# Patient Record
Sex: MALE | Race: WHITE | NOT HISPANIC OR LATINO | Employment: OTHER | URBAN - METROPOLITAN AREA
[De-identification: names, ages, dates, MRNs, and addresses within clinical notes are randomized per-mention and may not be internally consistent; named-entity substitution may affect disease eponyms.]

---

## 2022-02-08 ENCOUNTER — APPOINTMENT (EMERGENCY)
Dept: RADIOLOGY | Facility: HOSPITAL | Age: 57
DRG: 872 | End: 2022-02-08
Payer: MEDICARE

## 2022-02-08 ENCOUNTER — HOSPITAL ENCOUNTER (INPATIENT)
Facility: HOSPITAL | Age: 57
LOS: 8 days | Discharge: NURSING, REHAB OR LONG-TERM CARE | DRG: 872 | End: 2022-02-18
Attending: EMERGENCY MEDICINE | Admitting: INTERNAL MEDICINE
Payer: MEDICARE

## 2022-02-08 ENCOUNTER — APPOINTMENT (EMERGENCY)
Dept: CT IMAGING | Facility: HOSPITAL | Age: 57
DRG: 872 | End: 2022-02-08
Payer: MEDICARE

## 2022-02-08 DIAGNOSIS — K21.9 GASTROESOPHAGEAL REFLUX DISEASE WITHOUT ESOPHAGITIS: ICD-10-CM

## 2022-02-08 DIAGNOSIS — I85.00 ESOPHAGEAL VARICES WITHOUT BLEEDING, UNSPECIFIED ESOPHAGEAL VARICES TYPE (HCC): ICD-10-CM

## 2022-02-08 DIAGNOSIS — D50.0 IRON DEFICIENCY ANEMIA DUE TO CHRONIC BLOOD LOSS: ICD-10-CM

## 2022-02-08 DIAGNOSIS — I87.2 VENOUS STASIS DERMATITIS OF BOTH LOWER EXTREMITIES: ICD-10-CM

## 2022-02-08 DIAGNOSIS — R78.81 GRAM-POSITIVE BACTEREMIA: ICD-10-CM

## 2022-02-08 DIAGNOSIS — I10 PRIMARY HYPERTENSION: ICD-10-CM

## 2022-02-08 DIAGNOSIS — L03.116 CELLULITIS OF LEFT LOWER EXTREMITY: Primary | ICD-10-CM

## 2022-02-08 PROBLEM — R26.2 AMBULATORY DYSFUNCTION: Status: ACTIVE | Noted: 2022-02-08

## 2022-02-08 PROBLEM — G40.909 NONINTRACTABLE EPILEPSY WITHOUT STATUS EPILEPTICUS (HCC): Status: ACTIVE | Noted: 2022-02-08

## 2022-02-08 LAB
2HR DELTA HS TROPONIN: -6 NG/L
4HR DELTA HS TROPONIN: -6 NG/L
ALBUMIN SERPL BCP-MCNC: 2.1 G/DL (ref 3.5–5)
ALP SERPL-CCNC: 84 U/L (ref 46–116)
ALT SERPL W P-5'-P-CCNC: 17 U/L (ref 12–78)
AMMONIA PLAS-SCNC: 32 UMOL/L (ref 11–35)
ANION GAP SERPL CALCULATED.3IONS-SCNC: 3 MMOL/L (ref 4–13)
APTT PPP: 37 SECONDS (ref 23–37)
AST SERPL W P-5'-P-CCNC: 30 U/L (ref 5–45)
BASOPHILS # BLD AUTO: 0.03 THOUSANDS/ΜL (ref 0–0.1)
BASOPHILS NFR BLD AUTO: 0 % (ref 0–1)
BILIRUB SERPL-MCNC: 0.43 MG/DL (ref 0.2–1)
BUN SERPL-MCNC: 13 MG/DL (ref 5–25)
CALCIUM ALBUM COR SERPL-MCNC: 9.3 MG/DL (ref 8.3–10.1)
CALCIUM SERPL-MCNC: 7.8 MG/DL (ref 8.3–10.1)
CARDIAC TROPONIN I PNL SERPL HS: 2 NG/L
CARDIAC TROPONIN I PNL SERPL HS: 2 NG/L
CARDIAC TROPONIN I PNL SERPL HS: 8 NG/L
CHLORIDE SERPL-SCNC: 103 MMOL/L (ref 100–108)
CK SERPL-CCNC: 109 U/L (ref 39–308)
CO2 SERPL-SCNC: 31 MMOL/L (ref 21–32)
CREAT SERPL-MCNC: 0.59 MG/DL (ref 0.6–1.3)
EOSINOPHIL # BLD AUTO: 0.6 THOUSAND/ΜL (ref 0–0.61)
EOSINOPHIL NFR BLD AUTO: 6 % (ref 0–6)
ERYTHROCYTE [DISTWIDTH] IN BLOOD BY AUTOMATED COUNT: 17.7 % (ref 11.6–15.1)
GFR SERPL CREATININE-BSD FRML MDRD: 111 ML/MIN/1.73SQ M
GLUCOSE SERPL-MCNC: 93 MG/DL (ref 65–140)
HCT VFR BLD AUTO: 30.7 % (ref 36.5–49.3)
HGB BLD-MCNC: 8.7 G/DL (ref 12–17)
IMM GRANULOCYTES # BLD AUTO: 0.04 THOUSAND/UL (ref 0–0.2)
IMM GRANULOCYTES NFR BLD AUTO: 0 % (ref 0–2)
INR PPP: 1.26 (ref 0.84–1.19)
LACTATE SERPL-SCNC: 1.7 MMOL/L (ref 0.5–2)
LYMPHOCYTES # BLD AUTO: 0.88 THOUSANDS/ΜL (ref 0.6–4.47)
LYMPHOCYTES NFR BLD AUTO: 9 % (ref 14–44)
MCH RBC QN AUTO: 24.6 PG (ref 26.8–34.3)
MCHC RBC AUTO-ENTMCNC: 28.3 G/DL (ref 31.4–37.4)
MCV RBC AUTO: 87 FL (ref 82–98)
MONOCYTES # BLD AUTO: 0.91 THOUSAND/ΜL (ref 0.17–1.22)
MONOCYTES NFR BLD AUTO: 9 % (ref 4–12)
NEUTROPHILS # BLD AUTO: 7.19 THOUSANDS/ΜL (ref 1.85–7.62)
NEUTS SEG NFR BLD AUTO: 76 % (ref 43–75)
NRBC BLD AUTO-RTO: 0 /100 WBCS
NT-PROBNP SERPL-MCNC: 396 PG/ML
PLATELET # BLD AUTO: 198 THOUSANDS/UL (ref 149–390)
PMV BLD AUTO: 9.6 FL (ref 8.9–12.7)
POTASSIUM SERPL-SCNC: 3.6 MMOL/L (ref 3.5–5.3)
PROT SERPL-MCNC: 7.8 G/DL (ref 6.4–8.2)
PROTHROMBIN TIME: 15.7 SECONDS (ref 11.6–14.5)
RBC # BLD AUTO: 3.53 MILLION/UL (ref 3.88–5.62)
SODIUM SERPL-SCNC: 137 MMOL/L (ref 136–145)
TSH SERPL DL<=0.05 MIU/L-ACNC: 0.85 UIU/ML (ref 0.36–3.74)
WBC # BLD AUTO: 9.65 THOUSAND/UL (ref 4.31–10.16)

## 2022-02-08 PROCEDURE — 84443 ASSAY THYROID STIM HORMONE: CPT

## 2022-02-08 PROCEDURE — 85730 THROMBOPLASTIN TIME PARTIAL: CPT

## 2022-02-08 PROCEDURE — 85025 COMPLETE CBC W/AUTO DIFF WBC: CPT

## 2022-02-08 PROCEDURE — 82140 ASSAY OF AMMONIA: CPT

## 2022-02-08 PROCEDURE — 84484 ASSAY OF TROPONIN QUANT: CPT

## 2022-02-08 PROCEDURE — 82550 ASSAY OF CK (CPK): CPT

## 2022-02-08 PROCEDURE — 83605 ASSAY OF LACTIC ACID: CPT

## 2022-02-08 PROCEDURE — 87040 BLOOD CULTURE FOR BACTERIA: CPT

## 2022-02-08 PROCEDURE — 99285 EMERGENCY DEPT VISIT HI MDM: CPT

## 2022-02-08 PROCEDURE — 99220 PR INITIAL OBSERVATION CARE/DAY 70 MINUTES: CPT | Performed by: INTERNAL MEDICINE

## 2022-02-08 PROCEDURE — 85610 PROTHROMBIN TIME: CPT

## 2022-02-08 PROCEDURE — 93005 ELECTROCARDIOGRAM TRACING: CPT

## 2022-02-08 PROCEDURE — 87186 SC STD MICRODIL/AGAR DIL: CPT

## 2022-02-08 PROCEDURE — 71045 X-RAY EXAM CHEST 1 VIEW: CPT

## 2022-02-08 PROCEDURE — 70450 CT HEAD/BRAIN W/O DYE: CPT

## 2022-02-08 PROCEDURE — 80053 COMPREHEN METABOLIC PANEL: CPT

## 2022-02-08 PROCEDURE — 36415 COLL VENOUS BLD VENIPUNCTURE: CPT

## 2022-02-08 PROCEDURE — 73564 X-RAY EXAM KNEE 4 OR MORE: CPT

## 2022-02-08 PROCEDURE — 96365 THER/PROPH/DIAG IV INF INIT: CPT

## 2022-02-08 PROCEDURE — 99285 EMERGENCY DEPT VISIT HI MDM: CPT | Performed by: EMERGENCY MEDICINE

## 2022-02-08 PROCEDURE — 83880 ASSAY OF NATRIURETIC PEPTIDE: CPT

## 2022-02-08 RX ORDER — DIAZEPAM 10 MG/1
10 TABLET ORAL EVERY 12 HOURS PRN
COMMUNITY
End: 2022-02-18 | Stop reason: HOSPADM

## 2022-02-08 RX ORDER — CEFAZOLIN SODIUM 1 G/50ML
1000 SOLUTION INTRAVENOUS EVERY 8 HOURS
Status: DISCONTINUED | OUTPATIENT
Start: 2022-02-08 | End: 2022-02-09

## 2022-02-08 RX ORDER — PROPRANOLOL HYDROCHLORIDE 10 MG/1
10 TABLET ORAL 3 TIMES DAILY
COMMUNITY
End: 2022-02-18 | Stop reason: HOSPADM

## 2022-02-08 RX ORDER — PANTOPRAZOLE SODIUM 20 MG/1
20 TABLET, DELAYED RELEASE ORAL DAILY
Status: DISCONTINUED | OUTPATIENT
Start: 2022-02-09 | End: 2022-02-10

## 2022-02-08 RX ORDER — VANCOMYCIN HYDROCHLORIDE 1 G/200ML
1000 INJECTION, SOLUTION INTRAVENOUS ONCE
Status: DISCONTINUED | OUTPATIENT
Start: 2022-02-08 | End: 2022-02-08

## 2022-02-08 RX ORDER — SENNOSIDES 8.6 MG
1 TABLET ORAL DAILY
Status: DISCONTINUED | OUTPATIENT
Start: 2022-02-09 | End: 2022-02-18 | Stop reason: HOSPADM

## 2022-02-08 RX ORDER — FUROSEMIDE 40 MG/1
40 TABLET ORAL DAILY
Status: DISCONTINUED | OUTPATIENT
Start: 2022-02-09 | End: 2022-02-09

## 2022-02-08 RX ORDER — ONDANSETRON 2 MG/ML
4 INJECTION INTRAMUSCULAR; INTRAVENOUS EVERY 6 HOURS PRN
Status: DISCONTINUED | OUTPATIENT
Start: 2022-02-08 | End: 2022-02-18 | Stop reason: HOSPADM

## 2022-02-08 RX ORDER — IRON POLYSACCHARIDE COMPLEX 150 MG
150 CAPSULE ORAL 2 TIMES DAILY
COMMUNITY
End: 2022-05-11 | Stop reason: SDUPTHER

## 2022-02-08 RX ORDER — LEVETIRACETAM 500 MG/1
500 TABLET ORAL EVERY 12 HOURS SCHEDULED
COMMUNITY
End: 2022-05-11 | Stop reason: SDUPTHER

## 2022-02-08 RX ORDER — TRIAMCINOLONE ACETONIDE 1 MG/G
CREAM TOPICAL 2 TIMES DAILY
Status: DISCONTINUED | OUTPATIENT
Start: 2022-02-08 | End: 2022-02-18 | Stop reason: HOSPADM

## 2022-02-08 RX ORDER — FUROSEMIDE 40 MG/1
40 TABLET ORAL DAILY
COMMUNITY
End: 2022-02-18 | Stop reason: HOSPADM

## 2022-02-08 RX ORDER — OXCARBAZEPINE 600 MG/1
600 TABLET, FILM COATED ORAL EVERY 12 HOURS SCHEDULED
COMMUNITY
End: 2022-05-11 | Stop reason: SDUPTHER

## 2022-02-08 RX ORDER — PROPRANOLOL HYDROCHLORIDE 20 MG/1
10 TABLET ORAL 3 TIMES DAILY
Status: DISCONTINUED | OUTPATIENT
Start: 2022-02-08 | End: 2022-02-12

## 2022-02-08 RX ORDER — OXCARBAZEPINE 150 MG/1
600 TABLET, FILM COATED ORAL EVERY 12 HOURS SCHEDULED
Status: DISCONTINUED | OUTPATIENT
Start: 2022-02-08 | End: 2022-02-18 | Stop reason: HOSPADM

## 2022-02-08 RX ORDER — BETAMETHASONE DIPROPIONATE 0.5 MG/G
CREAM TOPICAL 2 TIMES DAILY
COMMUNITY

## 2022-02-08 RX ORDER — FOLIC ACID 1 MG/1
1 TABLET ORAL DAILY
Status: DISCONTINUED | OUTPATIENT
Start: 2022-02-09 | End: 2022-02-10

## 2022-02-08 RX ORDER — ACETAMINOPHEN 325 MG/1
650 TABLET ORAL EVERY 6 HOURS PRN
Status: DISCONTINUED | OUTPATIENT
Start: 2022-02-08 | End: 2022-02-18 | Stop reason: HOSPADM

## 2022-02-08 RX ORDER — PANTOPRAZOLE SODIUM 20 MG/1
20 TABLET, DELAYED RELEASE ORAL DAILY
COMMUNITY
End: 2022-02-18 | Stop reason: HOSPADM

## 2022-02-08 RX ORDER — DIAZEPAM 5 MG/1
10 TABLET ORAL EVERY 12 HOURS PRN
Status: DISCONTINUED | OUTPATIENT
Start: 2022-02-08 | End: 2022-02-18 | Stop reason: HOSPADM

## 2022-02-08 RX ORDER — FOLIC ACID 1 MG/1
1 TABLET ORAL DAILY
COMMUNITY
End: 2022-05-11 | Stop reason: SDUPTHER

## 2022-02-08 RX ORDER — LEVETIRACETAM 500 MG/1
500 TABLET ORAL EVERY 12 HOURS SCHEDULED
Status: DISCONTINUED | OUTPATIENT
Start: 2022-02-08 | End: 2022-02-18 | Stop reason: HOSPADM

## 2022-02-08 RX ADMIN — TRIAMCINOLONE ACETONIDE: 1 CREAM TOPICAL at 22:22

## 2022-02-08 RX ADMIN — LEVETIRACETAM 500 MG: 500 TABLET, FILM COATED ORAL at 22:20

## 2022-02-08 RX ADMIN — VANCOMYCIN HYDROCHLORIDE 1500 MG: 5 INJECTION, POWDER, LYOPHILIZED, FOR SOLUTION INTRAVENOUS at 18:08

## 2022-02-08 RX ADMIN — CEFEPIME HYDROCHLORIDE 2000 MG: 2 INJECTION, POWDER, FOR SOLUTION INTRAVENOUS at 15:34

## 2022-02-08 RX ADMIN — CEFAZOLIN SODIUM 1000 MG: 1 SOLUTION INTRAVENOUS at 22:20

## 2022-02-08 RX ADMIN — OXCARBAZEPINE 600 MG: 150 TABLET, FILM COATED ORAL at 22:20

## 2022-02-08 NOTE — ASSESSMENT & PLAN NOTE
Left lower extremity erythema and edema, likely due to chronic wounds  Received cefepime and vancomycin in ED  Will continue with Ancef  Blood cultures are pending  Monitor fever curve  Wound care consulted

## 2022-02-08 NOTE — ED PROVIDER NOTES
History  Chief Complaint   Patient presents with    Knee Swelling     c/o L knee swelling and pain; per pts brother, pt has had multiple falls at home  unsure if pt injured knee during one of the falls  redness and swelling noted during triage     Tequila Rebollar comes to the emergency department accompanied by his son after experiencing progressive ambulatory dysfunction and frequent falls at home pared with increasing redness and tenderness of his left knee  Patient recently has immigrated to the [facility] to reside with his family from [place]  He has not been able to ambulate on his own with the family ever since arriving to the family's residence in [place]   The remainder of the history was provided by the patient's son who is accompanying him at the bedside  Describes that he recently had a fall the previous night in which he struck his head in the bathroom  Secondary to only a minor cut to the left eyebrow, they did not seek medical evaluation at that time  Secondary to his increased in difficulty ambulating around the house, the increased progression of the red/beefy/tender rash on his knee, the decided the hospital for continued evaluation  Denies headaches, changes in vision, changes in hearing, chest pains, shortness of breath, nausea, vomiting, changes in urination, changes in bowel movements, or numbness/paresthesias  Describes that his lower extremities have been bilaterally swollen for some time and they always look like that  Patient denies any recent weight changes and/or worsening of his baseline lower extremity edema        History provided by:  Patient and relative   used: No    Rash  Location:  Leg  Leg rash location:  L knee  Quality: burning, painful and redness    Pain details:     Quality:  Sharp and hot    Severity:  Moderate    Onset quality:  Gradual    Duration:  5 days    Timing:  Constant    Progression:  Worsening  Severity:  Mild  Onset quality: Gradual  Duration:  5 days  Timing:  Constant  Progression:  Worsening  Chronicity:  New  Context: not animal contact, not chemical exposure, not diapers, not eggs, not exposure to similar rash, not food, not hot tub use, not insect bite/sting, not medications, not new detergent/soap, not nuts, not plant contact, not pollen, not pregnancy, not sick contacts and not sun exposure    Relieved by:  Nothing  Worsened by:  Nothing  Ineffective treatments:  None tried  Associated symptoms: no abdominal pain, no diarrhea, no fatigue, no fever, no headaches, no hoarse voice, no induration, no joint pain, no myalgias, no nausea, no periorbital edema, no shortness of breath, no sore throat, no throat swelling, no tongue swelling, no URI, not vomiting and not wheezing        None       No past medical history on file  No past surgical history on file  No family history on file  I have reviewed and agree with the history as documented  No existing history information found  No existing history information found  Social History     Tobacco Use    Smoking status: Not on file    Smokeless tobacco: Not on file   Substance Use Topics    Alcohol use: Not on file    Drug use: Not on file        Review of Systems   Constitutional: Negative for chills, fatigue and fever  HENT: Negative for ear pain, hoarse voice and sore throat  Eyes: Negative for pain and visual disturbance  Respiratory: Negative for cough, shortness of breath and wheezing  Cardiovascular: Negative for chest pain and palpitations  Gastrointestinal: Negative for abdominal pain, diarrhea, nausea and vomiting  Genitourinary: Negative for dysuria and hematuria  Musculoskeletal: Negative for arthralgias, back pain and myalgias  Skin: Positive for rash  Negative for color change  Neurological: Negative for seizures, syncope and headaches  All other systems reviewed and are negative        Physical Exam  ED Triage Vitals [02/08/22 9644] Temperature Pulse Respirations Blood Pressure SpO2   97 7 °F (36 5 °C) 88 18 101/65 98 %      Temp Source Heart Rate Source Patient Position - Orthostatic VS BP Location FiO2 (%)   Oral Monitor Sitting Right arm --      Pain Score       --             Orthostatic Vital Signs  Vitals:    02/08/22 1350   BP: 101/65   Pulse: 88   Patient Position - Orthostatic VS: Sitting       Physical Exam  Vitals and nursing note reviewed  Constitutional:       Appearance: He is well-developed  He is ill-appearing  HENT:      Head: Normocephalic and atraumatic  Right Ear: External ear normal       Left Ear: External ear normal       Nose: Nose normal  No congestion or rhinorrhea  Mouth/Throat:      Mouth: Mucous membranes are moist       Pharynx: No oropharyngeal exudate or posterior oropharyngeal erythema  Eyes:      General:         Right eye: No discharge  Left eye: No discharge  Extraocular Movements: Extraocular movements intact  Conjunctiva/sclera: Conjunctivae normal    Cardiovascular:      Rate and Rhythm: Normal rate and regular rhythm  Pulses: Normal pulses  Heart sounds: Normal heart sounds  Pulmonary:      Effort: Pulmonary effort is normal  No respiratory distress  Breath sounds: Normal breath sounds  No wheezing or rhonchi  Abdominal:      General: Abdomen is flat  Palpations: Abdomen is soft  Tenderness: There is no abdominal tenderness  There is no guarding or rebound  Musculoskeletal:         General: Swelling and tenderness present  No deformity or signs of injury  Cervical back: Neck supple  Right lower leg: No edema  Left lower leg: No edema  Skin:     General: Skin is warm and dry  Capillary Refill: Capillary refill takes less than 2 seconds  Coloration: Skin is jaundiced  Findings: Erythema and rash present  Neurological:      General: No focal deficit present        Mental Status: He is alert and oriented to person, place, and time  Psychiatric:         Mood and Affect: Mood normal                      ED Medications  Medications   vancomycin (VANCOCIN) 1500 mg in sodium chloride 0 9% 250 mL IVPB (has no administration in time range)   cefepime (MAXIPIME) 2 g/50 mL dextrose IVPB (2,000 mg Intravenous New Bag 2/8/22 1534)       Diagnostic Studies  Results Reviewed     Procedure Component Value Units Date/Time    HS Troponin I 4hr [[id number]]     Lab Status: No result Specimen: Blood     NT-BNP PRO [[id number]]  (Abnormal) Collected: 02/08/22 1436    Lab Status: Final result Specimen: Blood from Hand, Right Updated: 02/08/22 1534     NT-proBNP 396 pg/mL     TSH [[id number]]  (Normal) Collected: 02/08/22 1436    Lab Status: Final result Specimen: Blood from Hand, Right Updated: 02/08/22 1534     TSH 3RD GENERATON 0 848 uIU/mL     Narrative:      Patients undergoing fluorescein dye angiography may retain small amounts of fluorescein in the body for 48-72 hours post procedure  Samples containing fluorescein can produce falsely depressed TSH values  If the patient had this procedure,a specimen should be resubmitted post fluorescein clearance        Comprehensive metabolic panel [[id number]]  (Abnormal) Collected: 02/08/22 1436    Lab Status: Final result Specimen: Blood from Hand, Right Updated: 02/08/22 1534     Sodium 137 mmol/L      Potassium 3 6 mmol/L      Chloride 103 mmol/L      CO2 31 mmol/L      ANION GAP 3 mmol/L      BUN 13 mg/dL      Creatinine 0 59 mg/dL      Glucose 93 mg/dL      Calcium 7 8 mg/dL      Corrected Calcium 9 3 mg/dL      AST 30 U/L      ALT 17 U/L      Alkaline Phosphatase 84 U/L      Total Protein 7 8 g/dL      Albumin 2 1 g/dL      Total Bilirubin 0 43 mg/dL      eGFR 111 ml/min/1 73sq m     Narrative:      [place] guidelines for Chronic Kidney Disease (CKD):     Stage 1 with normal or high GFR (GFR > 90 mL/min/1 73 square meters)    Stage 2 Mild CKD (GFR = 60-89 mL/min/1 73 square meters)    Stage 3A Moderate CKD (GFR = 45-59 mL/min/1 73 square meters)    Stage 3B Moderate CKD (GFR = 30-44 mL/min/1 73 square meters)    Stage 4 Severe CKD (GFR = 15-29 mL/min/1 73 square meters)    Stage 5 End Stage CKD (GFR <15 mL/min/1 73 square meters)  Note: GFR calculation is accurate only with a steady state creatinine    Blood culture #1 [[id number]] Collected: 02/08/22 1529    Lab Status: In process Specimen: Blood from Arm, Left Updated: 02/08/22 1533    Blood culture #2 [[id number]] Collected: 02/08/22 1442    Lab Status: In process Specimen: Blood from Hand, Right Updated: 02/08/22 1533    HS Troponin 0hr (reflex protocol) [[id number]]  (Normal) Collected: 02/08/22 1436    Lab Status: Final result Specimen: Blood from Hand, Right Updated: 02/08/22 1525     hs TnI 0hr 8 ng/L     HS Troponin I 2hr [[id number]]     Lab Status: No result Specimen: Blood     Lactic acid [[id number]]  (Normal) Collected: 02/08/22 1436    Lab Status: Final result Specimen: Blood from Hand, Right Updated: 02/08/22 1521     LACTIC ACID 1 7 mmol/L     Narrative:      Result may be elevated if tourniquet was used during collection      Protime-INR [[id number]]  (Abnormal) Collected: 02/08/22 1436    Lab Status: Final result Specimen: Blood from Arm, Right Updated: 02/08/22 1518     Protime 15 7 seconds      INR 1 26    APTT [[id number]]  (Normal) Collected: 02/08/22 1436    Lab Status: Final result Specimen: Blood from Arm, Right Updated: 02/08/22 1518     PTT 37 seconds     CK Total with Reflex CKMB [[id number]]  (Normal) Collected: 02/08/22 1436    Lab Status: Final result Specimen: Blood from Arm, Right Updated: 02/08/22 1518     Total  U/L     Ammonia [[id number]]  (Normal) Collected: 02/08/22 1436    Lab Status: Final result Specimen: Blood from Hand, Right Updated: 02/08/22 1514     Ammonia 32 umol/L     CBC and differential [[id number]]  (Abnormal) Collected: 02/08/22 1436    Lab Status: Final result Specimen: Blood from Hand, Right Updated: 02/08/22 1508     WBC 9 65 Thousand/uL      RBC 3 53 Million/uL      Hemoglobin 8 7 g/dL      Hematocrit 30 7 %      MCV 87 fL      MCH 24 6 pg      MCHC 28 3 g/dL      RDW 17 7 %      MPV 9 6 fL      Platelets 705 Thousands/uL      nRBC 0 /100 WBCs      Neutrophils Relative 76 %      Immat GRANS % 0 %      Lymphocytes Relative 9 %      Monocytes Relative 9 %      Eosinophils Relative 6 %      Basophils Relative 0 %      Neutrophils Absolute 7 19 Thousands/µL      Immature Grans Absolute 0 04 Thousand/uL      Lymphocytes Absolute 0 88 Thousands/µL      Monocytes Absolute 0 91 Thousand/µL      Eosinophils Absolute 0 60 Thousand/µL      Basophils Absolute 0 03 Thousands/µL                  CT head without contrast   ED Interpretation by Jewel Evans MD (02/08 1642)   FINDINGS:     PARENCHYMA:  No intracranial mass, mass effect or midline shift  No CT signs of acute infarction  No acute parenchymal hemorrhage      VENTRICLES AND EXTRA-AXIAL SPACES:  Normal for the patient's age      VISUALIZED ORBITS AND PARANASAL SINUSES:  There is opacification and mildly enlargement of left nasolacrimal duct  No significant adjacent fat stranding  Mild mucosal thickening of right maxillary sinus and ethmoidal air cells      CALVARIUM AND EXTRACRANIAL SOFT TISSUES:  Most likely chronic nasal bone fracture deformity         IMPRESSION:     1  No acute intracranial CT abnormality      2   Opacified and mildly enlarged left nasolacrimal duct  No significant adjacent fat stranding to suggest acute dacryocystitis  Recommend nonurgent ENT consultation                        Workstation performed: [id number]      Final Result by Carlyle Mahoney MD (02/08 8557)      1  No acute intracranial CT abnormality  2   Opacified and mildly enlarged left nasolacrimal duct  No significant adjacent fat stranding to suggest acute dacryocystitis  Recommend nonurgent ENT consultation  Workstation performed: [id number]         XR knee 4+ views left injury   ED Interpretation by Maxime Read MD (02/08 1659)   No acute intraosseous pathology  XR chest 1 view portable   ED Interpretation by Maxime Read MD (02/08 1659)   FINDINGS:     Cardiomediastinal silhouette appears unremarkable      Increased interstitial prominence, findings can be seen with pulmonary edema versus atypical infection  Right lower lung consolidation  No pleural effusion  No pneumothorax      No acute osseous abnormality      Left upper quadrant surgical clips      IMPRESSION:     1  Right lower lung consolidation  Recommend continued short-term follow-up to ensure complete resolution  2   Increased interstitial prominence, findings can be seen with pulmonary edema versus atypical infection                   Workstation performed: [id number]      Final Result by Zaki Powell MD (02/08 1654)      1  Right lower lung consolidation  Recommend continued short-term follow-up to ensure complete resolution  2   Increased interstitial prominence, findings can be seen with pulmonary edema versus atypical infection                     Workstation performed: [id number]               Procedures  ECG 12 Lead Documentation Only    Date/Time: 2/8/2022 5:07 PM  Performed by: Maxime Read MD  Authorized by: Maxime Read MD     ECG reviewed by me, the ED Provider: yes    Patient location:  ED  Previous ECG:     Previous ECG:  Unavailable    Comparison to cardiac monitor: No    Interpretation:     Interpretation: abnormal    Rate:     ECG rate:  75    ECG rate assessment: normal    Rhythm:     Rhythm: sinus rhythm    Ectopy:     Ectopy: none    QRS:     QRS axis:  Normal    QRS intervals:  Normal  Conduction:     Conduction: abnormal      Abnormal conduction: 1st degree    ST segments:     ST segments:  Non-specific  T waves:     T waves: non-specific            ED Course                                       MDM  Number of Diagnoses or Management Options  Cellulitis of left lower extremity: new and requires workup  Diagnosis management comments: Za Torres comes to the emergency department accompanied by his family after worsening cellulitic changes over his left knee that has exacerbated/worsened his already declining ambulatory status  Based on his initial presentation to the emergency department, initial laboratory studies were conducted:  CBC was notable for hemoglobin of 8 7  CMP was unremarkable  Troponin was recorded 8  CK evaluation was unremarkable  Ammonia was unremarkable  TSH was unremarkable  PT-INR was elevated  PTT was within normal limits  BNP was elevated to 386  Secondary to the reported fall the previous evening with head strike, CT head was performed  CT head showed no acute intracranial pathology  X-rays of the left knee and chest were ordered  Chest x-ray was notable for findings consistent with a potential pneumonia versus pleural effusion in lower lungs  X-ray of the knee showed no acute intraosseous pathology  Patient was also provided with dosages of vanc and cefepime for presumed cellulitis secondary to bacterial infection  Blood cultures were also collected and sent at this time with a lactic acid sent that was unremarkable/recorded at 1 7  Secondary to his declining ambulatory status, remarkable skin findings/cellulitic changes, in need for antibiotics, it was decided the patient would benefit from IV antibiotic usage in the inpatient setting  This case was discussed with the inpatient team and was agreed upon an observation stay for the patient at the hospital   This plan was discussed with the family at the bedside who expressed understanding  Disposition:  Observation stay for IV antibiotics for cellulitis         Amount and/or Complexity of Data Reviewed  Clinical lab tests: ordered and reviewed  Tests in the radiology section of CPT®: ordered and reviewed  Review and summarize past medical records: yes  Discuss the patient with other providers: yes  Independent visualization of images, tracings, or specimens: yes    Risk of Complications, Morbidity, and/or Mortality  Presenting problems: moderate  Diagnostic procedures: moderate  Management options: moderate    Patient Progress  Patient progress: stable      Disposition  Final diagnoses:   Cellulitis of left lower extremity     Time reflects when diagnosis was documented in both MDM as applicable and the Disposition within this note     Time User Action Codes Description Comment    2/8/2022  4:47 PM Wenceslao Mcardle Add [L03 [id number]] Cellulitis of left lower extremity       ED Disposition     ED Disposition Condition Date/Time Comment    Admit Stable Tue Feb 8, 2022  4:47 PM Case was discussed with Irene Traylor and the patient's admission status was agreed to be Admission Status: observation status to the service of Dr Peggy Zarco  Follow-up Information    None         Patient's Medications    No medications on file     No discharge procedures on file  PDMP Review     None           ED Provider  Attending physically available and evaluated [facility]  I managed the patient along with the ED Attending      Electronically Signed by         Daniel Bustos MD  02/08/22 0168

## 2022-02-08 NOTE — ASSESSMENT & PLAN NOTE
Patient has a history of seizures since being a child  Will continue with Keppra and Trileptal  Seizure precautions

## 2022-02-08 NOTE — ASSESSMENT & PLAN NOTE
Patient presents with multiple falls over the last couple weeks  PT/OT consulted  Patient's brother is in agreement for SNF if needed

## 2022-02-08 NOTE — H&P
[address] 1965, 60 y o  male MRN: [id number]  Unit/Bed#: ED 25 Encounter: [id number]  Primary Care Provider: No primary care provider on file  Date and time admitted to hospital: 2/8/2022  1:49 PM    Venous stasis dermatitis of both lower extremities  Assessment & Plan  Continue with betamethasone cream  Leg elevation  Wound care consulted    Gastroesophageal reflux disease without esophagitis  Assessment & Plan  Continue pantoprazole    Primary hypertension  Assessment & Plan  Controlled  Continue Inderal and Lasix    Nonintractable epilepsy without status epilepticus [facility])  Assessment & Plan  Patient has a history of seizures since being a child  Will continue with Keppra and Trileptal  Seizure precautions    Ambulatory dysfunction  Assessment & Plan  Patient presents with multiple falls over the last couple weeks  PT/OT consulted  Patient's brother is in agreement for SNF if needed    * Cellulitis of left lower extremity  Assessment & Plan  Left lower extremity erythema and edema, likely due to chronic wounds  Received cefepime and vancomycin in ED  Will continue with Ancef  Blood cultures are pending  Monitor fever curve  Wound care consulted      VTE Pharmacologic Prophylaxis: VTE Score: 3 Moderate Risk (Score 3-4) - Pharmacological DVT Prophylaxis Ordered: enoxaparin (Lovenox)  Code Status: FULL  Discussion with family: Updated  (brother) at bedside  Anticipated Length of Stay: Patient will be admitted on an observation basis with an anticipated length of stay of less than 2 midnights secondary to IV abx and assessment by PT/OT  Total Time for Visit, including Counseling / Coordination of Care: 60 minutes Greater than 50% of this total time spent on direct patient counseling and coordination of care      Chief Complaint: LLE pain and fall    History of Present Illness:  Cee Payton is a 60 y o  male with a PMH of seizures and bilateral lower extremity wounds who presents with left lower extremity pain and fall  Patient's brother provides much of the history  He states that patient recently moved from [place] to the [address] [place] [address] Road  About 2 weeks ago  He has a medication list for him, but it is in [place], I tried to translate important medications with him  He states for the last few days patient has had increasing redness and warmth of his left lower extremity  States that he has always had bilateral lower extremity swelling and chronic wounds, but has recently developed this redness  He denies any fevers, chills, lightheadedness  He also states that his brother has been falling more recently  He does not use any assistive devices  In the ED ultrasound of his lower extremity did not reveal any abscess or pockets of air  Review of Systems:  Review of Systems   Constitutional: Negative for activity change, appetite change, chills, diaphoresis, fatigue and fever  HENT: Negative for congestion, rhinorrhea, sinus pressure, sinus pain and sore throat  Eyes: Negative  Respiratory: Negative for cough, chest tightness and shortness of breath  Cardiovascular: Negative for chest pain, palpitations and leg swelling  Gastrointestinal: Negative for abdominal distention, abdominal pain, constipation, diarrhea, nausea and vomiting  Endocrine: Negative  Genitourinary: Negative for difficulty urinating, dysuria, flank pain, frequency, hematuria and urgency  Musculoskeletal: Positive for gait problem  Negative for back pain and neck pain  Left leg pain swelling and redness   Skin: Negative  Allergic/Immunologic: Negative  Neurological: Negative for dizziness, syncope, speech difficulty, weakness, light-headedness and headaches  Hematological: Negative  Psychiatric/Behavioral: Negative  All other systems reviewed and are negative        Past Medical and Surgical History:   Past Medical History:   Diagnosis Date  Anxiety     GERD (gastroesophageal reflux disease)     Hypertension     Seizures (HCC)        Past Surgical History:   Procedure Laterality Date    SPLENECTOMY         Meds/Allergies:  Prior to Admission medications    Medication Sig Start Date End Date Taking? Authorizing Provider   betamethasone dipropionate (DIPROSONE) 0 05 % cream Apply topically 2 (two) times a day   Yes Historical Provider, MD   diazepam (VALIUM) 10 mg tablet Take 10 mg by mouth every 12 (twelve) hours as needed for anxiety   Yes Historical Provider, MD   folic acid (FOLVITE) 1 mg tablet Take 1 mg by mouth daily   Yes Historical Provider, MD   furosemide (LASIX) 40 mg tablet Take 40 mg by mouth daily   Yes Historical Provider, MD   iron polysaccharides (FERREX) 150 mg capsule Take 150 mg by mouth 2 (two) times a day   Yes Historical Provider, MD   levETIRAcetam (KEPPRA) 500 mg tablet Take 500 mg by mouth every 12 (twelve) hours   Yes Historical Provider, MD   OXcarbazepine (TRILEPTAL) 600 mg tablet Take 600 mg by mouth every 12 (twelve) hours   Yes Historical Provider, MD   pantoprazole (PROTONIX) 20 mg tablet Take 20 mg by mouth daily   Yes Historical Provider, MD   propranolol (INDERAL) 10 mg tablet Take 10 mg by mouth 3 (three) times a day   Yes Historical Provider, MD     I have reviewed home medications with patient family member      Allergies: No Known Allergies    Social History:  Marital Status: Single   Occupation:  Does not work  Patient Pre-hospital Living Situation: Home  Patient Pre-hospital Level of Mobility: walks  Patient Pre-hospital Diet Restrictions:  None  Substance Use History:   Social History     Substance and Sexual Activity   Alcohol Use Never     Social History     Tobacco Use   Smoking Status Never Smoker   Smokeless Tobacco Never Used     Social History     Substance and Sexual Activity   Drug Use Never       Family History:  Family History   Problem Relation Age of Onset    Depression Mother     Heart disease Father        Physical Exam:     Vitals:   Blood Pressure: 108/66 (02/08/22 1800)  Pulse: 84 (02/08/22 1800)  Temperature: 97 7 °F (36 5 °C) (02/08/22 1350)  Temp Source: Oral (02/08/22 1350)  Respirations: 16 (02/08/22 1800)  Weight - Scale: 79 4 kg (175 lb) (02/08/22 1517)  SpO2: 95 % (02/08/22 1800)    Physical Exam  Vitals and nursing note reviewed  Constitutional:       Appearance: He is normal weight  Comments: Chronically ill-appearing   HENT:      Head: Normocephalic and atraumatic  Right Ear: External ear normal       Left Ear: External ear normal       Nose: Nose normal       Mouth/Throat:      Mouth: Mucous membranes are moist       Pharynx: Oropharynx is clear  Eyes:      Conjunctiva/sclera: Conjunctivae normal       Pupils: Pupils are equal, round, and reactive to light  Cardiovascular:      Rate and Rhythm: Normal rate and regular rhythm  Pulses: Normal pulses  Heart sounds: Normal heart sounds  Pulmonary:      Effort: Pulmonary effort is normal       Breath sounds: Normal breath sounds  Abdominal:      General: Abdomen is flat  Bowel sounds are normal       Palpations: Abdomen is soft  Musculoskeletal:      Cervical back: Neck supple  No muscular tenderness  Comments: Bilateral lower extremity edema with chronic wounds, left lower extremity with erythema and increased swelling from ankle proximally to medial left knee   Skin:     General: Skin is warm and dry  Capillary Refill: Capillary refill takes less than 2 seconds  Neurological:      General: No focal deficit present  Mental Status: He is alert and oriented to person, place, and time  Mental status is at baseline  Psychiatric:         Mood and Affect: Mood normal          Behavior: Behavior normal          Thought Content:  Thought content normal          Judgment: Judgment normal           Additional Data:     Lab Results:  Results from last 7 days   Lab Units 02/08/22  1436   WBC Thousand/uL 9 65   HEMOGLOBIN g/dL 8 7*   HEMATOCRIT % 30 7*   PLATELETS Thousands/uL 198   NEUTROS PCT % 76*   LYMPHS PCT % 9*   MONOS PCT % 9   EOS PCT % 6     Results from last 7 days   Lab Units 02/08/22  1436   SODIUM mmol/L 137   POTASSIUM mmol/L 3 6   CHLORIDE mmol/L 103   CO2 mmol/L 31   BUN mg/dL 13   CREATININE mg/dL 0 59*   ANION GAP mmol/L 3*   CALCIUM mg/dL 7 8*   ALBUMIN g/dL 2 1*   TOTAL BILIRUBIN mg/dL 0 43   ALK PHOS U/L 84   ALT U/L 17   AST U/L 30   GLUCOSE RANDOM mg/dL 93     Results from last 7 days   Lab Units 02/08/22  1436   INR  1 26*             Results from last 7 days   Lab Units 02/08/22  1436   LACTIC ACID mmol/L 1 7       Imaging: Reviewed radiology reports from this admission including: chest xray, CT head and xray(s)  CT head without contrast   ED Interpretation by Igor Plummer MD (02/08 1642)   FINDINGS:     PARENCHYMA:  No intracranial mass, mass effect or midline shift  No CT signs of acute infarction  No acute parenchymal hemorrhage      VENTRICLES AND EXTRA-AXIAL SPACES:  Normal for the patient's age      VISUALIZED ORBITS AND PARANASAL SINUSES:  There is opacification and mildly enlargement of left nasolacrimal duct  No significant adjacent fat stranding  Mild mucosal thickening of right maxillary sinus and ethmoidal air cells      CALVARIUM AND EXTRACRANIAL SOFT TISSUES:  Most likely chronic nasal bone fracture deformity         IMPRESSION:     1  No acute intracranial CT abnormality      2   Opacified and mildly enlarged left nasolacrimal duct  No significant adjacent fat stranding to suggest acute dacryocystitis  Recommend nonurgent ENT consultation                        Workstation performed: [id number]      Final Result by Aniceto Orr MD (02/08 1637)      1  No acute intracranial CT abnormality  2   Opacified and mildly enlarged left nasolacrimal duct  No significant adjacent fat stranding to suggest acute dacryocystitis    Recommend nonurgent ENT consultation  Workstation performed: [id number]         XR knee 4+ views left injury   ED Interpretation by Mark Putnam MD (02/08 1659)   No acute intraosseous pathology  XR chest 1 view portable   ED Interpretation by Mark Putnam MD (02/08 1659)   FINDINGS:     Cardiomediastinal silhouette appears unremarkable      Increased interstitial prominence, findings can be seen with pulmonary edema versus atypical infection  Right lower lung consolidation  No pleural effusion  No pneumothorax      No acute osseous abnormality      Left upper quadrant surgical clips      IMPRESSION:     1  Right lower lung consolidation  Recommend continued short-term follow-up to ensure complete resolution  2   Increased interstitial prominence, findings can be seen with pulmonary edema versus atypical infection                   Workstation performed: [id number]      Final Result by Henry Kim MD (02/08 1654)      1  Right lower lung consolidation  Recommend continued short-term follow-up to ensure complete resolution  2   Increased interstitial prominence, findings can be seen with pulmonary edema versus atypical infection  Workstation performed: [id number]             EKG and Other Studies Reviewed on Admission:   · EKG: No EKG obtained  ** Please Note: This note has been constructed using a voice recognition system   **

## 2022-02-09 ENCOUNTER — APPOINTMENT (OUTPATIENT)
Dept: NON INVASIVE DIAGNOSTICS | Facility: HOSPITAL | Age: 57
DRG: 872 | End: 2022-02-09
Payer: MEDICARE

## 2022-02-09 ENCOUNTER — APPOINTMENT (OUTPATIENT)
Dept: CT IMAGING | Facility: HOSPITAL | Age: 57
DRG: 872 | End: 2022-02-09
Payer: MEDICARE

## 2022-02-09 PROBLEM — R78.81 GRAM-POSITIVE BACTEREMIA: Status: ACTIVE | Noted: 2022-02-09

## 2022-02-09 LAB
ANION GAP SERPL CALCULATED.3IONS-SCNC: 3 MMOL/L (ref 4–13)
AORTIC ROOT: 4 CM
APICAL FOUR CHAMBER EJECTION FRACTION: 58 %
ASCENDING AORTA: 3.4 CM (ref 2.03–3.04)
ATRIAL RATE: 74 BPM
AV PEAK GRADIENT: 4 MMHG
BUN SERPL-MCNC: 11 MG/DL (ref 5–25)
CALCIUM SERPL-MCNC: 7.8 MG/DL (ref 8.3–10.1)
CHLORIDE SERPL-SCNC: 104 MMOL/L (ref 100–108)
CO2 SERPL-SCNC: 32 MMOL/L (ref 21–32)
CREAT SERPL-MCNC: 0.54 MG/DL (ref 0.6–1.3)
DOP CALC RVOT PEAK VEL: 0.56 M/S
E WAVE DECELERATION TIME: 264 MS
ERYTHROCYTE [DISTWIDTH] IN BLOOD BY AUTOMATED COUNT: 17.9 % (ref 11.6–15.1)
FERRITIN SERPL-MCNC: 48 NG/ML (ref 8–388)
FOLATE SERPL-MCNC: >20 NG/ML (ref 3.1–17.5)
FRACTIONAL SHORTENING: 29 % (ref 28–44)
GFR SERPL CREATININE-BSD FRML MDRD: 115 ML/MIN/1.73SQ M
GLUCOSE P FAST SERPL-MCNC: 85 MG/DL (ref 65–99)
GLUCOSE SERPL-MCNC: 85 MG/DL (ref 65–140)
HCT VFR BLD AUTO: 28.3 % (ref 36.5–49.3)
HGB BLD-MCNC: 8 G/DL (ref 12–17)
INTERVENTRICULAR SEPTUM IN DIASTOLE (PARASTERNAL SHORT AXIS VIEW): 1 CM (ref 0.53–1)
IRON SATN MFR SERPL: 11 % (ref 20–50)
IRON SERPL-MCNC: 17 UG/DL (ref 65–175)
LEFT ATRIUM AREA SYSTOLE SINGLE PLANE A4C: 19.3 CM2
LEFT ATRIUM SIZE: 4.1 CM
LEFT INTERNAL DIMENSION IN SYSTOLE: 3.9 CM (ref 2.1–4)
LEFT VENTRICULAR INTERNAL DIMENSION IN DIASTOLE: 5.5 CM (ref 4.83–7.19)
LEFT VENTRICULAR POSTERIOR WALL IN END DIASTOLE: 1 CM (ref 0.52–0.99)
LEFT VENTRICULAR STROKE VOLUME: 82 ML
MCH RBC QN AUTO: 24.1 PG (ref 26.8–34.3)
MCHC RBC AUTO-ENTMCNC: 28.3 G/DL (ref 31.4–37.4)
MCV RBC AUTO: 85 FL (ref 82–98)
MV E'TISSUE VEL-SEP: 9 CM/S
MV PEAK A VEL: 0.99 M/S
MV PEAK E VEL: 95 CM/S
MV STENOSIS PRESSURE HALF TIME: 0 MS
P AXIS: 88 DEGREES
PLATELET # BLD AUTO: 190 THOUSANDS/UL (ref 149–390)
PMV BLD AUTO: 9.4 FL (ref 8.9–12.7)
POTASSIUM SERPL-SCNC: 3.5 MMOL/L (ref 3.5–5.3)
PR INTERVAL: 220 MS
PV PEAK GRADIENT: 2 MMHG
QRS AXIS: 50 DEGREES
QRSD INTERVAL: 92 MS
QT INTERVAL: 380 MS
QTC INTERVAL: 421 MS
RBC # BLD AUTO: 3.32 MILLION/UL (ref 3.88–5.62)
RIGHT ATRIUM AREA SYSTOLE A4C: 19.5 CM2
RIGHT VENTRICLE ID DIMENSION: 4.5 CM
SL CV LV EF: 55
SL CV PED ECHO LEFT VENTRICLE DIASTOLIC VOLUME (MOD BIPLANE) 2D: 146 ML
SL CV PED ECHO LEFT VENTRICLE SYSTOLIC VOLUME (MOD BIPLANE) 2D: 64 ML
SL CV RVOT MAX GRADIENT: 1 MMHG
SODIUM SERPL-SCNC: 139 MMOL/L (ref 136–145)
T WAVE AXIS: 71 DEGREES
TIBC SERPL-MCNC: 157 UG/DL (ref 250–450)
TR MAX PG: 21 MMHG
TRICUSPID VALVE PEAK REGURGITATION VELOCITY: 2.26 M/S
VENTRICULAR RATE: 74 BPM
VIT B12 SERPL-MCNC: 289 PG/ML (ref 100–900)
WBC # BLD AUTO: 9.44 THOUSAND/UL (ref 4.31–10.16)
Z-SCORE OF ASCENDING AORTA: 3.38
Z-SCORE OF INTERVENTRICULAR SEPTUM IN END DIASTOLE: 1.96
Z-SCORE OF LEFT VENTRICULAR DIMENSION IN END SYSTOLE: -0.68
Z-SCORE OF LEFT VENTRICULAR POSTERIOR WALL IN END DIASTOLE: 2.07

## 2022-02-09 PROCEDURE — 82607 VITAMIN B-12: CPT | Performed by: INTERNAL MEDICINE

## 2022-02-09 PROCEDURE — 93010 ELECTROCARDIOGRAM REPORT: CPT | Performed by: INTERNAL MEDICINE

## 2022-02-09 PROCEDURE — 93306 TTE W/DOPPLER COMPLETE: CPT

## 2022-02-09 PROCEDURE — 73701 CT LOWER EXTREMITY W/DYE: CPT

## 2022-02-09 PROCEDURE — 82728 ASSAY OF FERRITIN: CPT | Performed by: INTERNAL MEDICINE

## 2022-02-09 PROCEDURE — G1004 CDSM NDSC: HCPCS

## 2022-02-09 PROCEDURE — 83540 ASSAY OF IRON: CPT | Performed by: INTERNAL MEDICINE

## 2022-02-09 PROCEDURE — 80048 BASIC METABOLIC PNL TOTAL CA: CPT | Performed by: INTERNAL MEDICINE

## 2022-02-09 PROCEDURE — 83550 IRON BINDING TEST: CPT | Performed by: INTERNAL MEDICINE

## 2022-02-09 PROCEDURE — 93306 TTE W/DOPPLER COMPLETE: CPT | Performed by: INTERNAL MEDICINE

## 2022-02-09 PROCEDURE — 99225 PR SBSQ OBSERVATION CARE/DAY 25 MINUTES: CPT | Performed by: INTERNAL MEDICINE

## 2022-02-09 PROCEDURE — 82746 ASSAY OF FOLIC ACID SERUM: CPT | Performed by: INTERNAL MEDICINE

## 2022-02-09 PROCEDURE — 85027 COMPLETE CBC AUTOMATED: CPT | Performed by: INTERNAL MEDICINE

## 2022-02-09 RX ORDER — POTASSIUM CHLORIDE 20 MEQ/1
40 TABLET, EXTENDED RELEASE ORAL ONCE
Status: COMPLETED | OUTPATIENT
Start: 2022-02-09 | End: 2022-02-09

## 2022-02-09 RX ADMIN — IOHEXOL 100 ML: 350 INJECTION, SOLUTION INTRAVENOUS at 17:37

## 2022-02-09 RX ADMIN — TRIAMCINOLONE ACETONIDE: 1 CREAM TOPICAL at 16:31

## 2022-02-09 RX ADMIN — FOLIC ACID 1 MG: 1 TABLET ORAL at 08:22

## 2022-02-09 RX ADMIN — ENOXAPARIN SODIUM 40 MG: 40 INJECTION SUBCUTANEOUS at 08:21

## 2022-02-09 RX ADMIN — STANDARDIZED SENNA CONCENTRATE 8.6 MG: 8.6 TABLET ORAL at 08:21

## 2022-02-09 RX ADMIN — POTASSIUM CHLORIDE 40 MEQ: 1500 TABLET, EXTENDED RELEASE ORAL at 09:43

## 2022-02-09 RX ADMIN — OXCARBAZEPINE 600 MG: 150 TABLET, FILM COATED ORAL at 20:48

## 2022-02-09 RX ADMIN — ACETAMINOPHEN 650 MG: 325 TABLET, FILM COATED ORAL at 20:55

## 2022-02-09 RX ADMIN — PROPRANOLOL HYDROCHLORIDE 10 MG: 20 TABLET ORAL at 20:54

## 2022-02-09 RX ADMIN — PANTOPRAZOLE SODIUM 20 MG: 20 TABLET, DELAYED RELEASE ORAL at 08:21

## 2022-02-09 RX ADMIN — LEVETIRACETAM 500 MG: 500 TABLET, FILM COATED ORAL at 08:21

## 2022-02-09 RX ADMIN — PROPRANOLOL HYDROCHLORIDE 10 MG: 20 TABLET ORAL at 08:21

## 2022-02-09 RX ADMIN — VANCOMYCIN HYDROCHLORIDE 1250 MG: 5 INJECTION, POWDER, LYOPHILIZED, FOR SOLUTION INTRAVENOUS at 20:48

## 2022-02-09 RX ADMIN — CEFAZOLIN SODIUM 1000 MG: 1 SOLUTION INTRAVENOUS at 04:25

## 2022-02-09 RX ADMIN — VANCOMYCIN HYDROCHLORIDE 1250 MG: 5 INJECTION, POWDER, LYOPHILIZED, FOR SOLUTION INTRAVENOUS at 13:28

## 2022-02-09 RX ADMIN — ONDANSETRON 4 MG: 2 INJECTION INTRAMUSCULAR; INTRAVENOUS at 20:55

## 2022-02-09 RX ADMIN — LEVETIRACETAM 500 MG: 500 TABLET, FILM COATED ORAL at 20:48

## 2022-02-09 RX ADMIN — TRIAMCINOLONE ACETONIDE: 1 CREAM TOPICAL at 08:22

## 2022-02-09 RX ADMIN — OXCARBAZEPINE 600 MG: 150 TABLET, FILM COATED ORAL at 08:21

## 2022-02-09 RX ADMIN — FUROSEMIDE 40 MG: 40 TABLET ORAL at 08:22

## 2022-02-09 NOTE — ASSESSMENT & PLAN NOTE
Left lower extremity erythema and edema, likely cellulitis in the setting of chronic venous stasis and stasis dermatitis Received cefepime and vancomycin in ED  Started on IV Ancef on admission  Blood cultures from admission positive for Gram-positive cocci in clusters  Antibiotics changed to IV vancomycin today  Follow up on final blood culture report results  Continue to follow CBC and temperature curve closely  In view of bacteremia and cellulitis, will get CT of the lower extremity to rule out any underlying abscess

## 2022-02-09 NOTE — ASSESSMENT & PLAN NOTE
Patient presented with left lower extremity cellulitis  Blood cultures on admission positive for Gram-positive cocci in clusters  Will await isolation and sensitivity  Antibiotics switched to IV vancomycin  Pharmacy consulted  for dosing management     Follow-up on transthoracic echocardiogram  Repeat blood cultures ordered for 2/10

## 2022-02-09 NOTE — PLAN OF CARE
Problem: MOBILITY - ADULT  Goal: Maintain or return to baseline ADL function  Description: INTERVENTIONS:  -  Assess patient's ability to carry out ADLs; assess patient's baseline for ADL function and identify physical deficits which impact ability to perform ADLs (bathing, care of mouth/teeth, toileting, grooming, dressing, etc )  - Assess/evaluate cause of self-care deficits   - Assess range of motion  - Assess patient's mobility; develop plan if impaired  - Assess patient's need for assistive devices and provide as appropriate  - Encourage maximum independence but intervene and supervise when necessary  - Involve family in performance of ADLs  - Assess for home care needs following discharge   - Consider OT consult to assist with ADL evaluation and planning for discharge  - Provide patient education as appropriate  Outcome: Progressing  Goal: Maintains/Returns to pre admission functional level  Description: INTERVENTIONS:  - Perform BMAT or MOVE assessment daily    - Set and communicate daily mobility goal to care team and patient/family/caregiver  - Collaborate with rehabilitation services on mobility goals if consulted  - Perform Range of Motion times a day  - Reposition patient every  hours    - Dangle patient  times a day  - Stand patient  times a day  - Ambulate patient  times a day  - Out of bed to chair  times a day   - Out of bed for meals  times a day  - Out of bed for toileting  - Record patient progress and toleration of activity level   Outcome: Progressing     Problem: PAIN - ADULT  Goal: Verbalizes/displays adequate comfort level or baseline comfort level  Description: Interventions:  - Encourage patient to monitor pain and request assistance  - Assess pain using appropriate pain scale  - Administer analgesics based on type and severity of pain and evaluate response  - Implement non-pharmacological measures as appropriate and evaluate response  - Consider cultural and social influences on pain and pain management  - Notify physician/advanced practitioner if interventions unsuccessful or patient reports new pain  Outcome: Progressing     Problem: INFECTION - ADULT  Goal: Absence or prevention of progression during hospitalization  Description: INTERVENTIONS:  - Assess and monitor for signs and symptoms of infection  - Monitor lab/diagnostic results  - Monitor all insertion sites, i e  indwelling lines, tubes, and drains  - Monitor endotracheal if appropriate and nasal secretions for changes in amount and color  - [place] appropriate cooling/warming therapies per order  - Administer medications as ordered  - Instruct and encourage patient and family to use good hand hygiene technique  - Identify and instruct in appropriate isolation precautions for identified infection/condition  Outcome: Progressing  Goal: Absence of fever/infection during neutropenic period  Description: INTERVENTIONS:  - Monitor WBC    Outcome: Progressing     Problem: SAFETY ADULT  Goal: Maintain or return to baseline ADL function  Description: INTERVENTIONS:  -  Assess patient's ability to carry out ADLs; assess patient's baseline for ADL function and identify physical deficits which impact ability to perform ADLs (bathing, care of mouth/teeth, toileting, grooming, dressing, etc )  - Assess/evaluate cause of self-care deficits   - Assess range of motion  - Assess patient's mobility; develop plan if impaired  - Assess patient's need for assistive devices and provide as appropriate  - Encourage maximum independence but intervene and supervise when necessary  - Involve family in performance of ADLs  - Assess for home care needs following discharge   - Consider OT consult to assist with ADL evaluation and planning for discharge  - Provide patient education as appropriate  Outcome: Progressing  Goal: Maintains/Returns to pre admission functional level  Description: INTERVENTIONS:  - Perform BMAT or MOVE assessment daily    - Set and communicate daily mobility goal to care team and patient/family/caregiver  - Collaborate with rehabilitation services on mobility goals if consulted  - Perform Range of Motion  times a day  - Reposition patient every  hours    - Dangle patient  times a day  - Stand patient  times a day  - Ambulate patient  times a day  - Out of bed to chair  times a day   - Out of bed for meals  times a day  - Out of bed for toileting  - Record patient progress and toleration of activity level   Outcome: Progressing  Goal: Patient will remain free of falls  Description: INTERVENTIONS:  - Educate patient/family on patient safety including physical limitations  - Instruct patient to call for assistance with activity   - Consult OT/PT to assist with strengthening/mobility   - Keep Call bell within reach  - Keep bed low and locked with side rails adjusted as appropriate  - Keep care items and personal belongings within reach  - Initiate and maintain comfort rounds  - Make Fall Risk Sign visible to staff  - Offer Toileting every  Hours, in advance of need  - Initiate/Maintain alarm  - Obtain necessary fall risk management equipment:   - Apply yellow socks and bracelet for high fall risk patients  - Consider moving patient to room near nurses station  Outcome: Progressing     Problem: DISCHARGE PLANNING  Goal: Discharge to home or other facility with appropriate resources  Description: INTERVENTIONS:  - Identify barriers to discharge w/patient and caregiver  - Arrange for needed discharge resources and transportation as appropriate  - Identify discharge learning needs (meds, wound care, etc )  - Arrange for interpretive services to assist at discharge as needed  - Refer to Case Management Department for coordinating discharge planning if the patient needs post-hospital services based on physician/advanced practitioner order or complex needs related to functional status, cognitive ability, or social support system  Outcome: Progressing Problem: Knowledge Deficit  Goal: Patient/family/caregiver demonstrates understanding of disease process, treatment plan, medications, and discharge instructions  Description: Complete learning assessment and assess knowledge base    Interventions:  - Provide teaching at level of understanding  - Provide teaching via preferred learning methods  Outcome: Progressing     Problem: SKIN/TISSUE INTEGRITY - ADULT  Goal: Skin Integrity remains intact(Skin Breakdown Prevention)  Description: Assess:  -Perform [place] assessment every   -Clean and moisturize skin every   -Inspect skin when repositioning, toileting, and assisting with ADLS  -Assess under medical devices such as  every   -Assess extremities for adequate circulation and sensation     Bed Management:  -Have minimal linens on bed & keep smooth, unwrinkled  -Change linens as needed when moist or perspiring  -Avoid sitting or lying in one position for more than  hours while in bed  -Keep HOB at degrees     Toileting:  -Offer bedside commode  -Assess for incontinence every   -Use incontinent care products after each incontinent episode such as     Activity:  -Mobilize patient  times a day  -Encourage activity and walks on unit  -Encourage or provide ROM exercises   -Turn and reposition patient every  Hours  -Use appropriate equipment to lift or move patient in bed  -Instruct/ Assist with weight shifting every  when out of bed in chair  -Consider limitation of chair time  hour intervals    Skin Care:  -Avoid use of baby powder, tape, friction and shearing, hot water or constrictive clothing  -Relieve pressure over bony prominences using   -Do not massage red bony areas    Next Steps:  -Teach patient strategies to minimize risks such as    -Consider consults to  interdisciplinary teams such as   Outcome: Progressing  Goal: Incision(s), wounds(s) or drain site(s) healing without S/S of infection  Description: INTERVENTIONS  - Assess and document dressing, incision, wound bed, drain sites and surrounding tissue  - Provide patient and family education  - Perform skin care/dressing changes every   Outcome: Progressing  Goal: Pressure injury heals and does not worsen  Description: Interventions:  - Implement low air loss mattress or specialty surface (Criteria met)  - Apply silicone foam dressing  - Instruct/assist with weight shifting every  minutes when in chair   - Limit chair time to  hour intervals  - Use special pressure reducing interventions such as  when in chair   - Apply fecal or urinary incontinence containment device   - Perform passive or active ROM every   - Turn and reposition patient & offload bony prominences every  hours   - Utilize friction reducing device or surface for transfers   - Consider consults to  interdisciplinary teams such as   - Use incontinent care products after each incontinent episode such as   - Consider nutrition services referral as needed  Outcome: Progressing     Problem: MUSCULOSKELETAL - ADULT  Goal: Maintain or return mobility to safest level of function  Description: INTERVENTIONS:  - Assess patient's ability to carry out ADLs; assess patient's baseline for ADL function and identify physical deficits which impact ability to perform ADLs (bathing, care of mouth/teeth, toileting, grooming, dressing, etc )  - Assess/evaluate cause of self-care deficits   - Assess range of motion  - Assess patient's mobility  - Assess patient's need for assistive devices and provide as appropriate  - Encourage maximum independence but intervene and supervise when necessary  - Involve family in performance of ADLs  - Assess for home care needs following discharge   - Consider OT consult to assist with ADL evaluation and planning for discharge  - Provide patient education as appropriate  Outcome: Progressing  Goal: Maintain proper alignment of affected body part  Description: INTERVENTIONS:  - Support, maintain and protect limb and body alignment  - Provide patient/ family with appropriate education  Outcome: Progressing

## 2022-02-09 NOTE — PROGRESS NOTES
[facility]  Progress Note - Lillian Madisony 1965, 60 y o  male MRN: [id number]  Unit/Bed#: S -01 Encounter: [id number]  Primary Care Provider: No primary care provider on file  Date and time admitted to hospital: 2/8/2022  1:49 PM     Addendum  Patients  Brother updated over the phone    * Cellulitis of left lower extremity  Assessment & Plan  Left lower extremity erythema and edema, likely cellulitis in the setting of chronic venous stasis and stasis dermatitis Received cefepime and vancomycin in ED  Started on IV Ancef on admission  Blood cultures from admission positive for Gram-positive cocci in clusters  Antibiotics changed to IV vancomycin today  Follow up on final blood culture report results  Continue to follow CBC and temperature curve closely  In view of bacteremia and cellulitis, will get CT of the lower extremity to rule out any underlying abscess  Gram-positive bacteremia  Assessment & Plan  Patient presented with left lower extremity cellulitis  Blood cultures on admission positive for Gram-positive cocci in clusters  Will await isolation and sensitivity  Antibiotics switched to IV vancomycin  Pharmacy consulted  for dosing management     Follow-up on transthoracic echocardiogram  Repeat blood cultures ordered for 2/10    Venous stasis dermatitis of both lower extremities  Assessment & Plan  Continue with betamethasone cream  Leg elevation  Wound care consulted    Gastroesophageal reflux disease without esophagitis  Assessment & Plan  Continue pantoprazole    Primary hypertension  Assessment & Plan  Controlled  Continue Inderal with holding parameters  Will hold Lasix in view of borderline low blood pressure      Nonintractable epilepsy without status epilepticus [facility])  Assessment & Plan  Patient has a history of seizures since being a child  Will continue with Keppra and Trileptal  Seizure precautions          VTE Pharmacologic Prophylaxis: VTE Score: 3 Moderate Risk (Score 3-4) - Pharmacological DVT Prophylaxis Ordered: enoxaparin (Lovenox)  Patient Centered Rounds: I performed bedside rounds with nursing staff today  Discussions with Specialists or Other Care Team Provider:  Discussed with nursing and case management    Education and Discussions with Family / Patient: Message left for pts brother   Time Spent for Care: 20 minutes  More than 50% of total time spent on counseling and coordination of care as described above  Current Length of Stay: 0 day(s)  Current Patient Status: Observation   Certification Statement: The patient will continue to require additional inpatient hospital stay due to Ongoing IV antibiotics  Discharge Plan: Patient currently medically stable for discharge    Code Status: Level 1 - Full Code    Subjective:   Patient seen and examined  Still complains of pain in the left knee and leg    Objective:     Vitals:   Temp (24hrs), Av °F (36 7 °C), Min:97 7 °F (36 5 °C), Max:98 4 °F (36 9 °C)    Temp:  [97 7 °F (36 5 °C)-98 4 °F (36 9 °C)] 98 °F (36 7 °C)  HR:  [74-88] 74  Resp:  [16-19] 19  BP: ()/(50-66) 110/55  SpO2:  [93 %-98 %] 94 %  Body mass index is 23 73 kg/m²  Input and Output Summary (last 24 hours): Intake/Output Summary (Last 24 hours) at 2022 1241  Last data filed at 2022 0700  Gross per 24 hour   Intake 450 ml   Output 300 ml   Net 150 ml       Physical Exam:   Physical Exam  Constitutional:       Appearance: He is ill-appearing  HENT:      Head: Normocephalic  Mouth/Throat:      Mouth: Mucous membranes are moist    Eyes:      Extraocular Movements: Extraocular movements intact  Pupils: Pupils are equal, round, and reactive to light  Cardiovascular:      Rate and Rhythm: Normal rate and regular rhythm  Pulses: Normal pulses  Pulmonary:      Effort: Pulmonary effort is normal    Abdominal:      General: Abdomen is flat   Bowel sounds are normal       Palpations: Abdomen is soft    Musculoskeletal:         General: Swelling and tenderness present  Cervical back: Neck supple  Right lower leg: Edema present  Comments: Patient with extensive erythema ,tenderness and induration of the medial aspect of the left knee and leg  Dressing on top of the blistered area   Neurological:      General: No focal deficit present  Mental Status: He is alert  Mental status is at baseline  Psychiatric:         Mood and Affect: Mood normal                       Additional Data:     Labs:  Results from last 7 days   Lab Units 02/09/22 0453 02/08/22  1436 02/08/22  1436   WBC Thousand/uL 9 44   < > 9 65   HEMOGLOBIN g/dL 8 0*   < > 8 7*   HEMATOCRIT % 28 3*   < > 30 7*   PLATELETS Thousands/uL 190   < > 198   NEUTROS PCT %  --   --  76*   LYMPHS PCT %  --   --  9*   MONOS PCT %  --   --  9   EOS PCT %  --   --  6    < > = values in this interval not displayed  Results from last 7 days   Lab Units 02/09/22 0453 02/08/22  1436 02/08/22  1436   SODIUM mmol/L 139   < > 137   POTASSIUM mmol/L 3 5   < > 3 6   CHLORIDE mmol/L 104   < > 103   CO2 mmol/L 32   < > 31   BUN mg/dL 11   < > 13   CREATININE mg/dL 0 54*   < > 0 59*   ANION GAP mmol/L 3*   < > 3*   CALCIUM mg/dL 7 8*   < > 7 8*   ALBUMIN g/dL  --   --  2 1*   TOTAL BILIRUBIN mg/dL  --   --  0 43   ALK PHOS U/L  --   --  84   ALT U/L  --   --  17   AST U/L  --   --  30   GLUCOSE RANDOM mg/dL 85   < > 93    < > = values in this interval not displayed       Results from last 7 days   Lab Units 02/08/22  1436   INR  1 26*             Results from last 7 days   Lab Units 02/08/22  1436   LACTIC ACID mmol/L 1 7       Lines/Drains:  Invasive Devices  Report    Peripheral Intravenous Line            Peripheral IV 02/09/22 Left;Upper;Ventral (anterior) Arm <1 day                      Imaging: Reviewed radiology reports from this admission including: xray(s)    Recent Cultures (last 7 days):   Results from last 7 days   Lab Units 02/08/22  1529 02/08/22  1442   GRAM STAIN RESULT  Gram positive cocci in clusters* Gram positive cocci in clusters*       Last 24 Hours Medication List:   Current Facility-Administered Medications   Medication Dose Route Frequency Provider Last Rate    acetaminophen  650 mg Oral Q6H PRN Susan Ruggiero MD      diazepam  10 mg Oral Q12H PRN Susan Ruggiero MD      enoxaparin  40 mg Subcutaneous Daily Susan Ruggiero MD      folic acid  1 mg Oral Daily Susan Ruggiero MD      levETIRAcetam  500 mg Oral Q12H [address] Susan Ruggiero MD      ondansetron  4 mg Intravenous Q6H PRN Susan Ruggiero MD      OXcarbazepine  600 mg Oral Q12H [address] Susan Ruggiero MD      pantoprazole  20 mg Oral Daily Susan Ruggiero MD      propranolol  10 mg Oral TID Susan Ruggiero MD      senna  1 tablet Oral Daily Susan Ruggiero MD      triamcinolone   Topical BID Susan Ruggiero MD      vancomycin  15 mg/kg Intravenous Zach Rae MD          Today, Patient Was Seen By: Vamsi Serna MD    **Please Note: This note may have been constructed using a voice recognition system  **

## 2022-02-09 NOTE — PHYSICAL THERAPY NOTE
Physical Therapy Cancellation Note       02/09/22 1539   PT Last Visit   PT Visit Date 02/09/22   Note Type   Note type Evaluation   Cancel Reasons Other   Additional Comments referral received for PT eval and tx  pt is pending CT of L LE to rule out abscess  will await imaging result and initiate PT as appropriate       Mark Huerta, PT

## 2022-02-09 NOTE — PROGRESS NOTES
Vancomycin Assessment    Jose L Serna is a 60 y o  male who is currently receiving vancomycin 1250 mg IV q12h for skin-soft tissue infection     Relevant clinical data and objective history reviewed:  Creatinine   Date Value Ref Range Status   02/09/2022 0 54 (L) 0 60 - 1 30 mg/dL Final     Comment:     Standardized to IDMS reference method   02/08/2022 0 59 (L) 0 60 - 1 30 mg/dL Final     Comment:     Standardized to IDMS reference method     /55 (BP Location: Right arm)   Pulse 74   Temp 98 °F (36 7 °C)   Resp 19   Ht 6' (1 829 m)   Wt 79 4 kg (175 lb)   SpO2 94%   BMI 23 73 kg/m²   I/O last 3 completed shifts: In: 450 [P O :350; IV Piggyback:100]  Out: 300 [Urine:300]  Lab Results   Component Value Date/Time    BUN 11 02/09/2022 04:53 AM    WBC 9 44 02/09/2022 04:53 AM    HGB 8 0 (L) 02/09/2022 04:53 AM    HCT 28 3 (L) 02/09/2022 04:53 AM    MCV 85 02/09/2022 04:53 AM     02/09/2022 04:53 AM     Temp Readings from Last 3 Encounters:   02/09/22 98 °F (36 7 °C)     Vancomycin Days of Therapy: 1    Assessment/Plan  The patient is currently on vancomycin utilizing scheduled dosing based on actual body weight  Baseline risks associated with therapy include: dehydration  The patient is currently receiving 1250 mg IV q12h and after clinical evaluation will be changed to 1250 mg IV q8h  Pharmacy will also follow closely for s/sx of nephrotoxicity, infusion reactions, and appropriateness of therapy  BMP and CBC will be ordered per protocol  Plan for trough as patient approaches steady state, prior to the 4th  dose at approximately 1200 on 02/10  Due to infection severity, will target a trough of 15-20 (appropriate for most indications)   Pharmacy will continue to follow the patients culture results and clinical progress daily      Tripp Araya, Pharmacist

## 2022-02-09 NOTE — NURSING NOTE
Pt  States he is "pissed off" that he was woken up this am  Pt  Was woken up due IV being out and needing medication  Pt  Noted he pulled up both IVs and "made a mess" pt  Showed my where he dumped everything in the corner of the bed

## 2022-02-09 NOTE — UTILIZATION REVIEW
Initial Clinical Review    WAS OBSERVATION 02/08/2022 @ 1648, CONVERTED TO INPATIENT ADMISSION 02/10/2022, DUE TO CONTINUED STAY REQUIRED TO CARE FOR PATIENT WITH    Gram + Bacteremia (see below)  Admission: Date/Time/Statement:   Admission Orders (From admission, onward)     Ordered        02/10/22 0821  Inpatient Admission  Once            02/08/22 1648  Place in Observation  Once                      Orders Placed This Encounter   Procedures    Inpatient Admission     Standing Status:   Standing     Number of Occurrences:   1     Order Specific Question:   Level of Care     Answer:   Med Surg [16]     Order Specific Question:   Estimated length of stay     Answer:   More than 2 Midnights     Order Specific Question:   Certification     Answer:   I certify that inpatient services are medically necessary for this patient for a duration of greater than two midnights  See H&P and MD Progress Notes for additional information about the patient's course of treatment  ED Arrival Information     Expected Arrival Acuity    - 2/8/2022 13:24 Urgent         Means of arrival Escorted by Service Admission type    Wheelchair Family Member Hospitalist Urgent         Arrival complaint    left knee swelling        Chief Complaint   Patient presents with    Knee Swelling     c/o L knee swelling and pain; per pts brother, pt has had multiple falls at home  unsure if pt injured knee during one of the falls  redness and swelling noted during triage       Initial Presentation: 60year old male, presented to the ED @ [facility], from home, via wheelchair, with son  Admitted as Observation due to Cellulitis of left lower extremity  PMH:  Anxiety, GERD, HTN, Seizures  Date: 02/08/2022  Reports experiencing progressive ambulatory dysfunction and frequent falls at home pared with increasing redness & tenderness to left knee    Patient recently has immigrated to the [place] (2[place] ago) from [facility] to reside with his family  Left lower extremity erythema and edema, likely due to chronic wounds  Received cefepime and vancomycin in ED  Will continue with Ancef  Blood cultures are pending  Monitor fever curve  Wound care consulted  Consult PT/OT  Continue Keppra & trileptal   Seizure precautions  Continue inderal & lasix  Venous statis dermatitis of both lower extremities - continue Betamethasone cream   Leg elevation  Day 2: 02/09/2022  Continue IV abx switched to vanco   Follow up on blood cultures:  Gram + Bacteremia  Monitor & trend CBC  Monitor Temperature curve closely  Day 1:  02/10/2022  Gram + Bacteremia  Continue IV Abx  Monitor and trend CBC,  Monitor & Trend temperature curve  VTE Pharmacologic Prophylaxis: VTE Score: 3 Moderate Risk (Score 3-4) - Pharmacological DVT Prophylaxis Ordered: enoxaparin (Lovenox)  ED Triage Vitals   Temperature Pulse Respirations Blood Pressure SpO2   02/08/22 1350 02/08/22 1350 02/08/22 1350 02/08/22 1350 02/08/22 1350   97 7 °F (36 5 °C) 88 18 101/65 98 %      Temp Source Heart Rate Source Patient Position - Orthostatic VS BP Location FiO2 (%)   02/08/22 1350 02/08/22 1350 02/08/22 1350 02/08/22 1350 --   Oral Monitor Sitting Right arm       Pain Score       02/08/22 2227       No Pain          Wt Readings from Last 1 Encounters:   02/09/22 79 4 kg (175 lb)     Additional Vital Signs:   Date/Time Temp Pulse Resp BP MAP (mmHg) SpO2 O2 Device Patient Position - Orthostatic VS   02/09/22 0700 98 °F (36 7 °C) 74 19 110/55 -- 94 % -- Lying   02/08/22 2100 98 4 °F (36 9 °C) 80 18 102/50 -- 93 % None (Room air) Lying   02/08/22 2020 -- 86 18 97/66 -- 93 % None (Room air) Lying   02/08/22 1900 -- 82 18 102/66 78 95 % None (Room air) Lying   02/08/22 1800 -- 84 16 108/66 82 95 % -- --     02/08/2022 @ 1622  CT head:  1   No acute intracranial CT abnormality     2   Opacified and mildly enlarged left nasolacrimal duct   No significant adjacent fat stranding to suggest acute dacryocystitis   Recommend nonurgent ENT consultation  2022 @ 0714  Left knee X:  No acute osseous abnormality  2022 @ 1649  Chest X:  1   Right lower lung consolidation  Recommend continued short-term follow-up to ensure complete resolution  2   Increased interstitial prominence, findings can be seen with pulmonary edema versus atypical infection       2022 @ 1713  EC, NSR    Pertinent Labs/Diagnostic Test Results:     Results from last 7 days   Lab Units 02/10/22  04522  0453 22  1436   WBC Thousand/uL 9 15 9 44 9 65   HEMOGLOBIN g/dL 7 4* 8 0* 8 7*   HEMATOCRIT % 26 5* 28 3* 30 7*   PLATELETS Thousands/uL 200 190 198   NEUTROS ABS Thousands/µL 6 84  --  7 19     Results from last 7 days   Lab Units 02/10/22  04522  0453 22  1436   SODIUM mmol/L 139 139 137   POTASSIUM mmol/L 3 2* 3 5 3 6   CHLORIDE mmol/L 105 104 103   CO2 mmol/L 31 32 31   ANION GAP mmol/L 3* 3* 3*   BUN mg/dL 9 11 13   CREATININE mg/dL 0 56* 0 54* 0 59*   EGFR ml/min/1 73sq m 114 115 111   CALCIUM mg/dL 7 5* 7 8* 7 8*     Results from last 7 days   Lab Units 02/10/22  04522  1436   AST U/L 24 30   ALT U/L 13 17   ALK PHOS U/L 79 84   TOTAL PROTEIN g/dL 6 4 7 8   ALBUMIN g/dL 1 6* 2 1*   TOTAL BILIRUBIN mg/dL 0 28 0 43   AMMONIA umol/L  --  32     Results from last 7 days   Lab Units 02/10/22  04522  0453 22  1436   GLUCOSE RANDOM mg/dL 114 85 93       Results from last 7 days   Lab Units 22  1436   CK TOTAL U/L 109     Results from last 7 days   Lab Units 22  1913 22  1714 22  1436   HS TNI 0HR ng/L  --   --  8   HS TNI 2HR ng/L  --  2  --    HSTNI D2 ng/L  --  -6  --    HS TNI 4HR ng/L 2  --   --    HSTNI D4 ng/L -6  --   --      Results from last 7 days   Lab Units 22  1436   PROTIME seconds 15 7*   INR  1 26*   PTT seconds 37     Results from last 7 days   Lab Units 22  1436   TSH 3RD GENERATON uIU/mL 0 848     Results from last 7 days   Lab Units 02/08/22  1436   LACTIC ACID mmol/L 1 7     Results from last 7 days   Lab Units 02/08/22  1436   NT-PRO BNP pg/mL 396*     Results from last 7 days   Lab Units 02/10/22  0009 02/10/22  0003 02/08/22  1529 02/08/22  1442   BLOOD CULTURE  Received in Microbiology Lab  Culture in Progress  Received in Microbiology Lab  Culture in Progress    --   --    GRAM STAIN RESULT   --   --  Gram positive cocci in clusters* Gram positive cocci in clusters*     ED Treatment:   Medication Administration from 02/08/2022 1324 to 02/08/2022 2053       Date/Time Order Dose Route Action     02/08/2022 1534 cefepime (MAXIPIME) 2 g/50 mL dextrose IVPB 2,000 mg Intravenous New Bag     02/08/2022 1808 vancomycin (VANCOCIN) 1500 mg in sodium chloride 0 9% 250 mL IVPB 1,500 mg Intravenous New Bag        Past Medical History:   Diagnosis Date    Anxiety     GERD (gastroesophageal reflux disease)     Hypertension     Seizures (HCC)      Present on Admission:   Cellulitis of left lower extremity   Ambulatory dysfunction   Nonintractable epilepsy without status epilepticus ([facility] [address] )   Primary hypertension   Gastroesophageal reflux disease without esophagitis   Venous stasis dermatitis of both lower extremities      Admitting Diagnosis:  Gram-positive bacteremia [R78 81]  Swelling of joint of left knee [M25 462]  Cellulitis of left lower extremity [L03 116]  Age/Sex: 60 y o  male  Admission Orders:  Consult PT/OT  [place] [facility]    Scheduled Medications:  enoxaparin, 40 mg, Subcutaneous, Daily  levETIRAcetam, 500 mg, Oral, Q12H JODIE  OXcarbazepine, 600 mg, Oral, Q12H JODIE  pantoprazole, 20 mg, Oral, Daily  potassium chloride, 40 mEq, Oral, Once  propranolol, 10 mg, Oral, TID  senna, 1 tablet, Oral, Daily  triamcinolone, , Topical, BID  vancomycin, 15 mg/kg, Intravenous, Q8H      Continuous IV Infusions:     PRN Meds:  acetaminophen, 650 mg, Oral, Q6H PRN  X 1 dose 2/9  diazepam, 10 mg, Oral, Q12H PRN  ondansetron, 4 mg, Intravenous, Q6H PRN  X 1 dose 2/9            Network Utilization Review Department  ATTENTION: Please call with any questions or concerns to [phone] and carefully listen to the prompts so that you are directed to the right person  All voicemails are confidential   Jannie Davison all requests for admission clinical reviews, approved or denied determinations and any other requests to dedicated fax number below belonging to the campus where the patient is receiving treatment   List of dedicated fax numbers for the Facilities:  [address] DENIALS (Administrative/Medical Necessity) [phone]   [address] (Maternity/NICU/Pediatrics) [phone]   [address]  [address] [facility] [place] [address] [id number] [address] [facility] [place] [address] [phone]

## 2022-02-09 NOTE — ASSESSMENT & PLAN NOTE
Controlled  Continue Inderal with holding parameters  Will hold Lasix in view of borderline low blood pressure

## 2022-02-09 NOTE — ED ATTENDING ATTESTATION
2/8/2022  IJeferson MD, saw and evaluated the patient  I have discussed the patient with the resident/non-physician practitioner and agree with the resident's/non-physician practitioner's findings, Plan of Care, and MDM as documented in the resident's/non-physician practitioner's note, except where noted  All available labs and Radiology studies were reviewed  I was present for key portions of any procedure(s) performed by the resident/non-physician practitioner and I was immediately available to provide assistance  At this point I agree with the current assessment done in the Emergency Department  I have conducted an independent evaluation of this patient a history and physical is as follows: Pt with multiple fall and redness to the medial aspect of left knee  Tracking erythema  Concerning for infection  Pt looks toxic       Will start broad spectrum antibiotics and will need to be admitted to hospital    ED Course         Critical Care Time  Procedures

## 2022-02-10 ENCOUNTER — APPOINTMENT (INPATIENT)
Dept: ULTRASOUND IMAGING | Facility: HOSPITAL | Age: 57
DRG: 872 | End: 2022-02-10
Payer: MEDICARE

## 2022-02-10 PROBLEM — Z90.81 STATUS POST SPLENECTOMY: Status: ACTIVE | Noted: 2022-02-10

## 2022-02-10 PROBLEM — D64.9 ANEMIA: Status: ACTIVE | Noted: 2022-02-10

## 2022-02-10 PROBLEM — A41.9 SEPSIS (HCC): Status: ACTIVE | Noted: 2022-02-10

## 2022-02-10 LAB
ALBUMIN SERPL BCP-MCNC: 1.6 G/DL (ref 3.5–5)
ALP SERPL-CCNC: 79 U/L (ref 46–116)
ALT SERPL W P-5'-P-CCNC: 13 U/L (ref 12–78)
ANION GAP SERPL CALCULATED.3IONS-SCNC: 3 MMOL/L (ref 4–13)
AST SERPL W P-5'-P-CCNC: 24 U/L (ref 5–45)
BASOPHILS # BLD AUTO: 0.02 THOUSANDS/ΜL (ref 0–0.1)
BASOPHILS NFR BLD AUTO: 0 % (ref 0–1)
BILIRUB SERPL-MCNC: 0.28 MG/DL (ref 0.2–1)
BUN SERPL-MCNC: 9 MG/DL (ref 5–25)
CALCIUM ALBUM COR SERPL-MCNC: 9.4 MG/DL (ref 8.3–10.1)
CALCIUM SERPL-MCNC: 7.5 MG/DL (ref 8.3–10.1)
CHLORIDE SERPL-SCNC: 105 MMOL/L (ref 100–108)
CO2 SERPL-SCNC: 31 MMOL/L (ref 21–32)
CREAT SERPL-MCNC: 0.56 MG/DL (ref 0.6–1.3)
EOSINOPHIL # BLD AUTO: 0.25 THOUSAND/ΜL (ref 0–0.61)
EOSINOPHIL NFR BLD AUTO: 3 % (ref 0–6)
ERYTHROCYTE [DISTWIDTH] IN BLOOD BY AUTOMATED COUNT: 18 % (ref 11.6–15.1)
GFR SERPL CREATININE-BSD FRML MDRD: 114 ML/MIN/1.73SQ M
GLUCOSE SERPL-MCNC: 114 MG/DL (ref 65–140)
HCT VFR BLD AUTO: 26.5 % (ref 36.5–49.3)
HEMOCCULT STL QL: NEGATIVE
HEMOCCULT STL QL: NORMAL
HEMOCCULT STL QL: NORMAL
HGB BLD-MCNC: 7.4 G/DL (ref 12–17)
IMM GRANULOCYTES # BLD AUTO: 0.07 THOUSAND/UL (ref 0–0.2)
IMM GRANULOCYTES NFR BLD AUTO: 1 % (ref 0–2)
LACTATE SERPL-SCNC: 1.7 MMOL/L (ref 0.5–2)
LYMPHOCYTES # BLD AUTO: 1.14 THOUSANDS/ΜL (ref 0.6–4.47)
LYMPHOCYTES NFR BLD AUTO: 13 % (ref 14–44)
MCH RBC QN AUTO: 23.9 PG (ref 26.8–34.3)
MCHC RBC AUTO-ENTMCNC: 27.9 G/DL (ref 31.4–37.4)
MCV RBC AUTO: 86 FL (ref 82–98)
MONOCYTES # BLD AUTO: 0.83 THOUSAND/ΜL (ref 0.17–1.22)
MONOCYTES NFR BLD AUTO: 9 % (ref 4–12)
NEUTROPHILS # BLD AUTO: 6.84 THOUSANDS/ΜL (ref 1.85–7.62)
NEUTS SEG NFR BLD AUTO: 74 % (ref 43–75)
NRBC BLD AUTO-RTO: 0 /100 WBCS
PLATELET # BLD AUTO: 200 THOUSANDS/UL (ref 149–390)
PMV BLD AUTO: 9.4 FL (ref 8.9–12.7)
POTASSIUM SERPL-SCNC: 3.2 MMOL/L (ref 3.5–5.3)
PROCALCITONIN SERPL-MCNC: <0.05 NG/ML
PROT SERPL-MCNC: 6.4 G/DL (ref 6.4–8.2)
RBC # BLD AUTO: 3.09 MILLION/UL (ref 3.88–5.62)
SODIUM SERPL-SCNC: 139 MMOL/L (ref 136–145)
VANCOMYCIN TROUGH SERPL-MCNC: 23.2 UG/ML (ref 10–20)
WBC # BLD AUTO: 9.15 THOUSAND/UL (ref 4.31–10.16)

## 2022-02-10 PROCEDURE — 80053 COMPREHEN METABOLIC PANEL: CPT | Performed by: INTERNAL MEDICINE

## 2022-02-10 PROCEDURE — 99223 1ST HOSP IP/OBS HIGH 75: CPT | Performed by: INTERNAL MEDICINE

## 2022-02-10 PROCEDURE — 83605 ASSAY OF LACTIC ACID: CPT | Performed by: INTERNAL MEDICINE

## 2022-02-10 PROCEDURE — 0Y9D0ZZ DRAINAGE OF LEFT UPPER LEG, OPEN APPROACH: ICD-10-PCS | Performed by: ORTHOPAEDIC SURGERY

## 2022-02-10 PROCEDURE — 99232 SBSQ HOSP IP/OBS MODERATE 35: CPT | Performed by: INTERNAL MEDICINE

## 2022-02-10 PROCEDURE — 10061 I&D ABSCESS COMP/MULTIPLE: CPT | Performed by: PHYSICIAN ASSISTANT

## 2022-02-10 PROCEDURE — 99254 IP/OBS CNSLTJ NEW/EST MOD 60: CPT | Performed by: PHYSICIAN ASSISTANT

## 2022-02-10 PROCEDURE — 80202 ASSAY OF VANCOMYCIN: CPT | Performed by: INTERNAL MEDICINE

## 2022-02-10 PROCEDURE — 87070 CULTURE OTHR SPECIMN AEROBIC: CPT | Performed by: INTERNAL MEDICINE

## 2022-02-10 PROCEDURE — 76700 US EXAM ABDOM COMPLETE: CPT

## 2022-02-10 PROCEDURE — 87205 SMEAR GRAM STAIN: CPT | Performed by: INTERNAL MEDICINE

## 2022-02-10 PROCEDURE — 82272 OCCULT BLD FECES 1-3 TESTS: CPT | Performed by: INTERNAL MEDICINE

## 2022-02-10 PROCEDURE — 84145 PROCALCITONIN (PCT): CPT | Performed by: INTERNAL MEDICINE

## 2022-02-10 PROCEDURE — 85025 COMPLETE CBC W/AUTO DIFF WBC: CPT | Performed by: INTERNAL MEDICINE

## 2022-02-10 PROCEDURE — 87040 BLOOD CULTURE FOR BACTERIA: CPT | Performed by: INTERNAL MEDICINE

## 2022-02-10 PROCEDURE — 99255 IP/OBS CONSLTJ NEW/EST HI 80: CPT | Performed by: INTERNAL MEDICINE

## 2022-02-10 PROCEDURE — 87186 SC STD MICRODIL/AGAR DIL: CPT | Performed by: INTERNAL MEDICINE

## 2022-02-10 RX ORDER — PANTOPRAZOLE SODIUM 40 MG/1
40 TABLET, DELAYED RELEASE ORAL DAILY
Status: DISCONTINUED | OUTPATIENT
Start: 2022-02-11 | End: 2022-02-12

## 2022-02-10 RX ORDER — SODIUM CHLORIDE, SODIUM GLUCONATE, SODIUM ACETATE, POTASSIUM CHLORIDE, MAGNESIUM CHLORIDE, SODIUM PHOSPHATE, DIBASIC, AND POTASSIUM PHOSPHATE .53; .5; .37; .037; .03; .012; .00082 G/100ML; G/100ML; G/100ML; G/100ML; G/100ML; G/100ML; G/100ML
75 INJECTION, SOLUTION INTRAVENOUS CONTINUOUS
Status: DISCONTINUED | OUTPATIENT
Start: 2022-02-10 | End: 2022-02-13

## 2022-02-10 RX ORDER — POTASSIUM CHLORIDE 20 MEQ/1
40 TABLET, EXTENDED RELEASE ORAL ONCE
Status: COMPLETED | OUTPATIENT
Start: 2022-02-10 | End: 2022-02-10

## 2022-02-10 RX ORDER — VANCOMYCIN HYDROCHLORIDE 1 G/200ML
1000 INJECTION, SOLUTION INTRAVENOUS EVERY 8 HOURS
Status: DISCONTINUED | OUTPATIENT
Start: 2022-02-10 | End: 2022-02-17

## 2022-02-10 RX ADMIN — PROPRANOLOL HYDROCHLORIDE 10 MG: 20 TABLET ORAL at 09:42

## 2022-02-10 RX ADMIN — OXCARBAZEPINE 600 MG: 150 TABLET, FILM COATED ORAL at 09:42

## 2022-02-10 RX ADMIN — POTASSIUM CHLORIDE 40 MEQ: 1500 TABLET, EXTENDED RELEASE ORAL at 06:38

## 2022-02-10 RX ADMIN — TRIAMCINOLONE ACETONIDE: 1 CREAM TOPICAL at 17:41

## 2022-02-10 RX ADMIN — OXCARBAZEPINE 600 MG: 150 TABLET, FILM COATED ORAL at 20:30

## 2022-02-10 RX ADMIN — VANCOMYCIN HYDROCHLORIDE 1000 MG: 1 INJECTION, SOLUTION INTRAVENOUS at 22:37

## 2022-02-10 RX ADMIN — SODIUM CHLORIDE, SODIUM GLUCONATE, SODIUM ACETATE, POTASSIUM CHLORIDE, MAGNESIUM CHLORIDE, SODIUM PHOSPHATE, DIBASIC, AND POTASSIUM PHOSPHATE 150 ML/HR: .53; .5; .37; .037; .03; .012; .00082 INJECTION, SOLUTION INTRAVENOUS at 11:59

## 2022-02-10 RX ADMIN — VANCOMYCIN HYDROCHLORIDE 1250 MG: 5 INJECTION, POWDER, LYOPHILIZED, FOR SOLUTION INTRAVENOUS at 05:10

## 2022-02-10 RX ADMIN — SODIUM CHLORIDE, SODIUM GLUCONATE, SODIUM ACETATE, POTASSIUM CHLORIDE, MAGNESIUM CHLORIDE, SODIUM PHOSPHATE, DIBASIC, AND POTASSIUM PHOSPHATE 150 ML/HR: .53; .5; .37; .037; .03; .012; .00082 INJECTION, SOLUTION INTRAVENOUS at 20:03

## 2022-02-10 RX ADMIN — VANCOMYCIN HYDROCHLORIDE 1250 MG: 5 INJECTION, POWDER, LYOPHILIZED, FOR SOLUTION INTRAVENOUS at 11:59

## 2022-02-10 RX ADMIN — TRIAMCINOLONE ACETONIDE: 1 CREAM TOPICAL at 09:44

## 2022-02-10 RX ADMIN — ACETAMINOPHEN 650 MG: 325 TABLET, FILM COATED ORAL at 20:30

## 2022-02-10 RX ADMIN — PANTOPRAZOLE SODIUM 20 MG: 20 TABLET, DELAYED RELEASE ORAL at 09:42

## 2022-02-10 RX ADMIN — LEVETIRACETAM 500 MG: 500 TABLET, FILM COATED ORAL at 09:42

## 2022-02-10 RX ADMIN — STANDARDIZED SENNA CONCENTRATE 8.6 MG: 8.6 TABLET ORAL at 09:42

## 2022-02-10 RX ADMIN — LEVETIRACETAM 500 MG: 500 TABLET, FILM COATED ORAL at 20:30

## 2022-02-10 RX ADMIN — POTASSIUM CHLORIDE 40 MEQ: 1500 TABLET, EXTENDED RELEASE ORAL at 09:44

## 2022-02-10 RX ADMIN — ENOXAPARIN SODIUM 40 MG: 40 INJECTION SUBCUTANEOUS at 09:42

## 2022-02-10 NOTE — PLAN OF CARE
Problem: MOBILITY - ADULT  Goal: Maintain or return to baseline ADL function  Description: INTERVENTIONS:  -  Assess patient's ability to carry out ADLs; assess patient's baseline for ADL function and identify physical deficits which impact ability to perform ADLs (bathing, care of mouth/teeth, toileting, grooming, dressing, etc )  - Assess/evaluate cause of self-care deficits   - Assess range of motion  - Assess patient's mobility; develop plan if impaired  - Assess patient's need for assistive devices and provide as appropriate  - Encourage maximum independence but intervene and supervise when necessary  - Involve family in performance of ADLs  - Assess for home care needs following discharge   - Consider OT consult to assist with ADL evaluation and planning for discharge  - Provide patient education as appropriate  Outcome: Progressing  Goal: Maintains/Returns to pre admission functional level  Description: INTERVENTIONS:  - Perform BMAT or MOVE assessment daily    - Set and communicate daily mobility goal to care team and patient/family/caregiver  - Collaborate with rehabilitation services on mobility goals if consulted  - Perform Range of Motion  times a day  - Reposition patient every  hours    - Dangle patient  times a day  - Stand patient  times a day  - Ambulate patient  times a day  - Out of bed to chair  times a day   - Out of bed for meals  times a day  - Out of bed for toileting  - Record patient progress and toleration of activity level   Outcome: Progressing     Problem: PAIN - ADULT  Goal: Verbalizes/displays adequate comfort level or baseline comfort level  Description: Interventions:  - Encourage patient to monitor pain and request assistance  - Assess pain using appropriate pain scale  - Administer analgesics based on type and severity of pain and evaluate response  - Implement non-pharmacological measures as appropriate and evaluate response  - Consider cultural and social influences on pain and pain management  - Notify physician/advanced practitioner if interventions unsuccessful or patient reports new pain  Outcome: Progressing     Problem: INFECTION - ADULT  Goal: Absence or prevention of progression during hospitalization  Description: INTERVENTIONS:  - Assess and monitor for signs and symptoms of infection  - Monitor lab/diagnostic results  - Monitor all insertion sites, i e  indwelling lines, tubes, and drains  - Monitor endotracheal if appropriate and nasal secretions for changes in amount and color  - [place] appropriate cooling/warming therapies per order  - Administer medications as ordered  - Instruct and encourage patient and family to use good hand hygiene technique  - Identify and instruct in appropriate isolation precautions for identified infection/condition  Outcome: Progressing  Goal: Absence of fever/infection during neutropenic period  Description: INTERVENTIONS:  - Monitor WBC    Outcome: Progressing     Problem: SAFETY ADULT  Goal: Maintain or return to baseline ADL function  Description: INTERVENTIONS:  -  Assess patient's ability to carry out ADLs; assess patient's baseline for ADL function and identify physical deficits which impact ability to perform ADLs (bathing, care of mouth/teeth, toileting, grooming, dressing, etc )  - Assess/evaluate cause of self-care deficits   - Assess range of motion  - Assess patient's mobility; develop plan if impaired  - Assess patient's need for assistive devices and provide as appropriate  - Encourage maximum independence but intervene and supervise when necessary  - Involve family in performance of ADLs  - Assess for home care needs following discharge   - Consider OT consult to assist with ADL evaluation and planning for discharge  - Provide patient education as appropriate  Outcome: Progressing  Goal: Maintains/Returns to pre admission functional level  Description: INTERVENTIONS:  - Perform BMAT or MOVE assessment daily    - Set and communicate daily mobility goal to care team and patient/family/caregiver  - Collaborate with rehabilitation services on mobility goals if consulted  - Perform Range of Motion  times a day  - Reposition patient every  hours    - Dangle patient  times a day  - Stand patient  times a day  - Ambulate patient  times a day  - Out of bed to chair  times a day   - Out of bed for meals  times a day  - Out of bed for toileting  - Record patient progress and toleration of activity level   Outcome: Progressing  Goal: Patient will remain free of falls  Description: INTERVENTIONS:  -  Assess patient's ability to carry out ADLs; assess patient's baseline for ADL function and identify physical deficits which impact ability to perform ADLs (bathing, care of mouth/teeth, toileting, grooming, dressing, etc )  - Assess/evaluate cause of self-care deficits   - Assess range of motion  - Assess patient's mobility; develop plan if impaired  - Assess patient's need for assistive devices and provide as appropriate  - Encourage maximum independence but intervene and supervise when necessary  - Involve family in performance of ADLs  - Assess for home care needs following discharge   - Consider OT consult to assist with ADL evaluation and planning for discharge  - Provide patient education as appropriate  Outcome: Progressing

## 2022-02-10 NOTE — PROGRESS NOTES
Vancomycin IV Pharmacy-to-Dose Consultation    Violeta Goodwin is a 60 y o  male who is currently receiving Vancomycin IV with management by the Pharmacy Consult service  Assessment/Plan:  The patient was reviewed  Renal function is stable and no signs or symptoms of nephrotoxicity and/or infusion reactions were documented in the chart  Trough resulted as 23 3, but drawn early , extrapolated is 21 6 which is supra-therapeutic , Based on todays assessment, will change vancomycin to 1000 mg IV q8h and start  10 hours after last dose , with a plan for a trough to be drawn at 2130 on 02/11  We will continue to follow the patients culture results and clinical progress daily      Lauren Moya, Pharmacist

## 2022-02-10 NOTE — CONSULTS
Consultation - [facility]  [facility] SITE 60 y o  male MRN: [id number]  Unit/Bed#: S -01 Encounter: [id number]        Consults    Reason for Consult / Principal Problem: anemia, cirrhosis    ASSESSMENT and PLAN:    Principal Problem:    Cellulitis of left lower extremity  Active Problems:    Ambulatory dysfunction    Nonintractable epilepsy without status epilepticus ([facility] [address] )    Primary hypertension    Gastroesophageal reflux disease without esophagitis    Venous stasis dermatitis of both lower extremities    MRSA bacteremia    Sepsis ([facility] )    Anemia    Status post splenectomy    #1  Iron deficiency anemia: hgb was around 9 in January 2022 when hospitalized for COVID in [place]  8 7 on admission, now 7 4  no obvious GI bleeding, is on oral tablets at home, reportedly has a history of angiectasias treated with APC in 2019 and esophageal varices, assumed angiectasias were in upper GI tract, unclear if patient ever had a colonoscopy  On PPI daily  Consider anemia of chronic disease with underlying cirrhosis, also concern for bleeding from angiectasias    -continue PPI  -plan for EGD tomorrow  -NPO after midnight  -consider colonoscopy inpatient versus outpatient pending EGD results and if hgb stable    -monitor hgb, transfuse as necessary    #2  Cirrhosis: MELD score is 9, does not appear to be a new diagnosis as he has hx of esophageal varices but patient's brother was not aware of diagnosis  Denies alcohol use, drug use, tattoos, hepatitis hx  Rule out hep B and C, autoimmune and other causes  On propanolol  On lasix but unclear if due to swelling from liver disease   No ascites on US, no masses in liver on US  -check AFP  -consider CT scan  -check chronic hepatitis, CLEMENTE, AMA, ASMA, ceruloplasmin, alpha-1 antitrypsin  -plan for EGD as above  -continue propanolol  -lasix currently on hold due to borderline hypotension -------------------------------------------------------------------------------------------------------------------    HPI:  This a 42-year-old male with a past medical history significant for epilepsy, hypertension, angioectasias status post APC in 2019, esophageal varices, splenectomy, tuberculosis who was admitted to the hospital secondary to leg pain and fevers  The patient is unable to provide much history however history is obtained primarily from the patient's brother at bedside and some records from previous hospitalization in [place]   The patient has been residing in [place] for the last few decades and recently moved back here to live with his brother after his father passed away and mother had to going to a nursing home  The patient was recently admitted in January in [place] secondary to [place] infection and then brought back to [place] after this infection cleared  According to the records from his recent hospitalization, his hemoglobin at that time was around 9  He reportedly has a history of having angioectasias that were APC in 2019 as well as having esophageal varices but there is no known history of cirrhosis  He is currently here being treated for cellulitis however get ultrasound to further assess the liver which is notable for signs of cirrhosis and portal hypertension  There is no signs of ascites  It appears the patient is on propanolol as well as Lasix regularly  The patient's brother denies any chronic alcohol use, and history of IV drug use, tattoos, or family history of significant liver disease  Patient has had anemia in the past according to patient's brother  They were unable to tell when the last time patient had an endoscopy and/or colonoscopy is  Reportedly his father had a history of colon cancer but never had screening    The patient's brother does not think that the patient ever had a colonoscopy in the past     The patient reports 1 episode of vomiting yesterday but is not sure if there is any blood in it  He denies any abdominal pain  Denies any heartburn or trouble swallowing his food  Reports his bowel movements are typically regular denies any melena or blood in the stool  He does have dark bowel movement secondary to taking oral iron  He has had a good appetite at home and has not had any unexplained weight loss  REVIEW OF SYSTEMS:    CONSTITUTIONAL: Denies any chills, or rigors  Good appetite, and no recent weight loss  +fever  HEENT: No earache or tinnitus  Denies hearing loss or visual disturbances  CARDIOVASCULAR: No chest pain or palpitations  RESPIRATORY: Denies any cough, hemoptysis, shortness of breath or dyspnea on exertion  GASTROINTESTINAL: As noted in the History of Present Illness  GENITOURINARY: No problems with urination  Denies any hematuria or dysuria  NEUROLOGIC: No dizziness or vertigo, denies headaches  MUSCULOSKELETAL: Denies any muscle or joint pain  +leg pain  SKIN: Denies skin rashes or itching  ENDOCRINE: Denies excessive thirst  Denies intolerance to heat or cold  PSYCHOSOCIAL: Denies depression or anxiety  Denies any recent memory loss         Historical Information   Past Medical History:   Diagnosis Date    Anxiety     GERD (gastroesophageal reflux disease)     Hypertension     Seizures (HCC)      Past Surgical History:   Procedure Laterality Date    SPLENECTOMY       Social History   Social History     Substance and Sexual Activity   Alcohol Use Never     Social History     Substance and Sexual Activity   Drug Use Never     Social History     Tobacco Use   Smoking Status Never Smoker   Smokeless Tobacco Never Used     Family History   Problem Relation Age of Onset    Depression Mother     Heart disease Father        Meds/Allergies     Medications Prior to Admission   Medication    betamethasone dipropionate (DIPROSONE) 0 05 % cream    diazepam (VALIUM) 10 mg tablet    folic acid (FOLVITE) 1 mg tablet    furosemide (LASIX) 40 mg tablet    iron polysaccharides (FERREX) 150 mg capsule    levETIRAcetam (KEPPRA) 500 mg tablet    OXcarbazepine (TRILEPTAL) 600 mg tablet    pantoprazole (PROTONIX) 20 mg tablet    propranolol (INDERAL) 10 mg tablet     Current Facility-Administered Medications   Medication Dose Route Frequency    acetaminophen (TYLENOL) tablet 650 mg  650 mg Oral Q6H PRN    diazepam (VALIUM) tablet 10 mg  10 mg Oral Q12H PRN    enoxaparin (LOVENOX) subcutaneous injection 40 mg  40 mg Subcutaneous Daily    levETIRAcetam (KEPPRA) tablet 500 mg  500 mg Oral Q12H [address]    multi-electrolyte (PLASMALYTE-A/ISOLYTE-S PH 7 4) IV solution  150 mL/hr Intravenous Continuous    ondansetron (ZOFRAN) injection 4 mg  4 mg Intravenous Q6H PRN    OXcarbazepine (TRILEPTAL) tablet 600 mg  600 mg Oral Q12H [address]    [START ON 2/11/2022] pantoprazole (PROTONIX) EC tablet 40 mg  40 mg Oral Daily    propranolol (INDERAL) tablet 10 mg  10 mg Oral TID    senna (SENOKOT) tablet 8 6 mg  1 tablet Oral Daily    triamcinolone (KENALOG) 0 1 % cream   Topical BID    vancomycin (VANCOCIN) IVPB (premix in dextrose) 1,000 mg 200 mL  1,000 mg Intravenous Q8H       No Known Allergies        Objective     Blood pressure 112/64, pulse 99, temperature 98 °F (36 7 °C), temperature source Oral, resp  rate 19, height 6' (1 829 m), weight 79 4 kg (175 lb), SpO2 92 %        Intake/Output Summary (Last 24 hours) at 2/10/2022 1407  Last data filed at 2/10/2022 1300  Gross per 24 hour   Intake 720 ml   Output 840 ml   Net -120 ml         PHYSICAL EXAM:      General Appearance:   Alert, cooperative, no distress, appears stated age    HEENT:   Normocephalic, atraumatic, anicteric, no oropharyngeal thrush present      Neck:  Supple, symmetrical, trachea midline, no adenopathy;    thyroid: no enlargement/tenderness/nodules; no carotid  bruit or JVD    Lungs:   Clear to auscultation bilaterally; no rales, rhonchi or wheezing; respirations unlabored    Heart[de-identified]   S1 and S2 normal; regular rate and rhythm; no murmur, rub, or gallop  Abdomen:   Soft, non-tender, non-distended; normal bowel sounds; no masses, no organomegaly    Genitalia:   Deferred    Rectal:   Deferred    Extremities:  No cyanosis, clubbing; bilateral calves are wrapped, some edema noted in the feet   Pulses:  2+ and symmetric all extremities    Skin:  Skin color, texture, turgor normal, no rashes or lesions    Lymph nodes:  No palpable cervical, axillary or inguinal lymphadenopathy        Lab Results:   Results from last 7 days   Lab Units 02/10/22  0451   WBC Thousand/uL 9 15   HEMOGLOBIN g/dL 7 4*   HEMATOCRIT % 26 5*   PLATELETS Thousands/uL 200   NEUTROS PCT % 74   LYMPHS PCT % 13*   MONOS PCT % 9   EOS PCT % 3     Results from last 7 days   Lab Units 02/10/22  0451   POTASSIUM mmol/L 3 2*   CHLORIDE mmol/L 105   CO2 mmol/L 31   BUN mg/dL 9   CREATININE mg/dL 0 56*   CALCIUM mg/dL 7 5*   ALK PHOS U/L 79   ALT U/L 13   AST U/L 24     Results from last 7 days   Lab Units 02/08/22  1436   INR  1 26*           Imaging Studies: I have personally reviewed pertinent imaging studies  XR chest 1 view portable    Result Date: 2/8/2022  Impression: 1  Right lower lung consolidation  Recommend continued short-term follow-up to ensure complete resolution  2   Increased interstitial prominence, findings can be seen with pulmonary edema versus atypical infection  Workstation performed: [id number]     XR knee 4+ views left injury    Result Date: 2/9/2022  Impression: No acute osseous abnormality  Workstation performed: [id number]     CT head without contrast    Result Date: 2/8/2022  Impression: 1  No acute intracranial CT abnormality  2   Opacified and mildly enlarged left nasolacrimal duct  No significant adjacent fat stranding to suggest acute dacryocystitis  Recommend nonurgent ENT consultation   Workstation performed: [id number]     US abdomen complete    Result Date: 2/10/2022  Impression: 1  Cirrhotic changes in the liver with suggestion for cavernous transformation of portal vein, suggesting portal hypertension  2 Gallstone/sludge in the gallbladder with gallbladder wall thickening  3 History of splenectomy  Splenules identified  No prior study for comparison  Further evaluation with contrast-enhanced CT recommended  The study was marked in EPIC for significant notification  Workstation performed: [id number]     CT lower extremity w contrast left    Result Date: 2/10/2022  Impression: Suspected thrombosis/thrombophlebitis of the great saphenous vein Diffuse subcutaneous edema which could be secondary to cellulitis, venous stasis, or dependent edema  No discrete rim enhancing fluid collection  The study was marked in [facility] for immediate notification  Workstation performed: [id number]     Echo complete w/ contrast if indicated    Addendum Date: 2/9/2022      Left Ventricle: Left ventricular cavity size is upper normal  Wall thickness is normal  The left ventricular ejection fraction is 55%  Systolic function is normal  Wall motion is normal  Diastolic function is normal    Mitral Valve: There is trace regurgitation    Tricuspid Valve: There is mild regurgitation    Aorta: The aortic root is mildly dilated (4 0 cm)  The ascending aorta is normal in size    Pulmonary Artery: The pulmonary artery systolic pressure is normal            Patient was seen and examined by Dr Sera Kan  All nassar medical decisions were made by Dr Sera Kan  Thank you for allowing us to participate in the care of this present patient  We will follow-up with you closely

## 2022-02-10 NOTE — WOUND OSTOMY CARE
Progress Note - Wound   Jose Dawson Him 60 y o  male MRN: [id number]  Unit/Bed#: S -01 Encounter: [id number]      Assessment:   Patient admitted due to cellulitis of left lower extremity  History of GERD, HTN, Seizures  Wound care consulted for bilateral lower extremities  Patient agreeable to assessment, alert and oriented x3, continent of bowel and bladder, independently turns for assessment, heels elevated off of mattress, pillow support for turning and repositioning, is an assist with care  Primary RN aware of assessment findings  1  Bilateral sacrum, buttock, and heels are dry, intact, blanchable pink skin  2  Bilateral lower extremities noted with excoriations scattered to anterior tibial due patient scratching  There are scattered superficial open aspects with no drainage, are a mixture of pink tissue and adhered scabbing  No redness, no warmth, no induration  3  Left medial knee- Awaiting orthopedic surgery assessment for possible I&D of this site  There are currebtly 2 open aspects that are oval in shape, 100% beefy red tissue, with moderate amount of purulent bloody drainage noted, no foul odor  Marisela-wound is dry, intact, indurated, warm to touch, painful to touch, and erythematous  Educated patient on importance of frequent offloading of pressure via turning, repositioning, and weight-shifting  Verbalized understanding of plan of care  No induration, fluctuance, odor, warmth, redness, or purulence noted to the above noted wound  New dressings applied  Patient tolerated well, denies pain to the wound  Primary nurse aware of plan of care  See flow sheets for more detailed assessment findings  Wound care will sign off if orthopedic surgery evaluation completed and are following patient for left knee wound  Skin care plans:  1-Hydraguard to bilateral sacrum, buttock and heels BID and PRN  2-Elevate heels to offload pressure  3-Ehob cushion in chair when out of bed    4-Moisturize skin daily with skin nourishing cream   5-Turn/reposition q2h for pressure re-distribution on skin  6- B/L anterior tibial- Cleanse legs with soap and water, pat dry  Apply Vaseline gauze over scattered superficial open areas, cover with ABD pad, wrap with Lisbet  Change every other day and PRN  May discontinue dressing once superficial open aspects have resolved  7- Left knee- Cleanse with NSS, pat dry  Apply Maxorb Silver Alginate over wound bed, cover with ABD pad and wrap lightly with Lisbet  Change daily and PRN for soilage dislodgement  Wound 02/08/22 Cellulitis Knee Left;Medial (Active)   Wound Image   02/10/22 1000   Wound Description Beefy red;Swelling 02/10/22 1000   Marisela-wound Assessment Dry; Intact; Erythema; Induration;Swelling 02/10/22 1000   Drainage Amount Moderate 02/10/22 1000   Drainage Description Purulent;Sanguineous 02/10/22 1000   Non-staged Wound Description Full thickness 02/10/22 1000   Treatments Cleansed;Irrigation with NSS;Site care 02/10/22 1000   Dressing Calcium Alginate with Silver;ABD;Dry dressing 02/10/22 1000   Wound packed? No 02/10/22 1000   Dressing Changed Changed 02/10/22 1000   Patient Tolerance Tolerated well 02/10/22 1000   Dressing Status Clean;Dry; Intact 02/10/22 1000     Call or tigertext with any questions  Wound Care will sign off    Bo Van RN BSN  Wound care

## 2022-02-10 NOTE — ASSESSMENT & PLAN NOTE
Progressive hemoglobin decline noted since admission  Upon reviewing patient's old records from [place] , patient has history of angiodysplasia and had argon beam therapy on January 2019  There was also a remote mention of esophageal varices  Continue to follow serial H&H  Patient denies any nausea ,vomiting or hematemesis  Follow-up on stool for occult blood  Continue with PPI therapy  GI consultation will be obtained  In view of anemia, hypoalbuminemia and? History of esophageal varices, will pursue workup to rule out underlying cirrhosis  Follow-up on abdominal ultrasound

## 2022-02-10 NOTE — CASE MANAGEMENT
Case Management Assessment & Discharge Planning Note    Patient name Christoph Lentz S /S -01 MRN [id number]  : 1965 Date 2/10/2022       Current Admission Date: 2022  Current Admission Diagnosis:Cellulitis of left lower extremity   Patient Active Problem List    Diagnosis Date Noted    Sepsis ([facility] [address] ) 02/10/2022    Anemia 02/10/2022    Status post splenectomy 02/10/2022    MRSA bacteremia 2022    Cellulitis of left lower extremity 2022    Ambulatory dysfunction 2022    Nonintractable epilepsy without status epilepticus ([facility] ) 2022    Primary hypertension 2022    Gastroesophageal reflux disease without esophagitis 2022    Venous stasis dermatitis of both lower extremities 2022      LOS (days): 0  Geometric Mean LOS (GMLOS) (days): 3 20  Days to GMLOS:2 9     OBJECTIVE:    Risk of Unplanned Readmission Score: 14         Current admission status: Inpatient       Preferred Pharmacy:   [facility] [address] ALBERTO ESPARZA [address] [facility]  [facility] [address]  Phone: [phone] Fax: [phone]    Primary Care Provider: No primary care provider on file  Primary Insurance: MEDICARE  Secondary Insurance: ERIS LAZO PENDING    ASSESSMENT:  Active Health Care Agents    There are no active Health Care Agents on file  Readmission Root Cause  30 Day Readmission: No    Patient Information  Admitted from[de-identified] Home  Mental Status: Alert,Confused (developmental delay)  During Assessment patient was accompanied by: Not accompanied during assessment  Assessment information provided by[de-identified] Brother  Primary Caregiver: Self  Support Systems: Family members  [place] of Residence: [address] [place] [address] do you live in?: [place] entry access options   Select all that apply : Stairs  Number of steps to enter home : 6  Type of Current Residence: [address] [place] [address] home  Upon entering residence, is there a bedroom on the main floor (no further steps)?: No  A bedroom is located on the following floor levels of residence (select all that apply):: Basement  Upon entering residence, is there a bathroom on the main floor (no further steps)?: Yes  Number of steps to basement from main floor: One Flight  Living Arrangements: Lives w/ Extended Family (his brother, sister in law, and her mother)    Activities of Daily Living Prior to Admission  Functional Status: Independent  Completes ADLs independently?: Yes  Ambulates independently?: Yes  Does patient use assisted devices?: No  Does patient currently own DME?: No  Does patient have a history of Outpatient Therapy (PT/OT)?: No  Does the patient have a history of Short-Term Rehab?: No  Does patient have a history of HHC?: No    Patient Information Continued  Does patient have prescription coverage?: No  Does patient receive dialysis treatments?: No    Means of Transportation  Means of Transport to Appts[de-identified] Family transport    DISCHARGE DETAILS:    Discharge planning discussed with[de-identified] patient's brother Ibeth Lighter of Choice: Yes  Comments - Freedom of Choice: CM placed call to patient's brother Brian Zeng to introduce self and role  Patient lives with his brother Vicki Vasquez wife and mother in law in a 1/2 double home in [facility] [place]  Patient has 6 GIUSEPPE and the basement was converted into an apt for him with bedroom and bathroom  Patient is a [place] citizen, as he was born and raised in the [facility] [facility] [place] until he was 23years old  At that time, he moved with his parents to [place], where he lived until 2 weeks ago  His father passed away and his mother, who is dependent for care, was placed into a nursing home in [place] and Brian Zeng moved patient in with him  He reports patient will stay with him for the rest of his life  Patient has Medicare Part A only and Brian Zeng understands a referral has been placed to [facility] meds to be filled at [address] as this will be the most cost effective OOP option   Patient does not have PCP and Reggie Gómez would like assistance with same  Reggie Gómez would like for CM to speak w/ his spouse as well, as he reports she's "better with all of this" so she will call CM tomorrow during her work break  CM reached out to [facility] to discuss PCP at dc and she feels it would be best for patient to follow at the resident clinic on [facility]  CM to arrange JOHN appt prior to dc  SLIM reports that d/t knee cellulitis and positive BCs, patient will likely need at least 2 weeks of IV abx at dc  CC Damaris made aware and CM will help to coordinate safe dcp    CM contacted family/caregiver?: Yes  Were Treatment Team discharge recommendations reviewed with patient/caregiver?: Yes  Did patient/caregiver verbalize understanding of patient care needs?: Yes  Were patient/caregiver advised of the risks associated with not following Treatment Team discharge recommendations?: Yes    Contacts  Patient Contacts: brother Reggie Gómez  Relationship to Patient[de-identified] Family  Contact Method: Phone  Phone Number: [phone]  Reason/Outcome: Continuity of [address] [facility]       Would you like to participate in our [address] service program?  : No - Declined
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
## 2022-02-10 NOTE — PLAN OF CARE
Problem: MOBILITY - ADULT  Goal: Maintain or return to baseline ADL function  Description: INTERVENTIONS:  -  Assess patient's ability to carry out ADLs; assess patient's baseline for ADL function and identify physical deficits which impact ability to perform ADLs (bathing, care of mouth/teeth, toileting, grooming, dressing, etc )  - Assess/evaluate cause of self-care deficits   - Assess range of motion  - Assess patient's mobility; develop plan if impaired  - Assess patient's need for assistive devices and provide as appropriate  - Encourage maximum independence but intervene and supervise when necessary  - Involve family in performance of ADLs  - Assess for home care needs following discharge   - Consider OT consult to assist with ADL evaluation and planning for discharge  - Provide patient education as appropriate  Outcome: Progressing  Goal: Maintains/Returns to pre admission functional level  Description: INTERVENTIONS:  - Perform BMAT or MOVE assessment daily    - Set and communicate daily mobility goal to care team and patient/family/caregiver  - Collaborate with rehabilitation services on mobility goals if consulted  - Perform Range of Motion  times a day  - Reposition patient every  hours    - Dangle patient  times a day  - Stand patient  times a day  - Ambulate patient  times a day  - Out of bed to chair  times a day   - Out of bed for meals  times a day  - Out of bed for toileting  - Record patient progress and toleration of activity level   Outcome: Progressing     Problem: PAIN - ADULT  Goal: Verbalizes/displays adequate comfort level or baseline comfort level  Description: Interventions:  - Encourage patient to monitor pain and request assistance  - Assess pain using appropriate pain scale  - Administer analgesics based on type and severity of pain and evaluate response  - Implement non-pharmacological measures as appropriate and evaluate response  - Consider cultural and social influences on pain and pain management  - Notify physician/advanced practitioner if interventions unsuccessful or patient reports new pain  Outcome: Progressing     Problem: INFECTION - ADULT  Goal: Absence or prevention of progression during hospitalization  Description: INTERVENTIONS:  - Assess and monitor for signs and symptoms of infection  - Monitor lab/diagnostic results  - Monitor all insertion sites, i e  indwelling lines, tubes, and drains  - Monitor endotracheal if appropriate and nasal secretions for changes in amount and color  - [place] appropriate cooling/warming therapies per order  - Administer medications as ordered  - Instruct and encourage patient and family to use good hand hygiene technique  - Identify and instruct in appropriate isolation precautions for identified infection/condition  Outcome: Progressing  Goal: Absence of fever/infection during neutropenic period  Description: INTERVENTIONS:  - Monitor WBC    Outcome: Progressing     Problem: SAFETY ADULT  Goal: Maintain or return to baseline ADL function  Description: INTERVENTIONS:  -  Assess patient's ability to carry out ADLs; assess patient's baseline for ADL function and identify physical deficits which impact ability to perform ADLs (bathing, care of mouth/teeth, toileting, grooming, dressing, etc )  - Assess/evaluate cause of self-care deficits   - Assess range of motion  - Assess patient's mobility; develop plan if impaired  - Assess patient's need for assistive devices and provide as appropriate  - Encourage maximum independence but intervene and supervise when necessary  - Involve family in performance of ADLs  - Assess for home care needs following discharge   - Consider OT consult to assist with ADL evaluation and planning for discharge  - Provide patient education as appropriate  Outcome: Progressing  Goal: Maintains/Returns to pre admission functional level  Description: INTERVENTIONS:  - Perform BMAT or MOVE assessment daily    - Set and communicate daily mobility goal to care team and patient/family/caregiver  - Collaborate with rehabilitation services on mobility goals if consulted  - Perform Range of Motion  times a day  - Reposition patient ev hours    - Dangle patient  times a day  - Stand patient  times a day  - Ambulate patient  times a day  - Out of bed to chair  times a day   - Out of bed for meals  times a day  - Out of bed for toileting  - Record patient progress and toleration of activity level   Outcome: Progressing  Goal: Patient will remain free of falls  Description: INTERVENTIONS:  - Educate patient/family on patient safety including physical limitations  - Instruct patient to call for assistance with activity   - Consult OT/PT to assist with strengthening/mobility   - Keep Call bell within reach  - Keep bed low and locked with side rails adjusted as appropriate  - Keep care items and personal belongings within reach  - Initiate and maintain comfort rounds  - Make Fall Risk Sign visible to staff  - Offer Toileting every  Hours, in advance of need  - Initiate/Maintain alarm  - Obtain necessary fall risk management equipment:   - Apply yellow socks and bracelet for high fall risk patients  - Consider moving patient to room near nurses station  Outcome: Progressing     Problem: DISCHARGE PLANNING  Goal: Discharge to home or other facility with appropriate resources  Description: INTERVENTIONS:  - Identify barriers to discharge w/patient and caregiver  - Arrange for needed discharge resources and transportation as appropriate  - Identify discharge learning needs (meds, wound care, etc )  - Arrange for interpretive services to assist at discharge as needed  - Refer to Case Management Department for coordinating discharge planning if the patient needs post-hospital services based on physician/advanced practitioner order or complex needs related to functional status, cognitive ability, or social support system  Outcome: Progressing Problem: Knowledge Deficit  Goal: Patient/family/caregiver demonstrates understanding of disease process, treatment plan, medications, and discharge instructions  Description: Complete learning assessment and assess knowledge base    Interventions:  - Provide teaching at level of understanding  - Provide teaching via preferred learning methods  Outcome: Progressing     Problem: SKIN/TISSUE INTEGRITY - ADULT  Goal: Skin Integrity remains intact(Skin Breakdown Prevention)  Description: Assess:  -Perform Bora assessment every   -Clean and moisturize skin every   -Inspect skin when repositioning, toileting, and assisting with ADLS  -Assess under medical devices such as  every   -Assess extremities for adequate circulation and sensation     Bed Management:  -Have minimal linens on bed & keep smooth, unwrinkled  -Change linens as needed when moist or perspiring  -Avoid sitting or lying in one position for more than  hours while in bed  -Keep HOB at degrees     Toileting:  -Offer bedside commode  -Assess for incontinence every   -Use incontinent care products after each incontinent episode such as     Activity:  -Mobilize patient  times a day  -Encourage activity and walks on unit  -Encourage or provide ROM exercises   -Turn and reposition patient every  Hours  -Use appropriate equipment to lift or move patient in bed  -Instruct/ Assist with weight shifting every  when out of bed in chair  -Consider limitation of chair time  hour intervals    Skin Care:  -Avoid use of baby powder, tape, friction and shearing, hot water or constrictive clothing  -Relieve pressure over bony prominences using   -Do not massage red bony areas    Next Steps:  -Teach patient strategies to minimize risks such as    -Consider consults to  interdisciplinary teams such as   Outcome: Progressing  Goal: Incision(s), wounds(s) or drain site(s) healing without S/S of infection  Description: INTERVENTIONS  - Assess and document dressing, incision, wound bed, drain sites and surrounding tissue  - Provide patient and family education  - Perform skin care/dressing changes every   Outcome: Progressing  Goal: Pressure injury heals and does not worsen  Description: Interventions:  - Implement low air loss mattress or specialty surface (Criteria met)  - Apply silicone foam dressing  - Instruct/assist with weight shifting every  minutes when in chair   - Limit chair time to  hour intervals  - Use special pressure reducing interventions such as  when in chair   - Apply fecal or urinary incontinence containment device   - Perform passive or active ROM every   - Turn and reposition patient & offload bony prominences every  hours   - Utilize friction reducing device or surface for transfers   - Consider consults to  interdisciplinary teams such as   - Use incontinent care products after each incontinent episode such as   - Consider nutrition services referral as needed  Outcome: Progressing     Problem: MUSCULOSKELETAL - ADULT  Goal: Maintain or return mobility to safest level of function  Description: INTERVENTIONS:  - Assess patient's ability to carry out ADLs; assess patient's baseline for ADL function and identify physical deficits which impact ability to perform ADLs (bathing, care of mouth/teeth, toileting, grooming, dressing, etc )  - Assess/evaluate cause of self-care deficits   - Assess range of motion  - Assess patient's mobility  - Assess patient's need for assistive devices and provide as appropriate  - Encourage maximum independence but intervene and supervise when necessary  - Involve family in performance of ADLs  - Assess for home care needs following discharge   - Consider OT consult to assist with ADL evaluation and planning for discharge  - Provide patient education as appropriate  Outcome: Progressing  Goal: Maintain proper alignment of affected body part  Description: INTERVENTIONS:  - Support, maintain and protect limb and body alignment  - Provide patient/ family with appropriate education  Outcome: Progressing     Problem: Nutrition/Hydration-ADULT  Goal: Nutrient/Hydration intake appropriate for improving, restoring or maintaining nutritional needs  Description: Monitor and assess patient's nutrition/hydration status for malnutrition  Collaborate with interdisciplinary team and initiate plan and interventions as ordered  Monitor patient's weight and dietary intake as ordered or per policy  Utilize nutrition screening tool and intervene as necessary  Determine patient's food preferences and provide high-protein, high-caloric foods as appropriate       INTERVENTIONS:  - Monitor oral intake, urinary output, labs, and treatment plans  - Assess nutrition and hydration status and recommend course of action  - Evaluate amount of meals eaten  - Assist patient with eating if necessary   - Allow adequate time for meals  - Recommend/ encourage appropriate diets, oral nutritional supplements, and vitamin/mineral supplements  - Order, calculate, and assess calorie counts as needed  - Recommend, monitor, and adjust tube feedings and TPN/PPN based on assessed needs  - Assess need for intravenous fluids  - Provide specific nutrition/hydration education as appropriate  - Include patient/family/caregiver in decisions related to nutrition  Outcome: Progressing     Problem: Prexisting or High Potential for Compromised Skin Integrity  Goal: Skin integrity is maintained or improved  Description: INTERVENTIONS:  - Identify patients at risk for skin breakdown  - Assess and monitor skin integrity  - Assess and monitor nutrition and hydration status  - Monitor labs   - Assess for incontinence   - Turn and reposition patient  - Assist with mobility/ambulation  - Relieve pressure over bony prominences  - Avoid friction and shearing  - Provide appropriate hygiene as needed including keeping skin clean and dry  - Evaluate need for skin moisturizer/barrier cream  - Collaborate with interdisciplinary team   - Patient/family teaching  - Consider wound care consult   Outcome: Progressing
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
## 2022-02-10 NOTE — PHYSICAL THERAPY NOTE
Physical Therapy Cancellation Note       02/10/22 1559   PT Last Visit   PT Visit Date 02/10/22   Note Type   Note type Cancelled Session   Cancel Reasons Other   Additional Comments Referral previously recieved for PT eval and tx, attempted to see pt for eval, but he recently returned from orthopedic procedure   will follow and initiate PT as appropriate     Licona Roots SPT
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
## 2022-02-10 NOTE — ASSESSMENT & PLAN NOTE
Patient presented with left lower extremity cellulitis  Blood cultures on admission positive for MRSA   Antibiotics switched to IV vancomycin 2/9  Pharmacy consulted  for dosing management   Transthoracic echocardiogram negative for any vegetations  Patient denies any back pain but has persistent left lower extremity pain  CT left lower extremity shows: Suspected thrombosis/thrombophlebitis of the great saphenous vein  Diffuse subcutaneous edema which could be secondary to cellulitis, venous stasis, or dependent edema  No discrete rim enhancing fluid collection  No osseous lesion seen on CT scan  Orthopedic consulted to evaluate need for I&D    Infectious disease consulted for antibiotic management  Repeat blood cultures ordered for 2/10

## 2022-02-10 NOTE — CONSULTS
Orthopedics   Jose Carreno 60 y o  male MRN: [id number]  Unit/Bed#: AN ULTRASOUND      Chief Complaint:   left thigh and knee pain pain    HPI:   60 y o male complaining of left knee and thigh erythema and pain  Patient reports this pain for several days now with progressive swelling and pain  He is able to move the knee but is painful  He has chronic venous stasis dermatitis  He was found to have MRSA in blood cultures [facility] in the hops[facility]  He is on Vancomycin  He has had a fever  He denies numbness or tingling to the leg  Review Of Systems:   · Skin: redness and swelling     · Neuro: See HPI  · Musculoskeletal: See HPI  · 14 point review of systems negative except as stated above     Past Medical History:   Past Medical History:   Diagnosis Date    Anxiety     GERD (gastroesophageal reflux disease)     Hypertension     Seizures ([address] )        Past Surgical History:   Past Surgical History:   Procedure Laterality Date    SPLENECTOMY         Family History:  Family history reviewed and non-contributory  Family History   Problem Relation Age of Onset    Depression Mother     Heart disease Father        Social History:  Social History     Socioeconomic History    Marital status: Single     Spouse name: None    Number of children: None    Years of education: None    Highest education level: None   Occupational History    None   Tobacco Use    Smoking status: Never Smoker    Smokeless tobacco: Never Used   Vaping Use    Vaping Use: Never used   Substance and Sexual Activity    Alcohol use: Never    Drug use: Never    Sexual activity: None   Other Topics Concern    None   Social History Narrative    None     Social Determinants of Health     Financial Resource Strain: Not on file   Food Insecurity: Not on file   Transportation Needs: Not on file   Physical Activity: Not on file   Stress: Not on file   Social Connections: Not on file   Intimate Partner Violence: Not on file   Housing Stability: Not on file       Allergies:   No Known Allergies        Labs:  0   Lab Value Date/Time    HCT 26 5 (L) 02/10/2022 0451    HCT 28 3 (L) 02/09/2022 0453    HCT 30 7 (L) 02/08/2022 1436    HGB 7 4 (L) 02/10/2022 0451    HGB 8 0 (L) 02/09/2022 0453    HGB 8 7 (L) 02/08/2022 1436    INR 1 26 (H) 02/08/2022 1436    WBC 9 15 02/10/2022 0451    WBC 9 44 02/09/2022 0453    WBC 9 65 02/08/2022 1436       Meds:    Current Facility-Administered Medications:     acetaminophen (TYLENOL) tablet 650 mg, 650 mg, Oral, Q6H PRN, Mary Fletcher MD, 650 mg at 02/09/22 2055    diazepam (VALIUM) tablet 10 mg, 10 mg, Oral, Q12H PRN, Mary Fletcher MD    enoxaparin (LOVENOX) subcutaneous injection 40 mg, 40 mg, Subcutaneous, Daily, Mary Fletcher MD, 40 mg at 02/10/22 0942    levETIRAcetam (KEPPRA) tablet 500 mg, 500 mg, Oral, Q12H [address], Mary Fletcher MD, 500 mg at 02/10/22 0942    multi-electrolyte (PLASMALYTE-A/ISOLYTE-S PH 7 4) IV solution, 150 mL/hr, Intravenous, Continuous, Facundo Blunt MD, Last Rate: 150 mL/hr at 02/10/22 1159, 150 mL/hr at 02/10/22 1159    ondansetron (ZOFRAN) injection 4 mg, 4 mg, Intravenous, Q6H PRN, Mary Fletcher MD, 4 mg at 02/09/22 2055    OXcarbazepine (TRILEPTAL) tablet 600 mg, 600 mg, Oral, Q12H [address], Mary Fletcher MD, 600 mg at 02/10/22 0942    [START ON 2/11/2022] pantoprazole (PROTONIX) EC tablet 40 mg, 40 mg, Oral, Daily, Facundo Blunt MD    propranolol (INDERAL) tablet 10 mg, 10 mg, Oral, TID, Mary Fletcher MD, 10 mg at 02/10/22 1836    senna (SENOKOT) tablet 8 6 mg, 1 tablet, Oral, Daily, Mary Fletcher MD, 8 6 mg at 02/10/22 1204    triamcinolone (KENALOG) 0 1 % cream, , Topical, BID, Mary Fletcher MD, Given at 02/10/22 0944    vancomycin (VANCOCIN) IVPB (premix in dextrose) 1,000 mg 200 mL, 1,000 mg, Intravenous, Q8H, Facundo Blunt MD    Blood Culture:   Lab Results   Component Value Date    BLOODCX Received in Microbiology Lab  Culture in Progress   02/10/2022       Wound Culture:   No results found for: WOUNDCULT    Ins and Outs:  I/O last 24 hours: In: 1000 [P O :720; IV Piggyback:280]  Out: 9366 [Urine:1065]          Physical Exam:   /59 (BP Location: Right arm)   Pulse 87   Temp 98 2 °F (36 8 °C) (Oral)   Resp 18   Ht 6' (1 829 m)   Wt 79 4 kg (175 lb)   SpO2 92%   BMI 23 73 kg/m²   Gen: Alert and oriented to person, place, time  HEENT: EOMI, eyes clear, moist mucus membranes, hearing intact  Respiratory: Bilateral chest rise  No audible wheezing found  Cardiovascular: Regular Rate and Rhythm  Abdomen: soft nontender/nondistended  · Musculoskeletal: left Lower Extremity  · Skin: Erythema over medial distal thigh and knee  No erythema or swelling over the anterior or lateral knee  There is a draining small sinus over the medial knee with scan purulence expressed  · ROM of the knee , painful ext past 10  No pain with flexion  · Sensation intact L1-S1  · 5/5 motor to L1-S1    Radiology:   I personally reviewed the films  X-rays of left knee show no acute abnormality  CT scan Left lower extremity shows no discreet abscess or fluid collection  There is edema consistent with cellulitis         Assessment:  60 y o male with  left leg cellulitis  This does not involve the knee as he has no lateral or anterior erythema and no effusion  ROM is maintained  Plan:   · Bedside I&D performed today, irrigated and purulent material expressed  Left open with 20cm packing in pace  -- see procedure note  · Cont  abx per primary team  · Heat to affected area  · Analgesics for pain  · Body mass index is 23 73 kg/m²  · Culture was taken by ID prior to incision and drainage but he has MRSA septicemia  · Ortho will monitor and change packing       Mela Mills PA-C

## 2022-02-10 NOTE — PROCEDURES
Procedure:    Patient was identified and verbal consent obtained for bedside I &D of left medial thigh abscess  The area was prepped with betadine and draped in a sterile fashion  5cc of 1%lidocaine was used for local anesthesia  The draining sinus tract was incised and a hemostat was used to break up loculations  Purulent and bloody drainage was expressed and milked proximally to the wound  Wound was irrigated with 100cc of normal saline  20cm of iodoform packing was placed and 2 sutures placed with the packing left as a wick  This was dressed with sterile gauze and ace wrap  The patient tolerated the procedure well

## 2022-02-10 NOTE — NURSING NOTE
Patient had hemoglobin of 8 0 on 2/9/22  Routine lab draw on 2/10/22 now shows Hgb 7 4     SLIM aware

## 2022-02-10 NOTE — ASSESSMENT & PLAN NOTE
Left lower extremity erythema and edema, likely cellulitis in the setting of chronic venous stasis and stasis dermatitis Received cefepime and vancomycin in ED  Started on IV Ancef on admission  Blood cultures from admission positive forMRSA Antibiotics changed to IV vancomycin   Follow up on repeat blood culture results    Continue to follow CBC and temperature curve closely   CT of the lower extremity noted

## 2022-02-11 ENCOUNTER — APPOINTMENT (INPATIENT)
Dept: GASTROENTEROLOGY | Facility: HOSPITAL | Age: 57
DRG: 872 | End: 2022-02-11
Payer: MEDICARE

## 2022-02-11 ENCOUNTER — ANESTHESIA EVENT (INPATIENT)
Dept: GASTROENTEROLOGY | Facility: HOSPITAL | Age: 57
DRG: 872 | End: 2022-02-11
Payer: MEDICARE

## 2022-02-11 ENCOUNTER — APPOINTMENT (INPATIENT)
Dept: RADIOLOGY | Facility: HOSPITAL | Age: 57
DRG: 872 | End: 2022-02-11
Payer: MEDICARE

## 2022-02-11 ENCOUNTER — ANESTHESIA (INPATIENT)
Dept: GASTROENTEROLOGY | Facility: HOSPITAL | Age: 57
DRG: 872 | End: 2022-02-11
Payer: MEDICARE

## 2022-02-11 PROBLEM — R09.02 HYPOXIA: Status: ACTIVE | Noted: 2022-02-11

## 2022-02-11 LAB
AFP-TM SERPL-MCNC: 1.1 NG/ML (ref 0.5–8)
ANION GAP SERPL CALCULATED.3IONS-SCNC: 4 MMOL/L (ref 4–13)
BACTERIA BLD CULT: ABNORMAL
BACTERIA BLD CULT: ABNORMAL
BASOPHILS # BLD AUTO: 0.03 THOUSANDS/ΜL (ref 0–0.1)
BASOPHILS NFR BLD AUTO: 0 % (ref 0–1)
BUN SERPL-MCNC: 6 MG/DL (ref 5–25)
CALCIUM SERPL-MCNC: 7.5 MG/DL (ref 8.3–10.1)
CHLORIDE SERPL-SCNC: 104 MMOL/L (ref 100–108)
CO2 SERPL-SCNC: 32 MMOL/L (ref 21–32)
CREAT SERPL-MCNC: 0.53 MG/DL (ref 0.6–1.3)
EOSINOPHIL # BLD AUTO: 0.59 THOUSAND/ΜL (ref 0–0.61)
EOSINOPHIL NFR BLD AUTO: 7 % (ref 0–6)
ERYTHROCYTE [DISTWIDTH] IN BLOOD BY AUTOMATED COUNT: 17.8 % (ref 11.6–15.1)
GFR SERPL CREATININE-BSD FRML MDRD: 116 ML/MIN/1.73SQ M
GLUCOSE SERPL-MCNC: 93 MG/DL (ref 65–140)
GRAM STN SPEC: ABNORMAL
GRAM STN SPEC: ABNORMAL
HBV CORE AB SER QL: NORMAL
HBV CORE IGM SER QL: NORMAL
HBV SURFACE AG SER QL: NORMAL
HCT VFR BLD AUTO: 27.1 % (ref 36.5–49.3)
HCV AB SER QL: NORMAL
HGB BLD-MCNC: 7.6 G/DL (ref 12–17)
IMM GRANULOCYTES # BLD AUTO: 0.05 THOUSAND/UL (ref 0–0.2)
IMM GRANULOCYTES NFR BLD AUTO: 1 % (ref 0–2)
LYMPHOCYTES # BLD AUTO: 1.26 THOUSANDS/ΜL (ref 0.6–4.47)
LYMPHOCYTES NFR BLD AUTO: 14 % (ref 14–44)
MCH RBC QN AUTO: 24.5 PG (ref 26.8–34.3)
MCHC RBC AUTO-ENTMCNC: 28 G/DL (ref 31.4–37.4)
MCV RBC AUTO: 87 FL (ref 82–98)
MONOCYTES # BLD AUTO: 0.92 THOUSAND/ΜL (ref 0.17–1.22)
MONOCYTES NFR BLD AUTO: 10 % (ref 4–12)
NEUTROPHILS # BLD AUTO: 6.1 THOUSANDS/ΜL (ref 1.85–7.62)
NEUTS SEG NFR BLD AUTO: 68 % (ref 43–75)
NRBC BLD AUTO-RTO: 0 /100 WBCS
PLATELET # BLD AUTO: 218 THOUSANDS/UL (ref 149–390)
PMV BLD AUTO: 9.5 FL (ref 8.9–12.7)
POTASSIUM SERPL-SCNC: 3.4 MMOL/L (ref 3.5–5.3)
PROCALCITONIN SERPL-MCNC: <0.05 NG/ML
RBC # BLD AUTO: 3.1 MILLION/UL (ref 3.88–5.62)
SODIUM SERPL-SCNC: 140 MMOL/L (ref 136–145)
VANCOMYCIN TROUGH SERPL-MCNC: 14.8 UG/ML (ref 10–20)
WBC # BLD AUTO: 8.95 THOUSAND/UL (ref 4.31–10.16)

## 2022-02-11 PROCEDURE — 84145 PROCALCITONIN (PCT): CPT | Performed by: INTERNAL MEDICINE

## 2022-02-11 PROCEDURE — 80202 ASSAY OF VANCOMYCIN: CPT | Performed by: INTERNAL MEDICINE

## 2022-02-11 PROCEDURE — 99232 SBSQ HOSP IP/OBS MODERATE 35: CPT | Performed by: INTERNAL MEDICINE

## 2022-02-11 PROCEDURE — 87340 HEPATITIS B SURFACE AG IA: CPT | Performed by: PHYSICIAN ASSISTANT

## 2022-02-11 PROCEDURE — 82105 ALPHA-FETOPROTEIN SERUM: CPT | Performed by: PHYSICIAN ASSISTANT

## 2022-02-11 PROCEDURE — 97116 GAIT TRAINING THERAPY: CPT

## 2022-02-11 PROCEDURE — 86038 ANTINUCLEAR ANTIBODIES: CPT | Performed by: PHYSICIAN ASSISTANT

## 2022-02-11 PROCEDURE — 86705 HEP B CORE ANTIBODY IGM: CPT | Performed by: PHYSICIAN ASSISTANT

## 2022-02-11 PROCEDURE — 80048 BASIC METABOLIC PNL TOTAL CA: CPT | Performed by: INTERNAL MEDICINE

## 2022-02-11 PROCEDURE — 85025 COMPLETE CBC W/AUTO DIFF WBC: CPT | Performed by: INTERNAL MEDICINE

## 2022-02-11 PROCEDURE — 86015 ACTIN ANTIBODY EACH: CPT | Performed by: PHYSICIAN ASSISTANT

## 2022-02-11 PROCEDURE — 86803 HEPATITIS C AB TEST: CPT | Performed by: PHYSICIAN ASSISTANT

## 2022-02-11 PROCEDURE — 82390 ASSAY OF CERULOPLASMIN: CPT | Performed by: PHYSICIAN ASSISTANT

## 2022-02-11 PROCEDURE — 43244 EGD VARICES LIGATION: CPT | Performed by: INTERNAL MEDICINE

## 2022-02-11 PROCEDURE — 86704 HEP B CORE ANTIBODY TOTAL: CPT | Performed by: PHYSICIAN ASSISTANT

## 2022-02-11 PROCEDURE — 86381 MITOCHONDRIAL ANTIBODY EACH: CPT | Performed by: PHYSICIAN ASSISTANT

## 2022-02-11 PROCEDURE — 99232 SBSQ HOSP IP/OBS MODERATE 35: CPT | Performed by: PHYSICIAN ASSISTANT

## 2022-02-11 PROCEDURE — 06L38CZ OCCLUSION OF ESOPHAGEAL VEIN WITH EXTRALUMINAL DEVICE, VIA NATURAL OR ARTIFICIAL OPENING ENDOSCOPIC: ICD-10-PCS | Performed by: INTERNAL MEDICINE

## 2022-02-11 PROCEDURE — 82103 ALPHA-1-ANTITRYPSIN TOTAL: CPT | Performed by: PHYSICIAN ASSISTANT

## 2022-02-11 PROCEDURE — 97163 PT EVAL HIGH COMPLEX 45 MIN: CPT

## 2022-02-11 PROCEDURE — 97167 OT EVAL HIGH COMPLEX 60 MIN: CPT

## 2022-02-11 PROCEDURE — 71045 X-RAY EXAM CHEST 1 VIEW: CPT

## 2022-02-11 PROCEDURE — 99024 POSTOP FOLLOW-UP VISIT: CPT | Performed by: PHYSICIAN ASSISTANT

## 2022-02-11 RX ORDER — SUCRALFATE 1 G/1
1 TABLET ORAL EVERY 6 HOURS SCHEDULED
Status: DISCONTINUED | OUTPATIENT
Start: 2022-02-11 | End: 2022-02-18 | Stop reason: HOSPADM

## 2022-02-11 RX ORDER — SODIUM CHLORIDE, SODIUM LACTATE, POTASSIUM CHLORIDE, CALCIUM CHLORIDE 600; 310; 30; 20 MG/100ML; MG/100ML; MG/100ML; MG/100ML
INJECTION, SOLUTION INTRAVENOUS CONTINUOUS PRN
Status: DISCONTINUED | OUTPATIENT
Start: 2022-02-11 | End: 2022-02-11

## 2022-02-11 RX ORDER — PROPOFOL 10 MG/ML
INJECTION, EMULSION INTRAVENOUS AS NEEDED
Status: DISCONTINUED | OUTPATIENT
Start: 2022-02-11 | End: 2022-02-11

## 2022-02-11 RX ORDER — LIDOCAINE HYDROCHLORIDE 10 MG/ML
INJECTION, SOLUTION EPIDURAL; INFILTRATION; INTRACAUDAL; PERINEURAL AS NEEDED
Status: DISCONTINUED | OUTPATIENT
Start: 2022-02-11 | End: 2022-02-11

## 2022-02-11 RX ORDER — OXYCODONE HYDROCHLORIDE 10 MG/1
10 TABLET ORAL EVERY 4 HOURS PRN
Status: DISCONTINUED | OUTPATIENT
Start: 2022-02-11 | End: 2022-02-18 | Stop reason: HOSPADM

## 2022-02-11 RX ORDER — OXYCODONE HYDROCHLORIDE 5 MG/1
5 TABLET ORAL EVERY 4 HOURS PRN
Status: DISCONTINUED | OUTPATIENT
Start: 2022-02-11 | End: 2022-02-18 | Stop reason: HOSPADM

## 2022-02-11 RX ORDER — HYDROMORPHONE HCL/PF 1 MG/ML
0.5 SYRINGE (ML) INJECTION EVERY 4 HOURS PRN
Status: DISCONTINUED | OUTPATIENT
Start: 2022-02-11 | End: 2022-02-18 | Stop reason: HOSPADM

## 2022-02-11 RX ADMIN — PROPOFOL 10 MG: 10 INJECTION, EMULSION INTRAVENOUS at 15:27

## 2022-02-11 RX ADMIN — LIDOCAINE HYDROCHLORIDE 50 MG: 10 INJECTION, SOLUTION EPIDURAL; INFILTRATION; INTRACAUDAL at 15:21

## 2022-02-11 RX ADMIN — OXYCODONE HYDROCHLORIDE 5 MG: 5 TABLET ORAL at 11:40

## 2022-02-11 RX ADMIN — PROPRANOLOL HYDROCHLORIDE 10 MG: 20 TABLET ORAL at 09:17

## 2022-02-11 RX ADMIN — SODIUM CHLORIDE, SODIUM LACTATE, POTASSIUM CHLORIDE, AND CALCIUM CHLORIDE: .6; .31; .03; .02 INJECTION, SOLUTION INTRAVENOUS at 15:04

## 2022-02-11 RX ADMIN — PROPOFOL 100 MG: 10 INJECTION, EMULSION INTRAVENOUS at 15:21

## 2022-02-11 RX ADMIN — SUCRALFATE 1 G: 1 TABLET ORAL at 18:19

## 2022-02-11 RX ADMIN — LEVETIRACETAM 500 MG: 500 TABLET, FILM COATED ORAL at 21:55

## 2022-02-11 RX ADMIN — VANCOMYCIN HYDROCHLORIDE 1000 MG: 1 INJECTION, SOLUTION INTRAVENOUS at 13:16

## 2022-02-11 RX ADMIN — TRIAMCINOLONE ACETONIDE 1 APPLICATION: 1 CREAM TOPICAL at 09:18

## 2022-02-11 RX ADMIN — PROPOFOL 10 MG: 10 INJECTION, EMULSION INTRAVENOUS at 15:25

## 2022-02-11 RX ADMIN — OXCARBAZEPINE 600 MG: 150 TABLET, FILM COATED ORAL at 09:17

## 2022-02-11 RX ADMIN — PANTOPRAZOLE SODIUM 40 MG: 40 TABLET, DELAYED RELEASE ORAL at 09:17

## 2022-02-11 RX ADMIN — OXCARBAZEPINE 600 MG: 150 TABLET, FILM COATED ORAL at 21:55

## 2022-02-11 RX ADMIN — LEVETIRACETAM 500 MG: 500 TABLET, FILM COATED ORAL at 09:17

## 2022-02-11 RX ADMIN — SUCRALFATE 1 G: 1 TABLET ORAL at 23:18

## 2022-02-11 RX ADMIN — VANCOMYCIN HYDROCHLORIDE 1000 MG: 1 INJECTION, SOLUTION INTRAVENOUS at 05:36

## 2022-02-11 RX ADMIN — STANDARDIZED SENNA CONCENTRATE 8.6 MG: 8.6 TABLET ORAL at 09:17

## 2022-02-11 RX ADMIN — TRIAMCINOLONE ACETONIDE: 1 CREAM TOPICAL at 18:01

## 2022-02-11 RX ADMIN — VANCOMYCIN HYDROCHLORIDE 1000 MG: 1 INJECTION, SOLUTION INTRAVENOUS at 23:17

## 2022-02-11 RX ADMIN — ONDANSETRON 4 MG: 2 INJECTION INTRAMUSCULAR; INTRAVENOUS at 11:40

## 2022-02-11 RX ADMIN — PROPOFOL 10 MG: 10 INJECTION, EMULSION INTRAVENOUS at 15:30

## 2022-02-11 NOTE — ASSESSMENT & PLAN NOTE
· As evidenced by fever, tachycardia, bacteremia secondary to left leg cellulitis  · Lactic acid on admission was 1 7  · Blood cultures 2/2 on admission positive for MRSA  · Repeats for 2/12  · Patient was initially started on IV cefazolin and was transitioned to intravenous vancomycin on 02/09    · Follow-up on repeat blood cultures to assess clearance of bacteremia  · Infectious disease consulted
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
## 2022-02-11 NOTE — PHYSICAL THERAPY NOTE
PHYSICAL THERAPY EVALUATION NOTE          Patient Name: Dinh Walker  [id number] Date: 2/11/2022 02/11/22 0855   PT Last Visit   PT Visit Date 02/11/22   Note Type   Note type Evaluation   Additional Comments pt provided verbal consent for PT eval and tx    Pain Assessment   Pain Assessment Tool FLACC   Pain Location/Orientation Orientation: Left; Location: Foot; Location: Knee; Location: Leg   Pain Rating: FLACC (Rest) - Face 0   Pain Rating: FLACC (Rest) - Legs 0   Pain Rating: FLACC (Rest) - Activity 0   Pain Rating: FLACC (Rest) - Cry 0   Pain Rating: FLACC (Rest) - Consolability 0   Score: FLACC (Rest) 0   Pain Rating: FLACC (Activity) - Face 1   Pain Rating: FLACC (Activity) - Legs 2  (L leg pulled up)   Pain Rating: FLACC (Activity) - Activity 0   Pain Rating: FLACC (Activity) - Cry 0   Pain Rating: FLACC (Activity) - Consolability 1   Score: FLACC (Activity) 4   Restrictions/Precautions   Weight Bearing Precautions Per Order Yes   LLE Weight Bearing Per Order WBAT  (per orthopedics, Dr Cleveland Mathur)   Braces or Orthoses Other (Comment)  (B/L LE knee down gauze/ace wrapping )   Other Precautions Contact/isolation; Impulsive;Cognitive; Chair Alarm; Bed Alarm;WBS;Multiple lines;O2;Fall Risk;Fluid restriction;Pain   Home Living   Type of Home Apartment; Other (Comment); House  (Basement apt in brother's house)   Home Layout Other (Comment)  (basement bedroom w/ bathroom 6 [employer] house, full flight down)   Bathroom Shower/Tub   (pt unable to consistently report)   Home Equipment Walker;Crutches  (pt reports not using at baseline)   Prior Function   Level of [place] Needs assistance with IADLs   Lives With Family  (brother, sister in law, mother)   Receives Help From [address]  (pt reports needing assist from brother recently d/t LE pain)   IADLs Needs assistance   Falls in the last 6 months 0   Comments Per CM note, pt completed ADL w/ out assistance at baseline  Pt not accurate historian at baseline due to developmental delay  Pt needs assistance w/ IADLs   General   Additional Pertinent History Temp 100 7 2/9/22 22:01 O2 87% RA 2/11/22 03:45; Hgb 7 4 2/10/22 04:51   Family/Caregiver Present No   Cognition   Overall Cognitive Status Impaired   Arousal/Participation Cooperative   Attention Attends with cues to redirect   Orientation Level Oriented to person;Oriented to situation   Following Commands Follows one step commands with increased time or repetition   Comments pt identifed by full name and birthday   Subjective   Subjective pt seated upright in bedside chair w/ leg rest elevated    RUE Assessment   RUE Assessment WFL  (strength 3/5; more likely but impaired cognitively)   LUE Assessment   LUE Assessment WFL  (strength 3/5; more likely but impaired cognitively)   RLE Assessment   RLE Assessment WFL   LLE Assessment   LLE Assessment X  (unable to assess d/t pain)   Light Touch   RLE Light Touch   (unable to assess d/t bandaging)   LLE Light Touch   (unable to assess d/t bandaging)   Transfers   Sit to Stand 5  Supervision   Additional items Impulsive; Increased time required;Verbal cues   Stand to Sit 5  Supervision   Additional items Increased time required; Impulsive;Verbal cues   Toilet transfer 4  Minimal assistance   Additional items Assist x 1; Increased time required;Verbal cues; Impulsive   Additional Comments Unable to tolerate WB LLE d/t pain   Ambulation/Elevation   Gait pattern   (hopping on R leg while keeping L elevated)   Gait Assistance 4  Minimal assist   Additional items Assist x 1;Verbal cues   Assistive Device Rolling walker   Distance 25ft  (w/ RW  more not possible d/t fatigue)   Stair Management Assistance Not tested   Additional items Other (Comment)  (see comments below)   Ambulation/Elevation Additional Comments stair management not appropriate at this time due to pt avoiding WB on LLE   PT to initiate stair training when appropriate   Balance   Static Sitting Fair +   Static Standing Fair -   Ambulatory Poor +   Endurance Deficit   Endurance Deficit Yes   Endurance Deficit Description pt required rest breaks during gait training   Activity Tolerance   Activity Tolerance Patient limited by fatigue;Patient limited by pain   Medical Staff Made Aware spoke to Juan Ma, [phone]   Nurse Made Aware ALEXANDER Ambrose   Assessment   Prognosis Good   Problem List Decreased strength;Decreased range of motion;Decreased endurance; Impaired balance;Decreased mobility; Decreased cognition; Impaired judgement;Decreased safety awareness;Orthopedic restrictions;Pain   Assessment Pt presents from home w/ his brother d/t progressive ambulatory dysfunction associated w/ multiple falls at home paired w/ increasing erythema, swelling, and tenderness of L knee  Dx include venous stasis dermatitis of B/L LE, ambulatory dysfunction, epilepsy, cellulitis of LLE, MRSA, sepsis, anemia, and L leg abscess  I & D of L knee 2/10/22  Order placed for PT eval and tx w/ WBAT LLE per orthopedics  Pt presents w/ comorbidities of anxiety, dizziness, HTN, hypoxia, anemia, epilepsy, and sepsis  Personal factors negatively affecting pt include 6STE, full flight of stairs to apt, impaired baseline cognition, previously not requiring supplemental O2, pain, and fall history  Pt presents w/ weakness, decreased ROM, decreased endurance, impaired balance, decreased mobility, decreased cognition, impaired judgement, decreased safety awareness, orthopedic restrictions, fall risk, and pain  These findings were evident in the physical exam (weakness, decreased ROM, decreased cognition, pain) and the mobility exam (requiring supervision for sit to stand and stand to sit tranfers, min assist x1 for ambulation and toilet transfer, and need for frequent verbal cues for mobility technique) and barthel index 55/100   Pt required frequent input for task focus and mobility technique w/ poor carryover or education received  Pt is at risk for falls due to both his physical and cognitive impairments  Although pt is WBAT pt refuses to WB on L LE d/t pain  Pt's clinical presentation is unpredictable (evident in anemia, need for min assist x1 for ambulation and toilet transfer and need for supervision for sit to stand and stand to sit transfers when normally mobilizing independently, need for supplemental O2 when not requiring it PTA, need for input for task focus and mobility technique w/ poor carryover of new info, pain limiting mobility status, and recent orthopedic procedure of  L LE)  Pt needs inpatient PT tx to improve mobility deficits and progress mobility training as appropriate  D/c recommendation is for home w/ home health PT to continue to reduce fall risk and maximize level of function independence  Pt would benefit from pain management consult to address pain control      Goals   Patient Goals to go home   STG Expiration Date 02/21/22   Short Term Goal #1 Pt will increase b/l LE strength by 1/2 grade to facilitate independent mobility, perform sit to stand and stand to sit transfers modified independent w/ RW to decrease fall risk factors,  ambulate 150ft w/ least restrictive AD w/ supervision to facilitate eventual safe return home, increase all balance 1 grade to decrease fall risk, complete exercise program w/ less than 50% input from therapist to increase strength and endurance, tolerate 4hr OOB to facilitate upright tolerance, tolerate standing 5 mins w/ least restrictive AD w/ modified independence to facilitate functional task performance, complete a full flight of steps w/ railing w/ min asist x1 w/ least restrictive AD to improve home accessibility, and complete timed up and go or comfortable gait speed to further assess mobility and monitor progress   PT Treatment Day 1   Plan   Treatment/Interventions Functional transfer training;LE strengthening/ROM; Elevations; Therapeutic exercise; Endurance training;Cognitive reorientation;Patient/family training;Equipment eval/education;Gait training   PT Frequency 3-5x/wk   Recommendation   PT Discharge Recommendation Home with home health rehabilitation   AM-PAC Basic Mobility Inpatient   Turning in Bed Without Bedrails 4   Lying on Back to Sitting on Edge of Flat Bed 4   Moving Bed to Chair 3   Standing Up From Chair 4   Walk in Room 3   Climb 3-5 Stairs 1   Basic Mobility Inpatient Raw Score 19   Basic Mobility Standardized Score 42 48   Highest Level Of Mobility   -HL Goal 6: Walk 10 steps or more   JH-HLM Highest Level of Mobility 7: Walk 25 feet or more   JH-HLM Goal Achieved Yes   Barthel Index   Feeding 10   Bathing 0   Grooming Score 5   Dressing Score 5   Bladder Score 10   Bowels Score 10   Toilet Use Score 5   Transfers (Bed/Chair) Score 10   Mobility (Level Surface) Score 0   Stairs Score 0   Barthel Index Score 55   Additional Treatment Session   Start Time 0855   End Time 0910   Treatment Assessment Therapist introduced roller walker education w/ mobility to address impairments noted in evaluation w/ marked improvement in pt ambulation  Sit to stand transfer supervision , ambulate 10ft, 15ft w/ seated rest break 2-3 mins w/ supervision, stand to sit transfer w/ supervision  pt continues to require frequent verbal cues for mobility technique and safety awareness  Continued inpatient PT tx is indicated to continue to improve pt indpendent mobility and decrease fall risk  Equipment Use RW   Additional Treatment Day 1   End of Consult   Patient Position at End of Consult Bed/Chair alarm activated; Bedside chair; All needs within reach     [facility] SPT

## 2022-02-11 NOTE — UTILIZATION REVIEW
Continued Stay Review    Date: 2/10 and 2/11/22                          Current Patient Class: IP Current Level of Care: MS    HPI:56 y o  male initially admitted on 2/10 as IP with Cellulitis LLE    Assessment/Plan:   2/10 - pt was on room air and in the evening was put on 3L NC oxygen for intermittent hypoxia  Was febrile to 100 1F  Celllulitis LLE, sepsis, MRSA bacteremia, anemia - IV Ancef changed to IV Vanco, follow blood cultures, CBC, temp curve  RACHAEL negative for vegetation  Repeat blood cultures done 2/10  H/o angiodysplagia with argon beam therapy 1/2019, mentions esophageal varicies - serial H/H - Hgb 7 4  HTN is controlled and will continue Inderal with holding parameters d/t lower Bps, also holding Lasix  2/10 ID Consult - Sepsis likely d/t MRSA Bacteremia, likely source L Leg abscess - chronic wounds - also possible source could be pneumonia or endocarditis or septic phlebitis  Hemodynamically stable, continue Vanco  L knee abscess - reasonable ROM, purulent fluid expressed and sent for cultures  Imaging w/o defined collection but with copious drainage strongly suggests abscess, consult Ortho, continue IV antibiotics, I&D, local wound cared  Blood culture 1+ gram + cocci in clusters  2/10 GO Consult - iron def anemia - possible anemia of chronic disease vs bleeding from angiectasis - PPI, EGD 2/11, NPO p MN, possible colonoscopy, trend H/H - 7 4/26  5  Cirrhosis - MELD 9, not a new dx, h/o esophageal varices, denies ETOH and IVDU, hepatitis, tatoos  R/o Hep B and C, Autoimmune - on Propranolol, Lasix  No ascites, no masses on Liver US  Check AFP, check chronic hepatitis, CLEMENTE, AMA, ASMA, ceruloplasmin, alpha-1 antitrypsin  2/10 Ortho Consult - L thigh and knee pain with progressive swelling, MRSA bacteremia, on Vanco   Knee is red ans swollen  Left leg cellulitis  This does not involve the knee as he has no lateral or anterior erythema and no effusion  ROM is maintained   I&D done - today, irrigated and purulent material expressed  Left open with 20cm packing in pace, continue antibiotics, heat to site, analgesia  2/11 - seen by ortho and pain in L knee remains but is controlled, no fevers, chills, WBAT, continue IV antibiotics, analgesia, start PT/OT, no indication for operative intervention at this time, follow cultures  ID decreased IV Vanco to 1 gm q 8 hr  Will recheck another set of blood cultures 2/12  If they remain + will need to consider change in antibiotics and RACHAEL  Afebrile, no chills  Vital Signs:   02/11/22 0700 98 °F (36 7 °C) 98 19 110/60 -- 94 % -- -- -- Lying   02/11/22 0354 97 6 °F (36 4 °C) -- -- -- -- 93 % 32 3 L/min Nasal cannula --   02/11/22 0345 -- -- -- -- -- 87 % Abnormal  -- -- None (Room air) --   02/10/22 2238 99 6 °F (37 6 °C) -- -- -- -- -- -- -- -- --   02/10/22 2022 100 1 °F (37 8 °C) 102 18 106/59 75 94 % 32 3 L/min Nasal cannula Lying   02/10/22 2020 -- -- -- -- -- 87 % Abnormal  -- -- None (Room air) --   02/10/22 1527 98 2 °F (36 8 °C) 87 18 104/59 -- 92 % -- -- None (Room air) Lying   02/10/22 0900 -- -- -- -- -- -- -- -- None (Room air) --   02/10/22 0646 98 °F (36 7 °C) 99 19 112/64 -- 92 % -- -- -- Lying   02/09/22 2347 99 8 °F (37 7 °C) -- -- -- -- -- -- -- -- --   02/09/22 2201 100 7 °F (38 2 °C) Abnormal  -- -- -- -- -- -- -- -- --   02/09/22 2053 101 4 °F (38 6 °C) Abnormal  108 Abnormal  18 115/64 80 90 % -- -- None (Room air) Lying   02/09/22 1527 99 °F (37 2 °C) 93 18 91/51 63 89 % Abnormal  -- -- None (Room air) Lying   02/09/22 1430 -- 74 -- 110/55 -- -- -- -- -- --   02/09/22 0700 98 °F (36 7 °C) 74 19 110/55 -- 94 % -- -- -- Lying     Pertinent Labs/Diagnostic Results:   2/11 EGD - Normal duodenum  Nodular gastritis  Normal retroflexion, no evidence of gastric varices  Four columns of grade 2 varices with red Michael signs    Four bands were placed with good decompression of varices in the distal esophagus     RECOMMENDATION:  Schedule follow-up procedure for continued treatment   Return to floor  Full liquid diet  B i d  PPI therapy  Outpatient repeat EGD in 2 to 3 months for additional banding    2/9 Echo -   Left Ventricle: Left ventricular cavity size is upper normal  Wall thickness is normal  The left ventricular ejection fraction is 55%  Systolic function is normal  Wall motion is normal  Diastolic function is normal     Mitral Valve: There is trace regurgitation    Tricuspid Valve: There is mild regurgitation    Aorta: The aortic root is mildly dilated (4 0 cm)  The ascending aorta is normal in size    Pulmonary Artery: The pulmonary artery systolic pressure is normal    2/10 US ABD -  1 Cirrhotic changes in the liver with suggestion for cavernous transformation of portal vein, suggesting portal hypertension  2 Gallstone/sludge in the gallbladder with gallbladder wall thickening  3 History of splenectomy  Splenules identified  No prior study for comparison  Further evaluation with contrast-enhanced CT recommended  Results from last 7 days   Lab Units 02/11/22  0537 02/10/22  0451 02/09/22  0453 02/08/22  1436 02/08/22  1436   WBC Thousand/uL 8 95 9 15 9 44   < > 9 65   HEMOGLOBIN g/dL 7 6* 7 4* 8 0*  --  8 7*   HEMATOCRIT % 27 1* 26 5* 28 3*  --  30 7*   PLATELETS Thousands/uL 218 200 190   < > 198   NEUTROS ABS Thousands/µL 6 10 6 84  --   --  7 19    < > = values in this interval not displayed           Results from last 7 days   Lab Units 02/11/22  0537 02/10/22  0451 02/09/22  0453 02/08/22  1436   SODIUM mmol/L 140 139 139 137   POTASSIUM mmol/L 3 4* 3 2* 3 5 3 6   CHLORIDE mmol/L 104 105 104 103   CO2 mmol/L 32 31 32 31   ANION GAP mmol/L 4 3* 3* 3*   BUN mg/dL 6 9 11 13   CREATININE mg/dL 0 53* 0 56* 0 54* 0 59*   EGFR ml/min/1 73sq m 116 114 115 111   CALCIUM mg/dL 7 5* 7 5* 7 8* 7 8*     Results from last 7 days   Lab Units 02/10/22  0451 02/08/22  1436   AST U/L 24 30   ALT U/L 13 17   ALK PHOS U/L 79 84   TOTAL PROTEIN g/dL 6 4 7 8   ALBUMIN g/dL 1 6* 2 1*   TOTAL BILIRUBIN mg/dL 0 28 0 43   AMMONIA umol/L  --  32         Results from last 7 days   Lab Units 02/11/22  0537 02/10/22  0451 02/09/22  0453 02/08/22  1436   GLUCOSE RANDOM mg/dL 93 114 85 93     Results from last 7 days   Lab Units 02/08/22  1436   CK TOTAL U/L 109     Results from last 7 days   Lab Units 02/08/22  1913 02/08/22  1714 02/08/22  1436   HS TNI 0HR ng/L  --   --  8   HS TNI 2HR ng/L  --  2  --    HSTNI D2 ng/L  --  -6  --    HS TNI 4HR ng/L 2  --   --    HSTNI D4 ng/L -6  --   --          Results from last 7 days   Lab Units 02/08/22  1436   PROTIME seconds 15 7*   INR  1 26*   PTT seconds 37     Results from last 7 days   Lab Units 02/08/22  1436   TSH 3RD GENERATON uIU/mL 0 848     Results from last 7 days   Lab Units 02/11/22  0537 02/10/22  1224   PROCALCITONIN ng/ml <0 05 <0 05     Results from last 7 days   Lab Units 02/10/22  1224 02/08/22  1436   LACTIC ACID mmol/L 1 7 1 7             Results from last 7 days   Lab Units 02/08/22  1436   NT-PRO BNP pg/mL 396*     Results from last 7 days   Lab Units 02/09/22  0945   FERRITIN ng/mL 48     Results from last 7 days   Lab Units 02/11/22  0536   HEP B S AG  Non-reactive   HEP C AB  Non-reactive   HEP B C IGM  Non-reactive   HEP B C TOTAL AB  Non-reactive     Results from last 7 days   Lab Units 02/10/22  1157 02/10/22  0009 02/10/22  0003 02/08/22  1529 02/08/22  1442   BLOOD CULTURE   --   --   --  Methicillin Resistant Staphylococcus aureus* Methicillin Resistant Staphylococcus aureus*   GRAM STAIN RESULT  1+ Gram positive cocci in clusters*  No polys seen* Gram positive cocci in clusters* Gram positive cocci in clusters* Gram positive cocci in clusters* Gram positive cocci in clusters*         Medications:   Scheduled Medications:  enoxaparin, 40 mg, Subcutaneous, Daily  levETIRAcetam, 500 mg, Oral, Q12H JODIE  OXcarbazepine, 600 mg, Oral, Q12H [facility]  pantoprazole, 40 mg, Oral, Daily  propranolol, 10 mg, Oral, TID  senna, 1 tablet, Oral, Daily  triamcinolone, , Topical, BID  vancomycin, 1,000 mg, Intravenous, Q8H      Continuous IV Infusions:  multi-electrolyte, 150 mL/hr, Intravenous, Continuous      PRN Meds:  acetaminophen, 650 mg, Oral, Q6H PRN -x 1 2/10  diazepam, 10 mg, Oral, Q12H PRN  HYDROmorphone, 0 5 mg, Intravenous, Q4H PRN  ondansetron, 4 mg, Intravenous, Q6H PRN  oxyCODONE, 10 mg, Oral, Q4H PRN  oxyCODONE, 5 mg, Oral, Q4H PRN    Discharge Plan: [facility]    Network Utilization Review Department  ATTENTION: Please call with any questions or concerns to [phone] and carefully listen to the prompts so that you are directed to the right person  All voicemails are confidential   [facility] all requests for admission clinical reviews, approved or denied determinations and any other requests to dedicated fax number below belonging to the campus where the patient is receiving treatment   List of dedicated fax numbers for the Facilities:  [address] DENIALS (Administrative/Medical Necessity) [phone]   [address] (Maternity/NICU/Pediatrics) [phone]   [address]  [address] [place] [id number] [place] [address] [address] [facility] [phone]

## 2022-02-11 NOTE — ASSESSMENT & PLAN NOTE
· Controlled  · Continue Inderal with holding parameters  · Will hold Lasix in view of borderline low blood pressure

## 2022-02-11 NOTE — PROGRESS NOTES
Patient being transported off unit to short procedure unit for EGD via stretcher with PCA  Family at bedside updated

## 2022-02-11 NOTE — ASSESSMENT & PLAN NOTE
· Patient presents with multiple falls over the last couple weeks  · PT/OT consulted  · Patient's brother is in agreement for SNF if needed

## 2022-02-11 NOTE — PROGRESS NOTES
Orthopedics   Jose Dawson Him 60 y o  male MRN: [id number]  Unit/Bed#: AN ULTRASOUND      Subjective:  56 y o male seen and evaluated this morning  He notes left knee pain  Pain is well controlled  Denies any fevers or chills  No new or changing numbness or tingling in the left lower extremity      Labs:  0   Lab Value Date/Time    HCT 27 1 (L) 02/11/2022 0537    HCT 26 5 (L) 02/10/2022 0451    HCT 28 3 (L) 02/09/2022 0453    HGB 7 6 (L) 02/11/2022 0537    HGB 7 4 (L) 02/10/2022 0451    HGB 8 0 (L) 02/09/2022 0453    INR 1 26 (H) 02/08/2022 1436    WBC 8 95 02/11/2022 0537    WBC 9 15 02/10/2022 0451    WBC 9 44 02/09/2022 0453       Meds:    Current Facility-Administered Medications:     acetaminophen (TYLENOL) tablet 650 mg, 650 mg, Oral, Q6H PRN, Viral Jackson MD, 650 mg at 02/10/22 2030    diazepam (VALIUM) tablet 10 mg, 10 mg, Oral, Q12H PRN, Viral Jackson MD    enoxaparin (LOVENOX) subcutaneous injection 40 mg, 40 mg, Subcutaneous, Daily, Viral Jackson MD, 40 mg at 02/10/22 0942    levETIRAcetam (KEPPRA) tablet 500 mg, 500 mg, Oral, Q12H [facility], Viral Jackson MD, 500 mg at 02/10/22 2030    multi-electrolyte (PLASMALYTE-A/ISOLYTE-S PH 7 4) IV solution, 150 mL/hr, Intravenous, Continuous, Miller Estrada MD, Last Rate: 150 mL/hr at 02/10/22 2003, 150 mL/hr at 02/10/22 2003    ondansetron [facility]) injection 4 mg, 4 mg, Intravenous, Q6H PRN, Viral Jackson MD, 4 mg at 02/09/22 2055    OXcarbazepine (TRILEPTAL) tablet 600 mg, 600 mg, Oral, Q12H [facility], Viral Jackson MD, 600 mg at 02/10/22 2030    pantoprazole (PROTONIX) EC tablet 40 mg, 40 mg, Oral, Daily, Miller Estrada MD    propranolol (INDERAL) tablet 10 mg, 10 mg, Oral, TID, Viral Jackson MD, 10 mg at 02/10/22 8905    senna (SENOKOT) tablet 8 6 mg, 1 tablet, Oral, Daily, Viral Jackson MD, 8 6 mg at 02/10/22 0942    triamcinolone (KENALOG) 0 1 % cream, , Topical, BID, Viral Jackson MD, Given at 02/10/22 1741    vancomycin (VANCOCIN) IVPB (premix in dextrose) 1,000 mg 200 mL, 1,000 mg, Intravenous, Q8H, Axel President, MD, Last Rate: 200 mL/hr at 02/11/22 0536, 1,000 mg at 02/11/22 0536    Blood Culture:   Lab Results   Component Value Date    BLOODCX No Growth at 24 hrs  02/10/2022       Wound Culture:   No results found for: WOUNDCULT    Ins and Outs:  I/O last 24 hours: In: 1280 [P O :480; I V :800]  Out: 1015 [Urine:1015]          Physical Exam:  Vitals:    02/11/22 0700   BP: 110/60   Pulse: 98   Resp: 19   Temp: 98 °F (36 7 °C)   SpO2: 94%     left Lower Extremity extremity:  · Dressings with moderate serosanguineous drainage no hamlet purulence  · Iodoform packing gauze intact in the medial knee wound this was removed there is no purulence able to be expressed    Single suture is intact 1 nylon suture that was only in 1 wound edge this was at the distal wound edge this suture was removed  · Erythema surrounding the wound as previously described  · No pain with range of motion of the knee no palpable knee effusion  · Sensation motor grossly intact throughout left lower extremity  · Lower extremities warm well perfused  · Packing was removed from the wound and new half-inch iodoform packing gauze was packed into the knee  · New dressings were applied    Assessment: 56 y o male left knee medial superficial abscess status post bedside I and D on 02/10/2022    Plan:  · Weight-bearing as tolerated lower extremity  · DVT prophylaxis  · Analgesics per primary team  · Antibiotics per Infectious Disease recommendation  · No indication for operative intervention at this time no evidence of septic arthritis  · PT/OT  · Dispo: Ortho will follow continue to monitor and change packing  · Follow-up on cultures taken by Infectious Disease      Macario Henry PA-C

## 2022-02-11 NOTE — ASSESSMENT & PLAN NOTE
· Progressive hemoglobin decline noted since admission  Upon reviewing patient's old records from [place] , patient has history of angiodysplasia and had argon beam therapy on January 2019  There was also a remote mention of esophageal varices  Continue to follow serial H&H  Patient denies any nausea ,vomiting or hematemesis  Follow-up on stool for occult blood  Continue with PPI therapy  GI consultation will be obtained  · In view of anemia, hypoalbuminemia and? History of esophageal varices, will pursue workup to rule out underlying cirrhosis    · For EGD today

## 2022-02-11 NOTE — ASSESSMENT & PLAN NOTE
· Left lower extremity erythema and edema, likely cellulitis in the setting of chronic venous stasis and stasis dermatitis Received cefepime and vancomycin in ED  · Started on IV Ancef on admission  Blood cultures from admission positive for MRSA Antibiotics, changed to IV vancomycin   · Repeat BC ordered for tomorrow    · If positive needs RACHAEL  · Continue to follow CBC and temperature curve closely  · CT of the lower extremity noted

## 2022-02-11 NOTE — ANESTHESIA POSTPROCEDURE EVALUATION
Post-Op Assessment Note    CV Status:  Stable    Pain management: adequate     Mental Status:  Sleepy   Hydration Status:  Euvolemic   PONV Controlled:  Controlled   Airway Patency:  Patent      Post Op Vitals Reviewed: Yes      Staff: CRNA         No complications documented      BP   104/58   Temp   98 2   Pulse  76   Resp   18   SpO2   97

## 2022-02-11 NOTE — OCCUPATIONAL THERAPY NOTE
Occupational Therapy Evaluation     Patient Name: Anusha Hewitt  UYIEP'A Date: 2/11/2022  Problem List  Principal Problem:    Cellulitis of left lower extremity  Active Problems:    Ambulatory dysfunction    Nonintractable epilepsy without status epilepticus ([facility] )    Primary hypertension    Gastroesophageal reflux disease without esophagitis    Venous stasis dermatitis of both lower extremities    MRSA bacteremia    Sepsis ([facility] )    Anemia    Status post splenectomy    Past Medical History  Past Medical History:   Diagnosis Date    Anxiety     GERD (gastroesophageal reflux disease)     Hypertension     Seizures ([facility] )      Past Surgical History  Past Surgical History:   Procedure Laterality Date    SPLENECTOMY           02/11/22 0830   OT Last Visit   OT Visit Date 02/11/22  (Friday)   Note Type   Note type Evaluation   Restrictions/Precautions   Weight Bearing Precautions Per Order Yes   LLE Weight Bearing Per Order WBAT  (per orthopedics consult, progress note)   Braces or Orthoses Other (Comment)  (gauze bandage B LE; ace bandage, packing L LE / knee)   Other Precautions Cognitive;Contact/isolation; Chair Alarm; Bed Alarm;Multiple lines;O2;Fall Risk;Pain  (3L O2 via NC)   Pain Assessment   Pain Assessment Tool FLACC   Effect of Pain on Daily Activities limits I w/ LBD and functional mobility, transfers   Patient's Stated Pain Goal No pain   Hospital Pain Intervention(s) Repositioned; Ambulation/increased activity; Emotional support   Pain Rating: FLACC (Rest) - Face 0   Pain Rating: FLACC (Rest) - Legs 0   Pain Rating: FLACC (Rest) - Activity 0   Pain Rating: FLACC (Rest) - Cry 0   Pain Rating: FLACC (Rest) - Consolability 0   Score: FLACC (Rest) 0   Pain Rating: FLACC (Activity) - Face 1   Pain Rating: FLACC (Activity) - Legs 1   Pain Rating: FLACC (Activity) - Activity 0   Pain Rating: FLACC (Activity) - Cry 1   Pain Rating: FLACC (Activity) - Consolability 1   Score: FLACC (Activity) 4   Home Living Type of Home House; Other (Comment)  (basement apt / bedroom at brother's house)   Home Layout   (basement bedroom/ apt w/ bathroom; 6 GIUSEPPE, full flight down)   Bathroom Shower/Tub   (pt unable to accurately consistently report)   [address] Crutches;Walker; Other (Comment)  (pt reports not using at baseline in [place])   Additional Comments Pt able to confirm living w/ brother, his wife and brother's mother in law in [address] [place] [address]  6 TE per CM note and full flight down to pt's bedroom  Prior Function   Level of [place] Needs assistance with IADLs   Lives With Family; Other (Comment)  (brother)   Receives Help From Family   ADL Assistance Independent  (pt reports brother A recently due to LE pain)   IADLs Needs assistance   Falls in the last 6 months 0   Comments Per CM note, pt completed ADL w/ out assistance at baseline  Pt not accurate historian at baseline due to developmental delay  Pt needs assistance w/ IADLs   Lifestyle   Autonomy Pt completed ADL w/ out assistance at baseline  Needs assistance w/ IADLs and did not use AD for functional mobility at baseline   Reciprocal Relationships Pt lives w/ brother and his family  3 dogs   Intrinsic Gratification Pt reports enjoying word puzzles from [place] and enjoyed talking about 3 dogs at his brother's house   Subjective   Subjective "I can hop around well"   ADL   Where Assessed Edge of bed  (vs OOB in chair)   Eating Assistance 6  Modified independent   Eating Deficit Setup; Other (Comment);NPO  (presently NPO for porcedure;demo ROM/coord to complete)   Grooming Assistance 6  Modified Independent  (seated at tray table)   Grooming Deficit Setup; Increased time to complete   UB Bathing Assistance Unable to assess  (completed w/ RN staff just prior to therapist arrival)   UB Bathing Deficit   (anticipate S seated based on obs skills)   LB Bathing Assistance Unable to assess  (anticpate min- mod A based on fxal obs skills)   UB Dressing Assistance 6  Modified independent   UB Dressing Deficit Setup; Increased time to complete   LB Dressing Assistance 3  Moderate Assistance   LB Dressing Deficit Thread RLE into pants; Thread LLE into pants;Setup; Requires assistive device for steadying   Toileting Assistance    (recommend use of commode)   Additional Comments on 3L O2 via NC   Bed Mobility   Supine to Sit 4  Minimal assistance   Additional items Assist x 1; Increased time required; Bedrails;Verbal cues  (to complete approah, trunk support)   Sit to Supine Unable to assess   Additional Comments Pt seated OOB in chair post eval w/ needs met, call bell in reach and chair alarm activated   Transfers   Sit to Stand 5  Supervision   Additional items Assist x 1; Increased time required;Verbal cues   Stand to Sit 5  Supervision   Additional items Assist x 1; Increased time required;Verbal cues  (cues for hand placement)   Additional Comments Unable to tolerate weight bearing L LE   Functional Mobility   Functional Mobility 4  Minimal assistance   Additional Comments min A (CG/steadying A) short distances from EOB to chair  Pt unable to tolerate L LE weight bearing and engaged in functional mobility L LE NWB using RW w/ out LOB   Additional items Rolling walker   Balance   Static Sitting Good   Dynamic Sitting Fair -   Static Standing Fair -   Ambulatory Poor +   Activity Tolerance   Activity Tolerance Patient limited by pain; Patient limited by fatigue   Medical Staff Made Aware spoke to PT, [address] [place] [address]   Nurse Made Aware per Kal MUNOZ Slight appropriate to see pt   RUE Assessment   RUE Assessment WFL   RUE Strength   RUE Overall Strength Within Functional Limits - able to perform ADL tasks with strength  (observed during ADLs)   LUE Assessment   LUE Assessment WFL   LUE Strength   LUE Overall Strength Within Functional Limits - able to perform ADL tasks with strength  (observed during ADLs)   Hand Function   Gross Motor Coordination Functional   Fine Motor Coordination Functional   Sensation   Light Touch Not tested   Sharp/Dull Not tested   Cognition   Overall Cognitive Status Impaired  (premorbid/baseline developmental delay)   Arousal/Participation Alert; Cooperative   Attention Attends with cues to redirect   Orientation Level Oriented to person;Oriented to place;Oriented to situation  (aware in hospital)   Memory Decreased recall of recent events  (details, timeline)   Following Commands Follows multistep commands with increased time or repetition   Comments Identified pt by full name and birthdate  Alert, cooperative, and able to follow directions during functional tasks  Not accurate historian but able to confirm limited social history  Assessment   Limitation Decreased ADL status; Decreased endurance;Decreased self-care trans;Decreased high-level ADLs   Assessment Pt is a 61yo male [facility]  Pt presented w/ L LE pain and recent fall  Significant PMH impacting his occupational performance includes seizures, HTN, anxiety, developmental delay  Diagnosed w/ venous stasis dermatitis B LE, ambulatory dysfunction a d L LE cellulitis  Orthopedics consulted and pt is s/p bedside I&D on 2/10/22; left open w/ packing  Orthopedics progress note indicates pt may WBAT L LE  Personal factors impacting performance includes inability to complete IADLs, increased pain, limited insight into deficits, difficulty managing stairs  Pt w/ active OT orders and activity orders  Pt lives w/ brother, sister in law, and brother's mother in law in [address] [facility] w/ 6 GIUSEPPE, and full flight down to room  Pt completed ADL w/ out assistance or use of AD at baseline, and needs assistance w/ IADLs  Upon eval, pt alert and oriented to person, place  Able follow directions during functional tasks and communicate wants / needs, but not accurate/detailed historian  Confirmed living w/ brother  Pt required min A to complete bed mobility   Pt required S to stand pulling up on walker and min A for short distance functional mobility using RW  Pt unable to tolerate L LE weight bearing and completed NWB  Pt required mod A to complete LBD and mod I for UBD  Pt presents w/ decreased L LE ROM / strength, increased pain, decreased activity tolerance, decreased endurance, decreased standing tolerance / balance, limited insight into deficits, decreased cognition impacting his I w/ dressing, bathing, oral hygiene, functional mobility, functional transfers, activity engagement, clothing mgmt  Pt completing ADL below baseline level of I and would benefit from OT while in acute care to address deficits  Recommend DC home w/ family support / assist and Home OT pend ability to manage steps, medical optimization, pain control, and A at PLOF  Will continue to follow   Goals   Patient Goals Pt stated that he wants to have less pain and have something to eat and drink like a cookie or soda   Plan   Treatment Interventions ADL retraining;Functional transfer training; Endurance training;Patient/family training;Equipment evaluation/education;Continued evaluation; Energy conservation; Activityengagement; Compensatory technique education   Goal Expiration Date 02/18/22   OT Frequency 3-5x/wk   Recommendation   OT Discharge Recommendation Home with home health rehabilitation  (pend ability to manage stairs; medical optimization)   Equipment Recommended Bedside commode; Shower/Tub chair with back ($)  (use of RW at this time)   Commode Type Standard   AM-PAC Daily Activity Inpatient   Lower Body Dressing 3   Bathing 2   Toileting 3   Upper Body Dressing 4   Grooming 4   Eating 4   Daily Activity Raw Score 20   Daily Activity Standardized Score (Calc for Raw Score >=11) 42 03   AM-PAC Applied Cognition Inpatient   Following a Speech/Presentation 2   Understanding Ordinary Conversation 3   Taking Medications 2   Remembering Where Things Are Placed or Put Away 3   Remembering List of 4-5 Errands 1   Taking Care of Complicated Tasks 1   Applied Cognition Raw Score 12   Applied Cognition Standardized Score 28 82   Barthel Index   Feeding 10   Bathing 0   Grooming Score 5   Dressing Score 5   Bladder Score 10   Bowels Score 10   Toilet Use Score 5   Transfers (Bed/Chair) Score 10   Mobility (Level Surface) Score 0   Stairs Score 0   Barthel Index Score 55   Modified [place] Scale   Modified Karla Scale 4   The patient's raw score on the AM-PAC Daily Activity inpatient short form is 20, standardized score is 42 03, greater than 39 4  Patients at this level are likely to benefit from discharge to home  Please refer to the recommendation of the Occupational Therapist for safe discharge planning      Pt goals to be met by 2/18/22:  -Pt will complete bed mobility supine <> sit w/ mod I to max I and return home w/ family    -Pt will complete LBD w/ min A after set- up using Dahlia Grist as needed to max I and minimize burden of care to return home    -Pt will complete functional transfers to bed, chair, and toilet using AD, DME as needed w/ mod I to max I w/ ADLs and return home w/ family    -Pt will consistently engage in functional mobility using AD as needed w/ S to / from bathroom to max I and return home    -Pt will demonstrate improved functional standing tolerance for at least 10 minutes using AD as needed w/ at least fair balance while engaged in ADLs standing at sink to max I w/ functional mobility and stair negotiation to return home w/ family    -Pt will consistently follow 2 step directions during ADL w/ good recall to max I w/ ADLs    Ciera Torres, OTR/L

## 2022-02-11 NOTE — PLAN OF CARE
Problem: PHYSICAL THERAPY ADULT  Goal: Performs mobility at highest level of function for planned discharge setting  See evaluation for individualized goals  Description: Treatment/Interventions: Functional transfer training,LE strengthening/ROM,Elevations,Therapeutic exercise,Endurance training,Cognitive reorientation,Patient/family training,Equipment eval/education,Gait training          See flowsheet documentation for full assessment, interventions and recommendations  Outcome: Progressing  Note: Prognosis: Good  Problem List: Decreased strength,Decreased range of motion,Decreased endurance,Impaired balance,Decreased mobility,Decreased cognition,Impaired judgement,Decreased safety awareness,Orthopedic restrictions,Pain  Assessment: Pt presents from home w/ his brother d/t progressive ambulatory dysfunction associated w/ multiple falls at home paired w/ increasing erythema, swelling, and tenderness of L knee  Dx include venous stasis dermatitis of B/L LE, ambulatory dysfunction, epilepsy, cellulitis of LLE, MRSA, sepsis, anemia, and L leg abscess  I & D of L knee 2/10/22  Order placed for PT eval and tx w/ WBAT LLE per orthopedics  Pt presents w/ comorbidities of anxiety, dizziness, HTN, hypoxia, anemia, epilepsy, and sepsis  Personal factors negatively affecting pt include 6STE, full flight of stairs to apt, impaired baseline cognition, previously not requiring supplemental O2, pain, and fall history  Pt presents w/ weakness, decreased ROM, decreased endurance, impaired balance, decreased mobility, decreased cognition, impaired judgement, decreased safety awareness, orthopedic restrictions, fall risk, and pain   These findings were evident in the physical exam (weakness, decreased ROM, decreased cognition, pain) and the mobility exam (requiring supervision for sit to stand and stand to sit tranfers, min assist x1 for ambulation and toilet transfer, and need for frequent verbal cues for mobility technique) and barthel index 55/100  Pt required frequent input for task focus and mobility technique w/ poor carryover or education received  Pt is at risk for falls due to both his physical and cognitive impairments  Although pt is WBAT pt refuses to WB on L LE d/t pain  Pt's clinical presentation is unpredictable (evident in anemia, need for min assist x1 for ambulation and toilet transfer and need for supervision for sit to stand and stand to sit transfers when normally mobilizing independently, need for supplemental O2 when not requiring it PTA, need for input for task focus and mobility technique w/ poor carryover of new info, pain limiting mobility status, and recent orthopedic procedure of  L LE)  Pt needs inpatient PT tx to improve mobility deficits and progress mobility training as appropriate  D/c recommendation is for home w/ home health PT to continue to reduce fall risk and maximize level of function independence  Pt would benefit from pain management consult to address pain control  PT Discharge Recommendation: Home with home health rehabilitation          See flowsheet documentation for full assessment

## 2022-02-11 NOTE — PLAN OF CARE
Problem: MOBILITY - ADULT  Goal: Maintain or return to baseline ADL function  Description: INTERVENTIONS:  -  Assess patient's ability to carry out ADLs; assess patient's baseline for ADL function and identify physical deficits which impact ability to perform ADLs (bathing, care of mouth/teeth, toileting, grooming, dressing, etc )  - Assess/evaluate cause of self-care deficits   - Assess range of motion  - Assess patient's mobility; develop plan if impaired  - Assess patient's need for assistive devices and provide as appropriate  - Encourage maximum independence but intervene and supervise when necessary  - Involve family in performance of ADLs  - Assess for home care needs following discharge   - Consider OT consult to assist with ADL evaluation and planning for discharge  - Provide patient education as appropriate  Outcome: Progressing  Goal: Maintains/Returns to pre admission functional level  Description: INTERVENTIONS:  - Perform BMAT or MOVE assessment daily    - Set and communicate daily mobility goal to care team and patient/family/caregiver  - Collaborate with rehabilitation services on mobility goals if consulted  - Perform Range of Motion  times a day  - Reposition patient every  hours    - Dangle patient  times a day  - Stand patient  times a day  - Ambulate patient  times a day  - Out of bed to chair  times a day   - Out of bed for meals  times a day  - Out of bed for toileting  - Record patient progress and toleration of activity level   Outcome: Progressing     Problem: PAIN - ADULT  Goal: Verbalizes/displays adequate comfort level or baseline comfort level  Description: Interventions:  - Encourage patient to monitor pain and request assistance  - Assess pain using appropriate pain scale  - Administer analgesics based on type and severity of pain and evaluate response  - Implement non-pharmacological measures as appropriate and evaluate response  - Consider cultural and social influences on pain and pain management  - Notify physician/advanced practitioner if interventions unsuccessful or patient reports new pain  Outcome: Progressing     Problem: INFECTION - ADULT  Goal: Absence or prevention of progression during hospitalization  Description: INTERVENTIONS:  - Assess and monitor for signs and symptoms of infection  - Monitor lab/diagnostic results  - Monitor all insertion sites, i e  indwelling lines, tubes, and drains  - Monitor endotracheal if appropriate and nasal secretions for changes in amount and color  - [place] appropriate cooling/warming therapies per order  - Administer medications as ordered  - Instruct and encourage patient and family to use good hand hygiene technique  - Identify and instruct in appropriate isolation precautions for identified infection/condition  Outcome: Progressing  Goal: Absence of fever/infection during neutropenic period  Description: INTERVENTIONS:  - Monitor WBC    Outcome: Progressing     Problem: SAFETY ADULT  Goal: Maintain or return to baseline ADL function  Description: INTERVENTIONS:  -  Assess patient's ability to carry out ADLs; assess patient's baseline for ADL function and identify physical deficits which impact ability to perform ADLs (bathing, care of mouth/teeth, toileting, grooming, dressing, etc )  - Assess/evaluate cause of self-care deficits   - Assess range of motion  - Assess patient's mobility; develop plan if impaired  - Assess patient's need for assistive devices and provide as appropriate  - Encourage maximum independence but intervene and supervise when necessary  - Involve family in performance of ADLs  - Assess for home care needs following discharge   - Consider OT consult to assist with ADL evaluation and planning for discharge  - Provide patient education as appropriate  Outcome: Progressing  Goal: Maintains/Returns to pre admission functional level  Description: INTERVENTIONS:  - Perform BMAT or MOVE assessment daily    - Set and communicate daily mobility goal to care team and patient/family/caregiver  - Collaborate with rehabilitation services on mobility goals if consulted  - Perform Range of Motion  times a day  - Reposition patient every  hours    - Dangle patient  times a day  - Stand patient  times a day  - Ambulate patient  times a day  - Out of bed to chair  times a day   - Out of bed for meals  times a day  - Out of bed for toileting  - Record patient progress and toleration of activity level   Outcome: Progressing  Goal: Patient will remain free of falls  Description: INTERVENTIONS:  - Educate patient/family on patient safety including physical limitations  - Instruct patient to call for assistance with activity   - Consult OT/PT to assist with strengthening/mobility   - Keep Call bell within reach  - Keep bed low and locked with side rails adjusted as appropriate  - Keep care items and personal belongings within reach  - Initiate and maintain comfort rounds  - Make Fall Risk Sign visible to staff  - Offer Toileting every  Hours, in advance of need  - Initiate/Maintain alarm  - Obtain necessary fall risk management equipment:   - Apply yellow socks and bracelet for high fall risk patients  - Consider moving patient to room near nurses station  Outcome: Progressing     Problem: DISCHARGE PLANNING  Goal: Discharge to home or other facility with appropriate resources  Description: INTERVENTIONS:  - Identify barriers to discharge w/patient and caregiver  - Arrange for needed discharge resources and transportation as appropriate  - Identify discharge learning needs (meds, wound care, etc )  - Arrange for interpretive services to assist at discharge as needed  - Refer to Case Management Department for coordinating discharge planning if the patient needs post-hospital services based on physician/advanced practitioner order or complex needs related to functional status, cognitive ability, or social support system  Outcome: Progressing Problem: Knowledge Deficit  Goal: Patient/family/caregiver demonstrates understanding of disease process, treatment plan, medications, and discharge instructions  Description: Complete learning assessment and assess knowledge base    Interventions:  - Provide teaching at level of understanding  - Provide teaching via preferred learning methods  Outcome: Progressing     Problem: SKIN/TISSUE INTEGRITY - ADULT  Goal: Skin Integrity remains intact(Skin Breakdown Prevention)  Description: Assess:  -Perform [place] assessment every   -Clean and moisturize skin every   -Inspect skin when repositioning, toileting, and assisting with ADLS  -Assess under medical devices such as  every   -Assess extremities for adequate circulation and sensation     Bed Management:  -Have minimal linens on bed & keep smooth, unwrinkled  -Change linens as needed when moist or perspiring  -Avoid sitting or lying in one position for more than  hours while in bed  -Keep HOB at degrees     Toileting:  -Offer bedside commode  -Assess for incontinence every   -Use incontinent care products after each incontinent episode such as     Activity:  -Mobilize patient  times a day  -Encourage activity and walks on unit  -Encourage or provide ROM exercises   -Turn and reposition patient every  Hours  -Use appropriate equipment to lift or move patient in bed  -Instruct/ Assist with weight shifting every  when out of bed in chair  -Consider limitation of chair time  hour intervals    Skin Care:  -Avoid use of baby powder, tape, friction and shearing, hot water or constrictive clothing  -Relieve pressure over bony prominences using   -Do not massage red bony areas    Next Steps:  -Teach patient strategies to minimize risks such as    -Consider consults to  interdisciplinary teams such as   Outcome: Progressing  Goal: Incision(s), wounds(s) or drain site(s) healing without S/S of infection  Description: INTERVENTIONS  - Assess and document dressing, incision, wound bed, drain sites and surrounding tissue  - Provide patient and family education  - Perform skin care/dressing changes every   Outcome: Progressing  Goal: Pressure injury heals and does not worsen  Description: Interventions:  - Implement low air loss mattress or specialty surface (Criteria met)  - Apply silicone foam dressing  - Instruct/assist with weight shifting every  minutes when in chair   - Limit chair time to  hour intervals  - Use special pressure reducing interventions such as  when in chair   - Apply fecal or urinary incontinence containment device   - Perform passive or active ROM every   - Turn and reposition patient & offload bony prominences every  hours   - Utilize friction reducing device or surface for transfers   - Consider consults to  interdisciplinary teams such as   - Use incontinent care products after each incontinent episode such   - Consider nutrition services referral as needed  Outcome: Progressing     Problem: MUSCULOSKELETAL - ADULT  Goal: Maintain or return mobility to safest level of function  Description: INTERVENTIONS:  - Assess patient's ability to carry out ADLs; assess patient's baseline for ADL function and identify physical deficits which impact ability to perform ADLs (bathing, care of mouth/teeth, toileting, grooming, dressing, etc )  - Assess/evaluate cause of self-care deficits   - Assess range of motion  - Assess patient's mobility  - Assess patient's need for assistive devices and provide as appropriate  - Encourage maximum independence but intervene and supervise when necessary  - Involve family in performance of ADLs  - Assess for home care needs following discharge   - Consider OT consult to assist with ADL evaluation and planning for discharge  - Provide patient education as appropriate  Outcome: Progressing  Goal: Maintain proper alignment of affected body part  Description: INTERVENTIONS:  - Support, maintain and protect limb and body alignment  - Provide patient/ family with appropriate education  Outcome: Progressing     Problem: Nutrition/Hydration-ADULT  Goal: Nutrient/Hydration intake appropriate for improving, restoring or maintaining nutritional needs  Description: Monitor and assess patient's nutrition/hydration status for malnutrition  Collaborate with interdisciplinary team and initiate plan and interventions as ordered  Monitor patient's weight and dietary intake as ordered or per policy  Utilize nutrition screening tool and intervene as necessary  Determine patient's food preferences and provide high-protein, high-caloric foods as appropriate       INTERVENTIONS:  - Monitor oral intake, urinary output, labs, and treatment plans  - Assess nutrition and hydration status and recommend course of action  - Evaluate amount of meals eaten  - Assist patient with eating if necessary   - Allow adequate time for meals  - Recommend/ encourage appropriate diets, oral nutritional supplements, and vitamin/mineral supplements  - Order, calculate, and assess calorie counts as needed  - Recommend, monitor, and adjust tube feedings and TPN/PPN based on assessed needs  - Assess need for intravenous fluids  - Provide specific nutrition/hydration education as appropriate  - Include patient/family/caregiver in decisions related to nutrition  Outcome: Progressing     Problem: Prexisting or High Potential for Compromised Skin Integrity  Goal: Skin integrity is maintained or improved  Description: INTERVENTIONS:  - Identify patients at risk for skin breakdown  - Assess and monitor skin integrity  - Assess and monitor nutrition and hydration status  - Monitor labs   - Assess for incontinence   - Turn and reposition patient  - Assist with mobility/ambulation  - Relieve pressure over bony prominences  - Avoid friction and shearing  - Provide appropriate hygiene as needed including keeping skin clean and dry  - Evaluate need for skin moisturizer/barrier cream  - Collaborate with interdisciplinary team   - Patient/family teaching  - Consider wound care consult   Outcome: Progressing

## 2022-02-11 NOTE — ASSESSMENT & PLAN NOTE
· Episodes of hypoxia overnight and this morning, now on 3 liters/minute of oxygen  · Suspecting iatrogenic volume overload, decrease IV fluid rate  · Obtain chest x-ray  · Monitor volume status, wean as tolerated

## 2022-02-11 NOTE — ANESTHESIA PREPROCEDURE EVALUATION
Procedure:  EGD    Relevant Problems   CARDIO  Echo 55%EF   (+) Primary hypertension      GI/HEPATIC   (+) Gastroesophageal reflux disease without esophagitis      HEMATOLOGY   (+) Anemia      NEURO/PSYCH   (+) Nonintractable epilepsy without status epilepticus ([facility] [address] )      PULMONARY (within normal limits)      Other   (+) Ambulatory dysfunction   (+) Status post splenectomy   (+) Venous stasis dermatitis of both lower extremities        Physical Exam    Airway    Mallampati score: II  TM Distance: >3 FB  Neck ROM: full     Dental   lower dentures and upper dentures,     Cardiovascular      Pulmonary      Other Findings        Anesthesia Plan  ASA Score- 2     Anesthesia Type- IV sedation with anesthesia with ASA Monitors  Additional Monitors:   Airway Plan:           Plan Factors-    Chart reviewed  Existing labs reviewed  Patient summary reviewed  Patient is not a current smoker  Induction-     Postoperative Plan-     Informed Consent- Anesthetic plan and risks discussed with patient  I personally reviewed this patient with the CRNA  Discussed and agreed on the Anesthesia Plan with the CRNA  Osvaldo García

## 2022-02-11 NOTE — ASSESSMENT & PLAN NOTE
· Patient has a history of seizures since being a child  · Will continue with Keppra and Trileptal  · Seizure precautions
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
## 2022-02-11 NOTE — CASE MANAGEMENT
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 status epilepticus ([facility] [address] ) 2022    Primary hypertension 2022    Gastroesophageal reflux disease without esophagitis 2022    Venous stasis dermatitis of both lower extremities 2022      LOS (days): 1  Geometric Mean LOS (GMLOS) (days): 3 50  Days to GMLOS:2 3     OBJECTIVE:  Risk of Unplanned Readmission Score: 14         Current admission status: Inpatient   Preferred Pharmacy:   [address], [address] [place] [address] Road  [address] [place] [address]  Phone: [phone] Fax: [phone]    Primary Care Provider: No primary care provider on file  Primary Insurance: MEDICARE  Secondary Insurance: ERIS LAZO PENDING    DISCHARGE DETAILS:    Discharge planning discussed with[de-identified] patient's KATHARINE Dasilva/Jeanne  Freedom of Choice: Yes  Comments - Freedom of Choice: CM received call from patient's sister in law, who also goes by Ken Lemus  She stated that her  asked her to reach out and make sure that he answered all questions to CM's satisfaction yesterday  CM confirmed she was just asking basic home set up questions and did not have any outstanding questions at this time  Ken Lemus stated she already spoke with PATHS and is forwarding them the information they need  Marga Berg also working with  to get patient's benefits in order   Ken Lemus confirmed they would like to use [facility] at dc for any Rx needs as it will be cheapest  She is aware that PT is recommending home PT but reports they cannot afford this expense and feel comfortable that patient has extensive family support at home  They already have a RW that he can use  Shin Nunez confirmed he will need a PCP at dc and she'd like to use the resident clinic on [address]; CM agreed to make an appt at dc  CM let Shin Nunez know that at this time, it appears patient will need a LT IV abx course at dc  Because he does not have insurance coverage, his options would potentially be the infusion center (if on abx only once/day and they can offer glenys care) vs MA pending at SNF  Family would much prefer the infusion center and CM agreed that we will follow up next week to try and finalize his plan, once we know what his dc medication needs will be    CM contacted family/caregiver?: Yes  Were Treatment Team discharge recommendations reviewed with patient/caregiver?: Yes  Did patient/caregiver verbalize understanding of patient care needs?: Yes  Were patient/caregiver advised of the risks associated with not following Treatment Team discharge recommendations?: Yes    Contacts  Patient Contacts: KATHARINE Angel  Relationship to Patient[de-identified] Family  Contact Method: Phone  Phone Number: [phone]  Reason/Outcome: Continuity of [address]         Is the patient interested in Samira Warner at discharge?: No    Treatment Team Recommendation: Home with [facility]  Discharge Destination Plan[de-identified] Other (TBD  home with infusion center vs SNF)

## 2022-02-11 NOTE — PLAN OF CARE
Problem: OCCUPATIONAL THERAPY ADULT  Goal: Performs self-care activities at highest level of function for planned discharge setting  See evaluation for individualized goals  Description: Treatment Interventions: ADL retraining,Functional transfer training,Endurance training,Patient/family training,Equipment evaluation/education,Continued evaluation,Energy conservation,Activityengagement,Compensatory technique education  Equipment Recommended: Bedside commode,Shower/Tub chair with back ($) (use of RW at this time)       See flowsheet documentation for full assessment, interventions and recommendations  Note: Limitation: Decreased ADL status,Decreased endurance,Decreased self-care trans,Decreased high-level ADLs     Assessment: Pt is a 61yo male [facility]  Pt presented w/ L LE pain and recent fall  Significant PMH impacting his occupational performance includes seizures, HTN, anxiety, developmental delay  Diagnosed w/ venous stasis dermatitis B LE, ambulatory dysfunction a d L LE cellulitis  Orthopedics consulted and pt is s/p bedside I&D on 2/10/22; left open w/ packing  Orthopedics progress note indicates pt may WBAT L LE  Personal factors impacting performance includes inability to complete IADLs, increased pain, limited insight into deficits, difficulty managing stairs  Pt w/ active OT orders and activity orders  Pt lives w/ brother, sister in law, and brother's mother in law in [address] [facility] w/ 6 GIUSEPPE, and full flight down to room  Pt completed ADL w/ out assistance or use of AD at baseline, and needs assistance w/ IADLs  Upon eval, pt alert and oriented to person, place  Able follow directions during functional tasks and communicate wants / needs, but not accurate/detailed historian  Confirmed living w/ brother  Pt required min A to complete bed mobility  Pt required S to stand pulling up on walker and min A for short distance functional mobility using RW   Pt unable to tolerate L LE weight bearing and completed NWB  Pt required mod A to complete LBD and mod I for UBD  Pt presents w/ decreased L LE ROM / strength, increased pain, decreased activity tolerance, decreased endurance, decreased standing tolerance / balance, limited insight into deficits, decreased cognition impacting his I w/ dressing, bathing, oral hygiene, functional mobility, functional transfers, activity engagement, clothing mgmt  Pt completing ADL below baseline level of I and would benefit from OT while in acute care to address deficits  Recommend DC home w/ family support / assist and Home OT pend ability to manage steps, medical optimization, pain control, and A at PLOF   Will continue to follow     OT Discharge Recommendation: Home with home health rehabilitation (pend ability to manage stairs; medical optimization)

## 2022-02-11 NOTE — ASSESSMENT & PLAN NOTE
· Patient presented with left lower extremity cellulitis  Blood cultures on admission positive for MRSA   Antibiotics switched to IV vancomycin 2/9  Pharmacy consulted  for dosing management  · Transthoracic echocardiogram negative for any vegetations  Patient denies any back pain but has persistent left lower extremity pain  · May need RACHAEL  · CT left lower extremity shows: Suspected thrombosis/thrombophlebitis of the great saphenous vein  Diffuse subcutaneous edema which could be secondary to cellulitis, venous stasis, or dependent edema  No discrete rim enhancing fluid collection  · No osseous lesion seen on CT scan  Orthopedic preformed I/D on 2/10    · Infectious disease consulted for antibiotic management  · Repeat blood cultures ordered for 2/12

## 2022-02-11 NOTE — PLAN OF CARE
Problem: MOBILITY - ADULT  Goal: Maintain or return to baseline ADL function  Description: INTERVENTIONS:  -  Assess patient's ability to carry out ADLs; assess patient's baseline for ADL function and identify physical deficits which impact ability to perform ADLs (bathing, care of mouth/teeth, toileting, grooming, dressing, etc )  - Assess/evaluate cause of self-care deficits   - Assess range of motion  - Assess patient's mobility; develop plan if impaired  - Assess patient's need for assistive devices and provide as appropriate  - Encourage maximum independence but intervene and supervise when necessary  - Involve family in performance of ADLs  - Assess for home care needs following discharge   - Consider OT consult to assist with ADL evaluation and planning for discharge  - Provide patient education as appropriate  Outcome: Progressing  Goal: Maintains/Returns to pre admission functional level  Description: INTERVENTIONS:  - Perform BMAT or MOVE assessment daily    - Set and communicate daily mobility goal to care team and patient/family/caregiver  - Collaborate with rehabilitation services on mobility goals if consulted  - Perform Range of Motion  times a day  - Reposition patient every  hours    - Dangle patient  times a day  - Stand patient  times a day  - Ambulate patient  times a day  - Out of bed to chair  times a day   - Out of bed for meals  times a day  - Out of bed for toileting  - Record patient progress and toleration of activity level   Outcome: Progressing     Problem: PAIN - ADULT  Goal: Verbalizes/displays adequate comfort level or baseline comfort level  Description: Interventions:  - Encourage patient to monitor pain and request assistance  - Assess pain using appropriate pain scale  - Administer analgesics based on type and severity of pain and evaluate response  - Implement non-pharmacological measures as appropriate and evaluate response  - Consider cultural and social influences on pain and pain management  - Notify physician/advanced practitioner if interventions unsuccessful or patient reports new pain  Outcome: Progressing     Problem: INFECTION - ADULT  Goal: Absence or prevention of progression during hospitalization  Description: INTERVENTIONS:  - Assess and monitor for signs and symptoms of infection  - Monitor lab/diagnostic results  - Monitor all insertion sites, i e  indwelling lines, tubes, and drains  - Monitor endotracheal if appropriate and nasal secretions for changes in amount and color  - [place] appropriate cooling/warming therapies per order  - Administer medications as ordered  - Instruct and encourage patient and family to use good hand hygiene technique  - Identify and instruct in appropriate isolation precautions for identified infection/condition  Outcome: Progressing  Goal: Absence of fever/infection during neutropenic period  Description: INTERVENTIONS:  - Monitor WBC    Outcome: Progressing     Problem: SAFETY ADULT  Goal: Maintain or return to baseline ADL function  Description: INTERVENTIONS:  -  Assess patient's ability to carry out ADLs; assess patient's baseline for ADL function and identify physical deficits which impact ability to perform ADLs (bathing, care of mouth/teeth, toileting, grooming, dressing, etc )  - Assess/evaluate cause of self-care deficits   - Assess range of motion  - Assess patient's mobility; develop plan if impaired  - Assess patient's need for assistive devices and provide as appropriate  - Encourage maximum independence but intervene and supervise when necessary  - Involve family in performance of ADLs  - Assess for home care needs following discharge   - Consider OT consult to assist with ADL evaluation and planning for discharge  - Provide patient education as appropriate  Outcome: Progressing  Goal: Maintains/Returns to pre admission functional level  Description: INTERVENTIONS:  - Perform BMAT or MOVE assessment daily    - Set and communicate daily mobility goal to care team and patient/family/caregiver  - Collaborate with rehabilitation services on mobility goals if consulted  - Perform Range of Motion  times a day  - Reposition patient every  hours    - Dangle patient  times a day  - Stand patient  times a day  - Ambulate patient  times a day  - Out of bed to chair  times a day   - Out of bed for meals  times a day  - Out of bed for toileting  - Record patient progress and toleration of activity level   Outcome: Progressing  Goal: Patient will remain free of falls  Description: INTERVENTIONS:  - Educate patient/family on patient safety including physical limitations  - Instruct patient to call for assistance with activity   - Consult OT/PT to assist with strengthening/mobility   - Keep Call bell within reach  - Keep bed low and locked with side rails adjusted as appropriate  - Keep care items and personal belongings within reach  - Initiate and maintain comfort rounds  - Make Fall Risk Sign visible to staff  - Offer Toileting every  Hours, in advance of need  - Initiate/Maintain alarm  - Obtain necessary fall risk management equipment:   - Apply yellow socks and bracelet for high fall risk patients  - Consider moving patient to room near nurses station  Outcome: Progressing
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
## 2022-02-12 PROBLEM — R09.02 HYPOXIA: Status: RESOLVED | Noted: 2022-02-11 | Resolved: 2022-02-12

## 2022-02-12 PROBLEM — I85.00 ESOPHAGEAL VARICES (HCC): Status: ACTIVE | Noted: 2022-02-12

## 2022-02-12 LAB
A1AT SERPL-MCNC: 207 MG/DL (ref 101–187)
ACTIN IGG SERPL-ACNC: 16 UNITS (ref 0–19)
ANION GAP SERPL CALCULATED.3IONS-SCNC: 3 MMOL/L (ref 4–13)
BACTERIA WND AEROBE CULT: ABNORMAL
BASOPHILS # BLD AUTO: 0.03 THOUSANDS/ΜL (ref 0–0.1)
BASOPHILS NFR BLD AUTO: 1 % (ref 0–1)
BUN SERPL-MCNC: 7 MG/DL (ref 5–25)
CALCIUM SERPL-MCNC: 7.5 MG/DL (ref 8.3–10.1)
CERULOPLASMIN SERPL-MCNC: 22.5 MG/DL (ref 16–31)
CHLORIDE SERPL-SCNC: 103 MMOL/L (ref 100–108)
CO2 SERPL-SCNC: 32 MMOL/L (ref 21–32)
CREAT SERPL-MCNC: 0.45 MG/DL (ref 0.6–1.3)
EOSINOPHIL # BLD AUTO: 0.5 THOUSAND/ΜL (ref 0–0.61)
EOSINOPHIL NFR BLD AUTO: 8 % (ref 0–6)
ERYTHROCYTE [DISTWIDTH] IN BLOOD BY AUTOMATED COUNT: 17.5 % (ref 11.6–15.1)
GFR SERPL CREATININE-BSD FRML MDRD: 124 ML/MIN/1.73SQ M
GLUCOSE SERPL-MCNC: 86 MG/DL (ref 65–140)
GRAM STN SPEC: ABNORMAL
GRAM STN SPEC: ABNORMAL
HCT VFR BLD AUTO: 26.2 % (ref 36.5–49.3)
HGB BLD-MCNC: 7.4 G/DL (ref 12–17)
IMM GRANULOCYTES # BLD AUTO: 0.04 THOUSAND/UL (ref 0–0.2)
IMM GRANULOCYTES NFR BLD AUTO: 1 % (ref 0–2)
LYMPHOCYTES # BLD AUTO: 0.89 THOUSANDS/ΜL (ref 0.6–4.47)
LYMPHOCYTES NFR BLD AUTO: 13 % (ref 14–44)
MCH RBC QN AUTO: 24.7 PG (ref 26.8–34.3)
MCHC RBC AUTO-ENTMCNC: 28.2 G/DL (ref 31.4–37.4)
MCV RBC AUTO: 87 FL (ref 82–98)
MITOCHONDRIA M2 IGG SER-ACNC: <20 UNITS (ref 0–20)
MONOCYTES # BLD AUTO: 0.65 THOUSAND/ΜL (ref 0.17–1.22)
MONOCYTES NFR BLD AUTO: 10 % (ref 4–12)
NEUTROPHILS # BLD AUTO: 4.55 THOUSANDS/ΜL (ref 1.85–7.62)
NEUTS SEG NFR BLD AUTO: 67 % (ref 43–75)
NRBC BLD AUTO-RTO: 0 /100 WBCS
PLATELET # BLD AUTO: 246 THOUSANDS/UL (ref 149–390)
PMV BLD AUTO: 9.7 FL (ref 8.9–12.7)
POTASSIUM SERPL-SCNC: 3.4 MMOL/L (ref 3.5–5.3)
RBC # BLD AUTO: 3 MILLION/UL (ref 3.88–5.62)
SODIUM SERPL-SCNC: 138 MMOL/L (ref 136–145)
WBC # BLD AUTO: 6.66 THOUSAND/UL (ref 4.31–10.16)

## 2022-02-12 PROCEDURE — 80048 BASIC METABOLIC PNL TOTAL CA: CPT | Performed by: INTERNAL MEDICINE

## 2022-02-12 PROCEDURE — 99232 SBSQ HOSP IP/OBS MODERATE 35: CPT | Performed by: PHYSICIAN ASSISTANT

## 2022-02-12 PROCEDURE — 99232 SBSQ HOSP IP/OBS MODERATE 35: CPT | Performed by: INTERNAL MEDICINE

## 2022-02-12 PROCEDURE — 85025 COMPLETE CBC W/AUTO DIFF WBC: CPT | Performed by: INTERNAL MEDICINE

## 2022-02-12 PROCEDURE — 99024 POSTOP FOLLOW-UP VISIT: CPT | Performed by: ORTHOPAEDIC SURGERY

## 2022-02-12 PROCEDURE — 87040 BLOOD CULTURE FOR BACTERIA: CPT | Performed by: INTERNAL MEDICINE

## 2022-02-12 RX ORDER — NADOLOL 40 MG/1
40 TABLET ORAL DAILY
Status: DISCONTINUED | OUTPATIENT
Start: 2022-02-12 | End: 2022-02-18 | Stop reason: HOSPADM

## 2022-02-12 RX ORDER — PANTOPRAZOLE SODIUM 40 MG/1
40 TABLET, DELAYED RELEASE ORAL
Status: DISCONTINUED | OUTPATIENT
Start: 2022-02-12 | End: 2022-02-18 | Stop reason: HOSPADM

## 2022-02-12 RX ADMIN — SUCRALFATE 1 G: 1 TABLET ORAL at 23:11

## 2022-02-12 RX ADMIN — VANCOMYCIN HYDROCHLORIDE 1000 MG: 1 INJECTION, SOLUTION INTRAVENOUS at 08:30

## 2022-02-12 RX ADMIN — TRIAMCINOLONE ACETONIDE 1 APPLICATION: 1 CREAM TOPICAL at 17:25

## 2022-02-12 RX ADMIN — SUCRALFATE 1 G: 1 TABLET ORAL at 04:57

## 2022-02-12 RX ADMIN — TRIAMCINOLONE ACETONIDE: 1 CREAM TOPICAL at 08:31

## 2022-02-12 RX ADMIN — SUCRALFATE 1 G: 1 TABLET ORAL at 17:23

## 2022-02-12 RX ADMIN — OXCARBAZEPINE 600 MG: 150 TABLET, FILM COATED ORAL at 21:06

## 2022-02-12 RX ADMIN — VANCOMYCIN HYDROCHLORIDE 1000 MG: 1 INJECTION, SOLUTION INTRAVENOUS at 15:18

## 2022-02-12 RX ADMIN — VANCOMYCIN HYDROCHLORIDE 1000 MG: 1 INJECTION, SOLUTION INTRAVENOUS at 23:11

## 2022-02-12 RX ADMIN — ENOXAPARIN SODIUM 40 MG: 40 INJECTION SUBCUTANEOUS at 08:29

## 2022-02-12 RX ADMIN — LEVETIRACETAM 500 MG: 500 TABLET, FILM COATED ORAL at 21:06

## 2022-02-12 RX ADMIN — PANTOPRAZOLE SODIUM 40 MG: 40 TABLET, DELAYED RELEASE ORAL at 17:24

## 2022-02-12 RX ADMIN — SUCRALFATE 1 G: 1 TABLET ORAL at 11:57

## 2022-02-12 RX ADMIN — LEVETIRACETAM 500 MG: 500 TABLET, FILM COATED ORAL at 08:30

## 2022-02-12 RX ADMIN — OXCARBAZEPINE 600 MG: 150 TABLET, FILM COATED ORAL at 08:29

## 2022-02-12 RX ADMIN — PANTOPRAZOLE SODIUM 40 MG: 40 TABLET, DELAYED RELEASE ORAL at 08:30

## 2022-02-12 NOTE — PLAN OF CARE
Problem: MOBILITY - ADULT  Goal: Maintain or return to baseline ADL function  Description: INTERVENTIONS:  -  Assess patient's ability to carry out ADLs; assess patient's baseline for ADL function and identify physical deficits which impact ability to perform ADLs (bathing, care of mouth/teeth, toileting, grooming, dressing, etc )  - Assess/evaluate cause of self-care deficits   - Assess range of motion  - Assess patient's mobility; develop plan if impaired  - Assess patient's need for assistive devices and provide as appropriate  - Encourage maximum independence but intervene and supervise when necessary  - Involve family in performance of ADLs  - Assess for home care needs following discharge   - Consider OT consult to assist with ADL evaluation and planning for discharge  - Provide patient education as appropriate  Outcome: Progressing  Goal: Maintains/Returns to pre admission functional level  Description: INTERVENTIONS:  - Perform BMAT or MOVE assessment daily    - Set and communicate daily mobility goal to care team and patient/family/caregiver  - Collaborate with rehabilitation services on mobility goals if consulted  - Perform Range of Motion  times a day  - Reposition patient every  hours    - Dangle patient  times a day  - Stand patient times a day  - Ambulate patient  times a day  - Out of bed to chair  times a day   - Out of bed for meals  times a day  - Out of bed for toileting  - Record patient progress and toleration of activity level   Outcome: Progressing     Problem: PAIN - ADULT  Goal: Verbalizes/displays adequate comfort level or baseline comfort level  Description: Interventions:  - Encourage patient to monitor pain and request assistance  - Assess pain using appropriate pain scale  - Administer analgesics based on type and severity of pain and evaluate response  - Implement non-pharmacological measures as appropriate and evaluate response  - Consider cultural and social influences on pain and pain management  - Notify physician/advanced practitioner if interventions unsuccessful or patient reports new pain  Outcome: Progressing     Problem: INFECTION - ADULT  Goal: Absence or prevention of progression during hospitalization  Description: INTERVENTIONS:  - Assess and monitor for signs and symptoms of infection  - Monitor lab/diagnostic results  - Monitor all insertion sites, i e  indwelling lines, tubes, and drains  - Monitor endotracheal if appropriate and nasal secretions for changes in amount and color  - [place] appropriate cooling/warming therapies per order  - Administer medications as ordered  - Instruct and encourage patient and family to use good hand hygiene technique  - Identify and instruct in appropriate isolation precautions for identified infection/condition  Outcome: Progressing  Goal: Absence of fever/infection during neutropenic period  Description: INTERVENTIONS:  - Monitor WBC    Outcome: Progressing     Problem: SAFETY ADULT  Goal: Maintain or return to baseline ADL function  Description: INTERVENTIONS:  -  Assess patient's ability to carry out ADLs; assess patient's baseline for ADL function and identify physical deficits which impact ability to perform ADLs (bathing, care of mouth/teeth, toileting, grooming, dressing, etc )  - Assess/evaluate cause of self-care deficits   - Assess range of motion  - Assess patient's mobility; develop plan if impaired  - Assess patient's need for assistive devices and provide as appropriate  - Encourage maximum independence but intervene and supervise when necessary  - Involve family in performance of ADLs  - Assess for home care needs following discharge   - Consider OT consult to assist with ADL evaluation and planning for discharge  - Provide patient education as appropriate  Outcome: Progressing  Goal: Maintains/Returns to pre admission functional level  Description: INTERVENTIONS:  - Perform BMAT or MOVE assessment daily    - Set and communicate daily mobility goal to care team and patient/family/caregiver  - Collaborate with rehabilitation services on mobility goals if consulted  - Perform Range of Motion  times a day  - Reposition patient every  hours    - Dangle patient  times a day  - Stand patient times a day  - Ambulate patient  times a day  - Out of bed to chair  times a day   - Out of bed for meals times a day  - Out of bed for toileting  - Record patient progress and toleration of activity level   Outcome: Progressing  Goal: Patient will remain free of falls  Description: INTERVENTIONS:  - Educate patient/family on patient safety including physical limitations  - Instruct patient to call for assistance with activity   - Consult OT/PT to assist with strengthening/mobility   - Keep Call bell within reach  - Keep bed low and locked with side rails adjusted as appropriate  - Keep care items and personal belongings within reach  - Initiate and maintain comfort rounds  - Make Fall Risk Sign visible to staff  - Offer Toileting every  Hours, in advance of need  - Initiate/Maintain alarm  - Obtain necessary fall risk management equipment:   - Apply yellow socks and bracelet for high fall risk patients  - Consider moving patient to room near nurses station  Outcome: Progressing     Problem: DISCHARGE PLANNING  Goal: Discharge to home or other facility with appropriate resources  Description: INTERVENTIONS:  - Identify barriers to discharge w/patient and caregiver  - Arrange for needed discharge resources and transportation as appropriate  - Identify discharge learning needs (meds, wound care, etc )  - Arrange for interpretive services to assist at discharge as needed  - Refer to Case Management Department for coordinating discharge planning if the patient needs post-hospital services based on physician/advanced practitioner order or complex needs related to functional status, cognitive ability, or social support system  Outcome: Progressing Problem: Knowledge Deficit  Goal: Patient/family/caregiver demonstrates understanding of disease process, treatment plan, medications, and discharge instructions  Description: Complete learning assessment and assess knowledge base    Interventions:  - Provide teaching at level of understanding  - Provide teaching via preferred learning methods  Outcome: Progressing     Problem: SKIN/TISSUE INTEGRITY - ADULT  Goal: Skin Integrity remains intact(Skin Breakdown Prevention)  Description: Assess:  -Perform [place] assessment every   -Clean and moisturize skin every   -Inspect skin when repositioning, toileting, and assisting with ADLS  -Assess under medical devices such as  every  -Assess extremities for adequate circulation and sensation     Bed Management:  -Have minimal linens on bed & keep smooth, unwrinkled  -Change linens as needed when moist or perspiring  -Avoid sitting or lying in one position for more than  hours while in bed  -Keep HOB atdegrees     Toileting:  -Offer bedside commode  -Assess for incontinence every   -Use incontinent care products after each incontinent episode such as     Activity:  -Mobilize patient  times a day  -Encourage activity and walks on unit  -Encourage or provide ROM exercises   -Turn and reposition patient every  Hours  -Use appropriate equipment to lift or move patient in bed  -Instruct/ Assist with weight shifting every  when out of bed in chair  -Consider limitation of chair time  hour intervals    Skin Care:  -Avoid use of baby powder, tape, friction and shearing, hot water or constrictive clothing  -Relieve pressure over bony prominences using   -Do not massage red bony areas    Next Steps:  -Teach patient strategies to minimize risks such as    -Consider consults to  interdisciplinary teams such as  Outcome: Progressing  Goal: Incision(s), wounds(s) or drain site(s) healing without S/S of infection  Description: INTERVENTIONS  - Assess and document dressing, incision, wound bed, drain sites and surrounding tissue  - Provide patient and family education  - Perform skin care/dressing changes every   Outcome: Progressing  Goal: Pressure injury heals and does not worsen  Description: Interventions:  - Implement low air loss mattress or specialty surface (Criteria met)  - Apply silicone foam dressing  - Instruct/assist with weight shifting every  minutes when in chair   - Limit chair time to  hour intervals  - Use special pressure reducing interventions such as when in chair   - Apply fecal or urinary incontinence containment device   - Perform passive or active ROM every   - Turn and reposition patient & offload bony prominences every  hours   - Utilize friction reducing device or surface for transfers   - Consider consults to  interdisciplinary teams such as   - Use incontinent care products after each incontinent episode such as   - Consider nutrition services referral as needed  Outcome: Progressing     Problem: MUSCULOSKELETAL - ADULT  Goal: Maintain or return mobility to safest level of function  Description: INTERVENTIONS:  - Assess patient's ability to carry out ADLs; assess patient's baseline for ADL function and identify physical deficits which impact ability to perform ADLs (bathing, care of mouth/teeth, toileting, grooming, dressing, etc )  - Assess/evaluate cause of self-care deficits   - Assess range of motion  - Assess patient's mobility  - Assess patient's need for assistive devices and provide as appropriate  - Encourage maximum independence but intervene and supervise when necessary  - Involve family in performance of ADLs  - Assess for home care needs following discharge   - Consider OT consult to assist with ADL evaluation and planning for discharge  - Provide patient education as appropriate  Outcome: Progressing  Goal: Maintain proper alignment of affected body part  Description: INTERVENTIONS:  - Support, maintain and protect limb and body alignment  - Provide patient/ family with appropriate education  Outcome: Progressing     Problem: Nutrition/Hydration-ADULT  Goal: Nutrient/Hydration intake appropriate for improving, restoring or maintaining nutritional needs  Description: Monitor and assess patient's nutrition/hydration status for malnutrition  Collaborate with interdisciplinary team and initiate plan and interventions as ordered  Monitor patient's weight and dietary intake as ordered or per policy  Utilize nutrition screening tool and intervene as necessary  Determine patient's food preferences and provide high-protein, high-caloric foods as appropriate       INTERVENTIONS:  - Monitor oral intake, urinary output, labs, and treatment plans  - Assess nutrition and hydration status and recommend course of action  - Evaluate amount of meals eaten  - Assist patient with eating if necessary   - Allow adequate time for meals  - Recommend/ encourage appropriate diets, oral nutritional supplements, and vitamin/mineral supplements  - Order, calculate, and assess calorie counts as needed  - Recommend, monitor, and adjust tube feedings and TPN/PPN based on assessed needs  - Assess need for intravenous fluids  - Provide specific nutrition/hydration education as appropriate  - Include patient/family/caregiver in decisions related to nutrition  Outcome: Progressing     Problem: Prexisting or High Potential for Compromised Skin Integrity  Goal: Skin integrity is maintained or improved  Description: INTERVENTIONS:  - Identify patients at risk for skin breakdown  - Assess and monitor skin integrity  - Assess and monitor nutrition and hydration status  - Monitor labs   - Assess for incontinence   - Turn and reposition patient  - Assist with mobility/ambulation  - Relieve pressure over bony prominences  - Avoid friction and shearing  - Provide appropriate hygiene as needed including keeping skin clean and dry  - Evaluate need for skin moisturizer/barrier cream  - Collaborate with interdisciplinary team   - Patient/family teaching  - Consider wound care consult   Outcome: Progressing     Problem: Potential for Falls  Goal: Patient will remain free of falls  Description: INTERVENTIONS:  - Educate patient/family on patient safety including physical limitations  - Instruct patient to call for assistance with activity   - Consult OT/PT to assist with strengthening/mobility   - Keep Call bell within reach  - Keep bed low and locked with side rails adjusted as appropriate  - Keep care items and personal belongings within reach  - Initiate and maintain comfort rounds  - Make Fall Risk Sign visible to staff  - Offer Toileting every  Hours, in advance of need  - Initiate/Maintain alarm  - Obtain necessary fall risk management equipment:   - Apply yellow socks and bracelet for high fall risk patients  - Consider moving patient to room near nurses station  Outcome: Progressing

## 2022-02-12 NOTE — PROGRESS NOTES
Vancomycin IV Pharmacy-to-Dose Consultation    Christoph Spencer is a 60 y o  male who is currently receiving Vancomycin IV with management by the Pharmacy Consult service  Assessment/Plan:  The patient was reviewed  Renal function is stable and no signs or symptoms of nephrotoxicity and/or infusion reactions were documented in the chart  Based on todays assessment, continue current vancomycin (day # 5) dosing of 1000mg Q 8hrs, with a plan for trough to be drawn at 0700 on 02/13/22  We will continue to follow the patients culture results and clinical progress daily      Justo Ya, Pharmacist
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
## 2022-02-12 NOTE — PROGRESS NOTES
Progress Note - Orthopedics   Clara Espitia 60 y o  male MRN: [id number]  Unit/Bed#: S -01 Encounter: [id number]    Assessment:  Left thigh abscess    Plan:  Continue packing changes and antibiotics    Weight bearing:  Weight-bearing as tolerated    VTE Pharmacologic Prophylaxis: Sequential compression device (Venodyne)   VTE Mechanical Prophylaxis: sequential compression device    Subjective: This is a 53-year-old fellow who presents with this thigh abscess  We are currently treating him with IV antibiotics and wound packing  Vitals: Blood pressure 90/54, pulse (!) 54, temperature 98 1 °F (36 7 °C), temperature source Oral, resp  rate 18, height 6' (1 829 m), weight 79 4 kg (175 lb), SpO2 98 %  ,Body mass index is 23 73 kg/m²  Intake/Output Summary (Last 24 hours) at 2/12/2022 0857  Last data filed at 2/12/2022 0100  Gross per 24 hour   Intake 600 ml   Output 1100 ml   Net -500 ml       Invasive Devices  Report    Peripheral Intravenous Line            Peripheral IV 02/09/22 Left;Upper;Ventral (anterior) Arm 3 days                Physical Exam:  On physical examination the packing is removed today  He has no purulent drainage  There is no sign of any recurring abscess nor is there any signs of septic arthritis of the knee  His alignment looks good  No erythema or induration      Lab, Imaging and other studies:   CBC:   Lab Results   Component Value Date    WBC 6 66 02/12/2022    HGB 7 4 (L) 02/12/2022    HCT 26 2 (L) 02/12/2022    MCV 87 02/12/2022     02/12/2022    MCH 24 7 (L) 02/12/2022    MCHC 28 2 (L) 02/12/2022    RDW 17 5 (H) 02/12/2022    MPV 9 7 02/12/2022    NRBC 0 02/12/2022

## 2022-02-12 NOTE — ASSESSMENT & PLAN NOTE
· Esophageal varices noted on EGD 2/11, status post banding  · Start nadolol, discontinue propranolol  · GI following, repeat endoscopy in 2-3 months    · Full liquid diet today

## 2022-02-12 NOTE — ASSESSMENT & PLAN NOTE
· Episodes of hypoxia 2/11, was on 3 liters/minute of oxygen  · Suspecting iatrogenic volume overload, decreased IV fluid rate with improvement  · Chest x-ray with improved right lower lobe density, noted small effusion as well as atelectasis  · Incentive spirometry  · Was weaned to room air today

## 2022-02-12 NOTE — ASSESSMENT & PLAN NOTE
· Progressive hemoglobin decline noted since admission  Upon reviewing patient's old records from [place] , patient has history of angiodysplasia and had argon beam therapy on January 2019  There was also a remote mention of esophageal varices  Continue to follow serial H&H  Patient denies any nausea ,vomiting or hematemesis  Follow-up on stool for occult blood  Continue with PPI therapy  GI following    · SP EGD 2/11

## 2022-02-12 NOTE — PROGRESS NOTES
Progress Note- Sandyem Maradiaga 60 y o  male MRN: [id number]    Unit/Bed#: S -01 Encounter: [id number]      Assessment and Plan:    #1  Iron deficiency anemia: Hgb was around 9 in January 2022 when hospitalized for COVID in [place]  8 7 on admission, now 7 4  no obvious GI bleeding, is on oral iron tablets at home, reportedly has a history of angiectasias treated with APC in 2019 and esophageal varices, assumed angiectasias were in upper GI tract, unclear if patient ever had a colonoscopy  On PPI daily  Consider anemia of chronic disease with underlying cirrhosis, also concern for bleeding from angiectasias  EGD on Friday 2/11 showed for more large grade 2 varices (red beryl sign) in the esophagus status post 4 bands and nodular gastritis  Hemoglobin remains stable from yesterday at 7 4  Having brown bowel movements per nursing, occult stool negative  -PPI b i d , carafate q i d   -advance diet to low sodium, dental soft  -repeat EGD in 2-3 months for additional banding  -monitor hgb, transfuse as necessary  -may have colonoscopy outpatient if hemoglobin remains stable       #2  Cirrhosis: MELD score is 9 on admission, does not appear to be a new diagnosis as he has hx of esophageal varices but patient's brother was not aware of diagnosis  Denies alcohol use, drug use, tattoos, hepatitis hx  On propanolol  On lasix but unclear if due to swelling from liver disease  No ascites on US, no masses in liver on US  AFP normal     -Chronic hepatitis panel, autoimmune serologies, ceruloplasmin alpha-1 antitrypsin unrevealing for cause of liver disease  -continue propanolol, and repeat EGD in 2-3 months as above  -lasix currently on hold due to borderline hypotension   -will need regular GI outpatient follow-up    ______________________________________________________________________    Subjective:     Patient good spirits  Denies any abdominal pain, nausea or vomiting  Denies any painful swallowing  Tolerating full liquids  Denies any black or bloody stool      Medication Administration - last 24 hours from 02/11/2022 1414 to 02/12/2022 1414       Date/Time Order Dose Route Action Action by     02/12/2022 0831 triamcinolone (KENALOG) 0 1 % cream   Topical Given Cornelia De La Torre, RN     02/11/2022 1801 triamcinolone (KENALOG) 0 1 % cream   Topical Given Nohemi Boyd, RN     02/12/2022 0830 levETIRAcetam (KEPPRA) tablet 500 mg 500 mg Oral Given Erle Wil, RN     02/11/2022 2155 levETIRAcetam (KEPPRA) tablet 500 mg 500 mg Oral Given Glory Means, RN     02/12/2022 0829 OXcarbazepine (TRILEPTAL) tablet 600 mg 600 mg Oral Given Cornelia De La Torre, RN     02/11/2022 2155 OXcarbazepine (TRILEPTAL) tablet 600 mg 600 mg Oral Given Glory Means, RN     02/12/2022 0830 propranolol (INDERAL) tablet 10 mg 10 mg Oral Not Given Jose Fle Wil, RN     02/11/2022 2154 propranolol (INDERAL) tablet 10 mg 10 mg Oral Not Given Glory Means, RN     02/11/2022 1800 propranolol (INDERAL) tablet 10 mg   Oral Not Given Nohemi Boyd, RN     02/12/2022 4226 senna (SENOKOT) tablet 8 6 mg 8 6 mg Oral Not Given Cornelia De La Torre, RN     02/12/2022 0829 enoxaparin (LOVENOX) subcutaneous injection 40 mg 40 mg Subcutaneous Given Ermarlene Closanchez, RN     02/12/2022 0830 pantoprazole (PROTONIX) EC tablet 40 mg 40 mg Oral Given Erle Wil, RN     02/12/2022 0830 vancomycin (VANCOCIN) IVPB (premix in dextrose) 1,000 mg 200 mL 1,000 mg Intravenous [facility] [address] [place] [address], RN     02/11/2022 2317 vancomycin (VANCOCIN) IVPB (premix in dextrose) 1,000 mg 200 mL 1,000 mg Intravenous New Bag Glory Means, RN     02/12/2022 1157 sucralfate (CARAFATE) tablet 1 g 1 g Oral Given Cornelia De La Torre, RN     02/12/2022 0457 sucralfate (CARAFATE) tablet 1 g 1 g Oral Given Glory Means, ALEXANDER     02/11/2022 2318 sucralfate (CARAFATE) tablet 1 g 1 g Oral Given Glory Means, ALEXANDER     02/11/2022 1819 sucralfate (Kelley Bernardino) tablet 1 g 1 g Oral Given Nabila Chong RN     02/12/2022 1159 nadolol (CORGARD) tablet 40 mg 40 mg Oral Not Given Javier Pappas RN          Objective:     Vitals: Blood pressure 99/65, pulse 92, temperature 98 1 °F (36 7 °C), temperature source Oral, resp  rate 18, height 6' (1 829 m), weight 79 4 kg (175 lb), SpO2 98 %  ,Body mass index is 23 73 kg/m²  Intake/Output Summary (Last 24 hours) at 2/12/2022 1414  Last data filed at 2/12/2022 0100  Gross per 24 hour   Intake 600 ml   Output 800 ml   Net -200 ml       Physical Exam:   General Appearance: Awake and alert, in no acute distress  Abdomen: Soft, non-tender, non-distended; bowel sounds normal; no masses or no organomegaly    Invasive Devices  Report    Peripheral Intravenous Line            Peripheral IV 02/09/22 Left;Upper;Ventral (anterior) Arm 3 days                Lab Results:  No results displayed because visit has over 200 results  Imaging Studies: I have personally reviewed pertinent imaging studies

## 2022-02-12 NOTE — ASSESSMENT & PLAN NOTE
· Left lower extremity erythema and edema, likely cellulitis in the setting of chronic venous stasis and stasis dermatitis Received cefepime and vancomycin in ED  · Started on IV Ancef on admission  Blood cultures from admission positive for MRSA Antibiotics, changed to IV vancomycin     · Repeat BC ordered this AM   · If positive needs RACHAEL  · Continue to follow CBC and temperature curve closely  · CT of the lower extremity noted

## 2022-02-13 LAB
BACTERIA BLD CULT: ABNORMAL
BACTERIA BLD CULT: ABNORMAL
GRAM STN SPEC: ABNORMAL
GRAM STN SPEC: ABNORMAL
VANCOMYCIN TROUGH SERPL-MCNC: 18 UG/ML (ref 10–20)

## 2022-02-13 PROCEDURE — 99024 POSTOP FOLLOW-UP VISIT: CPT | Performed by: ORTHOPAEDIC SURGERY

## 2022-02-13 PROCEDURE — 80202 ASSAY OF VANCOMYCIN: CPT | Performed by: INTERNAL MEDICINE

## 2022-02-13 PROCEDURE — 99232 SBSQ HOSP IP/OBS MODERATE 35: CPT | Performed by: PHYSICIAN ASSISTANT

## 2022-02-13 RX ORDER — MAGNESIUM HYDROXIDE/ALUMINUM HYDROXICE/SIMETHICONE 120; 1200; 1200 MG/30ML; MG/30ML; MG/30ML
30 SUSPENSION ORAL EVERY 4 HOURS PRN
Status: DISCONTINUED | OUTPATIENT
Start: 2022-02-13 | End: 2022-02-18 | Stop reason: HOSPADM

## 2022-02-13 RX ORDER — CALCIUM CARBONATE 200(500)MG
1000 TABLET,CHEWABLE ORAL 2 TIMES DAILY PRN
Status: DISCONTINUED | OUTPATIENT
Start: 2022-02-13 | End: 2022-02-18 | Stop reason: HOSPADM

## 2022-02-13 RX ADMIN — OXCARBAZEPINE 600 MG: 150 TABLET, FILM COATED ORAL at 22:42

## 2022-02-13 RX ADMIN — TRIAMCINOLONE ACETONIDE 1 APPLICATION: 1 CREAM TOPICAL at 17:54

## 2022-02-13 RX ADMIN — OXCARBAZEPINE 600 MG: 150 TABLET, FILM COATED ORAL at 08:06

## 2022-02-13 RX ADMIN — SUCRALFATE 1 G: 1 TABLET ORAL at 11:29

## 2022-02-13 RX ADMIN — VANCOMYCIN HYDROCHLORIDE 1000 MG: 1 INJECTION, SOLUTION INTRAVENOUS at 07:41

## 2022-02-13 RX ADMIN — FAMOTIDINE 20 MG: 10 INJECTION, SOLUTION INTRAVENOUS at 08:07

## 2022-02-13 RX ADMIN — LEVETIRACETAM 500 MG: 500 TABLET, FILM COATED ORAL at 22:42

## 2022-02-13 RX ADMIN — PANTOPRAZOLE SODIUM 40 MG: 40 TABLET, DELAYED RELEASE ORAL at 15:05

## 2022-02-13 RX ADMIN — SUCRALFATE 1 G: 1 TABLET ORAL at 06:22

## 2022-02-13 RX ADMIN — ENOXAPARIN SODIUM 40 MG: 40 INJECTION SUBCUTANEOUS at 08:06

## 2022-02-13 RX ADMIN — PANTOPRAZOLE SODIUM 40 MG: 40 TABLET, DELAYED RELEASE ORAL at 06:22

## 2022-02-13 RX ADMIN — TRIAMCINOLONE ACETONIDE 1 APPLICATION: 1 CREAM TOPICAL at 08:08

## 2022-02-13 RX ADMIN — SUCRALFATE 1 G: 1 TABLET ORAL at 17:54

## 2022-02-13 RX ADMIN — ALUMINA, MAGNESIA, AND SIMETHICONE ORAL SUSPENSION REGULAR STRENGTH 30 ML: 1200; 1200; 120 SUSPENSION ORAL at 08:06

## 2022-02-13 RX ADMIN — VANCOMYCIN HYDROCHLORIDE 1000 MG: 1 INJECTION, SOLUTION INTRAVENOUS at 15:06

## 2022-02-13 RX ADMIN — STANDARDIZED SENNA CONCENTRATE 8.6 MG: 8.6 TABLET ORAL at 08:06

## 2022-02-13 RX ADMIN — SODIUM CHLORIDE, SODIUM GLUCONATE, SODIUM ACETATE, POTASSIUM CHLORIDE, MAGNESIUM CHLORIDE, SODIUM PHOSPHATE, DIBASIC, AND POTASSIUM PHOSPHATE 75 ML/HR: .53; .5; .37; .037; .03; .012; .00082 INJECTION, SOLUTION INTRAVENOUS at 04:59

## 2022-02-13 RX ADMIN — LEVETIRACETAM 500 MG: 500 TABLET, FILM COATED ORAL at 08:06

## 2022-02-13 NOTE — ASSESSMENT & PLAN NOTE
· Episodes of hypoxia noted, on 3 liters/minute of oxygen  · Suspecting iatrogenic volume overload/atelectasis  · Stop fluids  · Chest x-ray with improved right lower lobe density, noted small effusion as well as atelectasis  · Incentive spirometry

## 2022-02-13 NOTE — ASSESSMENT & PLAN NOTE
· As evidenced by fever, tachycardia, bacteremia secondary to left leg cellulitis  · Lactic acid on admission was 1 7  · Blood cultures 2/2 on admission positive for MRSA  · Repeats for 2/12 pending  · Patient was initially started on IV cefazolin and was transitioned to intravenous vancomycin on 02/09    · Follow-up on repeat blood cultures to assess clearance of bacteremia  · Infectious disease consulted

## 2022-02-13 NOTE — ASSESSMENT & PLAN NOTE
· Left lower extremity erythema and edema, likely cellulitis in the setting of chronic venous stasis and stasis dermatitis Received cefepime and vancomycin in ED  · Started on IV Ancef on admission  Blood cultures from admission positive for MRSA Antibiotics, changed to IV vancomycin   · Repeat BC ordered 2/12    · If positive needs RACHAEL  · Continue to follow CBC and temperature curve closely  · CT of the lower extremity noted

## 2022-02-13 NOTE — ASSESSMENT & PLAN NOTE
· Progressive hemoglobin decline noted since admission  Upon reviewing patient's old records from [place] , patient has history of angiodysplasia and had argon beam therapy on January 2019  There was also a remote mention of esophageal varices  Continue to follow serial H&H  Patient denies any nausea ,vomiting or hematemesis  Follow-up on stool for occult blood  Continue with PPI therapy  GI following    · SP EGD 2/11

## 2022-02-13 NOTE — PLAN OF CARE
Problem: MOBILITY - ADULT  Goal: Maintain or return to baseline ADL function  Description: INTERVENTIONS:  -  Assess patient's ability to carry out ADLs; assess patient's baseline for ADL function and identify physical deficits which impact ability to perform ADLs (bathing, care of mouth/teeth, toileting, grooming, dressing, etc )  - Assess/evaluate cause of self-care deficits   - Assess range of motion  - Assess patient's mobility; develop plan if impaired  - Assess patient's need for assistive devices and provide as appropriate  - Encourage maximum independence but intervene and supervise when necessary  - Involve family in performance of ADLs  - Assess for home care needs following discharge   - Consider OT consult to assist with ADL evaluation and planning for discharge  - Provide patient education as appropriate  Outcome: Progressing  Goal: Maintains/Returns to pre admission functional level  Description: INTERVENTIONS:  - Perform BMAT or MOVE assessment daily    - Set and communicate daily mobility goal to care team and patient/family/caregiver  - Collaborate with rehabilitation services on mobility goals if consulted  - Perform Range of Motion  times a day  - Reposition patient every  hours    - Dangle patient  times a day  - Stand patient  times a day  - Ambulate patient  times a day  - Out of bed to chair  times a day   - Out of bed for meals  times a day  - Out of bed for toileting  - Record patient progress and toleration of activity level   Outcome: Progressing     Problem: PAIN - ADULT  Goal: Verbalizes/displays adequate comfort level or baseline comfort level  Description: Interventions:  - Encourage patient to monitor pain and request assistance  - Assess pain using appropriate pain scale  - Administer analgesics based on type and severity of pain and evaluate response  - Implement non-pharmacological measures as appropriate and evaluate response  - Consider cultural and social influences on pain and pain management  - Notify physician/advanced practitioner if interventions unsuccessful or patient reports new pain  Outcome: Progressing     Problem: INFECTION - ADULT  Goal: Absence or prevention of progression during hospitalization  Description: INTERVENTIONS:  - Assess and monitor for signs and symptoms of infection  - Monitor lab/diagnostic results  - Monitor all insertion sites, i e  indwelling lines, tubes, and drains  - Monitor endotracheal if appropriate and nasal secretions for changes in amount and color  - [place] appropriate cooling/warming therapies per order  - Administer medications as ordered  - Instruct and encourage patient and family to use good hand hygiene technique  - Identify and instruct in appropriate isolation precautions for identified infection/condition  Outcome: Progressing  Goal: Absence of fever/infection during neutropenic period  Description: INTERVENTIONS:  - Monitor WBC    Outcome: Progressing     Problem: SAFETY ADULT  Goal: Maintain or return to baseline ADL function  Description: INTERVENTIONS:  -  Assess patient's ability to carry out ADLs; assess patient's baseline for ADL function and identify physical deficits which impact ability to perform ADLs (bathing, care of mouth/teeth, toileting, grooming, dressing, etc )  - Assess/evaluate cause of self-care deficits   - Assess range of motion  - Assess patient's mobility; develop plan if impaired  - Assess patient's need for assistive devices and provide as appropriate  - Encourage maximum independence but intervene and supervise when necessary  - Involve family in performance of ADLs  - Assess for home care needs following discharge   - Consider OT consult to assist with ADL evaluation and planning for discharge  - Provide patient education as appropriate  Outcome: Progressing  Goal: Maintains/Returns to pre admission functional level  Description: INTERVENTIONS:  - Perform BMAT or MOVE assessment daily    - Set and communicate daily mobility goal to care team and patient/family/caregiver  - Collaborate with rehabilitation services on mobility goals if consulted  - Perform Range of Motion  times a day  - Reposition patient every  hours    - Dangle patient  times a day  - Stand patient  times a day  - Ambulate patient  times a day  - Out of bed to chair  times a day   - Out of bed for meals  times a day  - Out of bed for toileting  - Record patient progress and toleration of activity level   Outcome: Progressing  Goal: Patient will remain free of falls  Description: INTERVENTIONS:  - Educate patient/family on patient safety including physical limitations  - Instruct patient to call for assistance with activity   - Consult OT/PT to assist with strengthening/mobility   - Keep Call bell within reach  - Keep bed low and locked with side rails adjusted as appropriate  - Keep care items and personal belongings within reach  - Initiate and maintain comfort rounds  - Make Fall Risk Sign visible to staff  - Offer Toileting every  Hours, in advance of need  - Initiate/Maintain alarm  - Obtain necessary fall risk management equipment:   - Apply yellow socks and bracelet for high fall risk patients  - Consider moving patient to room near nurses station  Outcome: Progressing     Problem: DISCHARGE PLANNING  Goal: Discharge to home or other facility with appropriate resources  Description: INTERVENTIONS:  - Identify barriers to discharge w/patient and caregiver  - Arrange for needed discharge resources and transportation as appropriate  - Identify discharge learning needs (meds, wound care, etc )  - Arrange for interpretive services to assist at discharge as needed  - Refer to Case Management Department for coordinating discharge planning if the patient needs post-hospital services based on physician/advanced practitioner order or complex needs related to functional status, cognitive ability, or social support system  Outcome: Progressing Problem: Knowledge Deficit  Goal: Patient/family/caregiver demonstrates understanding of disease process, treatment plan, medications, and discharge instructions  Description: Complete learning assessment and assess knowledge base    Interventions:  - Provide teaching at level of understanding  - Provide teaching via preferred learning methods  Outcome: Progressing     Problem: SKIN/TISSUE INTEGRITY - ADULT  Goal: Skin Integrity remains intact(Skin Breakdown Prevention)  Description: Assess:  -Perform [place] assessment every   -Clean and moisturize skin every   -Inspect skin when repositioning, toileting, and assisting with ADLS  -Assess under medical devices such as  every   -Assess extremities for adequate circulation and sensation     Bed Management:  -Have minimal linens on bed & keep smooth, unwrinkled  -Change linens as needed when moist or perspiring  -Avoid sitting or lying in one position for more than  hours while in bed  -Keep HOB at degrees     Toileting:  -Offer bedside commode  -Assess for incontinence every   -Use incontinent care products after each incontinent episode such as     Activity:  -Mobilize patient  times a day  -Encourage activity and walks on unit  -Encourage or provide ROM exercises   -Turn and reposition patient every  Hours  -Use appropriate equipment to lift or move patient in bed  -Instruct/ Assist with weight shifting every  when out of bed in chair  -Consider limitation of chair time  hour intervals    Skin Care:  -Avoid use of baby powder, tape, friction and shearing, hot water or constrictive clothing  -Relieve pressure over bony prominences using   -Do not massage red bony areas    Next Steps:  -Teach patient strategies to minimize risks such as    -Consider consults to  interdisciplinary teams such as   Outcome: Progressing  Goal: Incision(s), wounds(s) or drain site(s) healing without S/S of infection  Description: INTERVENTIONS  - Assess and document dressing, incision, wound bed, drain sites and surrounding tissue  - Provide patient and family education  - Perform skin care/dressing changes every   Outcome: Progressing  Goal: Pressure injury heals and does not worsen  Description: Interventions:  - Implement low air loss mattress or specialty surface (Criteria met)  - Apply silicone foam dressing  - Instruct/assist with weight shifting every  minutes when in chair   - Limit chair time to  hour intervals  - Use special pressure reducing interventions such as  when in chair   - Apply fecal or urinary incontinence containment device   - Perform passive or active ROM every   - Turn and reposition patient & offload bony prominences every  hours   - Utilize friction reducing device or surface for transfers   - Consider consults to  interdisciplinary teams such as   - Use incontinent care products after each incontinent episode such as   - Consider nutrition services referral as needed  Outcome: Progressing     Problem: MUSCULOSKELETAL - ADULT  Goal: Maintain or return mobility to safest level of function  Description: INTERVENTIONS:  - Assess patient's ability to carry out ADLs; assess patient's baseline for ADL function and identify physical deficits which impact ability to perform ADLs (bathing, care of mouth/teeth, toileting, grooming, dressing, etc )  - Assess/evaluate cause of self-care deficits   - Assess range of motion  - Assess patient's mobility  - Assess patient's need for assistive devices and provide as appropriate  - Encourage maximum independence but intervene and supervise when necessary  - Involve family in performance of ADLs  - Assess for home care needs following discharge   - Consider OT consult to assist with ADL evaluation and planning for discharge  - Provide patient education as appropriate  Outcome: Progressing  Goal: Maintain proper alignment of affected body part  Description: INTERVENTIONS:  - Support, maintain and protect limb and body alignment  - Provide patient/ family with appropriate education  Outcome: Progressing     Problem: Nutrition/Hydration-ADULT  Goal: Nutrient/Hydration intake appropriate for improving, restoring or maintaining nutritional needs  Description: Monitor and assess patient's nutrition/hydration status for malnutrition  Collaborate with interdisciplinary team and initiate plan and interventions as ordered  Monitor patient's weight and dietary intake as ordered or per policy  Utilize nutrition screening tool and intervene as necessary  Determine patient's food preferences and provide high-protein, high-caloric foods as appropriate       INTERVENTIONS:  - Monitor oral intake, urinary output, labs, and treatment plans  - Assess nutrition and hydration status and recommend course of action  - Evaluate amount of meals eaten  - Assist patient with eating if necessary   - Allow adequate time for meals  - Recommend/ encourage appropriate diets, oral nutritional supplements, and vitamin/mineral supplements  - Order, calculate, and assess calorie counts as needed  - Recommend, monitor, and adjust tube feedings and TPN/PPN based on assessed needs  - Assess need for intravenous fluids  - Provide specific nutrition/hydration education as appropriate  - Include patient/family/caregiver in decisions related to nutrition  Outcome: Progressing     Problem: Prexisting or High Potential for Compromised Skin Integrity  Goal: Skin integrity is maintained or improved  Description: INTERVENTIONS:  - Identify patients at risk for skin breakdown  - Assess and monitor skin integrity  - Assess and monitor nutrition and hydration status  - Monitor labs   - Assess for incontinence   - Turn and reposition patient  - Assist with mobility/ambulation  - Relieve pressure over bony prominences  - Avoid friction and shearing  - Provide appropriate hygiene as needed including keeping skin clean and dry  - Evaluate need for skin moisturizer/barrier cream  - Collaborate with interdisciplinary team   - Patient/family teaching  - Consider wound care consult   Outcome: Progressing     Problem: Potential for Falls  Goal: Patient will remain free of falls  Description: INTERVENTIONS:  - Educate patient/family on patient safety including physical limitations  - Instruct patient to call for assistance with activity   - Consult OT/PT to assist with strengthening/mobility   - Keep Call bell within reach  - Keep bed low and locked with side rails adjusted as appropriate  - Keep care items and personal belongings within reach  - Initiate and maintain comfort rounds  - Make Fall Risk Sign visible to staff  - Offer Toileting every  Hours, in advance of need  - Initiate/Maintain alarm  - Obtain necessary fall risk management equipment:  Apply yellow socks and bracelet for high fall risk patients  - Consider moving patient to room near nurses station  Outcome: Progressing

## 2022-02-13 NOTE — ASSESSMENT & PLAN NOTE
· Controlled  · Discontinue propranolol in favor of nadolol  · Will hold Lasix in view of borderline low blood pressure

## 2022-02-13 NOTE — PLAN OF CARE
Problem: MOBILITY - ADULT  Goal: Maintain or return to baseline ADL function  Description: INTERVENTIONS:  -  Assess patient's ability to carry out ADLs; assess patient's baseline for ADL function and identify physical deficits which impact ability to perform ADLs (bathing, care of mouth/teeth, toileting, grooming, dressing, etc )  - Assess/evaluate cause of self-care deficits   - Assess range of motion  - Assess patient's mobility; develop plan if impaired  - Assess patient's need for assistive devices and provide as appropriate  - Encourage maximum independence but intervene and supervise when necessary  - Involve family in performance of ADLs  - Assess for home care needs following discharge   - Consider OT consult to assist with ADL evaluation and planning for discharge  - Provide patient education as appropriate  Outcome: Progressing  Goal: Maintains/Returns to pre admission functional level  Description: INTERVENTIONS:  - Perform BMAT or MOVE assessment daily    - Set and communicate daily mobility goal to care team and patient/family/caregiver  - Collaborate with rehabilitation services on mobility goals if consulted  - Perform Range of Motion  times a day  - Reposition patient every  hours    - Dangle patient  times a day  - Stand patient  times a day  - Ambulate patient  times a day  - Out of bed to chair  times a day   - Out of bed for meals  times a day  - Out of bed for toileting  - Record patient progress and toleration of activity level   Outcome: Progressing     Problem: PAIN - ADULT  Goal: Verbalizes/displays adequate comfort level or baseline comfort level  Description: Interventions:  - Encourage patient to monitor pain and request assistance  - Assess pain using appropriate pain scale  - Administer analgesics based on type and severity of pain and evaluate response  - Implement non-pharmacological measures as appropriate and evaluate response  - Consider cultural and social influences on pain and pain management  - Notify physician/advanced practitioner if interventions unsuccessful or patient reports new pain  Outcome: Progressing     Problem: INFECTION - ADULT  Goal: Absence or prevention of progression during hospitalization  Description: INTERVENTIONS:  - Assess and monitor for signs and symptoms of infection  - Monitor lab/diagnostic results  - Monitor all insertion sites, i e  indwelling lines, tubes, and drains  - Monitor endotracheal if appropriate and nasal secretions for changes in amount and color  - [place] appropriate cooling/warming therapies per order  - Administer medications as ordered  - Instruct and encourage patient and family to use good hand hygiene technique  - Identify and instruct in appropriate isolation precautions for identified infection/condition  Outcome: Progressing  Goal: Absence of fever/infection during neutropenic period  Description: INTERVENTIONS:  - Monitor WBC    Outcome: Progressing     Problem: SAFETY ADULT  Goal: Maintain or return to baseline ADL function  Description: INTERVENTIONS:  -  Assess patient's ability to carry out ADLs; assess patient's baseline for ADL function and identify physical deficits which impact ability to perform ADLs (bathing, care of mouth/teeth, toileting, grooming, dressing, etc )  - Assess/evaluate cause of self-care deficits   - Assess range of motion  - Assess patient's mobility; develop plan if impaired  - Assess patient's need for assistive devices and provide as appropriate  - Encourage maximum independence but intervene and supervise when necessary  - Involve family in performance of ADLs  - Assess for home care needs following discharge   - Consider OT consult to assist with ADL evaluation and planning for discharge  - Provide patient education as appropriate  Outcome: Progressing  Goal: Maintains/Returns to pre admission functional level  Description: INTERVENTIONS:  - Perform BMAT or MOVE assessment daily    - Set and communicate daily mobility goal to care team and patient/family/caregiver  - Collaborate with rehabilitation services on mobility goals if consulted  - Perform Range of Motion  times a day  - Reposition patient every  hours    - Dangle patient  times a day  - Stand patient  times a day  - Ambulate patient  times a day  - Out of bed to chair  times a day   - Out of bed for meals times a day  - Out of bed for toileting  - Record patient progress and toleration of activity level   Outcome: Progressing  Goal: Patient will remain free of falls  Description: INTERVENTIONS:  - Educate patient/family on patient safety including physical limitations  - Instruct patient to call for assistance with activity   - Consult OT/PT to assist with strengthening/mobility   - Keep Call bell within reach  - Keep bed low and locked with side rails adjusted as appropriate  - Keep care items and personal belongings within reach  - Initiate and maintain comfort rounds  - Make Fall Risk Sign visible to staff  - Offer Toileting every  Hours, in advance of need  - Initiate/Maintain alarm  - Obtain necessary fall risk management equipment:   - Apply yellow socks and bracelet for high fall risk patients  - Consider moving patient to room near nurses station  Outcome: Progressing     Problem: DISCHARGE PLANNING  Goal: Discharge to home or other facility with appropriate resources  Description: INTERVENTIONS:  - Identify barriers to discharge w/patient and caregiver  - Arrange for needed discharge resources and transportation as appropriate  - Identify discharge learning needs (meds, wound care, etc )  - Arrange for interpretive services to assist at discharge as needed  - Refer to Case Management Department for coordinating discharge planning if the patient needs post-hospital services based on physician/advanced practitioner order or complex needs related to functional status, cognitive ability, or social support system  Outcome: Progressing Problem: Knowledge Deficit  Goal: Patient/family/caregiver demonstrates understanding of disease process, treatment plan, medications, and discharge instructions  Description: Complete learning assessment and assess knowledge base    Interventions:  - Provide teaching at level of understanding  - Provide teaching via preferred learning methods  Outcome: Progressing     Problem: SKIN/TISSUE INTEGRITY - ADULT  Goal: Skin Integrity remains intact(Skin Breakdown Prevention)  Description: Assess:  -Perform [place] assessment every   -Clean and moisturize skin every   -Inspect skin when repositioning, toileting, and assisting with ADLS  -Assess under medical devices such as  every   -Assess extremities for adequate circulation and sensation     Bed Management:  -Have minimal linens on bed & keep smooth, unwrinkled  -Change linens as needed when moist or perspiring  -Avoid sitting or lying in one position for more than  hours while in bed  -Keep HOB at degrees     Toileting:  -Offer bedside commode  -Assess for incontinence every   -Use incontinent care products after each incontinent episode such as    Activity:  -Mobilize patient  times a day  -Encourage activity and walks on unit  -Encourage or provide ROM exercises   -Turn and reposition patient every  Hours  -Use appropriate equipment to lift or move patient in bed  -Instruct/ Assist with weight shifting every  when out of bed in chair  -Consider limitation of chair time  hour intervals    Skin Care:  -Avoid use of baby powder, tape, friction and shearing, hot water or constrictive clothing  -Relieve pressure over bony prominences using   -Do not massage red bony areas    Next Steps:  -Teach patient strategies to minimize risks such as    -Consider consults to  interdisciplinary teams such as  Outcome: Progressing  Goal: Incision(s), wounds(s) or drain site(s) healing without S/S of infection  Description: INTERVENTIONS  - Assess and document dressing, incision, wound bed, drain sites and surrounding tissue  - Provide patient and family education  - Perform skin care/dressing changes every   Outcome: Progressing  Goal: Pressure injury heals and does not worsen  Description: Interventions:  - Implement low air loss mattress or specialty surface (Criteria met)  - Apply silicone foam dressing  - Instruct/assist with weight shifting every  minutes when in chair   - Limit chair time to  hour intervals  - Use special pressure reducing interventions such as  when in chair   - Apply fecal or urinary incontinence containment device   - Perform passive or active ROM every  - Turn and reposition patient & offload bony prominences every  hours   - Utilize friction reducing device or surface for transfers   - Consider consults to  interdisciplinary teams such as  - Use incontinent care products after each incontinent episode such as  - Consider nutrition services referral as needed  Outcome: Progressing     Problem: MUSCULOSKELETAL - ADULT  Goal: Maintain or return mobility to safest level of function  Description: INTERVENTIONS:  - Assess patient's ability to carry out ADLs; assess patient's baseline for ADL function and identify physical deficits which impact ability to perform ADLs (bathing, care of mouth/teeth, toileting, grooming, dressing, etc )  - Assess/evaluate cause of self-care deficits   - Assess range of motion  - Assess patient's mobility  - Assess patient's need for assistive devices and provide as appropriate  - Encourage maximum independence but intervene and supervise when necessary  - Involve family in performance of ADLs  - Assess for home care needs following discharge   - Consider OT consult to assist with ADL evaluation and planning for discharge  - Provide patient education as appropriate  Outcome: Progressing  Goal: Maintain proper alignment of affected body part  Description: INTERVENTIONS:  - Support, maintain and protect limb and body alignment  - Provide patient/ family with appropriate education  Outcome: Progressing     Problem: Nutrition/Hydration-ADULT  Goal: Nutrient/Hydration intake appropriate for improving, restoring or maintaining nutritional needs  Description: Monitor and assess patient's nutrition/hydration status for malnutrition  Collaborate with interdisciplinary team and initiate plan and interventions as ordered  Monitor patient's weight and dietary intake as ordered or per policy  Utilize nutrition screening tool and intervene as necessary  Determine patient's food preferences and provide high-protein, high-caloric foods as appropriate       INTERVENTIONS:  - Monitor oral intake, urinary output, labs, and treatment plans  - Assess nutrition and hydration status and recommend course of action  - Evaluate amount of meals eaten  - Assist patient with eating if necessary   - Allow adequate time for meals  - Recommend/ encourage appropriate diets, oral nutritional supplements, and vitamin/mineral supplements  - Order, calculate, and assess calorie counts as needed  - Recommend, monitor, and adjust tube feedings and TPN/PPN based on assessed needs  - Assess need for intravenous fluids  - Provide specific nutrition/hydration education as appropriate  - Include patient/family/caregiver in decisions related to nutrition  Outcome: Progressing     Problem: Prexisting or High Potential for Compromised Skin Integrity  Goal: Skin integrity is maintained or improved  Description: INTERVENTIONS:  - Identify patients at risk for skin breakdown  - Assess and monitor skin integrity  - Assess and monitor nutrition and hydration status  - Monitor labs   - Assess for incontinence   - Turn and reposition patient  - Assist with mobility/ambulation  - Relieve pressure over bony prominences  - Avoid friction and shearing  - Provide appropriate hygiene as needed including keeping skin clean and dry  - Evaluate need for skin moisturizer/barrier cream  - Collaborate with interdisciplinary team   - Patient/family teaching  - Consider wound care consult   Outcome: Progressing     Problem: Potential for Falls  Goal: Patient will remain free of falls  Description: INTERVENTIONS:  - Educate patient/family on patient safety including physical limitations  - Instruct patient to call for assistance with activity   - Consult OT/PT to assist with strengthening/mobility   - Keep Call bell within reach  - Keep bed low and locked with side rails adjusted as appropriate  - Keep care items and personal belongings within reach  - Initiate and maintain comfort rounds  - Make Fall Risk Sign visible to staff  - Offer Toileting every  Hours, in advance of need  - Initiate/Maintain alarm  - Obtain necessary fall risk management equipment:   - Apply yellow socks and bracelet for high fall risk patients  - Consider moving patient to room near nurses station  Outcome: Progressing

## 2022-02-13 NOTE — PROGRESS NOTES
Progress Note - Orthopedics   Asael Austin 60 y o  male MRN: [id number]  Unit/Bed#: S -01 Encounter: [id number]    Assessment:  Left thigh abscess    Plan:  Continue packing and antibiotics    Weight bearing: WBAT    VTE Pharmacologic Prophylaxis: Sequential compression device (Venodyne)   VTE Mechanical Prophylaxis: sequential compression device    Subjective: Pt is doing better - no additional complaints    Vitals: Blood pressure 107/63, pulse 101, temperature 98 3 °F (36 8 °C), temperature source Oral, resp  rate 16, height 6' (1 829 m), weight 79 4 kg (175 lb), SpO2 90 %  ,Body mass index is 23 73 kg/m²        Intake/Output Summary (Last 24 hours) at 2/13/2022 1004  Last data filed at 2/13/2022 0900  Gross per 24 hour   Intake 250 ml   Output 800 ml   Net -550 ml       Invasive Devices  Report    Peripheral Intravenous Line            Peripheral IV 02/09/22 Left;Upper;Ventral (anterior) Arm 4 days              Ortho Exam: Knee:  positive exam findings: tenderness noted proximal and ecchymosis noted medial, proximal and negative exam findings: no effusion    Lab, Imaging and other studies: CBC: No results found for: WBC, HGB, HCT, MCV, PLT, ADJUSTEDWBC, MCH, MCHC, RDW, MPV, NRBC

## 2022-02-13 NOTE — ASSESSMENT & PLAN NOTE
· Esophageal varices noted on EGD 2/11, status post banding  · Started nadolol, discontinued propranolol  · GI following, repeat endoscopy in 2-3 months  · Diet as tolerated

## 2022-02-13 NOTE — PROGRESS NOTES
Vancomycin IV Pharmacy-to-Dose Consultation    Uyen Killian is a 60 y o  male who is currently receiving Vancomycin IV with management by the Pharmacy Consult service  Assessment/Plan:  The patient was reviewed  Renal function is stable and no signs or symptoms of nephrotoxicity and/or infusion reactions were documented in the chart  Based on todays assessment, continue current vancomycin (day # 6) dosing of 1g IV q8 hours, with a plan for trough to be drawn at 0700 on 2/18/22  We will continue to follow the patients culture results and clinical progress daily      John Montano, Pharmacist
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
## 2022-02-14 PROBLEM — A41.9 SEPSIS (HCC): Status: RESOLVED | Noted: 2022-02-10 | Resolved: 2022-02-14

## 2022-02-14 LAB
ALBUMIN SERPL BCP-MCNC: 1.5 G/DL (ref 3.5–5)
ALP SERPL-CCNC: 80 U/L (ref 46–116)
ALT SERPL W P-5'-P-CCNC: 14 U/L (ref 12–78)
ANION GAP SERPL CALCULATED.3IONS-SCNC: 2 MMOL/L (ref 4–13)
AST SERPL W P-5'-P-CCNC: 19 U/L (ref 5–45)
BASOPHILS # BLD AUTO: 0.03 THOUSANDS/ΜL (ref 0–0.1)
BASOPHILS NFR BLD AUTO: 0 % (ref 0–1)
BILIRUB DIRECT SERPL-MCNC: 0.07 MG/DL (ref 0–0.2)
BILIRUB SERPL-MCNC: 0.3 MG/DL (ref 0.2–1)
BUN SERPL-MCNC: 7 MG/DL (ref 5–25)
CALCIUM SERPL-MCNC: 7.7 MG/DL (ref 8.3–10.1)
CHLORIDE SERPL-SCNC: 103 MMOL/L (ref 100–108)
CO2 SERPL-SCNC: 32 MMOL/L (ref 21–32)
CREAT SERPL-MCNC: 0.51 MG/DL (ref 0.6–1.3)
EOSINOPHIL # BLD AUTO: 0.39 THOUSAND/ΜL (ref 0–0.61)
EOSINOPHIL NFR BLD AUTO: 6 % (ref 0–6)
ERYTHROCYTE [DISTWIDTH] IN BLOOD BY AUTOMATED COUNT: 17.7 % (ref 11.6–15.1)
GFR SERPL CREATININE-BSD FRML MDRD: 118 ML/MIN/1.73SQ M
GLUCOSE SERPL-MCNC: 90 MG/DL (ref 65–140)
HCT VFR BLD AUTO: 28.2 % (ref 36.5–49.3)
HGB BLD-MCNC: 7.9 G/DL (ref 12–17)
IMM GRANULOCYTES # BLD AUTO: 0.08 THOUSAND/UL (ref 0–0.2)
IMM GRANULOCYTES NFR BLD AUTO: 1 % (ref 0–2)
INR PPP: 1.21 (ref 0.84–1.19)
LYMPHOCYTES # BLD AUTO: 1.01 THOUSANDS/ΜL (ref 0.6–4.47)
LYMPHOCYTES NFR BLD AUTO: 15 % (ref 14–44)
MCH RBC QN AUTO: 24.2 PG (ref 26.8–34.3)
MCHC RBC AUTO-ENTMCNC: 28 G/DL (ref 31.4–37.4)
MCV RBC AUTO: 87 FL (ref 82–98)
MONOCYTES # BLD AUTO: 0.76 THOUSAND/ΜL (ref 0.17–1.22)
MONOCYTES NFR BLD AUTO: 11 % (ref 4–12)
NEUTROPHILS # BLD AUTO: 4.54 THOUSANDS/ΜL (ref 1.85–7.62)
NEUTS SEG NFR BLD AUTO: 67 % (ref 43–75)
NRBC BLD AUTO-RTO: 0 /100 WBCS
PLATELET # BLD AUTO: 284 THOUSANDS/UL (ref 149–390)
PMV BLD AUTO: 9.1 FL (ref 8.9–12.7)
POTASSIUM SERPL-SCNC: 3.7 MMOL/L (ref 3.5–5.3)
PROT SERPL-MCNC: 6.6 G/DL (ref 6.4–8.2)
PROTHROMBIN TIME: 15.3 SECONDS (ref 11.6–14.5)
RBC # BLD AUTO: 3.26 MILLION/UL (ref 3.88–5.62)
RYE IGE QN: NEGATIVE
SODIUM SERPL-SCNC: 137 MMOL/L (ref 136–145)
WBC # BLD AUTO: 6.81 THOUSAND/UL (ref 4.31–10.16)

## 2022-02-14 PROCEDURE — 99232 SBSQ HOSP IP/OBS MODERATE 35: CPT | Performed by: INTERNAL MEDICINE

## 2022-02-14 PROCEDURE — 99024 POSTOP FOLLOW-UP VISIT: CPT

## 2022-02-14 PROCEDURE — 99232 SBSQ HOSP IP/OBS MODERATE 35: CPT | Performed by: PHYSICIAN ASSISTANT

## 2022-02-14 PROCEDURE — 85025 COMPLETE CBC W/AUTO DIFF WBC: CPT | Performed by: PHYSICIAN ASSISTANT

## 2022-02-14 PROCEDURE — 80076 HEPATIC FUNCTION PANEL: CPT | Performed by: NURSE PRACTITIONER

## 2022-02-14 PROCEDURE — 85610 PROTHROMBIN TIME: CPT | Performed by: NURSE PRACTITIONER

## 2022-02-14 PROCEDURE — 80048 BASIC METABOLIC PNL TOTAL CA: CPT | Performed by: PHYSICIAN ASSISTANT

## 2022-02-14 RX ADMIN — NADOLOL 40 MG: 40 TABLET ORAL at 09:30

## 2022-02-14 RX ADMIN — SUCRALFATE 1 G: 1 TABLET ORAL at 00:27

## 2022-02-14 RX ADMIN — SUCRALFATE 1 G: 1 TABLET ORAL at 23:10

## 2022-02-14 RX ADMIN — PANTOPRAZOLE SODIUM 40 MG: 40 TABLET, DELAYED RELEASE ORAL at 17:49

## 2022-02-14 RX ADMIN — ENOXAPARIN SODIUM 40 MG: 40 INJECTION SUBCUTANEOUS at 09:28

## 2022-02-14 RX ADMIN — TRIAMCINOLONE ACETONIDE: 1 CREAM TOPICAL at 09:30

## 2022-02-14 RX ADMIN — SUCRALFATE 1 G: 1 TABLET ORAL at 12:59

## 2022-02-14 RX ADMIN — TRIAMCINOLONE ACETONIDE: 1 CREAM TOPICAL at 17:49

## 2022-02-14 RX ADMIN — STANDARDIZED SENNA CONCENTRATE 8.6 MG: 8.6 TABLET ORAL at 09:28

## 2022-02-14 RX ADMIN — OXCARBAZEPINE 600 MG: 150 TABLET, FILM COATED ORAL at 09:27

## 2022-02-14 RX ADMIN — OXCARBAZEPINE 600 MG: 150 TABLET, FILM COATED ORAL at 21:27

## 2022-02-14 RX ADMIN — VANCOMYCIN HYDROCHLORIDE 1000 MG: 1 INJECTION, SOLUTION INTRAVENOUS at 09:26

## 2022-02-14 RX ADMIN — SUCRALFATE 1 G: 1 TABLET ORAL at 06:50

## 2022-02-14 RX ADMIN — SUCRALFATE 1 G: 1 TABLET ORAL at 17:49

## 2022-02-14 RX ADMIN — LEVETIRACETAM 500 MG: 500 TABLET, FILM COATED ORAL at 09:28

## 2022-02-14 RX ADMIN — LEVETIRACETAM 500 MG: 500 TABLET, FILM COATED ORAL at 21:27

## 2022-02-14 RX ADMIN — VANCOMYCIN HYDROCHLORIDE 1000 MG: 1 INJECTION, SOLUTION INTRAVENOUS at 15:39

## 2022-02-14 RX ADMIN — PANTOPRAZOLE SODIUM 40 MG: 40 TABLET, DELAYED RELEASE ORAL at 06:51

## 2022-02-14 RX ADMIN — VANCOMYCIN HYDROCHLORIDE 1000 MG: 1 INJECTION, SOLUTION INTRAVENOUS at 00:23

## 2022-02-14 RX ADMIN — VANCOMYCIN HYDROCHLORIDE 1000 MG: 1 INJECTION, SOLUTION INTRAVENOUS at 23:09

## 2022-02-14 NOTE — PROGRESS NOTES
Progress Note - Orthopedics   Jose De Los Santos 60 y o  male MRN: [id number]  Unit/Bed#: [facility]      Subjective:    60 y o male with a left thigh abscess  No acute events, no complaints  Pt doing well  Pain controlled   Denies fevers chills, CP, SOB    Labs:  0   Lab Value Date/Time    HCT 28 2 (L) 02/14/2022 0703    HCT 26 2 (L) 02/12/2022 0605    HCT 27 1 (L) 02/11/2022 0537    HGB 7 9 (L) 02/14/2022 0703    HGB 7 4 (L) 02/12/2022 0605    HGB 7 6 (L) 02/11/2022 0537    INR 1 21 (H) 02/14/2022 0703    WBC 6 81 02/14/2022 0703    WBC 6 66 02/12/2022 0605    WBC 8 95 02/11/2022 0537       Meds:    Current Facility-Administered Medications:     acetaminophen (TYLENOL) tablet 650 mg, 650 mg, Oral, Q6H PRN, Loco Alexander MD, 650 mg at 02/10/22 2030    aluminum-magnesium hydroxide-simethicone (MYLANTA) oral suspension 30 mL, 30 mL, Oral, Q4H PRN, Margi Linares PA-C, 30 mL at 02/13/22 0806    calcium carbonate (TUMS) chewable tablet 1,000 mg, 1,000 mg, Oral, BID PRN, Margi Linares PA-C    diazepam (VALIUM) tablet 10 mg, 10 mg, Oral, Q12H PRN, Loco Alexander MD    enoxaparin (LOVENOX) subcutaneous injection 40 mg, 40 mg, Subcutaneous, Daily, Loco Alexander MD, 40 mg at 02/14/22 0928    HYDROmorphone (DILAUDID) injection 0 5 mg, 0 5 mg, Intravenous, Q4H PRN, Loco Alexandre MD    levETIRAcetam (KEPPRA) tablet 500 mg, 500 mg, Oral, Q12H [facility], Loco Alexander MD, 500 mg at 02/14/22 0928    nadolol (CORGARD) tablet 40 mg, 40 mg, Oral, Daily, Margi Linares PA-C, 40 mg at 02/14/22 0930    ondansetron (ZOFRAN) injection 4 mg, 4 mg, Intravenous, Q6H PRN, Loco Alexander MD, 4 mg at 02/11/22 1140    OXcarbazepine (TRILEPTAL) tablet 600 mg, 600 mg, Oral, Q12H [facility], Loco Alexander MD, 600 mg at 02/14/22 5506    oxyCODONE (ROXICODONE) immediate release tablet 10 mg, 10 mg, Oral, Q4H PRN, Loco Alexander MD    oxyCODONE (ROXICODONE) IR tablet 5 mg, 5 mg, Oral, Q4H PRN, Loco Alexandre MD, 5 mg at 02/11/22 1140    pantoprazole (PROTONIX) EC tablet 40 mg, 40 mg, Oral, BID AC, Ema Corbett, SIDDHARTHA, 40 mg at 02/14/22 0651    senna (SENOKOT) tablet 8 6 mg, 1 tablet, Oral, Daily, Desiree Chen MD, 8 6 mg at 02/14/22 0928    sucralfate (CARAFATE) tablet 1 g, 1 g, Oral, Q6H [address], Desiree Chen MD, 1 g at 02/14/22 0650    triamcinolone (KENALOG) 0 1 % cream, , Topical, BID, Desiree Chen MD, Given at 02/14/22 0930    vancomycin (VANCOCIN) IVPB (premix in dextrose) 1,000 mg 200 mL, 1,000 mg, Intravenous, Q8H, Desiree Chen MD, Last Rate: 200 mL/hr at 02/14/22 0926, 1,000 mg at 02/14/22 0926    Blood Culture:   Lab Results   Component Value Date    BLOODCX No Growth at 24 hrs  02/12/2022    BLOODCX No Growth at 24 hrs  02/12/2022       Wound Culture:   Lab Results   Component Value Date    WOUNDCULT (A) 02/10/2022     3+ Growth of Methicillin Resistant Staphylococcus aureus       Ins and Outs:  I/O last 24 hours: In: 80 [P O :780]  Out: 800 [Urine:800]          Physical:  Vitals:    02/14/22 0700   BP: 114/63   Pulse: 97   Resp: 19   Temp: 98 °F (36 7 °C)   SpO2:      Musculoskeletal: left Lower Extremity  · Skin with significant ecchymosis at the medial aspect of the distal femur over the knee  · Dressing is soiled over the iodoform packing without significant purulent drainage  · TTP over the wound  · SILT over the toes  +fhl/ehl, +ankle dorsi/plantar flexion  Good cap refill  Assessment:    60 y o male with a left thigh abscess undergoing packing changes and getting IV antibiotics      Plan:  · WBAT to the LLE  · Continue with packing changes  · Continue IV antibiotics  · Greater than 2 gram drop which qualifies for diagnosis of acute blood loss anemia, will monitor and administer IVF/prbc as indicated   · PT/OT  · Pain control  · DVT ppx  · Dispo: Ortho will follow    Genia Cardona PA-C

## 2022-02-14 NOTE — PROGRESS NOTES
Vancomycin IV Pharmacy-to-Dose Consultation    Cee Payton is a 60 y o  male who is currently receiving Vancomycin IV with management by the Pharmacy Consult service for the treatment of skin-soft tissue infection  Assessment/Plan:    The patient's chart was reviewed  Renal function is stable  There are no signs or symptoms of nephrotoxicity and/or infusion reactions documented  Based on today's assessment, will continue current vancomycin (Day # 7) dosing of 1000mg IV every 8 hours, with a plan for trough to be drawn at 0700 on 2/18  We will continue to follow the patient's culture results and clinical progress daily        Gill Ott, PharmD   Pharmacist
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
## 2022-02-14 NOTE — ASSESSMENT & PLAN NOTE
· As evidenced by fever, tachycardia, bacteremia secondary to left leg cellulitis  · Clinically improved/resolved  · Lactic acid on admission was 1 7  · Blood cultures 2/2 on admission positive for MRSA  · See above  · Patient was initially started on IV cefazolin and was transitioned to intravenous vancomycin on 02/09    · Infectious disease following

## 2022-02-14 NOTE — PROGRESS NOTES
Progress Note - Violeta Goodwin 60 y o  male MRN: [id number]    Unit/Bed#: S -01 Encounter: [id number]    Assessment and Plan:     1  Cirrhosis complicated by esophageal varices, MELD 9   Patient with known history of cirrhosis however etiology is unknown who presented with iron deficiency anemia  EGD performed in patient with 4 columns of grade 2 varices with red Michael sign status post banding  Patient without any complaints today     -negative workup so far including alpha-1 antitrypsin, AMA, anti smooth muscle, chronic hepatitis panel, ceruloplasmin  CLEMENTE pending     -patient will need repeat EGD in 2-3 months  I sent message to our schedule OR to set this up   -hemoglobin has been stable at 7 9 with normal MCV  Bowel movement this morning without any bright red blood or melena   -patient was started on nadolol over weekend for hypertension  -AFP normal   Ultrasound without evidence of suspicious mass  Continue HCC screening q 6 months     -ultrasound without ascites  Continue to monitor    -patient is alert and oriented without asterixis however has tangential speech  Ammonia was normal on arrival   -continue to monitor mental status  2  Iron deficiency anemia:   Hemoglobin stable at 7 9  EGD without source of bleeding noted  Stool occult testing was negative  Patient denies prior colonoscopy  Possibly anemia of chronic disease, cannot rule out lesion versus malignancy with no prior colonoscopy noted      -PPI b i d , carafate q i d   -diet as tolerated    -repeat EGD in 2-3 months for additional banding  -monitor hgb, transfuse as necessary    -colonoscopy in the outpatient setting if hemoglobin continues to be stable without evidence of bleeding        ----------------------------------------------------------------------------------------------------------------    Subjective:     Patient reports he is doing okay  Conversation today with tangential speech    He reports having bowel movements this morning  Denies any nausea or vomiting  Tolerating diet during conversation today without difficulty  Objective:     Vitals: Blood pressure 114/63, pulse 97, temperature 98 °F (36 7 °C), temperature source Oral, resp  rate 19, height 6' (1 829 m), weight 79 4 kg (175 lb), SpO2 92 %  ,Body mass index is 23 73 kg/m²  Intake/Output Summary (Last 24 hours) at 2/14/2022 1051  Last data filed at 2/14/2022 0701  Gross per 24 hour   Intake 360 ml   Output 500 ml   Net -140 ml       Physical Exam:     General Appearance: Alert, cooperative  Sitting comfortably in bed without distress  Speech is somewhat erratic and tangential   Lungs: Clear to auscultation bilaterally, no rales or rhonchi, no labored breathing/accessory muscle use  Heart: Regular rate and rhythm, S1, S2 normal   Abdomen: Soft, non-tender, non-distended; bowel sounds normal; no masses or no organomegaly  Extremities: No cyanosis, clubbing, or edema    Invasive Devices  Report    Peripheral Intravenous Line            Peripheral IV 02/09/22 Left;Upper;Ventral (anterior) Arm 5 days                Lab Results:  Results from last 7 days   Lab Units 02/14/22  0703   WBC Thousand/uL 6 81   HEMOGLOBIN g/dL 7 9*   HEMATOCRIT % 28 2*   PLATELETS Thousands/uL 284   NEUTROS PCT % 67   LYMPHS PCT % 15   MONOS PCT % 11   EOS PCT % 6     Results from last 7 days   Lab Units 02/14/22  0703   POTASSIUM mmol/L 3 7   CHLORIDE mmol/L 103   CO2 mmol/L 32   BUN mg/dL 7   CREATININE mg/dL 0 51*   CALCIUM mg/dL 7 7*   ALK PHOS U/L 80   ALT U/L 14   AST U/L 19     Invalid input(s): BILI  Results from last 7 days   Lab Units 02/14/22  0703   INR  1 21*           Imaging Studies: I have personally reviewed pertinent imaging studies  EGD    Result Date: 2/11/2022  Impression: Normal duodenum Nodular gastritis Normal retroflexion, no evidence of gastric varices Four columns of grade 2 varices with red Michael signs   Four bands were placed with good decompression of varices in the distal esophagus RECOMMENDATION: Schedule follow-up procedure for continued treatment  Return to floor Full liquid diet B i d  PPI therapy Outpatient repeat EGD in 2 to 3 months for additional banding  Charlie Main MD     XR chest portable    Result Date: 2/11/2022  Impression: 1  Improved right lower lobe density 2  New blunting of the left costophrenic angle with mild basilar density suggesting atelectasis and a small pleural effusion  Workstation performed: [id number]     XR chest 1 view portable    Result Date: 2/8/2022  Impression: 1  Right lower lung consolidation  Recommend continued short-term follow-up to ensure complete resolution  2   Increased interstitial prominence, findings can be seen with pulmonary edema versus atypical infection  Workstation performed: [id number]     XR knee 4+ views left injury    Result Date: 2/9/2022  Impression: No acute osseous abnormality  Workstation performed: [id number]     CT head without contrast    Result Date: 2/8/2022  Impression: 1  No acute intracranial CT abnormality  2   Opacified and mildly enlarged left nasolacrimal duct  No significant adjacent fat stranding to suggest acute dacryocystitis  Recommend nonurgent ENT consultation  Workstation performed: [id number]     US abdomen complete    Result Date: 2/10/2022  Impression: 1  Cirrhotic changes in the liver with suggestion for cavernous transformation of portal vein, suggesting portal hypertension  2 Gallstone/sludge in the gallbladder with gallbladder wall thickening  3 History of splenectomy  Splenules identified  No prior study for comparison  Further evaluation with contrast-enhanced CT recommended  The study was marked in EPIC for significant notification   Workstation performed: [id number]     CT lower extremity w contrast left    Result Date: 2/10/2022  Impression: Suspected thrombosis/thrombophlebitis of the great saphenous vein Diffuse subcutaneous edema which could be secondary to cellulitis, venous stasis, or dependent edema  No discrete rim enhancing fluid collection  The study was marked in [facility] for immediate notification  Workstation performed: [id number]     Echo complete w/ contrast if indicated    Addendum Date: 2/9/2022      Left Ventricle: Left ventricular cavity size is upper normal  Wall thickness is normal  The left ventricular ejection fraction is 55%  Systolic function is normal  Wall motion is normal  Diastolic function is normal    Mitral Valve: There is trace regurgitation    Tricuspid Valve: There is mild regurgitation    Aorta: The aortic root is mildly dilated (4 0 cm)  The ascending aorta is normal in size     Pulmonary Artery: The pulmonary artery systolic pressure is normal

## 2022-02-14 NOTE — ASSESSMENT & PLAN NOTE
· Left lower extremity erythema and edema, likely cellulitis in the setting of chronic venous stasis and stasis dermatitis Received cefepime and vancomycin in ED  · Started on IV Ancef on admission  Blood cultures from admission positive for MRSA Antibiotics, changed to IV vancomycin     · Repeat BC ordered 2/12, NG x 24 hr  · Continue to follow CBC and temperature curve closely  · CT of the lower extremity noted
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
## 2022-02-14 NOTE — ASSESSMENT & PLAN NOTE
· Episodes of hypoxia noted, on 3 liters/minute of oxygen  · Suspecting iatrogenic volume overload/atelectasis  · Stopped fluids  · Chest x-ray with improved right lower lobe density, noted small effusion as well as atelectasis  · Incentive spirometry ordered

## 2022-02-14 NOTE — ASSESSMENT & PLAN NOTE
· Progressive hemoglobin decline noted since admission  Upon reviewing patient's old records from [place] , patient has history of angiodysplasia and had argon beam therapy on January 2019  There was also a remote mention of esophageal varices  Continue to follow serial H&H  Patient denies any nausea ,vomiting or hematemesis  · Continue with PPI therapy  GI following    · SP EGD 2/11

## 2022-02-14 NOTE — PLAN OF CARE
Problem: MOBILITY - ADULT  Goal: Maintain or return to baseline ADL function  Description: INTERVENTIONS:  -  Assess patient's ability to carry out ADLs; assess patient's baseline for ADL function and identify physical deficits which impact ability to perform ADLs (bathing, care of mouth/teeth, toileting, grooming, dressing, etc )  - Assess/evaluate cause of self-care deficits   - Assess range of motion  - Assess patient's mobility; develop plan if impaired  - Assess patient's need for assistive devices and provide as appropriate  - Encourage maximum independence but intervene and supervise when necessary  - Involve family in performance of ADLs  - Assess for home care needs following discharge   - Consider OT consult to assist with ADL evaluation and planning for discharge  - Provide patient education as appropriate  Outcome: Progressing  Goal: Maintains/Returns to pre admission functional level  Description: INTERVENTIONS:  - Perform BMAT or MOVE assessment daily    - Set and communicate daily mobility goal to care team and patient/family/caregiver  - Collaborate with rehabilitation services on mobility goals if consulted  - Perform Range of Motion  times a day  - Reposition patient every  hours    - Dangle patient  times a day  - Stand patient  times a day  - Ambulate patient  times a day  - Out of bed to chair  times a day   - Out of bed for meals  times a day  - Out of bed for toileting  - Record patient progress and toleration of activity level   Outcome: Progressing     Problem: PAIN - ADULT  Goal: Verbalizes/displays adequate comfort level or baseline comfort level  Description: Interventions:  - Encourage patient to monitor pain and request assistance  - Assess pain using appropriate pain scale  - Administer analgesics based on type and severity of pain and evaluate response  - Implement non-pharmacological measures as appropriate and evaluate response  - Consider cultural and social influences on pain and pain management  - Notify physician/advanced practitioner if interventions unsuccessful or patient reports new pain  Outcome: Progressing     Problem: INFECTION - ADULT  Goal: Absence or prevention of progression during hospitalization  Description: INTERVENTIONS:  - Assess and monitor for signs and symptoms of infection  - Monitor lab/diagnostic results  - Monitor all insertion sites, i e  indwelling lines, tubes, and drains  - Monitor endotracheal if appropriate and nasal secretions for changes in amount and color  - [place] appropriate cooling/warming therapies per order  - Administer medications as ordered  - Instruct and encourage patient and family to use good hand hygiene technique  - Identify and instruct in appropriate isolation precautions for identified infection/condition  Outcome: Progressing  Goal: Absence of fever/infection during neutropenic period  Description: INTERVENTIONS:  - Monitor WBC    Outcome: Progressing     Problem: SAFETY ADULT  Goal: Maintain or return to baseline ADL function  Description: INTERVENTIONS:  -  Assess patient's ability to carry out ADLs; assess patient's baseline for ADL function and identify physical deficits which impact ability to perform ADLs (bathing, care of mouth/teeth, toileting, grooming, dressing, etc )  - Assess/evaluate cause of self-care deficits   - Assess range of motion  - Assess patient's mobility; develop plan if impaired  - Assess patient's need for assistive devices and provide as appropriate  - Encourage maximum independence but intervene and supervise when necessary  - Involve family in performance of ADLs  - Assess for home care needs following discharge   - Consider OT consult to assist with ADL evaluation and planning for discharge  - Provide patient education as appropriate  Outcome: Progressing  Goal: Maintains/Returns to pre admission functional level  Description: INTERVENTIONS:  - Perform BMAT or MOVE assessment daily    - Set and communicate daily mobility goal to care team and patient/family/caregiver  - Collaborate with rehabilitation services on mobility goals if consulted  - Perform Range of Motion  times a day  - Reposition patient every  hours    - Dangle patient  times a day  - Stand patient  times a day  - Ambulate patient  times a day  - Out of bed to chair  times a day   - Out of bed for meals  times a day  - Out of bed for toileting  - Record patient progress and toleration of activity level   Outcome: Progressing  Goal: Patient will remain free of falls  Description: INTERVENTIONS:  - Educate patient/family on patient safety including physical limitations  - Instruct patient to call for assistance with activity   - Consult OT/PT to assist with strengthening/mobility   - Keep Call bell within reach  - Keep bed low and locked with side rails adjusted as appropriate  - Keep care items and personal belongings within reach  - Initiate and maintain comfort rounds  - Make Fall Risk Sign visible to staff  - Offer Toileting every  Hours, in advance of need  - Initiate/Maintain alarm  - Obtain necessary fall risk management equipment:   - Apply yellow socks and bracelet for high fall risk patients  - Consider moving patient to room near nurses station  Outcome: Progressing     Problem: DISCHARGE PLANNING  Goal: Discharge to home or other facility with appropriate resources  Description: INTERVENTIONS:  - Identify barriers to discharge w/patient and caregiver  - Arrange for needed discharge resources and transportation as appropriate  - Identify discharge learning needs (meds, wound care, etc )  - Arrange for interpretive services to assist at discharge as needed  - Refer to Case Management Department for coordinating discharge planning if the patient needs post-hospital services based on physician/advanced practitioner order or complex needs related to functional status, cognitive ability, or social support system  Outcome: Progressing Problem: Knowledge Deficit  Goal: Patient/family/caregiver demonstrates understanding of disease process, treatment plan, medications, and discharge instructions  Description: Complete learning assessment and assess knowledge base    Interventions:  - Provide teaching at level of understanding  - Provide teaching via preferred learning methods  Outcome: Progressing     Problem: SKIN/TISSUE INTEGRITY - ADULT  Goal: Skin Integrity remains intact(Skin Breakdown Prevention)  Description: Assess:  -Perform [place] assessment every   -Clean and moisturize skin every   -Inspect skin when repositioning, toileting, and assisting with ADLS  -Assess under medical devices such as  every   -Assess extremities for adequate circulation and sensation     Bed Management:  -Have minimal linens on bed & keep smooth, unwrinkled  -Change linens as needed when moist or perspiring  -Avoid sitting or lying in one position for more than  hours while in bed  -Keep HOB at degrees     Toileting:  -Offer bedside commode  -Assess for incontinence every   -Use incontinent care products after each incontinent episode such as     Activity:  -Mobilize patient  times a day  -Encourage activity and walks on unit  -Encourage or provide ROM exercises   -Turn and reposition patient every  Hours  -Use appropriate equipment to lift or move patient in bed  -Instruct/ Assist with weight shifting every  when out of bed in chair  -Consider limitation of chair time hour intervals    Skin Care:  -Avoid use of baby powder, tape, friction and shearing, hot water or constrictive clothing  -Relieve pressure over bony prominences using   -Do not massage red bony areas    Next Steps:  -Teach patient strategies to minimize risks such as    -Consider consults to  interdisciplinary teams such as Outcome: Progressing  Goal: Incision(s), wounds(s) or drain site(s) healing without S/S of infection  Description: INTERVENTIONS  - Assess and document dressing, incision, wound bed, drain sites and surrounding tissue  - Provide patient and family education  - Perform skin care/dressing changes every   Outcome: Progressing  Goal: Pressure injury heals and does not worsen  Description: Interventions:  - Implement low air loss mattress or specialty surface (Criteria met)  - Apply silicone foam dressing  - Instruct/assist with weight shifting every  minutes when in chair   - Limit chair time to  hour intervals  - Use special pressure reducing interventions such as  when in chair   - Apply fecal or urinary incontinence containment device   - Perform passive or active ROM every   - Turn and reposition patient & offload bony prominences every hours   - Utilize friction reducing device or surface for transfers   - Consider consults to  interdisciplinary teams such as   - Use incontinent care products after each incontinent episode such as   - Consider nutrition services referral as needed  Outcome: Progressing     Problem: MUSCULOSKELETAL - ADULT  Goal: Maintain or return mobility to safest level of function  Description: INTERVENTIONS:  - Assess patient's ability to carry out ADLs; assess patient's baseline for ADL function and identify physical deficits which impact ability to perform ADLs (bathing, care of mouth/teeth, toileting, grooming, dressing, etc )  - Assess/evaluate cause of self-care deficits   - Assess range of motion  - Assess patient's mobility  - Assess patient's need for assistive devices and provide as appropriate  - Encourage maximum independence but intervene and supervise when necessary  - Involve family in performance of ADLs  - Assess for home care needs following discharge   - Consider OT consult to assist with ADL evaluation and planning for discharge  - Provide patient education as appropriate  Outcome: Progressing  Goal: Maintain proper alignment of affected body part  Description: INTERVENTIONS:  - Support, maintain and protect limb and body alignment  - Provide patient/ family with appropriate education  Outcome: Progressing     Problem: Nutrition/Hydration-ADULT  Goal: Nutrient/Hydration intake appropriate for improving, restoring or maintaining nutritional needs  Description: Monitor and assess patient's nutrition/hydration status for malnutrition  Collaborate with interdisciplinary team and initiate plan and interventions as ordered  Monitor patient's weight and dietary intake as ordered or per policy  Utilize nutrition screening tool and intervene as necessary  Determine patient's food preferences and provide high-protein, high-caloric foods as appropriate       INTERVENTIONS:  - Monitor oral intake, urinary output, labs, and treatment plans  - Assess nutrition and hydration status and recommend course of action  - Evaluate amount of meals eaten  - Assist patient with eating if necessary   - Allow adequate time for meals  - Recommend/ encourage appropriate diets, oral nutritional supplements, and vitamin/mineral supplements  - Order, calculate, and assess calorie counts as needed  - Recommend, monitor, and adjust tube feedings and TPN/PPN based on assessed needs  - Assess need for intravenous fluids  - Provide specific nutrition/hydration education as appropriate  - Include patient/family/caregiver in decisions related to nutrition  Outcome: Progressing     Problem: Prexisting or High Potential for Compromised Skin Integrity  Goal: Skin integrity is maintained or improved  Description: INTERVENTIONS:  - Identify patients at risk for skin breakdown  - Assess and monitor skin integrity  - Assess and monitor nutrition and hydration status  - Monitor labs   - Assess for incontinence   - Turn and reposition patient  - Assist with mobility/ambulation  - Relieve pressure over bony prominences  - Avoid friction and shearing  - Provide appropriate hygiene as needed including keeping skin clean and dry  - Evaluate need for skin moisturizer/barrier cream  - Collaborate with interdisciplinary team   - Patient/family teaching  - Consider wound care consult   Outcome: Progressing     Problem: Potential for Falls  Goal: Patient will remain free of falls  Description: INTERVENTIONS:  - Educate patient/family on patient safety including physical limitations  - Instruct patient to call for assistance with activity   - Consult OT/PT to assist with strengthening/mobility   - Keep Call bell within reach  - Keep bed low and locked with side rails adjusted as appropriate  - Keep care items and personal belongings within reach  - Initiate and maintain comfort rounds  - Make Fall Risk Sign visible to staff  - Offer Toileting every  Hours, in advance of need  - Initiate/Maintain alarm  - Obtain necessary fall risk management equipment:   - Apply yellow socks and bracelet for high fall risk patients  - Consider moving patient to room near nurses station  Outcome: Progressing

## 2022-02-14 NOTE — ASSESSMENT & PLAN NOTE
· Patient presented with left lower extremity cellulitis  Blood cultures on admission positive for MRSA   Antibiotics switched to IV vancomycin 2/9  Pharmacy consulted  for dosing management  · Transthoracic echocardiogram negative for any vegetations  Patient denies any back pain but has persistent left lower extremity pain  · CT left lower extremity shows: Suspected thrombosis/thrombophlebitis of the great saphenous vein  Diffuse subcutaneous edema which could be secondary to cellulitis, venous stasis, or dependent edema  No discrete rim enhancing fluid collection  · No osseous lesion seen on CT scan  Orthopedic preformed I/D on 2/10    · Infectious disease consulted for antibiotic management  · Repeat blood cultures ordered for 2/12, no growth x 24 hr  · PICC placement once blood cultures are negative for 72 hr

## 2022-02-15 ENCOUNTER — APPOINTMENT (INPATIENT)
Dept: RADIOLOGY | Facility: HOSPITAL | Age: 57
DRG: 872 | End: 2022-02-15
Payer: MEDICARE

## 2022-02-15 PROCEDURE — 99253 IP/OBS CNSLTJ NEW/EST LOW 45: CPT | Performed by: SURGERY

## 2022-02-15 PROCEDURE — 71045 X-RAY EXAM CHEST 1 VIEW: CPT

## 2022-02-15 PROCEDURE — 99232 SBSQ HOSP IP/OBS MODERATE 35: CPT | Performed by: PHYSICIAN ASSISTANT

## 2022-02-15 PROCEDURE — 99233 SBSQ HOSP IP/OBS HIGH 50: CPT | Performed by: INTERNAL MEDICINE

## 2022-02-15 PROCEDURE — 80202 ASSAY OF VANCOMYCIN: CPT | Performed by: INTERNAL MEDICINE

## 2022-02-15 PROCEDURE — 99024 POSTOP FOLLOW-UP VISIT: CPT | Performed by: PHYSICIAN ASSISTANT

## 2022-02-15 RX ADMIN — PANTOPRAZOLE SODIUM 40 MG: 40 TABLET, DELAYED RELEASE ORAL at 16:35

## 2022-02-15 RX ADMIN — OXCARBAZEPINE 600 MG: 150 TABLET, FILM COATED ORAL at 09:13

## 2022-02-15 RX ADMIN — TRIAMCINOLONE ACETONIDE 1 APPLICATION: 1 CREAM TOPICAL at 16:36

## 2022-02-15 RX ADMIN — SUCRALFATE 1 G: 1 TABLET ORAL at 16:35

## 2022-02-15 RX ADMIN — LEVETIRACETAM 500 MG: 500 TABLET, FILM COATED ORAL at 22:28

## 2022-02-15 RX ADMIN — SUCRALFATE 1 G: 1 TABLET ORAL at 06:11

## 2022-02-15 RX ADMIN — LEVETIRACETAM 500 MG: 500 TABLET, FILM COATED ORAL at 09:14

## 2022-02-15 RX ADMIN — NADOLOL 40 MG: 40 TABLET ORAL at 09:14

## 2022-02-15 RX ADMIN — VANCOMYCIN HYDROCHLORIDE 1000 MG: 1 INJECTION, SOLUTION INTRAVENOUS at 22:28

## 2022-02-15 RX ADMIN — VANCOMYCIN HYDROCHLORIDE 1000 MG: 1 INJECTION, SOLUTION INTRAVENOUS at 07:42

## 2022-02-15 RX ADMIN — ENOXAPARIN SODIUM 40 MG: 40 INJECTION SUBCUTANEOUS at 09:14

## 2022-02-15 RX ADMIN — STANDARDIZED SENNA CONCENTRATE 8.6 MG: 8.6 TABLET ORAL at 09:14

## 2022-02-15 RX ADMIN — TRIAMCINOLONE ACETONIDE: 1 CREAM TOPICAL at 09:14

## 2022-02-15 RX ADMIN — PANTOPRAZOLE SODIUM 40 MG: 40 TABLET, DELAYED RELEASE ORAL at 06:12

## 2022-02-15 RX ADMIN — SUCRALFATE 1 G: 1 TABLET ORAL at 13:00

## 2022-02-15 RX ADMIN — OXCARBAZEPINE 600 MG: 150 TABLET, FILM COATED ORAL at 22:28

## 2022-02-15 RX ADMIN — VANCOMYCIN HYDROCHLORIDE 1000 MG: 1 INJECTION, SOLUTION INTRAVENOUS at 16:35

## 2022-02-15 NOTE — ASSESSMENT & PLAN NOTE
· Left lower extremity erythema and edema, likely cellulitis in the setting of chronic venous stasis and stasis dermatitis Received cefepime and vancomycin in ED  · S/p I&D with orthopedics 2/10  · Started on IV Ancef on admission  Blood cultures from admission positive for MRSA Antibiotics, changed to IV vancomycin     · Repeat BC ordered 2/12, NG x 48 hr  · Continue to follow CBC and temperature curve closely

## 2022-02-15 NOTE — CONSULTS
Consultation - General Surgery   [facility] 60 y o  male MRN: [id number]  Unit/Bed#: S -01 Encounter: [id number]    Assessment/Plan     Assessment/Plan:  Left medial thigh cellulitis with abscess  - s/p bedside I+D 2/10/22  - induration but no fluctuance  - erythema stable  - continue IV abx per ID  - cultures positive for MRSA  - continue local wound care with packing  - CT lower extremity with subcutaneous edema, no discrete fluid collection  - MRSA +    MRSA bacteremia  - IV abx per ID, Vanco  - source control, left leg abscess versus pneumonia  - anticipate 4 weeks of IV abx per ID    Comorbidities: Seizure disorder, Cirrhosis  - medical management  - continue Keppra , Trileptal    History of Present Illness   CC: leg left abscess  HPI:  [facility] SITE is a 60 y o  male who presents with left lower leg swelling and redness  Patient with history of b/l LE wounds   Patient recently moved from [place] [id number] to unsteady gait and falls  Patient admitted with Sepsis and MRSA bacteremia  LLE abscess was I+D at bedside  PAtient denies pain , and has no decreased ROM  Inpatient consult to Acute Care Surgery  Consult performed by: Maria Teresa Caballero PA-C  Consult ordered by: Fabiano Bright PA-C          Review of Systems   Constitutional: Negative for fatigue and fever  HENT: Negative for trouble swallowing and voice change  Gastrointestinal: Positive for abdominal distention  Negative for abdominal pain and nausea  Genitourinary: Negative for difficulty urinating  Musculoskeletal: Positive for joint swelling  Skin: Positive for color change  Neurological: Negative for dizziness  Hematological: Negative for adenopathy  Psychiatric/Behavioral: Negative for agitation         Historical Information   Past Medical History:   Diagnosis Date    Anxiety     GERD (gastroesophageal reflux disease)     Hypertension     Seizures ([facility] [address] )      Past Surgical History:   Procedure Laterality Date  SPLENECTOMY       Social History   Social History     Substance and Sexual Activity   Alcohol Use Never     Social History     Substance and Sexual Activity   Drug Use Never     E-Cigarette/Vaping    E-Cigarette Use Never User      E-Cigarette/Vaping Substances     Social History     Tobacco Use   Smoking Status Never Smoker   Smokeless Tobacco Never Used     Family History: non-contributory    Meds/Allergies   all current active meds have been reviewed  No Known Allergies    Objective   First Vitals:   Blood Pressure: 101/65 (02/08/22 1350)  Pulse: 88 (02/08/22 1350)  Temperature: 97 7 °F (36 5 °C) (02/08/22 1350)  Temp Source: Oral (02/08/22 1350)  Respirations: 18 (02/08/22 1350)  Height: 6' (182 9 cm) (02/09/22 1100)  Weight - Scale: 79 4 kg (175 lb) (02/08/22 1517)  SpO2: 98 % (02/08/22 1350)    Current Vitals:   Blood Pressure: 110/65 (02/15/22 0625)  Pulse: 85 (02/15/22 0625)  Temperature: 98 7 °F (37 1 °C) (02/15/22 0625)  Temp Source: Oral (02/15/22 0625)  Respirations: 19 (02/15/22 0625)  Height: 6' (182 9 cm) (02/11/22 1446)  Weight - Scale: 79 4 kg (175 lb) (02/11/22 1446)  SpO2: 95 % (02/15/22 0625)      Intake/Output Summary (Last 24 hours) at 2/15/2022 1435  Last data filed at 2/15/2022 1319  Gross per 24 hour   Intake 480 ml   Output 1150 ml   Net -670 ml       Invasive Devices  Report    Peripheral Intravenous Line            Peripheral IV 02/09/22 Left;Upper;Ventral (anterior) Arm 6 days                Physical Exam  HENT:      Head: Normocephalic  Mouth/Throat:      Mouth: Mucous membranes are moist    Eyes:      Conjunctiva/sclera: Conjunctivae normal    Cardiovascular:      Rate and Rhythm: Normal rate  Pulmonary:      Effort: Pulmonary effort is normal    Abdominal:      General: Bowel sounds are normal  There is distension  Tenderness: There is no abdominal tenderness  Musculoskeletal:      Cervical back: Normal range of motion     Skin:     Findings: Erythema (L medial thigh) present  Comments: Open wound with packing in place, fluctuance but no induration   Neurological:      Mental Status: He is alert  Lab Results: CBC: No results found for: WBC, HGB, HCT, MCV, PLT, ADJUSTEDWBC, MCH, MCHC, RDW, MPV, NRBC, CMP: No results found for: SODIUM, K, CL, CO2, ANIONGAP, BUN, CREATININE, GLUCOSE, CALCIUM, AST, ALT, ALKPHOS, PROT, BILITOT, EGFR  Imaging: I have personally reviewed pertinent reports  EKG, Pathology, and Other Studies: I have personally reviewed pertinent reports        Counseling / Coordination of Care

## 2022-02-15 NOTE — ASSESSMENT & PLAN NOTE
· Progressive hemoglobin decline noted since admission  Upon reviewing patient's old records from [place] , patient has history of angiodysplasia and had argon beam therapy on January 2019  There was also a remote mention of esophageal varices  Continue to follow serial H&H  Patient denies any nausea ,vomiting or hematemesis  · Continue with PPI therapy  GI following    · SP EGD 2/11

## 2022-02-15 NOTE — ASSESSMENT & PLAN NOTE
· Episodes of hypoxia noted, on 3 liters/minute of oxygen  · Suspecting iatrogenic volume overload/atelectasis  · Stopped fluids  · Chest x-ray with improved right lower lobe density, noted small effusion as well as atelectasis  · Incentive spirometry ordered  · Repeat CXR today    · Wean O2
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
## 2022-02-15 NOTE — PROGRESS NOTES
Progress Note - Orthopedics   Jose Martin 60 y o  male MRN: [id number]  Unit/Bed#: [facility]      Subjective:    56 y o male seen and evaluated this morning  He notes itching in the left knee pain overall is well controlled  Denies any fevers or chills      Labs:  0   Lab Value Date/Time    HCT 28 2 (L) 02/14/2022 0703    HCT 26 2 (L) 02/12/2022 0605    HCT 27 1 (L) 02/11/2022 0537    HGB 7 9 (L) 02/14/2022 0703    HGB 7 4 (L) 02/12/2022 0605    HGB 7 6 (L) 02/11/2022 0537    INR 1 21 (H) 02/14/2022 0703    WBC 6 81 02/14/2022 0703    WBC 6 66 02/12/2022 0605    WBC 8 95 02/11/2022 0537       Meds:    Current Facility-Administered Medications:     acetaminophen (TYLENOL) tablet 650 mg, 650 mg, Oral, Q6H PRN, Yenifer Kearney MD, 650 mg at 02/10/22 2030    aluminum-magnesium hydroxide-simethicone (MYLANTA) oral suspension 30 mL, 30 mL, Oral, Q4H PRN, Adrian Crisp, PA-C, 30 mL at 02/13/22 0806    calcium carbonate (TUMS) chewable tablet 1,000 mg, 1,000 mg, Oral, BID PRN, Adrian Crisp, PA-C    diazepam (VALIUM) tablet 10 mg, 10 mg, Oral, Q12H PRN, Yenifer Kearney MD    enoxaparin (LOVENOX) subcutaneous injection 40 mg, 40 mg, Subcutaneous, Daily, Yenifer Kearney MD, 40 mg at 02/15/22 0914    HYDROmorphone (DILAUDID) injection 0 5 mg, 0 5 mg, Intravenous, Q4H PRN, Yenifer Kearney MD    levETIRAcetam (KEPPRA) tablet 500 mg, 500 mg, Oral, Q12H [address], Yenifer Kearney MD, 500 mg at 02/15/22 0914    nadolol (CORGARD) tablet 40 mg, 40 mg, Oral, Daily, Adrianmariel Wick PA-C, 40 mg at 02/15/22 0914    ondansetron (ZOFRAN) injection 4 mg, 4 mg, Intravenous, Q6H PRN, Yenifer Kearney MD, 4 mg at 02/11/22 1140    OXcarbazepine (TRILEPTAL) tablet 600 mg, 600 mg, Oral, Q12H [address], Yenifer Kearney MD, 600 mg at 02/15/22 0913    oxyCODONE (ROXICODONE) immediate release tablet 10 mg, 10 mg, Oral, Q4H PRN, Yenifer Kearney MD    oxyCODONE (ROXICODONE) IR tablet 5 mg, 5 mg, Oral, Q4H PRN, Yenifer Kearney MD, 5 mg at 02/11/22 1140    pantoprazole (PROTONIX) EC tablet 40 mg, 40 mg, Oral, BID AC, Ema Corbett, SORENNP, 40 mg at 02/15/22 0612    senna (SENOKOT) tablet 8 6 mg, 1 tablet, Oral, Daily, Rebecca Mercado MD, 8 6 mg at 02/15/22 0914    sucralfate (CARAFATE) tablet 1 g, 1 g, Oral, Q6H [address], Rebecca Mercado MD, 1 g at 02/15/22 0611    triamcinolone (KENALOG) 0 1 % cream, , Topical, BID, Rebecca Mercado MD, Given at 02/15/22 0914    vancomycin (VANCOCIN) IVPB (premix in dextrose) 1,000 mg 200 mL, 1,000 mg, Intravenous, Q8H, Rebecca Mercado MD, Last Rate: 200 mL/hr at 02/15/22 0742, 1,000 mg at 02/15/22 0742    Blood Culture:   Lab Results   Component Value Date    BLOODCX No Growth at 48 hrs  02/12/2022    BLOODCX No Growth at 48 hrs  02/12/2022       Wound Culture:   Lab Results   Component Value Date    WOUNDCULT (A) 02/10/2022     3+ Growth of Methicillin Resistant Staphylococcus aureus       Ins and Outs:  I/O last 24 hours: In: 5 [P O :720]  Out: 1500 [Urine:1500]          Physical:  Vitals:    02/15/22 0625   BP: 110/65   Pulse: 85   Resp: 19   Temp: 98 7 °F (37 1 °C)   SpO2: 95%     Musculoskeletal: left Lower Extremity  · Erythema and induration are present over the medial distal thigh, no fluctuance  · 2 cm I&D site with no active drainage no purulence able to be expressed  · Dressings with moderate serosanguineous drainage, packing gauze removed without any gross purulence  · Wound was repacked with half-inch iodoform gauze  · No pain with knee range of motion, no palpable effusion  · SILT L3 through S1   ·  +fhl/ehl, +ankle dorsi/plantar flexion  ·   Extremities warm well perfused    Assessment:    56 y o male with a left distal medial thigh abscess, cellulitis in the setting of MRSA bacteremia    Plan:  · WBAT to the LLE  · Continue with packing changes  · Continue IV antibiotics  · PT/OT  · Pain control per primary team  · DVT ppx  · There is induration but no fluctuance on exam today, no purulent drainage   Will continue to monitor closely no plan for further I and D as of today  · Dispo: Ortho will follow    Deepak Pratt PA-C

## 2022-02-15 NOTE — ASSESSMENT & PLAN NOTE
· Controlled  · Discontinued propranolol in favor of nadolol  · Will hold Lasix in view of borderline low blood pressure

## 2022-02-15 NOTE — PROGRESS NOTES
Vancomycin IV Pharmacy-to-Dose Consultation    Andra Babcock is a 60 y o  male who is currently receiving Vancomycin IV with management by the Pharmacy Consult service for the treatment of skin-soft tissue infection  Assessment/Plan:    The patient's chart was reviewed  Renal function is stable  There are no signs or symptoms of nephrotoxicity and/or infusion reactions documented  Based on today's assessment, will continue current vancomycin (Day # 8) dosing of 1000mg IV every 8 hours, with a plan for trough to be drawn at 0700 on 2/18  We will continue to follow the patient's culture results and clinical progress daily        Anya Crandall, PharmD   Pharmacist

## 2022-02-15 NOTE — ASSESSMENT & PLAN NOTE
· Patient presented with left lower extremity cellulitis  Blood cultures on admission positive for MRSA   Antibiotics switched to IV vancomycin 2/9  Pharmacy consulted  for dosing management  · Transthoracic echocardiogram negative for any vegetations  Patient denies any back pain but has persistent left lower extremity pain  · CT left lower extremity shows: Suspected thrombosis/thrombophlebitis of the great saphenous vein  Diffuse subcutaneous edema which could be secondary to cellulitis, venous stasis, or dependent edema  No discrete rim enhancing fluid collection  · No osseous lesion seen on CT scan  Orthopedic preformed I/D on 2/10    · Infectious disease consulted for antibiotic management  · Repeat blood cultures ordered for 2/12, no growth x 48 hr  · PICC placement once blood cultures are negative for 72 hr  · Consent signed and in chart 2/15
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
## 2022-02-16 LAB
ANION GAP SERPL CALCULATED.3IONS-SCNC: 4 MMOL/L (ref 4–13)
BASOPHILS # BLD AUTO: 0.04 THOUSANDS/ΜL (ref 0–0.1)
BASOPHILS NFR BLD AUTO: 1 % (ref 0–1)
BUN SERPL-MCNC: 8 MG/DL (ref 5–25)
CALCIUM SERPL-MCNC: 7.9 MG/DL (ref 8.3–10.1)
CHLORIDE SERPL-SCNC: 104 MMOL/L (ref 100–108)
CO2 SERPL-SCNC: 29 MMOL/L (ref 21–32)
CREAT SERPL-MCNC: 0.59 MG/DL (ref 0.6–1.3)
EOSINOPHIL # BLD AUTO: 0.55 THOUSAND/ΜL (ref 0–0.61)
EOSINOPHIL NFR BLD AUTO: 7 % (ref 0–6)
ERYTHROCYTE [DISTWIDTH] IN BLOOD BY AUTOMATED COUNT: 17.8 % (ref 11.6–15.1)
GFR SERPL CREATININE-BSD FRML MDRD: 111 ML/MIN/1.73SQ M
GLUCOSE SERPL-MCNC: 116 MG/DL (ref 65–140)
HCT VFR BLD AUTO: 29.2 % (ref 36.5–49.3)
HGB BLD-MCNC: 8.5 G/DL (ref 12–17)
IMM GRANULOCYTES # BLD AUTO: 0.07 THOUSAND/UL (ref 0–0.2)
IMM GRANULOCYTES NFR BLD AUTO: 1 % (ref 0–2)
LYMPHOCYTES # BLD AUTO: 1.3 THOUSANDS/ΜL (ref 0.6–4.47)
LYMPHOCYTES NFR BLD AUTO: 17 % (ref 14–44)
MCH RBC QN AUTO: 25.2 PG (ref 26.8–34.3)
MCHC RBC AUTO-ENTMCNC: 29.1 G/DL (ref 31.4–37.4)
MCV RBC AUTO: 87 FL (ref 82–98)
MONOCYTES # BLD AUTO: 0.64 THOUSAND/ΜL (ref 0.17–1.22)
MONOCYTES NFR BLD AUTO: 8 % (ref 4–12)
NEUTROPHILS # BLD AUTO: 5.08 THOUSANDS/ΜL (ref 1.85–7.62)
NEUTS SEG NFR BLD AUTO: 66 % (ref 43–75)
NRBC BLD AUTO-RTO: 0 /100 WBCS
PLATELET # BLD AUTO: 330 THOUSANDS/UL (ref 149–390)
PMV BLD AUTO: 8.8 FL (ref 8.9–12.7)
POTASSIUM SERPL-SCNC: 3.8 MMOL/L (ref 3.5–5.3)
RBC # BLD AUTO: 3.37 MILLION/UL (ref 3.88–5.62)
SODIUM SERPL-SCNC: 137 MMOL/L (ref 136–145)
WBC # BLD AUTO: 7.68 THOUSAND/UL (ref 4.31–10.16)

## 2022-02-16 PROCEDURE — 97116 GAIT TRAINING THERAPY: CPT

## 2022-02-16 PROCEDURE — 99232 SBSQ HOSP IP/OBS MODERATE 35: CPT | Performed by: NURSE PRACTITIONER

## 2022-02-16 PROCEDURE — 97110 THERAPEUTIC EXERCISES: CPT

## 2022-02-16 PROCEDURE — 99232 SBSQ HOSP IP/OBS MODERATE 35: CPT | Performed by: INTERNAL MEDICINE

## 2022-02-16 PROCEDURE — 80048 BASIC METABOLIC PNL TOTAL CA: CPT | Performed by: PHYSICIAN ASSISTANT

## 2022-02-16 PROCEDURE — 85025 COMPLETE CBC W/AUTO DIFF WBC: CPT | Performed by: PHYSICIAN ASSISTANT

## 2022-02-16 RX ADMIN — LEVETIRACETAM 500 MG: 500 TABLET, FILM COATED ORAL at 07:54

## 2022-02-16 RX ADMIN — VANCOMYCIN HYDROCHLORIDE 1000 MG: 1 INJECTION, SOLUTION INTRAVENOUS at 17:38

## 2022-02-16 RX ADMIN — SUCRALFATE 1 G: 1 TABLET ORAL at 17:37

## 2022-02-16 RX ADMIN — PANTOPRAZOLE SODIUM 40 MG: 40 TABLET, DELAYED RELEASE ORAL at 05:03

## 2022-02-16 RX ADMIN — SUCRALFATE 1 G: 1 TABLET ORAL at 00:37

## 2022-02-16 RX ADMIN — ENOXAPARIN SODIUM 40 MG: 40 INJECTION SUBCUTANEOUS at 07:54

## 2022-02-16 RX ADMIN — OXCARBAZEPINE 600 MG: 150 TABLET, FILM COATED ORAL at 21:18

## 2022-02-16 RX ADMIN — SUCRALFATE 1 G: 1 TABLET ORAL at 11:46

## 2022-02-16 RX ADMIN — VANCOMYCIN HYDROCHLORIDE 1000 MG: 1 INJECTION, SOLUTION INTRAVENOUS at 10:13

## 2022-02-16 RX ADMIN — PANTOPRAZOLE SODIUM 40 MG: 40 TABLET, DELAYED RELEASE ORAL at 17:37

## 2022-02-16 RX ADMIN — TRIAMCINOLONE ACETONIDE: 1 CREAM TOPICAL at 07:45

## 2022-02-16 RX ADMIN — TRIAMCINOLONE ACETONIDE: 1 CREAM TOPICAL at 17:41

## 2022-02-16 RX ADMIN — LEVETIRACETAM 500 MG: 500 TABLET, FILM COATED ORAL at 21:18

## 2022-02-16 RX ADMIN — SUCRALFATE 1 G: 1 TABLET ORAL at 05:03

## 2022-02-16 RX ADMIN — PANTOPRAZOLE SODIUM 40 MG: 40 TABLET, DELAYED RELEASE ORAL at 07:53

## 2022-02-16 RX ADMIN — OXCARBAZEPINE 600 MG: 150 TABLET, FILM COATED ORAL at 07:53

## 2022-02-16 NOTE — ASSESSMENT & PLAN NOTE
· Patient presents with multiple falls over the last couple weeks  · PT/OT consulted; recommending short-term rehab  · Patient's brother is in agreement for SNF if needed
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
## 2022-02-16 NOTE — ASSESSMENT & PLAN NOTE
· Episodes of hypoxia noted, on 3 liters/minute of oxygen  · Suspecting iatrogenic volume overload/atelectasis  · Stopped fluids  · Chest x-ray with improved right lower lobe density, noted small effusion as well as atelectasis  · Incentive spirometry ordered  · Chest x-ray performed on 02/15 showed streaky bibasilar opacities suggesting atelectasis with possible left retrocardiac partial volume as per radiology report  May consider follow-up PA and lateral views if persistent or worsening symptoms  · Instructed patient on usage of incentive spirometer, was able to accurately demonstrate usage    · Wean O2 as tolerated

## 2022-02-16 NOTE — PLAN OF CARE
Problem: PHYSICAL THERAPY ADULT  Goal: Performs mobility at highest level of function for planned discharge setting  See evaluation for individualized goals  Description: Treatment/Interventions: Functional transfer training,LE strengthening/ROM,Elevations,Therapeutic exercise,Endurance training,Cognitive reorientation,Patient/family training,Equipment eval/education,Gait training          See flowsheet documentation for full assessment, interventions and recommendations  Outcome: Progressing  Note: Prognosis: Good  Problem List: Decreased strength,Decreased range of motion,Decreased endurance,Impaired balance,Decreased mobility,Decreased cognition,Impaired judgement,Decreased safety awareness,Orthopedic restrictions  Assessment: pt was able to tolerate some additional ambulation from initial PT session, though pt continues to need assist w/ all phases of mobility  input was needed for mobility technique and safety, w/ limited retention of education received  pt has not progressed to expected degree  fall risk is evidentin 30 second chair stand test: 0 (less than 14 indicates fall risk in males 61to 59years old)  pt needs continued inpatient PT to reduce fall risk factors and progress mobility training as appropriate  discharge recommendation was initially home PT, but due to pt's continued mobility impairments the recommendation is updated to inpatient rehab to maximize level of independence  PT Discharge Recommendation: Post acute rehabilitation services          See flowsheet documentation for full assessment

## 2022-02-16 NOTE — PHYSICAL THERAPY NOTE
PHYSICAL THERAPY TREATMENT NOTE    Patient Name: Cee Payton  [id number] Date: 2/16/2022 02/16/22 0674   PT Last Visit   PT Visit Date 02/16/22   Pain Assessment   Pain Assessment Tool 0-10   Pain Score No Pain   Restrictions/Precautions   LLE Weight Bearing Per Order WBAT   Other Precautions Contact/isolation;Cognitive; Chair Alarm; Bed Alarm;O2;Fall Risk   General   Chart Reviewed Yes   Additional Pertinent History 2L oxygen via nasal cannula  resting pulse ox 94% and 68 BPM    Family/Caregiver Present No   Cognition   Arousal/Participation Cooperative   Attention Attends with cues to redirect   Orientation Level Oriented to person; Other (Comment)  (pt was identified w/ full name, birth date)   Following Commands Follows one step commands with increased time or repetition   Subjective   Subjective pt seen supine in bed  agreed to PT session  denied pain or dizziness  cuing was needed for appropriate level of participation  Bed Mobility   Supine to Sit 4  Minimal assistance   Additional items Assist x 1;HOB elevated; Bedrails; Increased time required;Verbal cues;LE management  (for trunk/LE positioning)   Transfers   Sit to Stand 4  Minimal assistance   Additional items Assist x 1; Increased time required;Verbal cues  (for hand placement, LE positioning)   Stand to Sit 5  Supervision   Additional items Increased time required;Verbal cues  (for body positioning, hand placement)   Ambulation/Elevation   Gait pattern Antalgic;Decreased L stance; Foward flexed; Short stride; Excessively slow   Gait Assistance 4  Minimal assist   Additional items Assist x 1;Verbal cues  (for walker positioning, sequencing, obstacle negotiation)   Assistive Device Rolling walker   Distance 30 feet x2 w/ seated rest break  (additional not possible due to fatigue)   Stair Management Assistance Not tested  (due to limited ambulation tolerance, safety) Ambulation/Elevation Additional Comments 30 second chair stand test: 0 (as pt is unable to stand w/o use of UEs)   Balance   Static Sitting Fair +   Static Standing Fair -  (w/ roller walker)   Ambulatory Poor +  (w/ roller walker)   Activity Tolerance   Activity Tolerance Patient limited by fatigue   Nurse Made Aware spoke to [address] [place] [address]   Equipment Use   Comments ankle pumps 30  heel slides, quad sets, and hip abduction 10 each  Assessment   Prognosis Good   Problem List Decreased strength;Decreased range of motion;Decreased endurance; Impaired balance;Decreased mobility; Decreased cognition; Impaired judgement;Decreased safety awareness;Orthopedic restrictions   Assessment pt was able to tolerate some additional ambulation from initial PT session, though pt continues to need assist w/ all phases of mobility  input was needed for mobility technique and safety, w/ limited retention of education received  pt has not progressed to expected degree  fall risk is evidentin 30 second chair stand test: 0 (less than 14 indicates fall risk in males 61to 59years old)  pt needs continued inpatient PT to reduce fall risk factors and progress mobility training as appropriate  discharge recommendation was initially home PT, but due to pt's continued mobility impairments the recommendation is updated to inpatient rehab to maximize level of independence  Goals   Patient Goals go home  eat some dinner     STG Expiration Date 02/21/22   Short Term Goal #1 Pt will increase b/l LE strength by 1/2 grade to facilitate independent mobility, perform sit to stand and stand to sit transfers modified independent w/ RW to decrease fall risk factors,  ambulate 150ft w/ least restrictive AD w/ supervision to facilitate eventual safe return home, increase all balance 1 grade to decrease fall risk, complete exercise program w/ less than 50% input from therapist to increase strength and endurance, tolerate 4hr OOB to facilitate upright tolerance, tolerate standing 5 mins w/ least restrictive AD w/ modified independence to facilitate functional task performance, complete a full flight of steps w/ railing w/ min asist x1 w/ least restrictive AD to improve home accessibility, and complete timed up and go or comfortable gait speed to further assess mobility and monitor progress   PT Treatment Day 2   Plan   Treatment/Interventions Functional transfer training;LE strengthening/ROM; Therapeutic exercise;Elevations; Endurance training;Cognitive reorientation;Patient/family training;Equipment eval/education; Bed mobility;Gait training   Progress Slow progress, decreased activity tolerance   PT Frequency 3-5x/wk   Recommendation   PT Discharge Recommendation Post acute rehabilitation services   Additional Comments recommend roller walker use w/ mobility   AM-PAC Basic Mobility Inpatient   Turning in Bed Without Bedrails 4   Lying on Back to Sitting on Edge of Flat Bed 3   Moving Bed to Chair 3   Standing Up From Chair 3   Walk in Room 3   Climb 3-5 Stairs 1   Basic Mobility Inpatient Raw Score 17   Basic Mobility Standardized Score 39 67   Highest Level Of Mobility   JH-HL Goal 5: Stand one or more mins   JH-HLM Highest Level of Mobility 7: Walk 25 feet or more   JH-HLM Goal Achieved Yes   End of Consult   Patient Position at End of Consult Bedside chair;Bed/Chair alarm activated; All needs within reach     The patient's AM-PAC Basic Mobility Inpatient Short Form Raw Score is 17  A Raw score of greater than 16 suggests the patient may benefit from discharge to home  Please also refer to the recommendation of the Physical Therapist for safe discharge planning  30 second chair stand test: 0 (less than 14 indicates fall risk in males 61to 59years old)  Skilled inpatient PT recommended while in hospital to progress pt toward treatment goals      Patrick Muñiz, PT

## 2022-02-16 NOTE — CASE MANAGEMENT
Case Management Discharge Planning Note    Patient name Virginia Guillory  Location S /S -01 MRN [id number]  : 1965 Date 2022       Current Admission Date: 2022  Current Admission Diagnosis:Cellulitis of left lower extremity   Patient Active Problem List    Diagnosis Date Noted    Esophageal varices ([facility] [address] ) 2022    Hypoxia 2022    Anemia 02/10/2022    Status post splenectomy 02/10/2022    MRSA bacteremia 2022    Cellulitis of left lower extremity 2022    Ambulatory dysfunction 2022    [facility] 2022    Primary hypertension 2022    Gastroesophageal reflux disease without esophagitis 2022    Venous stasis dermatitis of both lower extremities 2022      LOS (days): 6  Geometric Mean LOS (GMLOS) (days): 3 50  Days to GMLOS:-2 9     OBJECTIVE:  Risk of Unplanned Readmission Score: 17         Current admission status: Inpatient   Preferred Pharmacy:   [facility] [address] ALBERT[address] ESPARZA [address] [facility]  [facility] [address]  Phone: [phone] Fax: [phone]    Primary Care Provider: No primary care provider on file  Primary Insurance: MEDICARE  Secondary Insurance: ERIS LAZO PENDING    DISCHARGE DETAILS:    Discharge planning discussed with[de-identified] patient and his brother Sonya Pacheco at bedside  KATHARINE Angel via phone  Freedom of Choice: Yes  Comments - Freedom of Choice: CM informed by [facility] that one the PICC is placed and we have a safe dc plan, patient is stable for dc  At this time, it appears he'll be on IV Vanco through 3/12 and the only feasible and affordable option, as this is due more than once/day, is to a SNF under [place] pending  CM met with patient and his brother at bedside and KATHARINE Phelan participated via phone  All parties confirm they cannot afford to pay [facility] for home IV abx and as such, they are looking to pursue STR admision under [place] pending   They understand patient would remain at SNF for the duration of his IV abx and that PT is actually recommending STR at this time  Family would prefer [place] if possible but is open to a blanket referral to see all available options  Cris Pierce confirmed patient did receive his first two vaccines in [place] and she'll forward CM proof of same  [facility] aware of interest in dc to STR  CM called and spoke with [facility] liaison Sonnymary Katz who reports they should be able to accommodate and she'll keep an eye out for the referral   CM contacted family/caregiver?: Yes  Were Treatment Team discharge recommendations reviewed with patient/caregiver?: Yes  Did patient/caregiver verbalize understanding of patient care needs?: N/A- going to facility  Were patient/caregiver advised of the risks associated with not following Treatment Team discharge recommendations?: Yes    Contacts  Patient Contacts: brother Lara Pang and Jordin Lissy  Relationship to Patient[de-identified] Family  Contact Method: [address] [place] [address] Person  Phone Number: [phone]  Reason/Outcome: Continuity of Care,Referral,Discharge Planning    Other Referral/Resources/Interventions Provided:  Interventions: Short Term Rehab  Referral Comments: [facility] referral sent with indication that [facility] is first choice      Would you like to participate in our [address] service program?  : No - Declined    Treatment Team Recommendation: Short Term Rehab  Discharge Destination Plan[de-identified] Short Term Rehab

## 2022-02-16 NOTE — PLAN OF CARE
Problem: MOBILITY - ADULT  Goal: Maintain or return to baseline ADL function  Description: INTERVENTIONS:  -  Assess patient's ability to carry out ADLs; assess patient's baseline for ADL function and identify physical deficits which impact ability to perform ADLs (bathing, care of mouth/teeth, toileting, grooming, dressing, etc )  - Assess/evaluate cause of self-care deficits   - Assess range of motion  - Assess patient's mobility; develop plan if impaired  - Assess patient's need for assistive devices and provide as appropriate  - Encourage maximum independence but intervene and supervise when necessary  - Involve family in performance of ADLs  - Assess for home care needs following discharge   - Consider OT consult to assist with ADL evaluation and planning for discharge  - Provide patient education as appropriate  Outcome: Progressing  Goal: Maintains/Returns to pre admission functional level  Description: INTERVENTIONS:  - Perform BMAT or MOVE assessment daily    - Set and communicate daily mobility goal to care team and patient/family/caregiver  - Collaborate with rehabilitation services on mobility goals if consulted  - Perform Range of Motion 2 times a day  - Reposition patient every 2 hours    - Dangle patient 2 times a day  - Stand patient 2 times a day  - Ambulate patient 3 times a day  - Out of bed to chair 3 times a day   - Out of bed for meals 3 times a day  - Out of bed for toileting  - Record patient progress and toleration of activity level   Outcome: Progressing     Problem: PAIN - ADULT  Goal: Verbalizes/displays adequate comfort level or baseline comfort level  Description: Interventions:  - Encourage patient to monitor pain and request assistance  - Assess pain using appropriate pain scale  - Administer analgesics based on type and severity of pain and evaluate response  - Implement non-pharmacological measures as appropriate and evaluate response  - Consider cultural and social influences on pain and pain management  - Notify physician/advanced practitioner if interventions unsuccessful or patient reports new pain  Outcome: Progressing     Problem: INFECTION - ADULT  Goal: Absence or prevention of progression during hospitalization  Description: INTERVENTIONS:  - Assess and monitor for signs and symptoms of infection  - Monitor lab/diagnostic results  - Monitor all insertion sites, i e  indwelling lines, tubes, and drains  - Monitor endotracheal if appropriate and nasal secretions for changes in amount and color  - [place] appropriate cooling/warming therapies per order  - Administer medications as ordered  - Instruct and encourage patient and family to use good hand hygiene technique  - Identify and instruct in appropriate isolation precautions for identified infection/condition  Outcome: Progressing  Goal: Absence of fever/infection during neutropenic period  Description: INTERVENTIONS:  - Monitor WBC    Outcome: Progressing     Problem: SAFETY ADULT  Goal: Maintain or return to baseline ADL function  Description: INTERVENTIONS:  -  Assess patient's ability to carry out ADLs; assess patient's baseline for ADL function and identify physical deficits which impact ability to perform ADLs (bathing, care of mouth/teeth, toileting, grooming, dressing, etc )  - Assess/evaluate cause of self-care deficits   - Assess range of motion  - Assess patient's mobility; develop plan if impaired  - Assess patient's need for assistive devices and provide as appropriate  - Encourage maximum independence but intervene and supervise when necessary  - Involve family in performance of ADLs  - Assess for home care needs following discharge   - Consider OT consult to assist with ADL evaluation and planning for discharge  - Provide patient education as appropriate  Outcome: Progressing  Goal: Maintains/Returns to pre admission functional level  Description: INTERVENTIONS:  - Perform BMAT or MOVE assessment daily    - Set and communicate daily mobility goal to care team and patient/family/caregiver  - Collaborate with rehabilitation services on mobility goals if consulted  - Perform Range of Motion 2 times a day  - Reposition patient every 2 hours    - Dangle patient 2 times a day  - Stand patient 2 times a day  - Ambulate patient 3 times a day  - Out of bed to chair 3 times a day   - Out of bed for meals 3 times a day  - Out of bed for toileting  - Record patient progress and toleration of activity level   Outcome: Progressing  Goal: Patient will remain free of falls  Description: INTERVENTIONS:  - Educate patient/family on patient safety including physical limitations  - Instruct patient to call for assistance with activity   - Consult OT/PT to assist with strengthening/mobility   - Keep Call bell within reach  - Keep bed low and locked with side rails adjusted as appropriate  - Keep care items and personal belongings within reach  - Initiate and maintain comfort rounds  - Make Fall Risk Sign visible to staff  - Offer Toileting every 2 Hours, in advance of need  - Initiate/Maintain bed alarm  - Obtain necessary fall risk management equipment: bed alarm  - Apply yellow socks and bracelet for high fall risk patients  - Consider moving patient to room near nurses station  Outcome: Progressing     Problem: DISCHARGE PLANNING  Goal: Discharge to home or other facility with appropriate resources  Description: INTERVENTIONS:  - Identify barriers to discharge w/patient and caregiver  - Arrange for needed discharge resources and transportation as appropriate  - Identify discharge learning needs (meds, wound care, etc )  - Arrange for interpretive services to assist at discharge as needed  - Refer to Case Management Department for coordinating discharge planning if the patient needs post-hospital services based on physician/advanced practitioner order or complex needs related to functional status, cognitive ability, or social support system  Outcome: Progressing     Problem: Knowledge Deficit  Goal: Patient/family/caregiver demonstrates understanding of disease process, treatment plan, medications, and discharge instructions  Description: Complete learning assessment and assess knowledge base  Interventions:  - Provide teaching at level of understanding  - Provide teaching via preferred learning methods  Outcome: Progressing     Description: INTERVENTIONS:  - Support, maintain and protect limb and body alignment  - Provide patient/ family with appropriate education  Outcome: Progressing     Problem: Nutrition/Hydration-ADULT  Goal: Nutrient/Hydration intake appropriate for improving, restoring or maintaining nutritional needs  Description: Monitor and assess patient's nutrition/hydration status for malnutrition  Collaborate with interdisciplinary team and initiate plan and interventions as ordered  Monitor patient's weight and dietary intake as ordered or per policy  Utilize nutrition screening tool and intervene as necessary  Determine patient's food preferences and provide high-protein, high-caloric foods as appropriate       INTERVENTIONS:  - Monitor oral intake, urinary output, labs, and treatment plans  - Assess nutrition and hydration status and recommend course of action  - Evaluate amount of meals eaten  - Assist patient with eating if necessary   - Allow adequate time for meals  - Recommend/ encourage appropriate diets, oral nutritional supplements, and vitamin/mineral supplements  - Order, calculate, and assess calorie counts as needed  - Recommend, monitor, and adjust tube feedings and TPN/PPN based on assessed needs  - Assess need for intravenous fluids  - Provide specific nutrition/hydration education as appropriate  - Include patient/family/caregiver in decisions related to nutrition  Outcome: Progressing     Problem: Prexisting or High Potential for Compromised Skin Integrity  Goal: Skin integrity is maintained or improved  Description: INTERVENTIONS:  - Identify patients at risk for skin breakdown  - Assess and monitor skin integrity  - Assess and monitor nutrition and hydration status  - Monitor labs   - Assess for incontinence   - Turn and reposition patient  - Assist with mobility/ambulation  - Relieve pressure over bony prominences  - Avoid friction and shearing  - Provide appropriate hygiene as needed including keeping skin clean and dry  - Evaluate need for skin moisturizer/barrier cream  - Collaborate with interdisciplinary team   - Patient/family teaching  - Consider wound care consult   Outcome: Progressing     Problem: Potential for Falls  Goal: Patient will remain free of falls  Description: INTERVENTIONS:  - Educate patient/family on patient safety including physical limitations  - Instruct patient to call for assistance with activity   - Consult OT/PT to assist with strengthening/mobility   - Keep Call bell within reach  - Keep bed low and locked with side rails adjusted as appropriate  - Keep care items and personal belongings within reach  - Initiate and maintain comfort rounds  - Make Fall Risk Sign visible to staff  - Offer Toileting every 2 Hours, in advance of need  - Initiate/Maintain bed alarm  - Obtain necessary fall risk management equipment: bed alarm  - Apply yellow socks and bracelet for high fall risk patients  - Consider moving patient to room near nurses station  Outcome: Progressing

## 2022-02-16 NOTE — PROGRESS NOTES
Vancomycin IV Pharmacy-to-Dose Consultation    Jong Fabian is a 60 y o  male who is currently receiving Vancomycin IV with management by the Pharmacy Consult service for the treatment of skin-soft tissue infection  Assessment/Plan:    The patient's chart was reviewed  Renal function is stable  There are no signs or symptoms of nephrotoxicity and/or infusion reactions documented  Based on today's assessment, will continue current vancomycin (Day # 8) dosing of 1000mg IV every 8 hours, with a plan for trough to be drawn at 0700 on 2/18  We will continue to follow the patient's culture results and clinical progress daily        Elizabeth Barboza, PharmD   Pharmacist
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
## 2022-02-16 NOTE — ASSESSMENT & PLAN NOTE
· Left lower extremity erythema and edema, likely cellulitis in the setting of chronic venous stasis and stasis dermatitis Received cefepime and vancomycin in ED  · S/p I&D with orthopedics 2/10  · Started on IV Ancef on admission  Blood cultures from admission positive for MRSA Antibiotics, changed to IV vancomycin     · Repeat BC ordered 2/12, NG x 72 hr  · Continue to follow CBC and temperature curve closely  · Infectious disease following, recommending IV vancomycin through 03/12/2022 for total of 4 weeks therapy   · PICC line ordered

## 2022-02-16 NOTE — ASSESSMENT & PLAN NOTE
· Progressive hemoglobin decline noted since admission  Upon reviewing patient's old records from [place] , patient has history of angiodysplasia and had argon beam therapy on January 2019  There was also a remote mention of esophageal varices  Continue to follow serial H&H  Patient denies any nausea ,vomiting or hematemesis  · Continue with PPI therapy  GI following  Recommend outpatient colonoscopy  · SP EGD 2/11  · Hemoglobin stable 8 5 no active signs of bleeding noted

## 2022-02-16 NOTE — ASSESSMENT & PLAN NOTE
· Patient presented with left lower extremity cellulitis  Blood cultures on admission positive for MRSA   Antibiotics switched to IV vancomycin 2/9  Pharmacy consulted  for dosing management  · Transthoracic echocardiogram negative for any vegetations  Patient denies any back pain but has persistent left lower extremity pain  · CT left lower extremity shows: Suspected thrombosis/thrombophlebitis of the great saphenous vein  Diffuse subcutaneous edema which could be secondary to cellulitis, venous stasis, or dependent edema  No discrete rim enhancing fluid collection  · No osseous lesion seen on CT scan  Orthopedic preformed I/D on 2/10    · Infectious disease consulted for antibiotic management  · Repeat blood cultures ordered for 2/12, no growth x 72 hr  · PICC placement once blood cultures are negative for 72 hr  · Consent signed and in chart 2/15

## 2022-02-17 ENCOUNTER — APPOINTMENT (INPATIENT)
Dept: RADIOLOGY | Facility: HOSPITAL | Age: 57
DRG: 872 | End: 2022-02-17
Payer: MEDICARE

## 2022-02-17 LAB
ANION GAP SERPL CALCULATED.3IONS-SCNC: 4 MMOL/L (ref 4–13)
BACTERIA BLD CULT: NORMAL
BACTERIA BLD CULT: NORMAL
BASOPHILS # BLD AUTO: 0.02 THOUSANDS/ΜL (ref 0–0.1)
BASOPHILS NFR BLD AUTO: 0 % (ref 0–1)
BUN SERPL-MCNC: 9 MG/DL (ref 5–25)
CALCIUM SERPL-MCNC: 7.6 MG/DL (ref 8.3–10.1)
CHLORIDE SERPL-SCNC: 104 MMOL/L (ref 100–108)
CO2 SERPL-SCNC: 29 MMOL/L (ref 21–32)
CREAT SERPL-MCNC: 0.72 MG/DL (ref 0.6–1.3)
EOSINOPHIL # BLD AUTO: 0.4 THOUSAND/ΜL (ref 0–0.61)
EOSINOPHIL NFR BLD AUTO: 6 % (ref 0–6)
ERYTHROCYTE [DISTWIDTH] IN BLOOD BY AUTOMATED COUNT: 17.7 % (ref 11.6–15.1)
GFR SERPL CREATININE-BSD FRML MDRD: 102 ML/MIN/1.73SQ M
GLUCOSE SERPL-MCNC: 92 MG/DL (ref 65–140)
HCT VFR BLD AUTO: 26.6 % (ref 36.5–49.3)
HGB BLD-MCNC: 7.5 G/DL (ref 12–17)
IMM GRANULOCYTES # BLD AUTO: 0.07 THOUSAND/UL (ref 0–0.2)
IMM GRANULOCYTES NFR BLD AUTO: 1 % (ref 0–2)
LYMPHOCYTES # BLD AUTO: 1.11 THOUSANDS/ΜL (ref 0.6–4.47)
LYMPHOCYTES NFR BLD AUTO: 16 % (ref 14–44)
MCH RBC QN AUTO: 24.4 PG (ref 26.8–34.3)
MCHC RBC AUTO-ENTMCNC: 28.2 G/DL (ref 31.4–37.4)
MCV RBC AUTO: 87 FL (ref 82–98)
MONOCYTES # BLD AUTO: 0.54 THOUSAND/ΜL (ref 0.17–1.22)
MONOCYTES NFR BLD AUTO: 8 % (ref 4–12)
NEUTROPHILS # BLD AUTO: 4.63 THOUSANDS/ΜL (ref 1.85–7.62)
NEUTS SEG NFR BLD AUTO: 69 % (ref 43–75)
NRBC BLD AUTO-RTO: 0 /100 WBCS
PLATELET # BLD AUTO: 308 THOUSANDS/UL (ref 149–390)
PMV BLD AUTO: 8.9 FL (ref 8.9–12.7)
POTASSIUM SERPL-SCNC: 3.6 MMOL/L (ref 3.5–5.3)
RBC # BLD AUTO: 3.07 MILLION/UL (ref 3.88–5.62)
SODIUM SERPL-SCNC: 137 MMOL/L (ref 136–145)
VANCOMYCIN TROUGH SERPL-MCNC: 25.3 UG/ML (ref 10–20)
WBC # BLD AUTO: 6.77 THOUSAND/UL (ref 4.31–10.16)

## 2022-02-17 PROCEDURE — 99232 SBSQ HOSP IP/OBS MODERATE 35: CPT | Performed by: INTERNAL MEDICINE

## 2022-02-17 PROCEDURE — 02HV33Z INSERTION OF INFUSION DEVICE INTO SUPERIOR VENA CAVA, PERCUTANEOUS APPROACH: ICD-10-PCS | Performed by: RADIOLOGY

## 2022-02-17 PROCEDURE — 71045 X-RAY EXAM CHEST 1 VIEW: CPT

## 2022-02-17 PROCEDURE — 99232 SBSQ HOSP IP/OBS MODERATE 35: CPT | Performed by: PHYSICIAN ASSISTANT

## 2022-02-17 PROCEDURE — C1751 CATH, INF, PER/CENT/MIDLINE: HCPCS

## 2022-02-17 PROCEDURE — 85025 COMPLETE CBC W/AUTO DIFF WBC: CPT | Performed by: NURSE PRACTITIONER

## 2022-02-17 PROCEDURE — 80048 BASIC METABOLIC PNL TOTAL CA: CPT | Performed by: NURSE PRACTITIONER

## 2022-02-17 PROCEDURE — 36569 INSJ PICC 5 YR+ W/O IMAGING: CPT

## 2022-02-17 RX ADMIN — OXCARBAZEPINE 600 MG: 150 TABLET, FILM COATED ORAL at 21:41

## 2022-02-17 RX ADMIN — ENOXAPARIN SODIUM 40 MG: 40 INJECTION SUBCUTANEOUS at 08:48

## 2022-02-17 RX ADMIN — VANCOMYCIN HYDROCHLORIDE 1000 MG: 1 INJECTION, SOLUTION INTRAVENOUS at 15:42

## 2022-02-17 RX ADMIN — TRIAMCINOLONE ACETONIDE 1 APPLICATION: 1 CREAM TOPICAL at 08:59

## 2022-02-17 RX ADMIN — SUCRALFATE 1 G: 1 TABLET ORAL at 00:42

## 2022-02-17 RX ADMIN — SUCRALFATE 1 G: 1 TABLET ORAL at 11:59

## 2022-02-17 RX ADMIN — PANTOPRAZOLE SODIUM 40 MG: 40 TABLET, DELAYED RELEASE ORAL at 15:42

## 2022-02-17 RX ADMIN — VANCOMYCIN HYDROCHLORIDE 1000 MG: 1 INJECTION, SOLUTION INTRAVENOUS at 00:42

## 2022-02-17 RX ADMIN — VANCOMYCIN HYDROCHLORIDE 1000 MG: 1 INJECTION, SOLUTION INTRAVENOUS at 08:47

## 2022-02-17 RX ADMIN — PANTOPRAZOLE SODIUM 40 MG: 40 TABLET, DELAYED RELEASE ORAL at 06:29

## 2022-02-17 RX ADMIN — LEVETIRACETAM 500 MG: 500 TABLET, FILM COATED ORAL at 08:48

## 2022-02-17 RX ADMIN — STANDARDIZED SENNA CONCENTRATE 8.6 MG: 8.6 TABLET ORAL at 08:48

## 2022-02-17 RX ADMIN — OXCARBAZEPINE 600 MG: 150 TABLET, FILM COATED ORAL at 08:48

## 2022-02-17 RX ADMIN — LEVETIRACETAM 500 MG: 500 TABLET, FILM COATED ORAL at 21:41

## 2022-02-17 RX ADMIN — TRIAMCINOLONE ACETONIDE: 1 CREAM TOPICAL at 15:43

## 2022-02-17 RX ADMIN — SUCRALFATE 1 G: 1 TABLET ORAL at 06:29

## 2022-02-17 RX ADMIN — SUCRALFATE 1 G: 1 TABLET ORAL at 15:42

## 2022-02-17 NOTE — ASSESSMENT & PLAN NOTE
· Progressive hemoglobin decline noted since admission  Upon reviewing patient's old records from [place] , patient has history of angiodysplasia and had argon beam therapy on January 2019  There was also a remote mention of esophageal varices      · No signs of bleeding   · Status post EGD 2/11   · Continue to follow serial H&H   · Continue with PPI therapy  · GI following  · Recommend outpatient colonoscopy

## 2022-02-17 NOTE — PROGRESS NOTES
Vancomycin Assessment    Dinh Walker is a 60 y o  male who is currently receiving vancomycin 1g IV q8 hours for skin-soft tissue infection     Relevant clinical data and objective history reviewed:  Creatinine   Date Value Ref Range Status   02/17/2022 0 72 0 60 - 1 30 mg/dL Final     Comment:     Standardized to IDMS reference method   02/16/2022 0 59 (L) 0 60 - 1 30 mg/dL Final     Comment:     Standardized to IDMS reference method   02/14/2022 0 51 (L) 0 60 - 1 30 mg/dL Final     Comment:     Standardized to IDMS reference method     /72 (BP Location: Left arm)   Pulse 87   Temp 97 7 °F (36 5 °C) (Oral)   Resp 20   Ht 6' (1 829 m)   Wt 79 4 kg (175 lb)   SpO2 94%   BMI 23 73 kg/m²   I/O last 3 completed shifts: In: 480 [P O :480]  Out: 1650 [Urine:1650]  Lab Results   Component Value Date/Time    BUN 9 02/17/2022 05:00 AM    WBC 6 77 02/17/2022 05:00 AM    HGB 7 5 (L) 02/17/2022 05:00 AM    HCT 26 6 (L) 02/17/2022 05:00 AM    MCV 87 02/17/2022 05:00 AM     02/17/2022 05:00 AM     Temp Readings from Last 3 Encounters:   02/17/22 97 7 °F (36 5 °C) (Oral)     Vancomycin Days of Therapy: 10    Assessment/Plan  The patient is currently on vancomycin utilizing scheduled dosing  The patient is receiving 1g IV q8 hours with the most recent vancomycin level being at steady-state and supratherapeutic based on a goal of 15-20 (appropriate for most indications) ; therefore, after clinical evaluation will be changed to 750mg IV q8 hours   Pharmacy will continue to follow closely for s/sx of nephrotoxicity, infusion reactions, and appropriateness of therapy  BMP and CBC will be ordered per protocol  Plan for trough as patient approaches steady state, prior to the 5th  dose at approximately 0700 on 2/19/22  Pharmacy will continue to follow the patients culture results and clinical progress daily      Satish Parents, Pharmacist

## 2022-02-17 NOTE — ASSESSMENT & PLAN NOTE
· Esophageal varices noted on EGD 2/11, status post banding  · Started nadolol, discontinued propranolol  · GI following  · Repeat endoscopy in 2-3 months  · Diet as tolerated

## 2022-02-17 NOTE — ASSESSMENT & PLAN NOTE
· Controlled  · Discontinued propranolol in favor of nadolol  · Will hold Lasix in view of borderline low blood pressure  · BP continues to be soft

## 2022-02-17 NOTE — PROGRESS NOTES
PICC catheter noted to be malpositioned based on initial imaging from PICC placement XRAY  Attempted to reposition PICC catheter by power flushing - pending repeat CXR  1847 - Right PICC line has been repositioned approximately 5 cm beyond the cavoatrial junction  1900 - PICC pulled back 5 CM  Remains with excellent brisk blood return, PICC flushed well without resistance  Clamp secured  PICC OK TO USE, primary RN aware

## 2022-02-17 NOTE — ASSESSMENT & PLAN NOTE
· Left lower extremity erythema and edema, likely cellulitis in the setting of chronic venous stasis and stasis dermatitis Received cefepime and vancomycin in ED  · S/p I&D with orthopedics 2/10  · Started on IV Ancef on admission  Blood cultures from admission positive for MRSA Antibiotics, changed to IV vancomycin     · Repeat BC ordered 2/12, NG x 4 days  · Infectious disease following  · Recommending IV vancomycin through 03/12/2022 for total of 4 weeks therapy   · Check trough today at 1500   · Discussed with Pharmacy and RN  · PICC line ordered  · To be placed around 1630 2/17  · Stable for discharge when isolation bed found

## 2022-02-17 NOTE — PLAN OF CARE
Problem: MOBILITY - ADULT  Goal: Maintain or return to baseline ADL function  Description: INTERVENTIONS:  -  Assess patient's ability to carry out ADLs; assess patient's baseline for ADL function and identify physical deficits which impact ability to perform ADLs (bathing, care of mouth/teeth, toileting, grooming, dressing, etc )  - Assess/evaluate cause of self-care deficits   - Assess range of motion  - Assess patient's mobility; develop plan if impaired  - Assess patient's need for assistive devices and provide as appropriate  - Encourage maximum independence but intervene and supervise when necessary  - Involve family in performance of ADLs  - Assess for home care needs following discharge   - Consider OT consult to assist with ADL evaluation and planning for discharge  - Provide patient education as appropriate  Outcome: Progressing  Goal: Maintains/Returns to pre admission functional level  Description: INTERVENTIONS:  - Perform BMAT or MOVE assessment daily    - Set and communicate daily mobility goal to care team and patient/family/caregiver  - Collaborate with rehabilitation services on mobility goals if consulted  - Perform Range of Motion 3 times a day  - Reposition patient every 2 hours    - Dangle patient 3 times a day  - Stand patient 3 times a day  - Ambulate patient 3 times a day  - Out of bed to chair 3 times a day   - Out of bed for meals 3 times a day  - Out of bed for toileting  - Record patient progress and toleration of activity level   Outcome: Progressing     Problem: PAIN - ADULT  Goal: Verbalizes/displays adequate comfort level or baseline comfort level  Description: Interventions:  - Encourage patient to monitor pain and request assistance  - Assess pain using appropriate pain scale  - Administer analgesics based on type and severity of pain and evaluate response  - Implement non-pharmacological measures as appropriate and evaluate response  - Consider cultural and social influences on pain and pain management  - Notify physician/advanced practitioner if interventions unsuccessful or patient reports new pain  Outcome: Progressing     Problem: INFECTION - ADULT  Goal: Absence or prevention of progression during hospitalization  Description: INTERVENTIONS:  - Assess and monitor for signs and symptoms of infection  - Monitor lab/diagnostic results  - Monitor all insertion sites, i e  indwelling lines, tubes, and drains  - Monitor endotracheal if appropriate and nasal secretions for changes in amount and color  - [place] appropriate cooling/warming therapies per order  - Administer medications as ordered  - Instruct and encourage patient and family to use good hand hygiene technique  - Identify and instruct in appropriate isolation precautions for identified infection/condition  Outcome: Progressing  Goal: Absence of fever/infection during neutropenic period  Description: INTERVENTIONS:  - Monitor WBC    Outcome: Progressing     Problem: SAFETY ADULT  Goal: Maintain or return to baseline ADL function  Description: INTERVENTIONS:  -  Assess patient's ability to carry out ADLs; assess patient's baseline for ADL function and identify physical deficits which impact ability to perform ADLs (bathing, care of mouth/teeth, toileting, grooming, dressing, etc )  - Assess/evaluate cause of self-care deficits   - Assess range of motion  - Assess patient's mobility; develop plan if impaired  - Assess patient's need for assistive devices and provide as appropriate  - Encourage maximum independence but intervene and supervise when necessary  - Involve family in performance of ADLs  - Assess for home care needs following discharge   - Consider OT consult to assist with ADL evaluation and planning for discharge  - Provide patient education as appropriate  Outcome: Progressing  Goal: Maintains/Returns to pre admission functional level  Description: INTERVENTIONS:  - Perform BMAT or MOVE assessment daily    - Set and communicate daily mobility goal to care team and patient/family/caregiver  - Collaborate with rehabilitation services on mobility goals if consulted  - Perform Range of Motion 3 times a day  - Reposition patient every 2 hours    - Dangle patient 3 times a day  - Stand patient 3 times a day  - Ambulate patient 3 times a day  - Out of bed to chair 3 times a day   - Out of bed for meals 3 times a day  - Out of bed for toileting  - Record patient progress and toleration of activity level   Outcome: Progressing  Goal: Patient will remain free of falls  Description: INTERVENTIONS:  - Educate patient/family on patient safety including physical limitations  - Instruct patient to call for assistance with activity   - Consult OT/PT to assist with strengthening/mobility   - Keep Call bell within reach  - Keep bed low and locked with side rails adjusted as appropriate  - Keep care items and personal belongings within reach  - Initiate and maintain comfort rounds  - Make Fall Risk Sign visible to staff  - Offer Toileting every 2 Hours, in advance of need  - Initiate/Maintain bed alarm  - Apply yellow socks and bracelet for high fall risk patients  - Consider moving patient to room near nurses station  Outcome: Progressing     Problem: DISCHARGE PLANNING  Goal: Discharge to home or other facility with appropriate resources  Description: INTERVENTIONS:  - Identify barriers to discharge w/patient and caregiver  - Arrange for needed discharge resources and transportation as appropriate  - Identify discharge learning needs (meds, wound care, etc )  - Arrange for interpretive services to assist at discharge as needed  - Refer to Case Management Department for coordinating discharge planning if the patient needs post-hospital services based on physician/advanced practitioner order or complex needs related to functional status, cognitive ability, or social support system  Outcome: Progressing     Problem: Knowledge Deficit  Goal: Patient/family/caregiver demonstrates understanding of disease process, treatment plan, medications, and discharge instructions  Description: Complete learning assessment and assess knowledge base    Interventions:  - Provide teaching at level of understanding  - Provide teaching via preferred learning methods  Outcome: Progressing     Problem: SKIN/TISSUE INTEGRITY - ADULT  Goal: Skin Integrity remains intact(Skin Breakdown Prevention)  Description: Assess:  -Perform [place] assessment every shift   -Inspect skin when repositioning, toileting, and assisting with ADLS  -Assess extremities for adequate circulation and sensation     Bed Management:  -Have minimal linens on bed & keep smooth, unwrinkled  -Change linens as needed when moist or perspiring  -Avoid sitting or lying in one position for more than 2 hours while in bed  -Keep HOB at 30 degrees     Toileting:  -Offer bedside commode  -Assess for incontinence every shift     Activity:  -Mobilize patient 3 times a day  -Encourage activity and walks on unit  -Encourage or provide ROM exercises   -Turn and reposition patient every 2 Hours  -Use appropriate equipment to lift or move patient in bed    Skin Care:  -Avoid use of baby powder, tape, friction and shearing, hot water or constrictive clothing  -Relieve pressure over bony prominences using allevyn  -Do not massage red bony areas  Goal: Incision(s), wounds(s) or drain site(s) healing without S/S of infection  Description: INTERVENTIONS  - Assess and document dressing, incision, wound bed, drain sites and surrounding tissue  - Provide patient and family education  Outcome: Progressing  Goal: Pressure injury heals and does not worsen  Description: Interventions:  - Implement low air loss mattress or specialty surface (Criteria met)  - Apply silicone foam dressing  - Instruct/assist with weight shifting every 20 minutes when in chair   - Limit chair time to 2 hour intervals  - Apply fecal or urinary incontinence containment device   - Utilize friction reducing device or surface for transfers   - Consider nutrition services referral as needed  Outcome: Progressing     Problem: MUSCULOSKELETAL - ADULT  Goal: Maintain or return mobility to safest level of function  Description: INTERVENTIONS:  - Assess patient's ability to carry out ADLs; assess patient's baseline for ADL function and identify physical deficits which impact ability to perform ADLs (bathing, care of mouth/teeth, toileting, grooming, dressing, etc )  - Assess/evaluate cause of self-care deficits   - Assess range of motion  - Assess patient's mobility  - Assess patient's need for assistive devices and provide as appropriate  - Encourage maximum independence but intervene and supervise when necessary  - Involve family in performance of ADLs  - Assess for home care needs following discharge   - Consider OT consult to assist with ADL evaluation and planning for discharge  - Provide patient education as appropriate  Outcome: Progressing  Goal: Maintain proper alignment of affected body part  Description: INTERVENTIONS:  - Support, maintain and protect limb and body alignment  - Provide patient/ family with appropriate education  Outcome: Progressing     Problem: Nutrition/Hydration-ADULT  Goal: Nutrient/Hydration intake appropriate for improving, restoring or maintaining nutritional needs  Description: Monitor and assess patient's nutrition/hydration status for malnutrition  Collaborate with interdisciplinary team and initiate plan and interventions as ordered  Monitor patient's weight and dietary intake as ordered or per policy  Utilize nutrition screening tool and intervene as necessary  Determine patient's food preferences and provide high-protein, high-caloric foods as appropriate       INTERVENTIONS:  - Monitor oral intake, urinary output, labs, and treatment plans  - Assess nutrition and hydration status and recommend course of action  - Evaluate amount of meals eaten  - Assist patient with eating if necessary   - Allow adequate time for meals  - Recommend/ encourage appropriate diets, oral nutritional supplements, and vitamin/mineral supplements  - Order, calculate, and assess calorie counts as needed  - Recommend, monitor, and adjust tube feedings and TPN/PPN based on assessed needs  - Assess need for intravenous fluids  - Provide specific nutrition/hydration education as appropriate  - Include patient/family/caregiver in decisions related to nutrition  Outcome: Progressing     Problem: Prexisting or High Potential for Compromised Skin Integrity  Goal: Skin integrity is maintained or improved  Description: INTERVENTIONS:  - Identify patients at risk for skin breakdown  - Assess and monitor skin integrity  - Assess and monitor nutrition and hydration status  - Monitor labs   - Assess for incontinence   - Turn and reposition patient  - Assist with mobility/ambulation  - Relieve pressure over bony prominences  - Avoid friction and shearing  - Provide appropriate hygiene as needed including keeping skin clean and dry  - Evaluate need for skin moisturizer/barrier cream  - Collaborate with interdisciplinary team   - Patient/family teaching  - Consider wound care consult   Outcome: Progressing     Problem: Potential for Falls  Goal: Patient will remain free of falls  Description: INTERVENTIONS:  - Educate patient/family on patient safety including physical limitations  - Instruct patient to call for assistance with activity   - Consult OT/PT to assist with strengthening/mobility   - Keep Call bell within reach  - Keep bed low and locked with side rails adjusted as appropriate  - Keep care items and personal belongings within reach  - Initiate and maintain comfort rounds  - Make Fall Risk Sign visible to staff  - Offer Toileting every 2 Hours, in advance of need  - Initiate/Maintain bed alarm  - Apply yellow socks and bracelet for high fall risk patients  - Consider moving patient to room near nurses station  Outcome: Progressing
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
## 2022-02-17 NOTE — CASE MANAGEMENT
Case Management Discharge Planning Note    Patient name Andra Babcock  Location S /S -01 MRN [id number]  : 1965 Date 2022       Current Admission Date: 2022  Current Admission Diagnosis:Cellulitis of left lower extremity   Patient Active Problem List    Diagnosis Date Noted    Esophageal varices ([facility] ) 2022    Hypoxia 2022    Anemia 02/10/2022    Status post splenectomy 02/10/2022    MRSA bacteremia 2022    Cellulitis of left lower extremity 2022    Ambulatory dysfunction 2022    Nonintractable epilepsy without status epilepticus ([facility] ) 2022    Primary hypertension 2022    Gastroesophageal reflux disease without esophagitis 2022    Venous stasis dermatitis of both lower extremities 2022      LOS (days): 7  Geometric Mean LOS (GMLOS) (days): 3 50  Days to GMLOS:-3 8     OBJECTIVE:  Risk of Unplanned Readmission Score: 15         Current admission status: Inpatient   Preferred Pharmacy:   [address] [facility]  [facility] [place] [address]  Phone: [phone] Fax: [phone]    Primary Care Provider: No primary care provider on file  Primary Insurance: MEDICARE  Secondary Insurance: ERIS LAZO PENDING    DISCHARGE DETAILS:    Discharge planning discussed with[de-identified] patient's KATHARINE Angel via phone  Freedom of Choice: Yes  Comments - Freedom of Choice: As per SLIM, patient's PICC will be placed today and he is medically stable for dc once an accepting facility is identified  At this time[facility]  Ember Nance at [place] is trying to see where they would have an appropriate bed for him, as he would require isolation d/t + MRSA in his leg wound cultures  [facility] is also still reviewing  CM reached out to KATHARINE Trinidad via phone to review all options   She reports that [facility] or [facility] would be preferred as they would like to keep patient as close to home for visitation purposes  She did mention that he recently tested + for COVID on 1/11/22 in [place]  She emailed CM proof of same  CM agreed to follow up w/ family tomorrow re: final bed offers    CM contacted family/caregiver?: Yes (KATHARINE Liu)  Were Treatment Team discharge recommendations reviewed with patient/caregiver?: Yes  Did patient/caregiver verbalize understanding of patient care needs?: N/A- going to facility  Were patient/caregiver advised of the risks associated with not following Treatment Team discharge recommendations?: Yes    Contacts  Patient Contacts: KATHARINE Angel  Relationship to Patient[de-identified] Family  Contact Method: Phone  Phone Number: [phone]  Reason/Outcome: Continuity of Care,Referral,Discharge Planning    Would you like to participate in our [address] service program?  : No - Declined    Treatment Team Recommendation: Short Term Rehab  Discharge Destination Plan[de-identified] Short Term Rehab  Transport at Discharge : [address] [facility] (Transport is dependent on acute O2 needs)

## 2022-02-17 NOTE — WOUND OSTOMY CARE
Progress Note - Wound   Jose Martin 60 y o  male MRN: [id number]  Unit/Bed#: S -01 Encounter: [id number]      Assessment:   Patient admitted due to cellulitis of left lower extremity  History of GERD, HTN, Seizures  Wound care follow-up for bilateral lower extremities  Patient agreeable to assessment, alert and oriented x4, continent of bowel and bladder, independently turns for assessment, heels elevated off of mattress, pillow support for turning and repositioning, is an assist with care  Primary RN aware of assessment findings      1  Bilateral sacrum, buttock, and heels are dry, intact, blanchable pink skin      2  Bilateral lower extremities noted with excoriations scattered to anterior tibial due patient scratching  Wounds have improved  There are scattered superficial open aspects with no drainage, are a mixture of pink tissue and adhered scabbing  Marisela-wounds are dry, intact, re-epithelial tissue, no redness, no warmth, no induration      3  Left medial knee-  I&D of this site was completed 2/10/22  There are currently 2 open aspects that are oval in shape, approx  10% yellow adhered tissue and 90% beefy red and pink tissue, with moderate amount of serosanguineous drainage, no foul odor  Marisela-wound is dry, intact, indurated, erythematous, no warmth, no fluctuance noted  General Surgery aware of appearance      Educated patient on importance of frequent offloading of pressure via turning, repositioning, and weight-shifting  Verbalized understanding of plan of care      No induration, fluctuance, odor, warmth, redness, or purulence noted to the above noted wound  New dressings applied  Patient tolerated well, denies pain to the wound  Primary nurse aware of plan of care  See flow sheets for more detailed assessment findings   Wound care will sign off if orthopedic surgery evaluation completed and are following patient for left knee wound      Skin care plans:  1-Hydraguard to bilateral sacrum, buttock and heels BID and PRN  2-Elevate heels to offload pressure  3-Ehob cushion in chair when out of bed  4-Moisturize skin daily with skin nourishing cream   5-Turn/reposition q2h for pressure re-distribution on skin  6- B/L anterior tibial- Cleanse legs with soap and water, pat dry  Apply Dermagran gauze over scattered superficial open areas, cover with ABD pad, wrap with Lisbet  Change every other day and PRN  May discontinue dressing once superficial open aspects have resolved  7- Left knee- Cleanse with NSS, pat dry  Pack wound lightly with 1/4" Iodoform packing, leave tail out for easy retrieval  Cover with Maxorb alginate  Cover with 4x4, ABD pad, wrap with Lisbet  Change Daily and PRN for soilage/dislodgment  Wound 02/08/22 Cellulitis Knee Left;Medial (Active)   Wound Image   02/17/22 0839   Wound Description Beefy red;Pink;Yellow 02/17/22 0839   Marisela-wound Assessment Dry; Intact; Erythema;Swelling 02/17/22 0839   Wound Length (cm) 0 5 cm 02/17/22 0839   Wound Width (cm) 1 2 cm 02/17/22 0839   Wound Depth (cm) 0 7 cm 02/17/22 0839   Wound Surface Area (cm^2) 0 6 cm^2 02/17/22 0839   Wound Volume (cm^3) 0 42 cm^3 02/17/22 0839   Calculated Wound Volume (cm^3) 0 42 cm^3 02/17/22 0839   Tunneling in depth located at 0 02/17/22 0839   Undermining 1 5 02/17/22 0839   Undermining is depth extending from 12 02/17/22 0839   Drainage Amount Moderate 02/17/22 0839   Drainage Description Serosanguineous 02/17/22 0839   Non-staged Wound Description Full thickness 02/17/22 0839   Treatments Cleansed;Irrigation with NSS;Site care 02/17/22 0839   Dressing Packings;Gauze;ABD;Dry dressing 02/17/22 0839   Wound packed? Yes 02/17/22 0839   Packing- # removed 0 02/17/22 0839   Packing- # inserted 1 02/17/22 0839   Dressing Changed Changed 02/17/22 0839   Patient Tolerance Tolerated well 02/17/22 0839   Dressing Status Clean;Dry; Intact 02/17/22 0839     Call or tigertext with any questions  Wound Care will continue to follow    Estrella Mccarthy RN BSN  Wound care

## 2022-02-17 NOTE — DISCHARGE INSTR - OTHER ORDERS
All pertinent pre op instructions were discussed with the patient.   We discussed the skin wash instructions, discussed the need to stop all blood thinners, ibuprofen, aspirin and the like, green tea supplements, etc.    Necessity for pre op clearance appoi Skin care plans:  1-Hydraguard to bilateral sacrum, buttock and heels BID and PRN  2-Elevate heels to offload pressure  3-Ehob cushion in chair when out of bed  4-Moisturize skin daily with skin nourishing cream   5-Turn/reposition q2h for pressure re-distribution on skin  6- B/L anterior tibial- Cleanse legs with soap and water, pat dry  Apply Dermagran gauze over scattered superficial open areas, cover with ABD pad, wrap with Lisbet  Change every other day and PRN  May discontinue dressing once superficial open aspects have resolved  7- Left knee- Cleanse with NSS, pat dry  Pack wound lightly with 1/4" Iodoform packing, leave tail out for easy retrieval  Cover with Maxorb alginate  Cover with 4x4, ABD pad, wrap with Lisbet  Change Daily and PRN for soilage/dislodgment

## 2022-02-17 NOTE — ASSESSMENT & PLAN NOTE
· Patient has a history of seizures since being a child  · No seizures currently   · Will continue with Keppra and Trileptal  · Seizure precautions

## 2022-02-17 NOTE — PROGRESS NOTES
Vancomycin IV Pharmacy-to-Dose Consultation    Jong Fabian is a 60 y o  male who is currently receiving Vancomycin IV with management by the Pharmacy Consult service for the treatment of MRSA bacteremia    Assessment/Plan:    The patient's chart was reviewed  Renal function is stable  There are no signs or symptoms of nephrotoxicity and/or infusion reactions documented  Based on today's assessment, will continue current vancomycin (Day # 9) dosing of 1000mg IV every 8 hours, with a plan for trough to be drawn at 0700 on 2/18  We will continue to follow the patient's culture results and clinical progress daily        Elizabeth Barboza, PharmD   Pharmacist

## 2022-02-17 NOTE — ASSESSMENT & PLAN NOTE
· Patient presented with left lower extremity cellulitis  · Blood cultures on admission positive for MRSA     · Transthoracic echocardiogram negative for any vegetations  Patient denies any back pain but has persistent left lower extremity pain  · CT left lower extremity shows: Suspected thrombosis/thrombophlebitis of the great saphenous vein  Diffuse subcutaneous edema which could be secondary to cellulitis, venous stasis, or dependent edema  No discrete rim enhancing fluid collection  · No osseous lesion seen on CT scan  Orthopedic preformed I/D on 2/10  · Infectious disease consulted for antibiotic management  · Antibiotics switched to IV vancomycin 2/9, continue through 3/12 for 4 weeks therapy   · Pharmacy consulted  for dosing management    · Repeat blood cultures ordered for 2/12, no growth x 4 days  · PICC placement once blood cultures are negative for 72 hr  · Consent signed and in chart 2/15

## 2022-02-17 NOTE — PLAN OF CARE
Problem: MOBILITY - ADULT  Goal: Maintain or return to baseline ADL function  Description: INTERVENTIONS:  -  Assess patient's ability to carry out ADLs; assess patient's baseline for ADL function and identify physical deficits which impact ability to perform ADLs (bathing, care of mouth/teeth, toileting, grooming, dressing, etc )  - Assess/evaluate cause of self-care deficits   - Assess range of motion  - Assess patient's mobility; develop plan if impaired  - Assess patient's need for assistive devices and provide as appropriate  - Encourage maximum independence but intervene and supervise when necessary  - Involve family in performance of ADLs  - Assess for home care needs following discharge   - Consider OT consult to assist with ADL evaluation and planning for discharge  - Provide patient education as appropriate  Outcome: Progressing  Goal: Maintains/Returns to pre admission functional level  Description: INTERVENTIONS:  - Perform BMAT or MOVE assessment daily    - Set and communicate daily mobility goal to care team and patient/family/caregiver  - Collaborate with rehabilitation services on mobility goals if consulted  - Perform Range of Motion  times a day  - Reposition patient every  hours    - Dangle patient  times a day  - Stand patient  times a day  - Ambulate patient  times a day  - Out of bed to chair  times a day   - Out of bed for meals  times a day  - Out of bed for toileting  - Record patient progress and toleration of activity level   Outcome: Progressing     Problem: PAIN - ADULT  Goal: Verbalizes/displays adequate comfort level or baseline comfort level  Description: Interventions:  - Encourage patient to monitor pain and request assistance  - Assess pain using appropriate pain scale  - Administer analgesics based on type and severity of pain and evaluate response  - Implement non-pharmacological measures as appropriate and evaluate response  - Consider cultural and social influences on pain and pain management  - Notify physician/advanced practitioner if interventions unsuccessful or patient reports new pain  Outcome: Progressing     Problem: INFECTION - ADULT  Goal: Absence or prevention of progression during hospitalization  Description: INTERVENTIONS:  - Assess and monitor for signs and symptoms of infection  - Monitor lab/diagnostic results  - Monitor all insertion sites, i e  indwelling lines, tubes, and drains  - Monitor endotracheal if appropriate and nasal secretions for changes in amount and color  - Coulter appropriate cooling/warming therapies per order  - Administer medications as ordered  - Instruct and encourage patient and family to use good hand hygiene technique  - Identify and instruct in appropriate isolation precautions for identified infection/condition  Outcome: Progressing  Goal: Absence of fever/infection during neutropenic period  Description: INTERVENTIONS:  - Monitor WBC    Outcome: Progressing     Problem: SAFETY ADULT  Goal: Maintain or return to baseline ADL function  Description: INTERVENTIONS:  -  Assess patient's ability to carry out ADLs; assess patient's baseline for ADL function and identify physical deficits which impact ability to perform ADLs (bathing, care of mouth/teeth, toileting, grooming, dressing, etc )  - Assess/evaluate cause of self-care deficits   - Assess range of motion  - Assess patient's mobility; develop plan if impaired  - Assess patient's need for assistive devices and provide as appropriate  - Encourage maximum independence but intervene and supervise when necessary  - Involve family in performance of ADLs  - Assess for home care needs following discharge   - Consider OT consult to assist with ADL evaluation and planning for discharge  - Provide patient education as appropriate  Outcome: Progressing  Goal: Maintains/Returns to pre admission functional level  Description: INTERVENTIONS:  - Perform BMAT or MOVE assessment daily    - Set and communicate daily mobility goal to care team and patient/family/caregiver  - Collaborate with rehabilitation services on mobility goals if consulted  - Perform Range of Motion  times a day  - Reposition patient every  hours    - Dangle patient  times a day  - Stand patient  times a day  - Ambulate patient  times a day  - Out of bed to chair  times a day   - Out of bed for meals  times a day  - Out of bed for toileting  - Record patient progress and toleration of activity level   Outcome: Progressing  Goal: Patient will remain free of falls  Description: INTERVENTIONS:  - Educate patient/family on patient safety including physical limitations  - Instruct patient to call for assistance with activity   - Consult OT/PT to assist with strengthening/mobility   - Keep Call bell within reach  - Keep bed low and locked with side rails adjusted as appropriate  - Keep care items and personal belongings within reach  - Initiate and maintain comfort rounds  - Make Fall Risk Sign visible to staff  - Offer Toileting every  Hours, in advance of need  - Initiate/Maintain alarm  - Obtain necessary fall risk management equipment:   - Apply yellow socks and bracelet for high fall risk patients  - Consider moving patient to room near nurses station  Outcome: Progressing     Problem: DISCHARGE PLANNING  Goal: Discharge to home or other facility with appropriate resources  Description: INTERVENTIONS:  - Identify barriers to discharge w/patient and caregiver  - Arrange for needed discharge resources and transportation as appropriate  - Identify discharge learning needs (meds, wound care, etc )  - Arrange for interpretive services to assist at discharge as needed  - Refer to Case Management Department for coordinating discharge planning if the patient needs post-hospital services based on physician/advanced practitioner order or complex needs related to functional status, cognitive ability, or social support system  Outcome: Progressing Problem: Knowledge Deficit  Goal: Patient/family/caregiver demonstrates understanding of disease process, treatment plan, medications, and discharge instructions  Description: Complete learning assessment and assess knowledge base    Interventions:  - Provide teaching at level of understanding  - Provide teaching via preferred learning methods  Outcome: Progressing     Problem: SKIN/TISSUE INTEGRITY - ADULT  Goal: Skin Integrity remains intact(Skin Breakdown Prevention)  Description: Assess:  -Perform Bora assessment every   -Clean and moisturize skin every   -Inspect skin when repositioning, toileting, and assisting with ADLS  -Assess under medical devices such as  every   -Assess extremities for adequate circulation and sensation     Bed Management:  -Have minimal linens on bed & keep smooth, unwrinkled  -Change linens as needed when moist or perspiring  -Avoid sitting or lying in one position for more than  hours while in bed  -Keep HOB at degrees     Toileting:  -Offer bedside commode  -Assess for incontinence every   -Use incontinent care products after each incontinent episode such as     Activity:  -Mobilize patient  times a day  -Encourage activity and walks on unit  -Encourage or provide ROM exercises   -Turn and reposition patient every  Hours  -Use appropriate equipment to lift or move patient in bed  -Instruct/ Assist with weight shifting every  when out of bed in chair  -Consider limitation of chair time  hour intervals    Skin Care:  -Avoid use of baby powder, tape, friction and shearing, hot water or constrictive clothing  -Relieve pressure over bony prominences using   -Do not massage red bony areas    Next Steps:  -Teach patient strategies to minimize risks such as    -Consider consults to  interdisciplinary teams such as   Outcome: Progressing  Goal: Incision(s), wounds(s) or drain site(s) healing without S/S of infection  Description: INTERVENTIONS  - Assess and document dressing, incision, wound bed, drain sites and surrounding tissue  - Provide patient and family education  - Perform skin care/dressing changes every   Outcome: Progressing  Goal: Pressure injury heals and does not worsen  Description: Interventions:  - Implement low air loss mattress or specialty surface (Criteria met)  - Apply silicone foam dressing  - Instruct/assist with weight shifting every  minutes when in chair   - Limit chair time to  hour intervals  - Use special pressure reducing interventions such as  when in chair   - Apply fecal or urinary incontinence containment device   - Perform passive or active ROM every - Turn and reposition patient & offload bony prominences every hours   - Utilize friction reducing device or surface for transfers   - Consider consults to  interdisciplinary teams such as - Use incontinent care products after each incontinent episode such as   - Consider nutrition services referral as needed  Outcome: Progressing     Problem: MUSCULOSKELETAL - ADULT  Goal: Maintain or return mobility to safest level of function  Description: INTERVENTIONS:  - Assess patient's ability to carry out ADLs; assess patient's baseline for ADL function and identify physical deficits which impact ability to perform ADLs (bathing, care of mouth/teeth, toileting, grooming, dressing, etc )  - Assess/evaluate cause of self-care deficits   - Assess range of motion  - Assess patient's mobility  - Assess patient's need for assistive devices and provide as appropriate  - Encourage maximum independence but intervene and supervise when necessary  - Involve family in performance of ADLs  - Assess for home care needs following discharge   - Consider OT consult to assist with ADL evaluation and planning for discharge  - Provide patient education as appropriate  Outcome: Progressing  Goal: Maintain proper alignment of affected body part  Description: INTERVENTIONS:  - Support, maintain and protect limb and body alignment  - Provide patient/ family with appropriate education  Outcome: Progressing     Problem: Nutrition/Hydration-ADULT  Goal: Nutrient/Hydration intake appropriate for improving, restoring or maintaining nutritional needs  Description: Monitor and assess patient's nutrition/hydration status for malnutrition  Collaborate with interdisciplinary team and initiate plan and interventions as ordered  Monitor patient's weight and dietary intake as ordered or per policy  Utilize nutrition screening tool and intervene as necessary  Determine patient's food preferences and provide high-protein, high-caloric foods as appropriate       INTERVENTIONS:  - Monitor oral intake, urinary output, labs, and treatment plans  - Assess nutrition and hydration status and recommend course of action  - Evaluate amount of meals eaten  - Assist patient with eating if necessary   - Allow adequate time for meals  - Recommend/ encourage appropriate diets, oral nutritional supplements, and vitamin/mineral supplements  - Order, calculate, and assess calorie counts as needed  - Recommend, monitor, and adjust tube feedings and TPN/PPN based on assessed needs  - Assess need for intravenous fluids  - Provide specific nutrition/hydration education as appropriate  - Include patient/family/caregiver in decisions related to nutrition  Outcome: Progressing     Problem: Prexisting or High Potential for Compromised Skin Integrity  Goal: Skin integrity is maintained or improved  Description: INTERVENTIONS:  - Identify patients at risk for skin breakdown  - Assess and monitor skin integrity  - Assess and monitor nutrition and hydration status  - Monitor labs   - Assess for incontinence   - Turn and reposition patient  - Assist with mobility/ambulation  - Relieve pressure over bony prominences  - Avoid friction and shearing  - Provide appropriate hygiene as needed including keeping skin clean and dry  - Evaluate need for skin moisturizer/barrier cream  - Collaborate with interdisciplinary team   - Patient/family teaching  - Consider wound care consult   Outcome: Progressing     Problem: Potential for Falls  Goal: Patient will remain free of falls  Description: INTERVENTIONS:  - Educate patient/family on patient safety including physical limitations  - Instruct patient to call for assistance with activity   - Consult OT/PT to assist with strengthening/mobility   - Keep Call bell within reach  - Keep bed low and locked with side rails adjusted as appropriate  - Keep care items and personal belongings within reach  - Initiate and maintain comfort rounds  - Make Fall Risk Sign visible to staff  - Offer Toileting every  Hours, in advance of need  - Initiate/Maintain alarm  - Obtain necessary fall risk management equipment:   - Apply yellow socks and bracelet for high fall risk patients  - Consider moving patient to room near nurses station  Outcome: Progressing
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
## 2022-02-17 NOTE — PROCEDURES
Insert PICC line    Date/Time: 2/17/2022 4:45 PM  Performed by: Benny Ignacio RN  Authorized by: Dexter Peña PA-C     Patient location:  Bedside  Other Assisting Provider: No    Consent:     Consent obtained:  Written (by MD)    Consent given by:  Patient (by MD)    Procedural risks discussed: by MD     Alternatives discussed: by MD   Universal protocol:     Procedure explained and questions answered to patient or proxy's satisfaction: yes      Relevant documents present and verified: yes      Test results available and properly labeled: yes      Radiology Images displayed and confirmed  If images not available, report reviewed: yes      Required blood products, implants, devices, and special equipment available: yes      Site/side marked: yes      Immediately prior to procedure, a time out was called: yes      Patient identity confirmed:  Verbally with patient and arm band  Pre-procedure details:     Hand hygiene: Hand hygiene performed prior to insertion      Sterile barrier technique: All elements of maximal sterile technique followed      Skin preparation:  ChloraPrep    Skin preparation agent: Skin preparation agent completely dried prior to procedure    Indications:     PICC line indications: long term antibiotics    Anesthesia (see MAR for exact dosages):      Anesthesia method:  Local infiltration    Local anesthetic:  Lidocaine 1% w/o epi (4 CC total used)  Procedure details:     Location:  Brachial    Vessel type: vein      Laterality:  Right    Patient position:  Flat    Procedural supplies:  Single lumen    Catheter size:  4 Fr    Landmarks identified: yes      Ultrasound guidance: yes      Sterile ultrasound techniques: Sterile gel and sterile probe covers were used      Number of attempts:  1    Successful placement: yes (CXR confirmed)      Vessel of catheter tip end:  Chest Xray needed to confirm placement    Total catheter length (cm):  39    Catheter out on skin (cm):  5    Max flow rate: 999 mL/hr    Arm circumference:  27 CM  Post-procedure details:     Post-procedure:  Dressing applied and securement device placed    Assessment:  Blood return through all ports, placement verification pending x-ray result and free fluid flow (pending CXR)    Post-procedure complications: none      Patient tolerance of procedure: Tolerated well, no immediate complications  Comments:      REF [id number]  LOT [id number]  EXP 03/31/2023    PENDING CXR TO VERIFY PLACEMENT, PRIMARY RN AWARE NOT TO USE UNTIL PLACEMENT CONFIRMED BY CXR    1832 - Right PICC line coiled in the right subclavian region  Repositioning recommended  PICC catheter noted to be malpositioned based on initial imaging from PICC placement XRAY  Attempted to reposition PICC catheter by power flushing - pending repeat CXR  Primary RN aware not to use until appropriate positioning confirmed  1847 - Right PICC line has been repositioned approximately 5 cm beyond the cavoatrial junction  1900 - PICC pulled back 5 CM  Remains with excellent brisk blood return, PICC flushed well without resistance  Clamp secured   PICC OK TO USE

## 2022-02-18 VITALS
BODY MASS INDEX: 23.7 KG/M2 | SYSTOLIC BLOOD PRESSURE: 121 MMHG | WEIGHT: 175 LBS | HEIGHT: 72 IN | HEART RATE: 87 BPM | RESPIRATION RATE: 18 BRPM | OXYGEN SATURATION: 91 % | DIASTOLIC BLOOD PRESSURE: 57 MMHG | TEMPERATURE: 98 F

## 2022-02-18 DIAGNOSIS — R78.81 GRAM-POSITIVE BACTEREMIA: Primary | ICD-10-CM

## 2022-02-18 LAB
ANION GAP SERPL CALCULATED.3IONS-SCNC: 2 MMOL/L (ref 4–13)
BASOPHILS # BLD AUTO: 0.02 THOUSANDS/ΜL (ref 0–0.1)
BASOPHILS NFR BLD AUTO: 0 % (ref 0–1)
BUN SERPL-MCNC: 8 MG/DL (ref 5–25)
CALCIUM SERPL-MCNC: 7.6 MG/DL (ref 8.3–10.1)
CHLORIDE SERPL-SCNC: 106 MMOL/L (ref 100–108)
CO2 SERPL-SCNC: 30 MMOL/L (ref 21–32)
CREAT SERPL-MCNC: 0.59 MG/DL (ref 0.6–1.3)
EOSINOPHIL # BLD AUTO: 0.31 THOUSAND/ΜL (ref 0–0.61)
EOSINOPHIL NFR BLD AUTO: 5 % (ref 0–6)
ERYTHROCYTE [DISTWIDTH] IN BLOOD BY AUTOMATED COUNT: 17.8 % (ref 11.6–15.1)
GFR SERPL CREATININE-BSD FRML MDRD: 111 ML/MIN/1.73SQ M
GLUCOSE SERPL-MCNC: 94 MG/DL (ref 65–140)
HCT VFR BLD AUTO: 26.1 % (ref 36.5–49.3)
HGB BLD-MCNC: 7.4 G/DL (ref 12–17)
IMM GRANULOCYTES # BLD AUTO: 0.04 THOUSAND/UL (ref 0–0.2)
IMM GRANULOCYTES NFR BLD AUTO: 1 % (ref 0–2)
LYMPHOCYTES # BLD AUTO: 0.94 THOUSANDS/ΜL (ref 0.6–4.47)
LYMPHOCYTES NFR BLD AUTO: 15 % (ref 14–44)
MCH RBC QN AUTO: 24.5 PG (ref 26.8–34.3)
MCHC RBC AUTO-ENTMCNC: 28.4 G/DL (ref 31.4–37.4)
MCV RBC AUTO: 86 FL (ref 82–98)
MONOCYTES # BLD AUTO: 0.55 THOUSAND/ΜL (ref 0.17–1.22)
MONOCYTES NFR BLD AUTO: 9 % (ref 4–12)
NEUTROPHILS # BLD AUTO: 4.37 THOUSANDS/ΜL (ref 1.85–7.62)
NEUTS SEG NFR BLD AUTO: 70 % (ref 43–75)
NRBC BLD AUTO-RTO: 0 /100 WBCS
PLATELET # BLD AUTO: 287 THOUSANDS/UL (ref 149–390)
PMV BLD AUTO: 9 FL (ref 8.9–12.7)
POTASSIUM SERPL-SCNC: 3.5 MMOL/L (ref 3.5–5.3)
RBC # BLD AUTO: 3.02 MILLION/UL (ref 3.88–5.62)
SODIUM SERPL-SCNC: 138 MMOL/L (ref 136–145)
WBC # BLD AUTO: 6.23 THOUSAND/UL (ref 4.31–10.16)

## 2022-02-18 PROCEDURE — 99239 HOSP IP/OBS DSCHRG MGMT >30: CPT | Performed by: PHYSICIAN ASSISTANT

## 2022-02-18 PROCEDURE — 99232 SBSQ HOSP IP/OBS MODERATE 35: CPT | Performed by: INTERNAL MEDICINE

## 2022-02-18 PROCEDURE — 85025 COMPLETE CBC W/AUTO DIFF WBC: CPT | Performed by: PHYSICIAN ASSISTANT

## 2022-02-18 PROCEDURE — 80048 BASIC METABOLIC PNL TOTAL CA: CPT | Performed by: PHYSICIAN ASSISTANT

## 2022-02-18 RX ORDER — PANTOPRAZOLE SODIUM 40 MG/1
40 TABLET, DELAYED RELEASE ORAL
Qty: 60 TABLET | Refills: 0 | Status: SHIPPED | OUTPATIENT
Start: 2022-02-18 | End: 2022-05-11 | Stop reason: SDUPTHER

## 2022-02-18 RX ORDER — NADOLOL 40 MG/1
40 TABLET ORAL DAILY
Qty: 30 TABLET | Refills: 0 | Status: SHIPPED | OUTPATIENT
Start: 2022-02-19 | End: 2022-05-11 | Stop reason: SDUPTHER

## 2022-02-18 RX ORDER — SUCRALFATE 1 G/1
1 TABLET ORAL EVERY 6 HOURS SCHEDULED
Qty: 120 TABLET | Refills: 0 | Status: SHIPPED | OUTPATIENT
Start: 2022-02-18 | End: 2022-05-11 | Stop reason: SDUPTHER

## 2022-02-18 RX ADMIN — VANCOMYCIN HYDROCHLORIDE 750 MG: 750 INJECTION, SOLUTION INTRAVENOUS at 06:46

## 2022-02-18 RX ADMIN — SUCRALFATE 1 G: 1 TABLET ORAL at 06:21

## 2022-02-18 RX ADMIN — LEVETIRACETAM 500 MG: 500 TABLET, FILM COATED ORAL at 09:06

## 2022-02-18 RX ADMIN — NADOLOL 40 MG: 40 TABLET ORAL at 09:07

## 2022-02-18 RX ADMIN — OXCARBAZEPINE 600 MG: 150 TABLET, FILM COATED ORAL at 09:06

## 2022-02-18 RX ADMIN — SUCRALFATE 1 G: 1 TABLET ORAL at 11:49

## 2022-02-18 RX ADMIN — ENOXAPARIN SODIUM 40 MG: 40 INJECTION SUBCUTANEOUS at 09:06

## 2022-02-18 RX ADMIN — PANTOPRAZOLE SODIUM 40 MG: 40 TABLET, DELAYED RELEASE ORAL at 06:21

## 2022-02-18 RX ADMIN — STANDARDIZED SENNA CONCENTRATE 8.6 MG: 8.6 TABLET ORAL at 09:06

## 2022-02-18 RX ADMIN — VANCOMYCIN HYDROCHLORIDE 750 MG: 750 INJECTION, SOLUTION INTRAVENOUS at 00:01

## 2022-02-18 RX ADMIN — TRIAMCINOLONE ACETONIDE 1 APPLICATION: 1 CREAM TOPICAL at 09:07

## 2022-02-18 RX ADMIN — SUCRALFATE 1 G: 1 TABLET ORAL at 00:00
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
## 2022-02-18 NOTE — PLAN OF CARE
Problem: MOBILITY - ADULT  Goal: Maintain or return to baseline ADL function  Description: INTERVENTIONS:  -  Assess patient's ability to carry out ADLs; assess patient's baseline for ADL function and identify physical deficits which impact ability to perform ADLs (bathing, care of mouth/teeth, toileting, grooming, dressing, etc )  - Assess/evaluate cause of self-care deficits   - Assess range of motion  - Assess patient's mobility; develop plan if impaired  - Assess patient's need for assistive devices and provide as appropriate  - Encourage maximum independence but intervene and supervise when necessary  - Involve family in performance of ADLs  - Assess for home care needs following discharge   - Consider OT consult to assist with ADL evaluation and planning for discharge  - Provide patient education as appropriate  Outcome: Progressing  Goal: Maintains/Returns to pre admission functional level  Description: INTERVENTIONS:  - Perform BMAT or MOVE assessment daily    - Set and communicate daily mobility goal to care team and patient/family/caregiver  - Collaborate with rehabilitation services on mobility goals if consulted  - Perform Range of Motion  times a day  - Reposition patient every  hours    - Dangle patient  times a day  - Stand patient  times a day  - Ambulate patient  times a day  - Out of bed to chair  times a day   - Out of bed for meals  times a day  - Out of bed for toileting  - Record patient progress and toleration of activity level   Outcome: Progressing     Problem: PAIN - ADULT  Goal: Verbalizes/displays adequate comfort level or baseline comfort level  Description: Interventions:  - Encourage patient to monitor pain and request assistance  - Assess pain using appropriate pain scale  - Administer analgesics based on type and severity of pain and evaluate response  - Implement non-pharmacological measures as appropriate and evaluate response  - Consider cultural and social influences on pain and pain management  - Notify physician/advanced practitioner if interventions unsuccessful or patient reports new pain  Outcome: Progressing     Problem: INFECTION - ADULT  Goal: Absence or prevention of progression during hospitalization  Description: INTERVENTIONS:  - Assess and monitor for signs and symptoms of infection  - Monitor lab/diagnostic results  - Monitor all insertion sites, i e  indwelling lines, tubes, and drains  - Monitor endotracheal if appropriate and nasal secretions for changes in amount and color  - [place] appropriate cooling/warming therapies per order  - Administer medications as ordered  - Instruct and encourage patient and family to use good hand hygiene technique  - Identify and instruct in appropriate isolation precautions for identified infection/condition  Outcome: Progressing  Goal: Absence of fever/infection during neutropenic period  Description: INTERVENTIONS:  - Monitor WBC    Outcome: Progressing     Problem: SAFETY ADULT  Goal: Maintain or return to baseline ADL function  Description: INTERVENTIONS:  -  Assess patient's ability to carry out ADLs; assess patient's baseline for ADL function and identify physical deficits which impact ability to perform ADLs (bathing, care of mouth/teeth, toileting, grooming, dressing, etc )  - Assess/evaluate cause of self-care deficits   - Assess range of motion  - Assess patient's mobility; develop plan if impaired  - Assess patient's need for assistive devices and provide as appropriate  - Encourage maximum independence but intervene and supervise when necessary  - Involve family in performance of ADLs  - Assess for home care needs following discharge   - Consider OT consult to assist with ADL evaluation and planning for discharge  - Provide patient education as appropriate  Outcome: Progressing  Goal: Maintains/Returns to pre admission functional level  Description: INTERVENTIONS:  - Perform BMAT or MOVE assessment daily    - Set and communicate daily mobility goal to care team and patient/family/caregiver  - Collaborate with rehabilitation services on mobility goals if consulted  - Perform Range of Motion  times a day  - Reposition patient every  hours    - Dangle patient  times a day  - Stand patient  times a day  - Ambulate patient  times a day  - Out of bed to chair  times a day   - Out of bed for meals  times a day  - Out of bed for toileting  - Record patient progress and toleration of activity level   Outcome: Progressing  Goal: Patient will remain free of falls  Description: INTERVENTIONS:  - Educate patient/family on patient safety including physical limitations  - Instruct patient to call for assistance with activity   - Consult OT/PT to assist with strengthening/mobility   - Keep Call bell within reach  - Keep bed low and locked with side rails adjusted as appropriate  - Keep care items and personal belongings within reach  - Initiate and maintain comfort rounds  - Make Fall Risk Sign visible to staff  - Offer Toileting every  Hours, in advance of need  - Initiate/Maintain alarm  - Obtain necessary fall risk management equipment:   - Apply yellow socks and bracelet for high fall risk patients  - Consider moving patient to room near nurses station  Outcome: Progressing     Problem: DISCHARGE PLANNING  Goal: Discharge to home or other facility with appropriate resources  Description: INTERVENTIONS:  - Identify barriers to discharge w/patient and caregiver  - Arrange for needed discharge resources and transportation as appropriate  - Identify discharge learning needs (meds, wound care, etc )  - Arrange for interpretive services to assist at discharge as needed  - Refer to Case Management Department for coordinating discharge planning if the patient needs post-hospital services based on physician/advanced practitioner order or complex needs related to functional status, cognitive ability, or social support system  Outcome: Progressing Problem: Knowledge Deficit  Goal: Patient/family/caregiver demonstrates understanding of disease process, treatment plan, medications, and discharge instructions  Description: Complete learning assessment and assess knowledge base    Interventions:  - Provide teaching at level of understanding  - Provide teaching via preferred learning methods  Outcome: Progressing     Problem: SKIN/TISSUE INTEGRITY - ADULT  Goal: Skin Integrity remains intact(Skin Breakdown Prevention)  Description: Assess:  -Perform [place] assessment every   -Clean and moisturize skin every   -Inspect skin when repositioning, toileting, and assisting with ADLS  -Assess under medical devices such as  every   -Assess extremities for adequate circulation and sensation     Bed Management:  -Have minimal linens on bed & keep smooth, unwrinkled  -Change linens as needed when moist or perspiring  -Avoid sitting or lying in one position for more than  hours while in bed  -Keep HOB at degrees     Toileting:  -Offer bedside commode  -Assess for incontinence every   -Use incontinent care products after each incontinent episode such as     Activity:  -Mobilize patient  times a day  -Encourage activity and walks on unit  -Encourage or provide ROM exercises   -Turn and reposition patient every  Hours  -Use appropriate equipment to lift or move patient in bed  -Instruct/ Assist with weight shifting every  when out of bed in chair  -Consider limitation of chair time  hour intervals    Skin Care:  -Avoid use of baby powder, tape, friction and shearing, hot water or constrictive clothing  -Relieve pressure over bony prominences using   -Do not massage red bony areas    Next Steps:  -Teach patient strategies to minimize risks such as    -Consider consults to  interdisciplinary teams such as   Outcome: Progressing  Goal: Incision(s), wounds(s) or drain site(s) healing without S/S of infection  Description: INTERVENTIONS  - Assess and document dressing, incision, wound bed, drain sites and surrounding tissue  - Provide patient and family education  - Perform skin care/dressing changes every   Outcome: Progressing  Goal: Pressure injury heals and does not worsen  Description: Interventions:  - Implement low air loss mattress or specialty surface (Criteria met)  - Apply silicone foam dressing  - Instruct/assist with weight shifting every  minutes when in chair   - Limit chair time to  hour intervals  - Use special pressure reducing interventions such as when in chair   - Apply fecal or urinary incontinence containment device   - Perform passive or active ROM every   - Turn and reposition patient & offload bony prominences every  hours   - Utilize friction reducing device or surface for transfers   - Consider consults to  interdisciplinary teams such as   - Use incontinent care products after each incontinent episode such as   - Consider nutrition services referral as needed  Outcome: Progressing     Problem: MUSCULOSKELETAL - ADULT  Goal: Maintain or return mobility to safest level of function  Description: INTERVENTIONS:  - Assess patient's ability to carry out ADLs; assess patient's baseline for ADL function and identify physical deficits which impact ability to perform ADLs (bathing, care of mouth/teeth, toileting, grooming, dressing, etc )  - Assess/evaluate cause of self-care deficits   - Assess range of motion  - Assess patient's mobility  - Assess patient's need for assistive devices and provide as appropriate  - Encourage maximum independence but intervene and supervise when necessary  - Involve family in performance of ADLs  - Assess for home care needs following discharge   - Consider OT consult to assist with ADL evaluation and planning for discharge  - Provide patient education as appropriate  Outcome: Progressing  Goal: Maintain proper alignment of affected body part  Description: INTERVENTIONS:  - Support, maintain and protect limb and body alignment  - Provide patient/ family with appropriate education  Outcome: Progressing     Problem: Nutrition/Hydration-ADULT  Goal: Nutrient/Hydration intake appropriate for improving, restoring or maintaining nutritional needs  Description: Monitor and assess patient's nutrition/hydration status for malnutrition  Collaborate with interdisciplinary team and initiate plan and interventions as ordered  Monitor patient's weight and dietary intake as ordered or per policy  Utilize nutrition screening tool and intervene as necessary  Determine patient's food preferences and provide high-protein, high-caloric foods as appropriate       INTERVENTIONS:  - Monitor oral intake, urinary output, labs, and treatment plans  - Assess nutrition and hydration status and recommend course of action  - Evaluate amount of meals eaten  - Assist patient with eating if necessary   - Allow adequate time for meals  - Recommend/ encourage appropriate diets, oral nutritional supplements, and vitamin/mineral supplements  - Order, calculate, and assess calorie counts as needed  - Recommend, monitor, and adjust tube feedings and TPN/PPN based on assessed needs  - Assess need for intravenous fluids  - Provide specific nutrition/hydration education as appropriate  - Include patient/family/caregiver in decisions related to nutrition  Outcome: Progressing     Problem: Prexisting or High Potential for Compromised Skin Integrity  Goal: Skin integrity is maintained or improved  Description: INTERVENTIONS:  - Identify patients at risk for skin breakdown  - Assess and monitor skin integrity  - Assess and monitor nutrition and hydration status  - Monitor labs   - Assess for incontinence   - Turn and reposition patient  - Assist with mobility/ambulation  - Relieve pressure over bony prominences  - Avoid friction and shearing  - Provide appropriate hygiene as needed including keeping skin clean and dry  - Evaluate need for skin moisturizer/barrier cream  - Collaborate with interdisciplinary team   - Patient/family teaching  - Consider wound care consult   Outcome: Progressing     Problem: Potential for Falls  Goal: Patient will remain free of falls  Description: INTERVENTIONS:  - Educate patient/family on patient safety including physical limitations  - Instruct patient to call for assistance with activity   - Consult OT/PT to assist with strengthening/mobility   - Keep Call bell within reach  - Keep bed low and locked with side rails adjusted as appropriate  - Keep care items and personal belongings within reach  - Initiate and maintain comfort rounds  - Make Fall Risk Sign visible to staff  - Offer Toileting every  Hours, in advance of need  - Initiate/Maintain alarm  - Obtain necessary fall risk management equipment:   - Apply yellow socks and bracelet for high fall risk patients  - Consider moving patient to room near nurses station  Outcome: Progressing

## 2022-02-18 NOTE — ASSESSMENT & PLAN NOTE
· Left lower extremity erythema and edema, likely cellulitis in the setting of chronic venous stasis and stasis dermatitis Received cefepime and vancomycin in ED  · S/p I&D with orthopedics 2/10  · Started on IV Ancef on admission  Blood cultures from admission positive for MRSA Antibiotics, changed to IV vancomycin     · Repeat BC ordered 2/12, NG x 4 days  · Stable for discharge to facility   · Infectious disease following  · Recommending IV vancomycin through 03/12/2022 for total of 4 weeks therapy   · Check trough today at 1500   · Discussed with Pharmacy and RN  · PICC line placed

## 2022-02-18 NOTE — ASSESSMENT & PLAN NOTE
· Patient presented with left lower extremity cellulitis  · Blood cultures on admission positive for MRSA     · Transthoracic echocardiogram negative for any vegetations  Patient denies any back pain but has persistent left lower extremity pain  · CT left lower extremity shows: Suspected thrombosis/thrombophlebitis of the great saphenous vein  Diffuse subcutaneous edema which could be secondary to cellulitis, venous stasis, or dependent edema  No discrete rim enhancing fluid collection  · No osseous lesion seen on CT scan  Orthopedic preformed I/D on 2/10    · Infectious disease consulted for antibiotic management  · Antibiotics switched to IV vancomycin 2/9, continue through 3/12 for 4 weeks therapy   · Pharmacy consulted  for dosing management  · Recommend discharging on 750 IV Q8  · Repeat blood cultures ordered for 2/12, no growth x 4 days  · PICC placement once blood cultures are negative for 72 hr  · Consent signed and in chart 2/15
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
## 2022-02-18 NOTE — PROGRESS NOTES
Vancomycin IV Pharmacy-to-Dose Consultation    Anup Aparicio is a 60 y o  male who is currently receiving Vancomycin IV with management by the Pharmacy Consult service  Assessment/Plan:  The patient was reviewed  Renal function is stable and no signs or symptoms of nephrotoxicity and/or infusion reactions were documented in the chart  Based on todays assessment, continue current vancomycin (day # 11) dosing of 750mg IV q8 hours, with a plan for trough to be drawn at 0700 on 2/19/22  We will continue to follow the patients culture results and clinical progress daily      Andreina Hardwick, Pharmacist

## 2022-02-18 NOTE — ASSESSMENT & PLAN NOTE
· Progressive hemoglobin decline noted since admission  Upon reviewing patient's old records from [place] , patient has history of angiodysplasia and had argon beam therapy on January 2019  There was also a remote mention of esophageal varices      · No signs of bleeding   · Status post EGD 2/11   · Continue to follow serial H&H   · Continue with PPI therapy  · GI following  · Recommend outpatient colonoscopy  · Ambulatory referral placed

## 2022-02-18 NOTE — PROGRESS NOTES
Patient DC'd on 750mg IV vanco q8  Patient due for vanco trough 2/19  Discussed with Gómez Stacy      Sent orders and f/u information to F: [phone]

## 2022-02-18 NOTE — CASE MANAGEMENT
Case Management Discharge Planning Note    Patient name Nemo Torre  Location S /S -01 MRN [id number]  : 1965 Date 2022       Current Admission Date: 2022  Current Admission Diagnosis:Cellulitis of left lower extremity   Patient Active Problem List    Diagnosis Date Noted    Esophageal varices ([facility] ) 2022    Hypoxia 2022    Anemia 02/10/2022    Status post splenectomy 02/10/2022    MRSA bacteremia 2022    Cellulitis of left lower extremity 2022    Ambulatory dysfunction 2022    [facility]    Primary hypertension 2022    Gastroesophageal reflux disease without esophagitis 2022    Venous stasis dermatitis of both lower extremities 2022      LOS (days): 8  Geometric Mean LOS (GMLOS) (days): 3 50  Days to GMLOS:-4 7     OBJECTIVE:  Risk of Unplanned Readmission Score: 16         Current admission status: Inpatient   Preferred Pharmacy:   [facility] [address] [facility]BERT[facility] ESPARZA [address], [address]  [place] [address]  Phone: [phone] Fax: [phone]    Primary Care Provider: No primary care provider on file  Primary Insurance: MEDICARE  Secondary Insurance: ERIS LAZO PENDING    DISCHARGE DETAILS:    Other Referral/Resources/Interventions Provided:  Referral Comments: CM discussed with Anabella this morning  Bed available is at [facility]  CM informed family via phone, family prefers [facility] over [facility]  Family is going to transport pt for :30pm   CM informed Christina and LETY Kyle aware of transport time   CM informed RN of scripts in binder    Treatment Team Recommendation: Short Term Rehab  Discharge Destination Plan[de-identified] Short Term Rehab  Transport at Discharge : Family           ETA of Transport (Date): 22  ETA of Transport (Time): 1330     IMM Given (Date):: 22  IMM Given to[de-identified] Family       Accepting Facility Name, [facility]  Receiving Facility/Agency Phone Number: [phone]  Facility/Agency Fax Number: [phone]

## 2022-02-18 NOTE — ASSESSMENT & PLAN NOTE
· Controlled  · Discontinued propranolol in favor of nadolol  · Will hold Lasix in view of borderline low blood pressure  · BP continues to be soft so Lasix held indefinitely

## 2022-02-18 NOTE — ASSESSMENT & PLAN NOTE
· Esophageal varices noted on EGD 2/11, status post banding  · Started nadolol, discontinued propranolol  · GI following  · Repeat endoscopy in 2-3 months  · Ambulatory referral placed   · Diet as tolerated

## 2022-02-18 NOTE — ASSESSMENT & PLAN NOTE
· Continue with betamethasone cream  · Leg elevation  · Wound care consulted  · Wound care recs sent to facility

## 2022-02-22 ENCOUNTER — TELEPHONE (OUTPATIENT)
Dept: INFECTIOUS DISEASES | Facility: CLINIC | Age: 57
End: 2022-02-22

## 2022-02-28 ENCOUNTER — TELEPHONE (OUTPATIENT)
Dept: INFECTIOUS DISEASES | Facility: CLINIC | Age: 57
End: 2022-02-28

## 2022-02-28 NOTE — TELEPHONE ENCOUNTER
Called and spoke with Jennifer washington from [facility] regarding pt trough as it was supposed to be drawn on 2/23 and we have not received any results  She stated that they were having issues with the PICC line became dislodged on the 23rd then they needed to get it reinserted, when they did they found out that it wasn't in the right location and needed do redo it  So the pt did miss a total of 5 doses, all 3 doses on Saturday and 2 doses on Sunday  She then stated that the [address] [place] [address],[address] who works with in house Dr Isabel Bowden extended pt abx until 3/14 to make up for missed doses of abx  They were able to draw a trough on 2/25 and were going to fax the results over

## 2022-03-02 ENCOUNTER — TELEPHONE (OUTPATIENT)
Dept: INFECTIOUS DISEASES | Facility: CLINIC | Age: 57
End: 2022-03-02

## 2022-03-02 ENCOUNTER — OFFICE VISIT (OUTPATIENT)
Dept: INFECTIOUS DISEASES | Facility: CLINIC | Age: 57
End: 2022-03-02
Payer: MEDICARE

## 2022-03-02 VITALS
SYSTOLIC BLOOD PRESSURE: 106 MMHG | RESPIRATION RATE: 18 BRPM | HEART RATE: 68 BPM | OXYGEN SATURATION: 97 % | WEIGHT: 172.2 LBS | TEMPERATURE: 97.8 F | BODY MASS INDEX: 23.32 KG/M2 | DIASTOLIC BLOOD PRESSURE: 60 MMHG | HEIGHT: 72 IN

## 2022-03-02 DIAGNOSIS — L03.116 CELLULITIS OF LEFT LOWER EXTREMITY: ICD-10-CM

## 2022-03-02 DIAGNOSIS — Z90.81 STATUS POST SPLENECTOMY: ICD-10-CM

## 2022-03-02 DIAGNOSIS — R78.81 GRAM-POSITIVE BACTEREMIA: Primary | ICD-10-CM

## 2022-03-02 PROCEDURE — 99214 OFFICE O/P EST MOD 30 MIN: CPT | Performed by: PHYSICIAN ASSISTANT

## 2022-03-02 NOTE — PROGRESS NOTES
Assessment/Plan:    MRSA bacteremia   MRSA bacteremia secondary to left leg abscess  Patient did undergo I&D  He have a TTE without valvular vegetation, but was noted to have saphenous vein thrombophlebitis on CT scan  Patient is currently on IV vancomycin to complete a total of 4 weeks  Patient without complaints  Vanco trough on the low side at 13 3  Patient with a stable cr, but noted to have a wbc of 3 5 and ANC of 1 7  Still with some induration of the left thigh  Plan  - continue vancomycin but increase dose to 1 gram IV every 8 hours through 3/12/2022  - recheck CBC with diff, Cr, Vancomycin trough on Monday and continue weekly while on IV vancomycin  - if wbcs/anc continue to drop then will switch to daptomycin to complete course  - patient needs follow up with ortho for ongoing induration of left thigh    - ID follow up prn  Cellulitis of left lower extremity  LLE abscess  S/p I&D  Continued induration noted on exam today  Patient needs follow up with ortho  Diagnoses and all orders for this visit:    MRSA bacteremia    Cellulitis of left lower extremity    Status post splenectomy          Subjective:      Patient ID: Jose L Serna is a 60 y o  male  HPI  59 y/o male presents for office follow up today regarding MRSA bacteremia secondary to left leg abscess  Patient did undergo I&D  He have a TTE without valvular vegetation, but was noted to have saphenous vein thrombophlebitis on CT scan  Patient is currently on IV vancomycin to complete a total of 4 weeks  He is tolerating the vancomycin well  He has no new complaints  He denies any pain in the LLE  The following portions of the patient's history were reviewed and updated as appropriate: allergies, current medications, past family history, past medical history, past social history, past surgical history and problem list     Review of Systems   Constitutional: Negative for chills and fever     Respiratory: Negative for cough and shortness of breath  Gastrointestinal: Negative for abdominal pain, diarrhea, nausea and vomiting  Skin: Negative for rash  Psychiatric/Behavioral: Negative for behavioral problems and confusion  Objective:      /60 (BP Location: Left arm, Patient Position: Sitting, Cuff Size: Adult)   Pulse 68   Temp 97 8 °F (36 6 °C) (Temporal)   Resp 18   Ht 6' (1 829 m)   Wt 78 1 kg (172 lb 3 2 oz)   SpO2 97%   BMI 23 35 kg/m²          Physical Exam  Vitals reviewed  Constitutional:       General: He is not in acute distress  Appearance: Normal appearance  He is not ill-appearing, toxic-appearing or diaphoretic  HENT:      Head: Normocephalic and atraumatic  Eyes:      General: No scleral icterus  Right eye: No discharge  Left eye: No discharge  Conjunctiva/sclera: Conjunctivae normal    Cardiovascular:      Rate and Rhythm: Normal rate and regular rhythm  Pulmonary:      Effort: Pulmonary effort is normal  No respiratory distress  Breath sounds: Normal breath sounds  No stridor  No wheezing, rhonchi or rales  Chest:      Chest wall: No tenderness  Abdominal:      General: Bowel sounds are normal  There is no distension  Palpations: Abdomen is soft  Tenderness: There is no abdominal tenderness  Musculoskeletal:      Comments: Left medial thigh with induration at prior I&D site  No tenderness   Skin:     General: Skin is warm and dry  Coloration: Skin is not jaundiced or pale  Findings: No erythema or rash  Comments: PICC insertion site without erythema, induration or drainage  Neurological:      General: No focal deficit present  Mental Status: He is alert and oriented to person, place, and time     Psychiatric:         Mood and Affect: Mood normal          Behavior: Behavior normal          Labs  3/2/2022  Wbc: 3 5  Hgb: 7 9  Plt: 180  ANC: 1 7  Cr: 0 48  Vanco trough: 13 3

## 2022-03-02 NOTE — TELEPHONE ENCOUNTER
Called, spoke with Jorge Mann at [facility]  Let her know that at pt appt it was still noted that pt had induration on his LLE and that we were able to get him scheduled with Dr Deana Sanchez tomorrow at 0900  She did note it down and said that possibly the family would be able to take him to the appt if not they will call to change the appt  Provided all the information for Dr Brian Garcia office  Regarding pt Vanco trough of 13 3 it was discussed with Dr Hannah Borjas and Meng Tilley to increase to Vancomycin 1g IV Q8H  Repeat labs on Monday 3/7 prior to dosing  If pt labs continue to be abnormal may possibly switch to Daptomycin

## 2022-03-02 NOTE — PATIENT INSTRUCTIONS
- continue Vancomycin as ordered through 3/12/2022  - continue weekly CBC with diff, Cr, Vancomycin trough level  - suggest patient follow up with Orthopedics as he continues to have induration of the LLE  - ID follow up prn

## 2022-03-02 NOTE — ASSESSMENT & PLAN NOTE
MRSA bacteremia secondary to left leg abscess  Patient did undergo I&D  He have a TTE without valvular vegetation, but was noted to have saphenous vein thrombophlebitis on CT scan  Patient is currently on IV vancomycin to complete a total of 4 weeks  Patient without complaints  Vanco trough on the low side at 13 3  Patient with a stable cr, but noted to have a wbc of 3 5 and ANC of 1 7  Still with some induration of the left thigh  Plan  - continue vancomycin but increase dose to 1 gram IV every 8 hours through 3/12/2022  - recheck CBC with diff, Cr, Vancomycin trough on Monday and continue weekly while on IV vancomycin  - if wbcs/anc continue to drop then will switch to daptomycin to complete course  - patient needs follow up with ortho for ongoing induration of left thigh    - ID follow up prn

## 2022-03-03 ENCOUNTER — OFFICE VISIT (OUTPATIENT)
Dept: OBGYN CLINIC | Facility: HOSPITAL | Age: 57
End: 2022-03-03
Payer: MEDICARE

## 2022-03-03 VITALS
DIASTOLIC BLOOD PRESSURE: 67 MMHG | BODY MASS INDEX: 23.3 KG/M2 | SYSTOLIC BLOOD PRESSURE: 111 MMHG | WEIGHT: 172 LBS | HEART RATE: 73 BPM | HEIGHT: 72 IN

## 2022-03-03 DIAGNOSIS — L02.416 ABSCESS OF LEFT LEG: Primary | ICD-10-CM

## 2022-03-03 DIAGNOSIS — Z00.00 HEALTHCARE MAINTENANCE: ICD-10-CM

## 2022-03-03 PROCEDURE — 99203 OFFICE O/P NEW LOW 30 MIN: CPT | Performed by: ORTHOPAEDIC SURGERY

## 2022-03-03 NOTE — PROGRESS NOTES
Assessment:   Diagnosis ICD-10-CM Associated Orders   1  Abscess of left leg  L02 416    2  Healthcare maintenance  Z00 00        Plan:  Diagnosis, treatment options and associated risks were discussed with the patient including no treatment, nonsurgical treatment and potential for surgical intervention  The patient was given the opportunity to ask questions regarding each  In the presence of Jose feeling better and is being treated with ABX no indication for further I+D or surgical intervention  WBAT  It was explained to the patient that if they developed new onset of incapacitating pain, new neurological deficit or deformity, they should contact our office or seek out the closest emergency room immediately as this may indicate a worrisome complication related to their condition  To do next visit:  Return in about 3 weeks (around 3/24/2022) for re-check, or sooner if symptoms worsen or fail to improve  The above stated was discussed in layman's terms and the patient expressed understanding  All questions were answered to the patient's satisfaction  Scribe Attestation    I,:  Tanner Kebede am acting as a scribe while in the presence of the attending physician :       I,:  Ambrose Dias MD personally performed the services described in this documentation    as scribed in my presence :             Subjective:   Héctor Kerr is a 60 y o  male who presents office follow-up of his left leg, leg abscess from distal medial thigh  He was seen at [facility] ER where he underwent bedside I and D, 02/10/2022  Positive for MRSA  He has been seen through ID and has PICC line right upper extremity and is getting vancomycin q 8 hours  He presents today unaccompanied  He lives with a family member  He does not drive or work  He denies any pain at his left leg        Review of systems negative unless otherwise specified in HPI  Review of Systems    Past Medical History:   Diagnosis Date    Anxiety  GERD (gastroesophageal reflux disease)     Hypertension     Seizures (HCC)        Past Surgical History:   Procedure Laterality Date    SPLENECTOMY         Family History   Problem Relation Age of Onset    Depression Mother     Heart disease Father        Social History     Occupational History    Not on file   Tobacco Use    Smoking status: Never Smoker    Smokeless tobacco: Never Used   Vaping Use    Vaping Use: Never used   Substance and Sexual Activity    Alcohol use: Never    Drug use: Never    Sexual activity: Not on file         Current Outpatient Medications:     betamethasone dipropionate (DIPROSONE) 0 05 % cream, Apply topically 2 (two) times a day, Disp: , Rfl:     folic acid (FOLVITE) 1 mg tablet, Take 1 mg by mouth daily, Disp: , Rfl:     iron polysaccharides (FERREX) 150 mg capsule, Take 150 mg by mouth 2 (two) times a day, Disp: , Rfl:     levETIRAcetam (KEPPRA) 500 mg tablet, Take 500 mg by mouth every 12 (twelve) hours, Disp: , Rfl:     nadolol (CORGARD) 40 mg tablet, Take 1 tablet (40 mg total) by mouth daily, Disp: 30 tablet, Rfl: 0    OXcarbazepine (TRILEPTAL) 600 mg tablet, Take 600 mg by mouth every 12 (twelve) hours, Disp: , Rfl:     pantoprazole (PROTONIX) 40 mg tablet, Take 1 tablet (40 mg total) by mouth 2 (two) times a day before meals, Disp: 60 tablet, Rfl: 0    sucralfate (CARAFATE) 1 g tablet, Take 1 tablet (1 g total) by mouth every 6 (six) hours, Disp: 120 tablet, Rfl: 0    vancomycin (VANCOCIN), Infuse 150 mL (750 mg total) into a venous catheter every 8 (eight) hours for 22 days, Disp: 9900 mL, Rfl: 0    No Known Allergies         Vitals:    03/03/22 0849   BP: 111/67   Pulse: 73       Objective:                    Ortho Exam     Left Lower Extremity:  Scabbed over abscess distal medial thigh  No active drainage  No appreciable warmth  Rather firm palpable lump which is nontender  Calf and thigh are soft nontender no signs DVT  Nonantalgic gait  Full knee range of motion  5/5 quadriceps and hamstring strength      Diagnostics, reviewed and taken today if performed as documented:    None performed          Procedures, if performed today:    Procedures    None performed      Portions of the record may have been created with voice recognition software  Occasional wrong word or "sound a like" substitutions may have occurred due to the inherent limitations of voice recognition software  Read the chart carefully and recognize, using context, where substitutions have occurred

## 2022-03-03 NOTE — PATIENT INSTRUCTIONS
Diagnosis ICD-10-CM Associated Orders   1  Abscess of left leg  L02 416      WBAT  Continue with ID for ABX  It was explained to the patient that if they developed new onset of incapacitating pain, new neurological deficit or deformity, they should contact our office or seek out the closest emergency room immediately as this may indicate a worrisome complication related to their condition  Return in about 3 weeks (around 3/24/2022) for re-check, or sooner if symptoms worsen or fail to improve

## 2022-03-18 ENCOUNTER — TELEPHONE (OUTPATIENT)
Dept: GASTROENTEROLOGY | Facility: CLINIC | Age: 57
End: 2022-03-18

## 2022-03-18 NOTE — TELEPHONE ENCOUNTER
Tried to reach patient regarding scheduling repeat EGD in the hospital with cbc/inr 1 week prior  Sister answered phone and stated she needed to schedule for the patient  I do not have a communication consent on file   Emailed to sister at her request

## 2022-03-29 ENCOUNTER — OFFICE VISIT (OUTPATIENT)
Dept: OBGYN CLINIC | Facility: HOSPITAL | Age: 57
End: 2022-03-29
Payer: MEDICARE

## 2022-03-29 VITALS
BODY MASS INDEX: 24.27 KG/M2 | SYSTOLIC BLOOD PRESSURE: 121 MMHG | WEIGHT: 179.2 LBS | DIASTOLIC BLOOD PRESSURE: 72 MMHG | HEIGHT: 72 IN | HEART RATE: 82 BPM

## 2022-03-29 DIAGNOSIS — L02.416 ABSCESS OF LEFT LEG: Primary | ICD-10-CM

## 2022-03-29 PROCEDURE — 99213 OFFICE O/P EST LOW 20 MIN: CPT | Performed by: ORTHOPAEDIC SURGERY

## 2022-03-29 NOTE — PROGRESS NOTES
Assessment:   Diagnosis ICD-10-CM Associated Orders   1  Abscess of left leg  L02 416        Plan:  · The patient's leg is much improved in appearance at today's visit  There are no signs of active infection or drainage  · The patient may follow-up as needed for further signs of infection in the future, including but not limited to increased redness in the leg, red streaking up the leg, warmth, pain, purulent fluid collections, or drainage  · The physician was consulted and was instrumental in the formulation of the plan  To do next visit:  PRN follow-up  The above stated was discussed in layman's terms and the patient expressed understanding  All questions were answered to the patient's satisfaction  Subjective:   Dalia Colin is a 60 y o  male who presents to the office today for a follow-up appointment from his abscess of the left leg located at the distal medial thigh  He underwent I and D at the [facility] on 2/10 and was found to have MRSA  He had a PICC line placed and received vancomycin through ID every 8 hours  He then completed  Course of oral antibiotics  Today the patient reports little pain  He thinks he is finished with all antibiotics at this time, but he is not sure  He is very satisfied with his progress so far        Review of systems negative unless otherwise specified in HPI    Past Medical History:   Diagnosis Date    Anxiety     GERD (gastroesophageal reflux disease)     Hypertension     Seizures ([address] )        Past Surgical History:   Procedure Laterality Date    SPLENECTOMY         Family History   Problem Relation Age of Onset    Depression Mother     Heart disease Father        Social History     Occupational History    Not on file   Tobacco Use    Smoking status: Never Smoker    Smokeless tobacco: Never Used   Vaping Use    Vaping Use: Never used   Substance and Sexual Activity    Alcohol use: Never    Drug use: Never    Sexual activity: Not on file         Current Outpatient Medications:     betamethasone dipropionate (DIPROSONE) 0 05 % cream, Apply topically 2 (two) times a day, Disp: , Rfl:     folic acid (FOLVITE) 1 mg tablet, Take 1 mg by mouth daily, Disp: , Rfl:     iron polysaccharides (FERREX) 150 mg capsule, Take 150 mg by mouth 2 (two) times a day, Disp: , Rfl:     levETIRAcetam (KEPPRA) 500 mg tablet, Take 500 mg by mouth every 12 (twelve) hours, Disp: , Rfl:     nadolol (CORGARD) 40 mg tablet, Take 1 tablet (40 mg total) by mouth daily, Disp: 30 tablet, Rfl: 0    OXcarbazepine (TRILEPTAL) 600 mg tablet, Take 600 mg by mouth every 12 (twelve) hours, Disp: , Rfl:     pantoprazole (PROTONIX) 40 mg tablet, Take 1 tablet (40 mg total) by mouth 2 (two) times a day before meals, Disp: 60 tablet, Rfl: 0    sucralfate (CARAFATE) 1 g tablet, Take 1 tablet (1 g total) by mouth every 6 (six) hours, Disp: 120 tablet, Rfl: 0    No Known Allergies         Vitals:    03/29/22 0829   BP: 121/72   Pulse: 82       Objective:    General:  Patient is WDWN, alert and oriented, appears stated age, and is in no acute distress  Musculoskeletal:    Left Leg:    Inspection:  The patient's previous site of infection looks excellent today  There is a well-healed scar over the area where the incision was made to drain the abscess in the ER  There is no drainage  The lower leg is somewhat erythematous without any concern for infection  Range of Motion:  The patient is able to achieve full active flexion and extension of the knee  Palpation:  The patient is not tender with palpation over any aspect of the knee  There is no palpable warmth  Sensation:  SILT over the leg  Diagnostics, reviewed and taken today if performed as documented:    None performed        Procedures, if performed today:    None performed      Portions of the record may have been created with voice recognition software    Occasional wrong word or "sound a like" substitutions may have occurred due to the inherent limitations of voice recognition software  Read the chart carefully and recognize, using context, where substitutions have occurred

## 2022-05-11 ENCOUNTER — OFFICE VISIT (OUTPATIENT)
Dept: INTERNAL MEDICINE CLINIC | Facility: CLINIC | Age: 57
End: 2022-05-11
Payer: MEDICARE

## 2022-05-11 VITALS
WEIGHT: 181.2 LBS | DIASTOLIC BLOOD PRESSURE: 70 MMHG | HEART RATE: 80 BPM | SYSTOLIC BLOOD PRESSURE: 120 MMHG | OXYGEN SATURATION: 98 % | TEMPERATURE: 98.8 F | BODY MASS INDEX: 24.54 KG/M2 | HEIGHT: 72 IN

## 2022-05-11 DIAGNOSIS — K21.9 GASTROESOPHAGEAL REFLUX DISEASE WITHOUT ESOPHAGITIS: ICD-10-CM

## 2022-05-11 DIAGNOSIS — I10 PRIMARY HYPERTENSION: ICD-10-CM

## 2022-05-11 DIAGNOSIS — I85.00 ESOPHAGEAL VARICES WITHOUT BLEEDING, UNSPECIFIED ESOPHAGEAL VARICES TYPE (HCC): ICD-10-CM

## 2022-05-11 DIAGNOSIS — I87.2 VENOUS STASIS DERMATITIS OF BOTH LOWER EXTREMITIES: ICD-10-CM

## 2022-05-11 DIAGNOSIS — Z23 NEED FOR PNEUMOCOCCAL VACCINATION: ICD-10-CM

## 2022-05-11 DIAGNOSIS — D50.0 IRON DEFICIENCY ANEMIA DUE TO CHRONIC BLOOD LOSS: Primary | ICD-10-CM

## 2022-05-11 DIAGNOSIS — G40.909 NONINTRACTABLE EPILEPSY WITHOUT STATUS EPILEPTICUS, UNSPECIFIED EPILEPSY TYPE (HCC): ICD-10-CM

## 2022-05-11 DIAGNOSIS — Z90.81 STATUS POST SPLENECTOMY: ICD-10-CM

## 2022-05-11 PROBLEM — H91.93 BILATERAL HEARING LOSS: Status: ACTIVE | Noted: 2022-05-11

## 2022-05-11 PROBLEM — Z86.11 HISTORY OF TB (TUBERCULOSIS): Status: ACTIVE | Noted: 2022-05-11

## 2022-05-11 PROBLEM — R78.81 GRAM-POSITIVE BACTEREMIA: Status: RESOLVED | Noted: 2022-02-09 | Resolved: 2022-05-11

## 2022-05-11 PROBLEM — H54.8 LEGALLY BLIND IN RIGHT EYE, AS DEFINED IN USA: Status: ACTIVE | Noted: 2022-05-11

## 2022-05-11 PROBLEM — K55.20 ANGIODYSPLASIA: Status: ACTIVE | Noted: 2022-05-11

## 2022-05-11 PROBLEM — R09.02 HYPOXIA: Status: RESOLVED | Noted: 2022-02-11 | Resolved: 2022-05-11

## 2022-05-11 PROCEDURE — 90677 PCV20 VACCINE IM: CPT | Performed by: INTERNAL MEDICINE

## 2022-05-11 PROCEDURE — G0009 ADMIN PNEUMOCOCCAL VACCINE: HCPCS | Performed by: INTERNAL MEDICINE

## 2022-05-11 PROCEDURE — 99204 OFFICE O/P NEW MOD 45 MIN: CPT | Performed by: INTERNAL MEDICINE

## 2022-05-11 RX ORDER — NADOLOL 40 MG/1
40 TABLET ORAL DAILY
Qty: 30 TABLET | Refills: 2 | Status: SHIPPED | OUTPATIENT
Start: 2022-05-11

## 2022-05-11 RX ORDER — DIAZEPAM 10 MG/1
10 TABLET ORAL EVERY 12 HOURS
Qty: 60 TABLET | Refills: 2 | Status: SHIPPED | OUTPATIENT
Start: 2022-05-11

## 2022-05-11 RX ORDER — LEVETIRACETAM 500 MG/1
500 TABLET ORAL EVERY 12 HOURS SCHEDULED
Qty: 60 TABLET | Refills: 2 | Status: SHIPPED | OUTPATIENT
Start: 2022-05-11

## 2022-05-11 RX ORDER — IRON POLYSACCHARIDE COMPLEX 150 MG
150 CAPSULE ORAL DAILY
Qty: 30 CAPSULE | Refills: 2 | Status: SHIPPED | OUTPATIENT
Start: 2022-05-11 | End: 2022-06-06

## 2022-05-11 RX ORDER — FUROSEMIDE 20 MG/1
20 TABLET ORAL 2 TIMES DAILY
Qty: 30 TABLET | Refills: 2 | Status: SHIPPED | OUTPATIENT
Start: 2022-05-11

## 2022-05-11 RX ORDER — SUCRALFATE 1 G/1
1 TABLET ORAL EVERY 6 HOURS SCHEDULED
Qty: 120 TABLET | Refills: 2 | Status: SHIPPED | OUTPATIENT
Start: 2022-05-11

## 2022-05-11 RX ORDER — PANTOPRAZOLE SODIUM 40 MG/1
40 TABLET, DELAYED RELEASE ORAL
Qty: 60 TABLET | Refills: 2 | Status: SHIPPED | OUTPATIENT
Start: 2022-05-11 | End: 2022-07-11

## 2022-05-11 RX ORDER — FOLIC ACID 1 MG/1
1 TABLET ORAL DAILY
Qty: 30 TABLET | Refills: 2 | Status: SHIPPED | OUTPATIENT
Start: 2022-05-11 | End: 2022-06-06

## 2022-05-11 RX ORDER — OXCARBAZEPINE 600 MG/1
600 TABLET, FILM COATED ORAL EVERY 12 HOURS SCHEDULED
Qty: 60 TABLET | Refills: 2 | Status: SHIPPED | OUTPATIENT
Start: 2022-05-11
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
## 2022-05-11 NOTE — PATIENT INSTRUCTIONS
Creme emoliente para as pernas - [address], Cetaphil, Cerave qualquer medardo   Retorno em 1 mes com exames de pardeep Gill de gianfranco -se tiver, trazer na proxima visita  Ruben consulta com Neurologista e Gastroenterologista - se tiver sly ball para o consultorio [phone] que ajudamos a jameelar

## 2022-06-01 ENCOUNTER — APPOINTMENT (OUTPATIENT)
Dept: LAB | Facility: AMBULARY SURGERY CENTER | Age: 57
End: 2022-06-01
Payer: MEDICARE

## 2022-06-01 ENCOUNTER — TELEPHONE (OUTPATIENT)
Dept: NEUROLOGY | Facility: CLINIC | Age: 57
End: 2022-06-01

## 2022-06-01 DIAGNOSIS — R78.81 GRAM-POSITIVE BACTEREMIA: ICD-10-CM

## 2022-06-01 DIAGNOSIS — I85.00 ESOPHAGEAL VARICES WITHOUT BLEEDING, UNSPECIFIED ESOPHAGEAL VARICES TYPE (HCC): ICD-10-CM

## 2022-06-01 DIAGNOSIS — G40.909 NONINTRACTABLE EPILEPSY WITHOUT STATUS EPILEPTICUS, UNSPECIFIED EPILEPSY TYPE (HCC): ICD-10-CM

## 2022-06-01 DIAGNOSIS — I10 PRIMARY HYPERTENSION: ICD-10-CM

## 2022-06-01 DIAGNOSIS — D50.0 IRON DEFICIENCY ANEMIA DUE TO CHRONIC BLOOD LOSS: ICD-10-CM

## 2022-06-01 LAB
ALBUMIN SERPL BCP-MCNC: 3 G/DL (ref 3.5–5)
ALP SERPL-CCNC: 161 U/L (ref 46–116)
ALT SERPL W P-5'-P-CCNC: 57 U/L (ref 12–78)
ANION GAP SERPL CALCULATED.3IONS-SCNC: 1 MMOL/L (ref 4–13)
AST SERPL W P-5'-P-CCNC: 50 U/L (ref 5–45)
BASOPHILS # BLD AUTO: 0.04 THOUSANDS/ΜL (ref 0–0.1)
BASOPHILS NFR BLD AUTO: 1 % (ref 0–1)
BILIRUB SERPL-MCNC: 0.39 MG/DL (ref 0.2–1)
BUN SERPL-MCNC: 9 MG/DL (ref 5–25)
CALCIUM ALBUM COR SERPL-MCNC: 9.8 MG/DL (ref 8.3–10.1)
CALCIUM SERPL-MCNC: 9 MG/DL (ref 8.3–10.1)
CHLORIDE SERPL-SCNC: 107 MMOL/L (ref 100–108)
CHOLEST SERPL-MCNC: 147 MG/DL
CO2 SERPL-SCNC: 31 MMOL/L (ref 21–32)
CREAT SERPL-MCNC: 0.68 MG/DL (ref 0.6–1.3)
EOSINOPHIL # BLD AUTO: 0.25 THOUSAND/ΜL (ref 0–0.61)
EOSINOPHIL NFR BLD AUTO: 6 % (ref 0–6)
ERYTHROCYTE [DISTWIDTH] IN BLOOD BY AUTOMATED COUNT: 17.3 % (ref 11.6–15.1)
FERRITIN SERPL-MCNC: 10 NG/ML (ref 8–388)
GFR SERPL CREATININE-BSD FRML MDRD: 105 ML/MIN/1.73SQ M
GLUCOSE P FAST SERPL-MCNC: 93 MG/DL (ref 65–99)
HCT VFR BLD AUTO: 33 % (ref 36.5–49.3)
HDLC SERPL-MCNC: 47 MG/DL
HGB BLD-MCNC: 8.9 G/DL (ref 12–17)
IMM GRANULOCYTES # BLD AUTO: 0.01 THOUSAND/UL (ref 0–0.2)
IMM GRANULOCYTES NFR BLD AUTO: 0 % (ref 0–2)
IRON SATN MFR SERPL: 7 % (ref 20–50)
IRON SERPL-MCNC: 25 UG/DL (ref 65–175)
LDLC SERPL CALC-MCNC: 89 MG/DL (ref 0–100)
LYMPHOCYTES # BLD AUTO: 0.85 THOUSANDS/ΜL (ref 0.6–4.47)
LYMPHOCYTES NFR BLD AUTO: 20 % (ref 14–44)
MCH RBC QN AUTO: 21.5 PG (ref 26.8–34.3)
MCHC RBC AUTO-ENTMCNC: 27 G/DL (ref 31.4–37.4)
MCV RBC AUTO: 80 FL (ref 82–98)
MONOCYTES # BLD AUTO: 0.39 THOUSAND/ΜL (ref 0.17–1.22)
MONOCYTES NFR BLD AUTO: 9 % (ref 4–12)
NEUTROPHILS # BLD AUTO: 2.7 THOUSANDS/ΜL (ref 1.85–7.62)
NEUTS SEG NFR BLD AUTO: 64 % (ref 43–75)
NRBC BLD AUTO-RTO: 0 /100 WBCS
PLATELET # BLD AUTO: 239 THOUSANDS/UL (ref 149–390)
PMV BLD AUTO: 9.2 FL (ref 8.9–12.7)
POTASSIUM SERPL-SCNC: 4.2 MMOL/L (ref 3.5–5.3)
PROT SERPL-MCNC: 7.8 G/DL (ref 6.4–8.2)
RBC # BLD AUTO: 4.13 MILLION/UL (ref 3.88–5.62)
SODIUM SERPL-SCNC: 139 MMOL/L (ref 136–145)
TIBC SERPL-MCNC: 335 UG/DL (ref 250–450)
TRIGL SERPL-MCNC: 55 MG/DL
VANCOMYCIN TROUGH SERPL-MCNC: <0.8 UG/ML (ref 10–20)
WBC # BLD AUTO: 4.24 THOUSAND/UL (ref 4.31–10.16)

## 2022-06-01 PROCEDURE — 36415 COLL VENOUS BLD VENIPUNCTURE: CPT

## 2022-06-01 PROCEDURE — 80202 ASSAY OF VANCOMYCIN: CPT

## 2022-06-01 PROCEDURE — 80061 LIPID PANEL: CPT

## 2022-06-01 PROCEDURE — 83550 IRON BINDING TEST: CPT

## 2022-06-01 PROCEDURE — 82728 ASSAY OF FERRITIN: CPT

## 2022-06-01 PROCEDURE — 80183 DRUG SCRN QUANT OXCARBAZEPIN: CPT

## 2022-06-01 PROCEDURE — 85025 COMPLETE CBC W/AUTO DIFF WBC: CPT

## 2022-06-01 PROCEDURE — 83540 ASSAY OF IRON: CPT

## 2022-06-01 PROCEDURE — 80053 COMPREHEN METABOLIC PANEL: CPT

## 2022-06-06 LAB — OXCARBAZEPINE SERPL-MCNC: 19 UG/ML (ref 10–35)

## 2022-06-09 ENCOUNTER — OFFICE VISIT (OUTPATIENT)
Dept: INTERNAL MEDICINE CLINIC | Facility: CLINIC | Age: 57
End: 2022-06-09
Payer: MEDICARE

## 2022-06-09 VITALS
SYSTOLIC BLOOD PRESSURE: 124 MMHG | DIASTOLIC BLOOD PRESSURE: 78 MMHG | BODY MASS INDEX: 25.03 KG/M2 | WEIGHT: 184.8 LBS | HEIGHT: 72 IN | HEART RATE: 77 BPM | OXYGEN SATURATION: 96 % | TEMPERATURE: 98.5 F

## 2022-06-09 DIAGNOSIS — K74.60 DECOMPENSATED HEPATIC CIRRHOSIS (HCC): ICD-10-CM

## 2022-06-09 DIAGNOSIS — I87.2 VENOUS STASIS DERMATITIS OF BOTH LOWER EXTREMITIES: ICD-10-CM

## 2022-06-09 DIAGNOSIS — I10 PRIMARY HYPERTENSION: ICD-10-CM

## 2022-06-09 DIAGNOSIS — R73.09 OTHER ABNORMAL GLUCOSE: ICD-10-CM

## 2022-06-09 DIAGNOSIS — G40.909 NONINTRACTABLE EPILEPSY WITHOUT STATUS EPILEPTICUS, UNSPECIFIED EPILEPSY TYPE (HCC): ICD-10-CM

## 2022-06-09 DIAGNOSIS — D50.0 IRON DEFICIENCY ANEMIA DUE TO CHRONIC BLOOD LOSS: Primary | ICD-10-CM

## 2022-06-09 DIAGNOSIS — Z90.81 STATUS POST SPLENECTOMY: ICD-10-CM

## 2022-06-09 DIAGNOSIS — Z87.898 HISTORY OF PREDIABETES: ICD-10-CM

## 2022-06-09 DIAGNOSIS — K55.20 ANGIODYSPLASIA: ICD-10-CM

## 2022-06-09 DIAGNOSIS — I85.11 SECONDARY ESOPHAGEAL VARICES WITH BLEEDING (HCC): ICD-10-CM

## 2022-06-09 DIAGNOSIS — K72.90 DECOMPENSATED HEPATIC CIRRHOSIS (HCC): ICD-10-CM

## 2022-06-09 PROCEDURE — 99214 OFFICE O/P EST MOD 30 MIN: CPT | Performed by: INTERNAL MEDICINE

## 2022-06-09 PROCEDURE — G0439 PPPS, SUBSEQ VISIT: HCPCS | Performed by: INTERNAL MEDICINE

## 2022-06-09 RX ORDER — IRON POLYSACCHARIDE COMPLEX 150 MG
150 CAPSULE ORAL 2 TIMES DAILY
Qty: 90 CAPSULE | Refills: 1
Start: 2022-06-09

## 2022-06-09 NOTE — PATIENT INSTRUCTIONS
Get labs done before next visit  Stay in touch with gi team for scopes  Low salt diet    Cirrhosis   WHAT YOU NEED TO KNOW:   What is cirrhosis? Cirrhosis is long-term scarring of the liver  The liver makes enzymes and bile that help digest food and gives your body energy  It also removes harmful material from your body, such as alcohol and other chemicals  Cirrhosis is caused by repeated damage to your liver over time  Scar tissue starts to replace healthy liver tissue  The scar tissue prevents the liver from working properly  What increases my risk for cirrhosis? Long-term alcohol use    Hepatitis B or C infection    Fat or iron buildup in the liver    A disease such as cystic fibrosis, or a family history of liver cancer    Damage to the bile ducts that blocks the flow of bile    Obesity    What are the signs and symptoms of cirrhosis? You may not have any signs or symptoms until your liver damage is severe  You may have any of the following:  Fatigue    Bleeding and bruising easily    Swelling of your feet, legs, or abdomen    Nausea, loss of appetite, and weight loss    Itching    Jaundice (yellowing of your skin or eyes)    Black bowel movements or dark urine    How is cirrhosis diagnosed? Blood tests  may be used to check your liver enzymes  An ultrasound  may be used to check for damage to your liver or other tissues or organs  CT or MRI  pictures may be taken of your liver  You may be given IV contrast liquid to help your liver show up better in the pictures  Tell the healthcare provider if you have ever had an allergic reaction to contrast liquid  Do not enter the MRI room with anything made of metal  Metal can cause serious injury  Tell the healthcare provider if you have any metal in or on your body  A liver biopsy  is a procedure used to take a small piece of your liver to be tested for liver damage  How is cirrhosis treated?    Medicines  may be used to treat high blood pressure in the portal vein (the vein that goes to your liver)  You may also need medicine to decrease extra fluid that collects in an area such as your legs or abdomen  Medicines may be used to decrease itching, or to treat a bacterial or viral infection  Surgery  may be used to create a channel inside your liver to increase blood flow  This will help decrease swelling in your abdomen and lower blood pressure in the portal vein  Your risk for bleeding in your esophagus and stomach will also be decreased  You may need a liver transplant if your liver fails  What can I do to manage cirrhosis? Do not drink alcohol  Alcohol will cause more damage to your liver  Do not smoke  Nicotine and other chemicals in cigarettes and cigars can cause blood vessel and lung damage  Ask your healthcare provider for information if you currently smoke and need help to quit  E-cigarettes or smokeless tobacco still contain nicotine  Talk to your healthcare provider before you use these products  Eat a variety of healthy foods  Healthy foods include fruits, vegetables, whole-grain breads, low-fat dairy products, beans, lean meat, and fish  Ask if you need to be on a special diet  Reach or maintain a healthy weight  You may develop fatty liver disease if you are overweight  Ask your healthcare provider for a healthy weight for you  He can help you create a safe weight loss plan if you are overweight  Limit sodium (salt)  You may need to decrease the amount of sodium you eat if you have swelling caused by fluid buildup  Sodium is found in table salt and salty foods such as canned foods, frozen foods, and potato chips  Drink liquids as directed  Ask how much liquid to drink each day and which liquids are best for you  For most people, good liquids to drink are water, juice, and milk  Liquids can help your liver work better  Ask about vaccines  You may have a hard time fighting infection because of cirrhosis   Vaccines help protect you against viruses that can cause diseases such as the flu or hepatitis  Viral hepatitis is caused by a virus that leads to inflammation of the liver  You may need a hepatitis A or B vaccine  You may also need a pneumonia vaccine  Always get a flu vaccine each year as soon as it becomes available  Ask about medicines  Some medicines can harm your liver  Acetaminophen is an example  Talk to your healthcare provider about all your medicines  Do not take any over-the-counter medicine or herbal supplements until your healthcare provider says it is okay  When should I seek immediate care? You have pain during a bowel movement and it is black or contains blood  You have a fast heart rate and fast breathing  You are dizzy or confused  You have severe pain in your abdomen  You have trouble breathing  Your vomit looks like it has coffee grinds or blood in it  When should I contact my healthcare provider? You have a fever  You have red or itchy skin  You are in pain and feel weak  You have questions or concerns about your condition or care  CARE AGREEMENT:   You have the right to help plan your care  Learn about your health condition and how it may be treated  Discuss treatment options with your healthcare providers to decide what care you want to receive  You always have the right to refuse treatment  The above information is an  only  It is not intended as medical advice for individual conditions or treatments  Talk to your doctor, nurse or pharmacist before following any medical regimen to see if it is safe and effective for you  © Copyright [employer] 2022 Information is for End User's use only and may not be sold, redistributed or otherwise used for commercial purposes   All illustrations and images included in CareNotes® are the copyrighted property of [employer]  or [address] [place] [address]

## 2022-06-09 NOTE — PROGRESS NOTES
Jethro Boxer is here for his Subsequent Wellness visit  Last Medicare Wellness visit information reviewed, patient interviewed and updates made to the record today  Health Risk Assessment:   Patient rates overall health as good  Patient feels that their physical health rating is much better  Patient is satisfied with their life  Eyesight was rated as same  Hearing was rated as same  Patient feels that their emotional and mental health rating is same  Patients states they are never, rarely angry  Patient states they are never, rarely unusually tired/fatigued  Pain experienced in the last 7 days has been none  Patient states that he has experienced no weight loss or gain in last 6 months  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; Deaf in right side -- left eye with cataract, not recommending to go for sx due to risk of complete blindness    Fall Risk Screening: In the past year, patient has experienced: history of falling in past year    Number of falls: 1  Injured during fall?: Yes    Feels unsteady when standing or walking?: No    Worried about falling?: No      Home Safety:  Patient does not have trouble with stairs inside or outside of their home  Patient has working smoke alarms and has working carbon monoxide detector  Home safety hazards include: none  Nutrition:   Current diet is Limited junk food, Other (please comment) and Unhealthy  Soft diet due to eso varices  Additional salt intake  Poor intake vegetables/ fruits; likes bananas    Medications:   Patient is not currently taking any over-the-counter supplements  Patient is not able to manage medications  Has help with taking medications -- sister in law    Activities of Daily Living (ADLs)/Instrumental Activities of Daily Living (IADLs):   Walk and transfer into and out of bed and chair?: Yes  Dress and groom yourself?: Yes    Bathe or shower yourself?: Yes    Feed yourself?  Yes  Do your laundry/housekeeping?: No  Manage your money, pay your bills and track your expenses?: No  Make your own meals?: No    Do your own shopping?: Yes    ADL comments: Lives with brother and sister-in-law    Previous Hospitalizations:   Any hospitalizations or ED visits within the last 12 months?: Yes    How many hospitalizations have you had in the last year?: 1-2    Hospitalization Comments: Decompensated cirrhosis     Cognitive Screening:   Provider or family/friend/caregiver concerned regarding cognition?: No    PREVENTIVE SCREENINGS      Cardiovascular Screening:    General: Screening Current      Diabetes Screening:     General: Screening Current      Colorectal Cancer Screening:     General: Screening Current      Osteoporosis Screening:    General: Screening Not Indicated      Abdominal Aortic Aneurysm (AAA) Screening:        General: Screening Not Indicated      Lung Cancer Screening:     General: Screening Not Indicated      Hepatitis C Screening:    General: Screening Current    Screening, Brief Intervention, and Referral to Treatment (SBIRT)    Screening  Typical number of drinks in a day: 0  Typical number of drinks in a week: 0  Interpretation: Low risk drinking behavior  Single Item Drug Screening:  How often have you used an illegal drug (including marijuana) or a prescription medication for non-medical reasons in the past year? never    Single Item Drug Screen Score: 0  Interpretation: Negative screen for possible drug use disorder    Other Counseling Topics:   Car/seat belt/driving safety, skin self-exam, sunscreen and calcium and vitamin D intake and regular weightbearing exercise   Diet -- mostly soft, meats, jellos, pudding, rice

## 2022-06-09 NOTE — PROGRESS NOTES
INTERNAL MEDICINE FOLLOW-UP OFFICE VISIT  [facility] INTERNAL MEDICINE [place]    NAME: Jose Stewart  AGE: 60 y o  SEX: male    DATE OF ENCOUNTER: 6/9/2022   Assessment and Plan:     Problem List Items Addressed This Visit        Digestive    Esophageal varices ([facility] [address] )     Needs repeat EGD, was scoped in 02/200 and should have been repeated last month  Family wasn't able to schedule due to insurance, but now they have this and scheduled for gi appt in August  Plan as above           Angiodysplasia    Decompensated hepatic cirrhosis ([facility] [address] )     Presented with diagnosis from [facility], no documentation noting etiology; All serological markers from hospitalization, negative  Has US done showing hepatomegaly  Screening AFP negative  EGD in 02/200 with Grade II eso varices x 2, banded  Unsure if received hep a and b vaccines, HbsAg from hospital negative  Family would like to wait until records obtained from [place] to move forward with vaccination  They will try to get records to us in 2-3 weeks      No concerning symptomatology today  Plan:  · Repeat hep panel considering ast and alk phos  · Will try to text GI team and prepone appt  · Continue gi ppx, nadolol, lasix  · Obtain vaccine records  · Encourage healthier diet           Relevant Orders    Hepatic function panel       Endocrine    History of prediabetes     Labs in anu 03/2021, A1c 5 7  Repeat A1c           Relevant Orders    HEMOGLOBIN A1C W/ EAG ESTIMATION       Cardiovascular and Mediastinum    Primary hypertension     Good BP in office today  Continue nadolol, lasix  Low salt diet encouraged  Exercise encouraged              Nervous and Auditory    Nonintractable epilepsy without status epilepticus (HCC)     Continue keppra, trileptal, diazepam  No seizure like activity, No refills needed  Trileptal levels checked recently, WNL  Goal is to wean off diazepam              Musculoskeletal and Integument    Venous stasis dermatitis of both lower extremities     Patient just started using cetaphil for two days  Derm referral provided if does not note improvement           Relevant Orders    Ambulatory Referral to Dermatology       Other    Anemia - Primary     Hb 8 9  Iron panel- iron 25, ferritin 10, TIBC 300s  Oral iron reduced from 150mg BID to daily last visit however sister in law states she was just able to get the rx 2 days ago  Patient with no fatigue, shortness of breath, chest pain, headaches  Plan:  · Increase iron to BID  · Repeat CBC and iron panel before next visit  · Monitor symptoms           Relevant Medications    iron polysaccharides (FERREX) 150 mg capsule    Other Relevant Orders    Iron Panel (Includes Ferritin, Iron Sat%, Iron, and TIBC)    CBC and Platelet    Status post splenectomy     No vaccination history  Received pna 20 vaccine in may 2022  Needs to receive Hib, menningococal considering asplenia  Varicella vaccine considering above 50  Hepatitis B and A vaccines if immunity low considering cirrhosis             Other Visit Diagnoses     Other abnormal glucose         Relevant Orders    HEMOGLOBIN A1C W/ EAG ESTIMATION    BMI 25 0-25 9,adult              Return in about 3 months (around 9/9/2022) for Recheck  Counseling:     · Medication Side Effects - Adverse side effects of medications were reviewed with the patient/guardian today: Yes  · Counseling was given regarding: Prognosis, Risks and benefits of tx options, Intructions for management, Patient and family education, Importance of tx compliance, Risk factor reductions and Impressions  · Barriers to treatment include: Health information/records not available a this time, Visual impairments, Hearing impairment, Lack of motivation      Chief Complaint:     Chief Complaint   Patient presents with    Medicare Wellness Visit     Lab work discussion        History of Present Illness:     Mr [place] is presenting for Medicare wellness visit   He was last seen in our office a month ago after being hospitalized for chronic anemia, decompensated cirrhosis of unknown etiology  Was found to have Grade 2 esophageal varices x 2 which were banded  Was discharged with oral iron, nadolol  Was to establish care with GI and repeat EGD with colonoscopy but patient has not yet done this  He was also provided a referral to establish care with neurology for epilepsy, not done so  Labs from previous visit showing reduced iron/ferritin levels with marginally improving hb level  Mild AST elevation to 50 and Alk phos of 160; renal function at baseline  oxcarbazepine levels WNL  Given pneumococcal 20 vaccine last visit  Endorses no complaints today  Slept around 3:30 am per brother, however patient states he was watching tv and tends to compensate for sleep  Tried to go through questions for insomnia, patient denied all  No nausea, vomiting, diarrhea, abdominal pain/distention, melena, hematochezia, tenesmus, dysphagia, regurgitation, sour/metallic taste in mouth, early satiety, weight loss  The following portions of the patient's history were reviewed and updated as appropriate: allergies, current medications, past family history, past medical history, past social history, past surgical history and problem list      Review of Systems:     Review of Systems   Skin: Positive for color change (erythematous legs - unchagned)  All other systems reviewed and are negative         Problem List:     Patient Active Problem List   Diagnosis    Cellulitis of left lower extremity    Ambulatory dysfunction    Nonintractable epilepsy without status epilepticus ([address] )    Primary hypertension    Gastroesophageal reflux disease without esophagitis    Venous stasis dermatitis of both lower extremities    Anemia    Status post splenectomy    Esophageal varices ([address] )    Legally blind in right eye, as defined in [address] Bilateral hearing loss    Angiodysplasia    History of TB (tuberculosis)    History of prediabetes    Decompensated hepatic cirrhosis (HCC)        Objective:     /78 (BP Location: Left arm, Patient Position: Sitting, Cuff Size: Standard)   Pulse 77   Temp 98 5 °F (36 9 °C) (Tympanic)   Ht 6' (1 829 m)   Wt 83 8 kg (184 lb 12 8 oz)   SpO2 96%   BMI 25 06 kg/m²     Physical Exam  Vitals and nursing note reviewed  Constitutional:       General: He is not in acute distress  Appearance: Normal appearance  He is well-developed and normal weight  He is not ill-appearing, toxic-appearing or diaphoretic  HENT:      Head: Normocephalic and atraumatic  Cardiovascular:      Rate and Rhythm: Normal rate and regular rhythm  Pulses: Normal pulses  Radial pulses are 2+ on the right side and 2+ on the left side  Heart sounds: Normal heart sounds  Pulmonary:      Effort: Pulmonary effort is normal       Breath sounds: Normal breath sounds  No wheezing, rhonchi or rales  Abdominal:      General: Bowel sounds are normal  There is no distension  Palpations: Abdomen is soft  There is no mass  Tenderness: There is no abdominal tenderness  There is no guarding  Hernia: No hernia is present  Musculoskeletal:         General: No tenderness  Right lower leg: Edema present  Left lower leg: Edema present  Skin:     General: Skin is warm and dry  Coloration: Skin is jaundiced  Neurological:      Mental Status: He is alert and oriented to person, place, and time  Psychiatric:         Behavior: Behavior normal          Thought Content: Thought content normal          Judgment: Judgment normal            Pertinent Laboratory/Diagnostic Studies:    Laboratory Results: I have personally reviewed the pertinent laboratory results/reports   Radiology/Other Diagnostic Testing Results: I have personally reviewed pertinent reports         Current Medications:     Current Outpatient Medications   Medication Sig Dispense Refill  betamethasone dipropionate (DIPROSONE) 0 05 % cream Apply topically 2 (two) times a day      diazepam (VALIUM) 10 mg tablet Take 1 tablet (10 mg total) by mouth every 12 (twelve) hours 60 tablet 2    folic acid (FOLVITE) 1 mg tablet TAKE 1 TABLET (1 MG TOTAL) BY MOUTH IN THE MORNING  90 tablet 1    furosemide (LASIX) 20 mg tablet Take 1 tablet (20 mg total) by mouth in the morning and 1 tablet (20 mg total) in the evening  30 tablet 2    iron polysaccharides (FERREX) 150 mg capsule Take 1 capsule (150 mg total) by mouth 2 (two) times a day 90 capsule 1    levETIRAcetam (KEPPRA) 500 mg tablet Take 1 tablet (500 mg total) by mouth every 12 (twelve) hours 60 tablet 2    nadolol (CORGARD) 40 mg tablet Take 1 tablet (40 mg total) by mouth in the morning  30 tablet 2    OXcarbazepine (TRILEPTAL) 600 mg tablet Take 1 tablet (600 mg total) by mouth every 12 (twelve) hours 60 tablet 2    pantoprazole (PROTONIX) 40 mg tablet Take 1 tablet (40 mg total) by mouth in the morning and 1 tablet (40 mg total) in the evening  Take before meals  60 tablet 2    sucralfate (CARAFATE) 1 g tablet Take 1 tablet (1 g total) by mouth every 6 (six) hours 120 tablet 2     No current facility-administered medications for this visit  Karishma Valencia MD  [facility] INTERNAL MEDICINE [place]    BMI Counseling: Body mass index is 25 06 kg/m²  The BMI is above normal  Nutrition recommendations include 3-5 servings of fruits/vegetables daily, reducing fast food intake and consuming healthier snacks  Exercise recommendations include exercising 3-5 times per week and strength training exercises

## 2022-06-09 NOTE — ASSESSMENT & PLAN NOTE
No vaccination history  Received pna 20 vaccine in may 2022  Needs to receive Hib, menningococal considering asplenia  Varicella vaccine considering above 50  Hepatitis B and A vaccines if immunity low considering cirrhosis

## 2022-06-09 NOTE — ASSESSMENT & PLAN NOTE
Continue keppra, trileptal, diazepam  No seizure like activity, No refills needed  Trileptal levels checked recently, WNL  Goal is to wean off diazepam

## 2022-06-09 NOTE — ASSESSMENT & PLAN NOTE
Patient just started using cetaphil for two days  Derm referral provided if does not note improvement

## 2022-06-09 NOTE — ASSESSMENT & PLAN NOTE
Needs repeat EGD, was scoped in 02/200 and should have been repeated last month  Family wasn't able to schedule due to insurance, but now they have this and scheduled for gi appt in August  Plan as above

## 2022-08-03 ENCOUNTER — TELEPHONE (OUTPATIENT)
Dept: GASTROENTEROLOGY | Facility: AMBULARY SURGERY CENTER | Age: 57
End: 2022-08-03

## 2022-08-03 ENCOUNTER — OFFICE VISIT (OUTPATIENT)
Dept: GASTROENTEROLOGY | Facility: AMBULARY SURGERY CENTER | Age: 57
End: 2022-08-03
Payer: MEDICARE

## 2022-08-03 VITALS
WEIGHT: 190 LBS | OXYGEN SATURATION: 98 % | DIASTOLIC BLOOD PRESSURE: 78 MMHG | HEART RATE: 68 BPM | SYSTOLIC BLOOD PRESSURE: 122 MMHG | BODY MASS INDEX: 25.73 KG/M2 | HEIGHT: 72 IN

## 2022-08-03 DIAGNOSIS — I85.10 SECONDARY ESOPHAGEAL VARICES WITHOUT BLEEDING (HCC): ICD-10-CM

## 2022-08-03 DIAGNOSIS — I85.00 ESOPHAGEAL VARICES WITHOUT BLEEDING, UNSPECIFIED ESOPHAGEAL VARICES TYPE (HCC): ICD-10-CM

## 2022-08-03 DIAGNOSIS — K74.69 OTHER CIRRHOSIS OF LIVER (HCC): Primary | ICD-10-CM

## 2022-08-03 PROCEDURE — 99214 OFFICE O/P EST MOD 30 MIN: CPT | Performed by: PHYSICIAN ASSISTANT

## 2022-08-03 NOTE — TELEPHONE ENCOUNTER
Patient is scheduled for EGD on December 19 , 2022 at [facility] with Vandana Morrissey MD  Patient is aware of pre-procedure prep of Nothing to eat of drink after midnight and they will be called the day prior between 2 and 6 pm for time to report for procedure  Pre-procedure prep has been given to the patient  in person  on August 3 , 2022

## 2022-08-03 NOTE — PROGRESS NOTES
Follow-up Note -  [facility]  Jose Kaiser 1965 60 y o  male         Reason:  Follow-up; cirrhosis, esophageal varices    HPI:  57-year-old male, accompanied by family to help provide history, with history of seizure disorder, hard of hearing, history of splenectomy, cirrhosis and esophageal varices who presents for follow-up  He was hospitalized in February this year after presenting with lower extremity edema and swelling, fevers and anemia  At that time he had been reported with history of cirrhosis and esophageal varices although details of his history were vague, meld score was 9 at that time, he had reported no history of colonoscopy  He underwent EGD 02/11/2022 with Dr Herrera Killer seeing the banding of 4 large esophageal varices, for which follow-up EGD in a few months was recommended  He also underwent serologic workup for other causes of cirrhosis including hepatitis panel, alpha-1 antitrypsin/ceruloplasmin level, iron panel, autoimmune serologies, all of which returned unremarkable, AFP was found normal and right upper quadrant ultrasound showed gallbladder sludge and liver cirrhosis but no evidence of HCC  Echocardiogram did not show any obvious evidence of right-sided heart failure or pulmonary hypertension  Patient denies any history of alcohol use  At this time the patient and patient's family report that his lower extremity edema has been improving, he takes Lasix 20 mg twice daily, does not appear to take spironolactone  Has been abiding by low-sodium diet  Denies any blood or mucus in the stools, reports his bowel habits have been regular, denies any diarrhea or constipation, denies any loss of appetite or loss of weight, reports no swallowing difficulties  REVIEW OF SYSTEMS:      CONSTITUTIONAL: Denies any fever, chills, or rigors  Good appetite, and no recent weight loss  HEENT: No earache or tinnitus   Denies hearing loss or visual disturbances  CARDIOVASCULAR: No chest pain or palpitations  RESPIRATORY: Denies any cough, hemoptysis, shortness of breath or dyspnea on exertion  GASTROINTESTINAL: As noted in the History of Present Illness  GENITOURINARY: No problems with urination  Denies any hematuria or dysuria  NEUROLOGIC: No dizziness or vertigo, denies headaches  MUSCULOSKELETAL: Denies any muscle or joint pain  SKIN: Denies skin rashes or itching  ENDOCRINE: Denies excessive thirst  Denies intolerance to heat or cold  PSYCHOSOCIAL: Denies depression or anxiety  Denies any recent memory loss  Past Medical History:   Diagnosis Date    Anxiety     GERD (gastroesophageal reflux disease)     Hypertension     Hypoxia 02/11/2022    MRSA bacteremia 02/09/2022    Seizures ([address] )       Past Surgical History:   Procedure Laterality Date    COLONOSCOPY      SPLENECTOMY      UPPER GASTROINTESTINAL ENDOSCOPY       Social History     Socioeconomic History    Marital status: Single     Spouse name: Not on file    Number of children: Not on file    Years of education: Not on file    Highest education level: Not on file   Occupational History    Not on file   Tobacco Use    Smoking status: Never Smoker    Smokeless tobacco: Never Used   Vaping Use    Vaping Use: Never used   Substance and Sexual Activity    Alcohol use: Never    Drug use: Never    Sexual activity: Not on file   Other Topics Concern    Not on file   Social History Narrative    Not on file     Social Determinants of Health     Financial Resource Strain: Not on file   Food Insecurity: Not on file   Transportation Needs: Not on file   Physical Activity: Not on file   Stress: Not on file   Social Connections: Not on file   Intimate Partner Violence: Not on file   Housing Stability: Not on file     Family History   Problem Relation Age of Onset    Depression Mother     Heart disease Father      Patient has no known allergies    Current Outpatient Medications   Medication Sig Dispense Refill    betamethasone dipropionate (DIPROSONE) 0 05 % cream Apply topically 2 (two) times a day      diazepam (VALIUM) 10 mg tablet Take 1 tablet (10 mg total) by mouth every 12 (twelve) hours 60 tablet 2    folic acid (FOLVITE) 1 mg tablet TAKE 1 TABLET (1 MG TOTAL) BY MOUTH IN THE MORNING  90 tablet 1    furosemide (LASIX) 20 mg tablet Take 1 tablet (20 mg total) by mouth in the morning and 1 tablet (20 mg total) in the evening  30 tablet 2    iron polysaccharides (FERREX) 150 mg capsule Take 1 capsule (150 mg total) by mouth 2 (two) times a day 90 capsule 1    levETIRAcetam (KEPPRA) 500 mg tablet Take 1 tablet (500 mg total) by mouth every 12 (twelve) hours 60 tablet 2    nadolol (CORGARD) 40 mg tablet Take 1 tablet (40 mg total) by mouth in the morning  30 tablet 2    OXcarbazepine (TRILEPTAL) 600 mg tablet Take 1 tablet (600 mg total) by mouth every 12 (twelve) hours 60 tablet 2    pantoprazole (PROTONIX) 40 mg tablet TAKE 1 TABLET BY MOUTH IN THE MORNING AND 1 TABLET IN THE EVENING  TAKE BEFORE MEALS 180 tablet 1    sucralfate (CARAFATE) 1 g tablet Take 1 tablet (1 g total) by mouth every 6 (six) hours 120 tablet 2     No current facility-administered medications for this visit  Blood pressure 122/78, pulse 68, height 6' (1 829 m), weight 86 2 kg (190 lb), SpO2 98 %  PHYSICAL EXAM:      General Appearance:   Alert, cooperative, no distress, appears stated age    HEENT:   Normocephalic, atraumatic, anicteric      Neck:  Supple, symmetrical, trachea midline, no adenopathy;    thyroid: no enlargement/tenderness/nodules; no carotid  bruit or JVD    Lungs:   Clear to auscultation bilaterally; no rales, rhonchi or wheezing; respirations unlabored    Heart[de-identified]   S1 and S2 normal; regular rate and rhythm; no murmur, rub, or gallop     Abdomen:   Soft, non-tender, non-distended; normal bowel sounds; no masses, no organomegaly    Extremities: Nonpitting edema of bilateral lower extremities with some weeping and erythema, mild tenderness, wounds, rashes   Rectal:   Deferred                      Lab Results   Component Value Date    WBC 4 24 (L) 06/01/2022    HGB 8 9 (L) 06/01/2022    HCT 33 0 (L) 06/01/2022    MCV 80 (L) 06/01/2022     06/01/2022     Lab Results   Component Value Date    CALCIUM 9 0 06/01/2022    K 4 2 06/01/2022    CO2 31 06/01/2022     06/01/2022    BUN 9 06/01/2022    CREATININE 0 68 06/01/2022     Lab Results   Component Value Date    ALT 57 06/01/2022    AST 50 (H) 06/01/2022    ALKPHOS 161 (H) 06/01/2022     Lab Results   Component Value Date    INR 1 21 (H) 02/14/2022    INR 1 26 (H) 02/08/2022    PROTIME 15 3 (H) 02/14/2022    PROTIME 15 7 (H) 02/08/2022       EGD    Result Date: 2/11/2022  Impression: Normal duodenum Nodular gastritis Normal retroflexion, no evidence of gastric varices Four columns of grade 2 varices with red Michael signs  Four bands were placed with good decompression of varices in the distal esophagus RECOMMENDATION: Schedule follow-up procedure for continued treatment  Return to floor Full liquid diet B i d  PPI therapy Outpatient repeat EGD in 2 to 3 months for additional banding  Donna Antoine MD     XR chest portable    Result Date: 2/11/2022  Impression: 1  Improved right lower lobe density 2  New blunting of the left costophrenic angle with mild basilar density suggesting atelectasis and a small pleural effusion  Workstation performed: [id number]     XR chest 1 view portable    Result Date: 2/8/2022  Impression: 1  Right lower lung consolidation  Recommend continued short-term follow-up to ensure complete resolution  2   Increased interstitial prominence, findings can be seen with pulmonary edema versus atypical infection  Workstation performed: [id number]     XR knee 4+ views left injury    Result Date: 2/9/2022  Impression: No acute osseous abnormality   Workstation performed: [id number]     CT head without contrast    Result Date: 2/8/2022  Impression: 1  No acute intracranial CT abnormality  2   Opacified and mildly enlarged left nasolacrimal duct  No significant adjacent fat stranding to suggest acute dacryocystitis  Recommend nonurgent ENT consultation  Workstation performed: [id number]     US abdomen complete    Result Date: 2/10/2022  Impression: 1  Cirrhotic changes in the liver with suggestion for cavernous transformation of portal vein, suggesting portal hypertension  2 Gallstone/sludge in the gallbladder with gallbladder wall thickening  3 History of splenectomy  Splenules identified  No prior study for comparison  Further evaluation with contrast-enhanced CT recommended  The study was marked in EPIC for significant notification  Workstation performed: [id number]     CT lower extremity w contrast left    Result Date: 2/10/2022  Impression: Suspected thrombosis/thrombophlebitis of the great saphenous vein Diffuse subcutaneous edema which could be secondary to cellulitis, venous stasis, or dependent edema  No discrete rim enhancing fluid collection  The study was marked in [facility] for immediate notification  Workstation performed: [id number]     Echo complete w/ contrast if indicated    Addendum Date: 2/9/2022      Left Ventricle: Left ventricular cavity size is upper normal  Wall thickness is normal  The left ventricular ejection fraction is 55%  Systolic function is normal  Wall motion is normal  Diastolic function is normal    Mitral Valve: There is trace regurgitation    Tricuspid Valve: There is mild regurgitation    Aorta: The aortic root is mildly dilated (4 0 cm)  The ascending aorta is normal in size     Pulmonary Artery: The pulmonary artery systolic pressure is normal        ASSESSMENT & PLAN:    Other cirrhosis of liver (HCC)  Liver cirrhosis; complicated by portal hypertension with edema and esophageal varices, does not appear encephalopathic at this time, MELD score 9 based on labs from February and June  Etiology most likely NAFLD based on extensive negative serologic workup during recent hospitalization  Had esophageal varices banded in February for which follow-up EGD has been recommended, is due for [facility] [address]  surveillance, is also due for colon cancer screening    Appears to have good social support, lives with family    -plan for EGD, can nonurgently plan for colonoscopy for colon cancer screening separately at a later date as patient may require repeat banding during his EGD    -schedule procedure to be done in the hospital     -procedure was explained in detail to the patient and patient's family at this time including associated risks and benefits, risks including but not limited to infection, perforation bleeding     -will check meld labs and CBC preoperatively     -check right upper quadrant ultrasound and AFP for [place] [address]  surveillance    -advised patient to let us know if lower extremity swelling increases or develops any shortness of breath or abdominal swelling, we can consider adding spironolactone to his diuretic regimen, otherwise continue follow-up with his PCP who was prescribing his diuretics    -low-sodium diet     -continue avoidance of alcohol    - advised patient's family to notify us if patient is experiencing episodes of confusion or altered mentation, in which case we can consider adding lactulose    - office follow-up after endoscopic evaluation, again discussed colonoscopy, can also consider referral to hepatology service, does not appear to urgently require transplant evaluation at this time with low MELD score noted

## 2022-08-03 NOTE — ASSESSMENT & PLAN NOTE
Liver cirrhosis; complicated by portal hypertension with edema and esophageal varices, does not appear encephalopathic at this time, MELD score 9 based on labs from February and June  Etiology most likely NAFLD based on extensive negative serologic workup during recent hospitalization  Had esophageal varices banded in February for which follow-up EGD has been recommended, is due for [facility]  surveillance, is also due for colon cancer screening    Appears to have good social support, lives with family    -plan for EGD, can nonurgently plan for colonoscopy for colon cancer screening separately at a later date as patient may require repeat banding during his EGD    -schedule procedure to be done in the hospital     -procedure was explained in detail to the patient and patient's family at this time including associated risks and benefits, risks including but not limited to infection, perforation bleeding     -will check meld labs and CBC preoperatively     -check right upper quadrant ultrasound and AFP for [place] [address]  surveillance    -advised patient to let us know if lower extremity swelling increases or develops any shortness of breath or abdominal swelling, we can consider adding spironolactone to his diuretic regimen, otherwise continue follow-up with his PCP who was prescribing his diuretics    -low-sodium diet     -continue avoidance of alcohol    - advised patient's family to notify us if patient is experiencing episodes of confusion or altered mentation, in which case we can consider adding lactulose    - office follow-up after endoscopic evaluation, again discussed colonoscopy, can also consider referral to hepatology service, does not appear to urgently require transplant evaluation at this time with low MELD score noted

## 2022-08-03 NOTE — PATIENT INSTRUCTIONS
Scheduled date of EGD(as of today): 12/19/2022  Physician performing EGD: Dr Aminta Mendoza  Location of EGD: [id number] [facility]  Instructions reviewed with patient by: Penny Shaw  Clearances:  none

## 2022-08-11 ENCOUNTER — APPOINTMENT (OUTPATIENT)
Dept: LAB | Age: 57
End: 2022-08-11
Payer: MEDICARE

## 2022-08-11 ENCOUNTER — HOSPITAL ENCOUNTER (OUTPATIENT)
Dept: RADIOLOGY | Age: 57
Discharge: HOME | End: 2022-08-11
Payer: MEDICARE

## 2022-08-11 DIAGNOSIS — K74.69 OTHER CIRRHOSIS OF LIVER (HCC): ICD-10-CM

## 2022-08-11 LAB
AFP-TM SERPL-MCNC: 1.8 NG/ML (ref 0.5–8)
ALBUMIN SERPL BCP-MCNC: 2.5 G/DL (ref 3.5–5)
ALP SERPL-CCNC: 101 U/L (ref 46–116)
ALT SERPL W P-5'-P-CCNC: 20 U/L (ref 12–78)
ANION GAP SERPL CALCULATED.3IONS-SCNC: 0 MMOL/L (ref 4–13)
AST SERPL W P-5'-P-CCNC: 28 U/L (ref 5–45)
BASOPHILS # BLD AUTO: 0.03 THOUSANDS/ΜL (ref 0–0.1)
BASOPHILS NFR BLD AUTO: 1 % (ref 0–1)
BILIRUB SERPL-MCNC: 0.22 MG/DL (ref 0.2–1)
BUN SERPL-MCNC: 12 MG/DL (ref 5–25)
CALCIUM ALBUM COR SERPL-MCNC: 9.6 MG/DL (ref 8.3–10.1)
CALCIUM SERPL-MCNC: 8.4 MG/DL (ref 8.3–10.1)
CHLORIDE SERPL-SCNC: 109 MMOL/L (ref 96–108)
CO2 SERPL-SCNC: 33 MMOL/L (ref 21–32)
CREAT SERPL-MCNC: 0.7 MG/DL (ref 0.6–1.3)
EOSINOPHIL # BLD AUTO: 1.43 THOUSAND/ΜL (ref 0–0.61)
EOSINOPHIL NFR BLD AUTO: 28 % (ref 0–6)
ERYTHROCYTE [DISTWIDTH] IN BLOOD BY AUTOMATED COUNT: 16.3 % (ref 11.6–15.1)
GFR SERPL CREATININE-BSD FRML MDRD: 104 ML/MIN/1.73SQ M
GLUCOSE P FAST SERPL-MCNC: 100 MG/DL (ref 65–99)
HCT VFR BLD AUTO: 31.5 % (ref 36.5–49.3)
HGB BLD-MCNC: 8.6 G/DL (ref 12–17)
IMM GRANULOCYTES # BLD AUTO: 0.01 THOUSAND/UL (ref 0–0.2)
IMM GRANULOCYTES NFR BLD AUTO: 0 % (ref 0–2)
INR PPP: 1.14 (ref 0.84–1.19)
LYMPHOCYTES # BLD AUTO: 1.01 THOUSANDS/ΜL (ref 0.6–4.47)
LYMPHOCYTES NFR BLD AUTO: 20 % (ref 14–44)
MCH RBC QN AUTO: 21.7 PG (ref 26.8–34.3)
MCHC RBC AUTO-ENTMCNC: 27.3 G/DL (ref 31.4–37.4)
MCV RBC AUTO: 79 FL (ref 82–98)
MONOCYTES # BLD AUTO: 0.5 THOUSAND/ΜL (ref 0.17–1.22)
MONOCYTES NFR BLD AUTO: 10 % (ref 4–12)
NEUTROPHILS # BLD AUTO: 2.2 THOUSANDS/ΜL (ref 1.85–7.62)
NEUTS SEG NFR BLD AUTO: 41 % (ref 43–75)
NRBC BLD AUTO-RTO: 0 /100 WBCS
PLATELET # BLD AUTO: 246 THOUSANDS/UL (ref 149–390)
PMV BLD AUTO: 9.3 FL (ref 8.9–12.7)
POTASSIUM SERPL-SCNC: 4.3 MMOL/L (ref 3.5–5.3)
PROT SERPL-MCNC: 7.8 G/DL (ref 6.4–8.4)
PROTHROMBIN TIME: 14.9 SECONDS (ref 11.6–14.5)
RBC # BLD AUTO: 3.97 MILLION/UL (ref 3.88–5.62)
SODIUM SERPL-SCNC: 142 MMOL/L (ref 135–147)
WBC # BLD AUTO: 5.18 THOUSAND/UL (ref 4.31–10.16)

## 2022-08-11 PROCEDURE — 85610 PROTHROMBIN TIME: CPT

## 2022-08-11 PROCEDURE — 85025 COMPLETE CBC W/AUTO DIFF WBC: CPT

## 2022-08-11 PROCEDURE — 36415 COLL VENOUS BLD VENIPUNCTURE: CPT

## 2022-08-11 PROCEDURE — 80053 COMPREHEN METABOLIC PANEL: CPT

## 2022-08-11 PROCEDURE — 76705 ECHO EXAM OF ABDOMEN: CPT

## 2022-08-11 PROCEDURE — 82105 ALPHA-FETOPROTEIN SERUM: CPT

## 2022-08-23 DIAGNOSIS — K21.9 GASTROESOPHAGEAL REFLUX DISEASE WITHOUT ESOPHAGITIS: ICD-10-CM

## 2022-08-23 RX ORDER — SUCRALFATE 1 G/1
1 TABLET ORAL EVERY 6 HOURS SCHEDULED
Qty: 120 TABLET | Refills: 2 | Status: SHIPPED | OUTPATIENT
Start: 2022-08-23

## 2022-08-23 NOTE — TELEPHONE ENCOUNTER
Clara Espitia has requested a refill of sucralfate (CARAFATE) 1 g tablet  Pt meets the requirements placed by [facility]  Will refill medication

## 2022-08-25 ENCOUNTER — TELEPHONE (OUTPATIENT)
Dept: INTERNAL MEDICINE CLINIC | Facility: CLINIC | Age: 57
End: 2022-08-25

## 2022-08-25 DIAGNOSIS — D50.0 IRON DEFICIENCY ANEMIA DUE TO CHRONIC BLOOD LOSS: ICD-10-CM

## 2022-08-25 DIAGNOSIS — I87.2 VENOUS STASIS DERMATITIS OF BOTH LOWER EXTREMITIES: Primary | ICD-10-CM

## 2022-08-25 RX ORDER — IRON POLYSACCHARIDE COMPLEX 150 MG
150 CAPSULE ORAL 2 TIMES DAILY
Qty: 180 CAPSULE | Refills: 0 | Status: SHIPPED | OUTPATIENT
Start: 2022-08-25

## 2022-08-25 RX ORDER — BETAMETHASONE DIPROPIONATE 0.5 MG/G
CREAM TOPICAL 2 TIMES DAILY
Qty: 30 G | Refills: 1 | Status: SHIPPED | OUTPATIENT
Start: 2022-08-25

## 2022-08-25 NOTE — TELEPHONE ENCOUNTER
Called Saturnino Jj, at the suggestion of Florencio Watkins to call around 2:00PM, to discuss Jose Stewart's iron medication  L/m to call back

## 2022-08-25 NOTE — TELEPHONE ENCOUNTER
Dank Nair has called the [facility] office in regards to [facility] medications  Amy Saleh stated the [address] [facility] did not receive the script for iron polysaccharides (FERREX) 150 mg capsule  See medication from 06/09/22 was placed as a no print   Amy Saleh has also requested a refill of betamethasone dipropionate (DIPROSONE) 0 05% cream  Amy Saleh requested the refills be sent to the [address] [facility] on [place] [address] in [place], [place]

## 2022-08-25 NOTE — TELEPHONE ENCOUNTER
Yasmin Johnson had left a message on the med refill line in regards to [facility]  Landon Gutierrez wished to be called back in regards to Jose's iron medication  Called Landon Gutierrez and spoke to her spouse, Luke Worthington, who stated to call back around 2:00PM to talk to Landon Da Silva for the information and ended the call

## 2022-08-31 ENCOUNTER — APPOINTMENT (OUTPATIENT)
Dept: LAB | Facility: AMBULARY SURGERY CENTER | Age: 57
End: 2022-08-31
Payer: MEDICARE

## 2022-08-31 DIAGNOSIS — D50.0 IRON DEFICIENCY ANEMIA DUE TO CHRONIC BLOOD LOSS: ICD-10-CM

## 2022-08-31 DIAGNOSIS — Z87.898 HISTORY OF PREDIABETES: ICD-10-CM

## 2022-08-31 DIAGNOSIS — K72.90 DECOMPENSATED HEPATIC CIRRHOSIS (HCC): ICD-10-CM

## 2022-08-31 DIAGNOSIS — R73.09 OTHER ABNORMAL GLUCOSE: ICD-10-CM

## 2022-08-31 DIAGNOSIS — K74.60 DECOMPENSATED HEPATIC CIRRHOSIS (HCC): ICD-10-CM

## 2022-08-31 LAB
ALBUMIN SERPL BCP-MCNC: 2.6 G/DL (ref 3.5–5)
ALP SERPL-CCNC: 100 U/L (ref 46–116)
ALT SERPL W P-5'-P-CCNC: 22 U/L (ref 12–78)
AST SERPL W P-5'-P-CCNC: 23 U/L (ref 5–45)
BILIRUB DIRECT SERPL-MCNC: 0.09 MG/DL (ref 0–0.2)
BILIRUB SERPL-MCNC: 0.29 MG/DL (ref 0.2–1)
ERYTHROCYTE [DISTWIDTH] IN BLOOD BY AUTOMATED COUNT: 16.2 % (ref 11.6–15.1)
EST. AVERAGE GLUCOSE BLD GHB EST-MCNC: 108 MG/DL
FERRITIN SERPL-MCNC: 9 NG/ML (ref 8–388)
HBA1C MFR BLD: 5.4 %
HCT VFR BLD AUTO: 32.2 % (ref 36.5–49.3)
HGB BLD-MCNC: 8.4 G/DL (ref 12–17)
IRON SATN MFR SERPL: 6 % (ref 20–50)
IRON SERPL-MCNC: 20 UG/DL (ref 65–175)
MCH RBC QN AUTO: 20.7 PG (ref 26.8–34.3)
MCHC RBC AUTO-ENTMCNC: 26.1 G/DL (ref 31.4–37.4)
MCV RBC AUTO: 80 FL (ref 82–98)
PLATELET # BLD AUTO: 247 THOUSANDS/UL (ref 149–390)
PMV BLD AUTO: 9.4 FL (ref 8.9–12.7)
PROT SERPL-MCNC: 8.2 G/DL (ref 6.4–8.4)
RBC # BLD AUTO: 4.05 MILLION/UL (ref 3.88–5.62)
TIBC SERPL-MCNC: 330 UG/DL (ref 250–450)
WBC # BLD AUTO: 4.16 THOUSAND/UL (ref 4.31–10.16)

## 2022-08-31 PROCEDURE — 36415 COLL VENOUS BLD VENIPUNCTURE: CPT

## 2022-08-31 PROCEDURE — 85027 COMPLETE CBC AUTOMATED: CPT

## 2022-08-31 PROCEDURE — 83540 ASSAY OF IRON: CPT

## 2022-08-31 PROCEDURE — 82728 ASSAY OF FERRITIN: CPT

## 2022-08-31 PROCEDURE — 80076 HEPATIC FUNCTION PANEL: CPT

## 2022-08-31 PROCEDURE — 83036 HEMOGLOBIN GLYCOSYLATED A1C: CPT

## 2022-08-31 PROCEDURE — 83550 IRON BINDING TEST: CPT

## 2022-09-08 ENCOUNTER — OFFICE VISIT (OUTPATIENT)
Dept: INTERNAL MEDICINE CLINIC | Facility: CLINIC | Age: 57
End: 2022-09-08
Payer: MEDICARE

## 2022-09-08 VITALS
TEMPERATURE: 98.5 F | SYSTOLIC BLOOD PRESSURE: 124 MMHG | HEIGHT: 72 IN | BODY MASS INDEX: 25.33 KG/M2 | DIASTOLIC BLOOD PRESSURE: 80 MMHG | WEIGHT: 187 LBS | OXYGEN SATURATION: 98 % | HEART RATE: 66 BPM

## 2022-09-08 DIAGNOSIS — I87.2 VENOUS STASIS DERMATITIS OF BOTH LOWER EXTREMITIES: ICD-10-CM

## 2022-09-08 DIAGNOSIS — G40.909 NONINTRACTABLE EPILEPSY WITHOUT STATUS EPILEPTICUS, UNSPECIFIED EPILEPSY TYPE (HCC): ICD-10-CM

## 2022-09-08 DIAGNOSIS — I85.11 SECONDARY ESOPHAGEAL VARICES WITH BLEEDING (HCC): Primary | ICD-10-CM

## 2022-09-08 DIAGNOSIS — D50.9 IRON DEFICIENCY ANEMIA, UNSPECIFIED IRON DEFICIENCY ANEMIA TYPE: ICD-10-CM

## 2022-09-08 DIAGNOSIS — K74.69 OTHER CIRRHOSIS OF LIVER (HCC): ICD-10-CM

## 2022-09-08 PROCEDURE — 99214 OFFICE O/P EST MOD 30 MIN: CPT | Performed by: INTERNAL MEDICINE

## 2022-09-08 NOTE — PROGRESS NOTES
INTERNAL MEDICINE FOLLOW-UP OFFICE VISIT  [facility] INTERNAL MEDICINE [place]    NAME: Jose Stewart  AGE: 59 y o  SEX: male  : 1965     DATE: 2022     Assessment and Plan:     Esophageal varices ([facility] [address] )  Patient's last EGD performed in 2022 with subsequent banding x4  Per GI note repeat EGD planned for 22  Continue nadolol as prescribed    Other cirrhosis of liver ([facility] [address] )  Per GI note, etiology most likley NAFLD based on negative serologic workup   MELD score = 9  AFP negative as of 2022  AlkPhos = 100, AST = 23, ALT = 22 as of 2022  RUQ U/S not concerning for [facility]     Plan:   -will continue surveillance of [facility]    -repeat AFP in 6 months  -Continue lasix and nadolol   -discussed necessity of colonoscopy in near future     Nonintractable epilepsy without status epilepticus ([facility] )  No seizure like activity reported, no refills needed  Trileptal levels checked recently, WNL    Plan:   -continue keppra, trileptal and diazepam    Venous stasis dermatitis of both lower extremities  Lower extremity swelling and ulceration appears to have greatly improved  Patient has a dermatology appointment on 2022  Patient complains of itchiness and continue to scratch at legs    Plan:  Continue diprosone cream   Continue lasix 20 mg bid   No aldactone needed at this time as swelling has improved    Anemia  Lab Results   Component Value Date    IRON 20 (L) 2022    FERRITIN 9 2022    TIBC 330 2022    CONCFE 6 (L) 2022     Plan:  Iron supplementation increased to BID  Will allow time for iron levels to improve  Further adjustments can be made after patient gets his EGD and colonoscopy  If both procedures come back negative for a cause of iron deficiency, patient should be switched to IV iron supplementation  CBC and Iron Panel ordered      No follow-ups on file       Chief Complaint:     Chief Complaint   Patient presents with    Follow-up 3 month f/u, medication discussion        History of Present Illness:     Patient presents today for a 3 month follow up visit  Patient has been doing well since his last appointment and has no new complaints  He was recently seen by GI for his esophageal varices and cirrhosis of the liver  Per GI, patient is doing well and recent RUQ U/S and blood work is negative for [address]  EGD is scheduled for this coming December  Labs at this visit show improved LFTs and alkaline phosphatase levels  His latest fasting glucose was 100  Repeat iron panel shows persistently low iron levels for which his iron supplementation was increased to BID  Today the patient has no new complaints  Patient has not had any recent illnesses, fever or chills  He denies shortness of breath, chest pain, loss of consciousness or palpitations  He denies n/v/c but very rarely has diarrhea  He has not noticed blood in the stool  Patient denies dysuria or hematuria  The following portions of the patient's history were reviewed and updated as appropriate: allergies, current medications, past family history, past medical history, past social history, past surgical history and problem list      Review of Systems:     Review of Systems   Constitutional: Negative for chills and fever  HENT: Negative for ear pain and sore throat  Eyes: Negative for pain and visual disturbance  Respiratory: Negative for cough and shortness of breath  Cardiovascular: Negative for chest pain and palpitations  Gastrointestinal: Negative for abdominal pain, blood in stool, constipation and vomiting  Genitourinary: Negative for dysuria and hematuria  Musculoskeletal: Negative for arthralgias and back pain  Skin: Negative for color change and rash  Itchy lower extremities    Neurological: Negative for seizures, syncope and weakness  All other systems reviewed and are negative         Problem List:     Patient Active Problem List Diagnosis    Cellulitis of left lower extremity    Ambulatory dysfunction    Nonintractable epilepsy without status epilepticus ([address] )    Primary hypertension    Gastroesophageal reflux disease without esophagitis    Venous stasis dermatitis of both lower extremities    Anemia    Status post splenectomy    Esophageal varices (HCC)    Legally blind in right eye, as defined in [address] Bilateral hearing loss    Angiodysplasia    History of TB (tuberculosis)    History of prediabetes    Other cirrhosis of liver (HCC)        Objective:     /80 (BP Location: Left arm, Patient Position: Sitting, Cuff Size: Standard)   Pulse 66   Temp 98 5 °F (36 9 °C) (Tympanic)   Ht 6' (1 829 m)   Wt 84 8 kg (187 lb)   SpO2 98%   BMI 25 36 kg/m²     Physical Exam  Constitutional:       General: He is not in acute distress  Appearance: Normal appearance  He is normal weight  He is not ill-appearing or toxic-appearing  HENT:      Head: Normocephalic and atraumatic  Eyes:      Extraocular Movements: Extraocular movements intact  Conjunctiva/sclera: Conjunctivae normal       Pupils: Pupils are equal, round, and reactive to light  Cardiovascular:      Rate and Rhythm: Normal rate and regular rhythm  Pulses: Normal pulses  Heart sounds: Normal heart sounds  No murmur heard  No gallop  Pulmonary:      Effort: Pulmonary effort is normal  No respiratory distress  Breath sounds: Normal breath sounds  No stridor  No wheezing, rhonchi or rales  Chest:      Chest wall: No tenderness  Abdominal:      General: Abdomen is flat  Bowel sounds are normal       Palpations: Abdomen is soft  Musculoskeletal:         General: Normal range of motion  Cervical back: Normal range of motion  Right lower leg: Edema present  Left lower leg: Edema present  Skin:     General: Skin is warm  Coloration: Skin is not jaundiced or pale  Findings: No rash        Comments: Venous stasis dermatitis in lower extremities bilaterally  Neurological:      General: No focal deficit present  Mental Status: He is alert  Mental status is at baseline  Cranial Nerves: No cranial nerve deficit  Sensory: No sensory deficit  Motor: No weakness     Psychiatric:         Mood and Affect: Mood normal          Behavior: Behavior normal          Pertinent Laboratory/Diagnostic Studies:      Xochitl Torres MD  [facility] INTERNAL MEDICINE [facility]

## 2022-09-08 NOTE — ASSESSMENT & PLAN NOTE
Lab Results   Component Value Date    IRON 20 (L) 08/31/2022    FERRITIN 9 08/31/2022    TIBC 330 08/31/2022    CONCFE 6 (L) 08/31/2022     Plan:  Iron supplementation increased to BID  Will allow time for iron levels to improve  Further adjustments can be made after patient gets his EGD and colonoscopy    If both procedures come back negative for a cause of iron deficiency, patient should be switched to IV iron supplementation  CBC and Iron Panel ordered

## 2022-09-08 NOTE — ASSESSMENT & PLAN NOTE
Per GI note, etiology most likley NAFLD based on negative serologic workup   MELD score = 9  AFP negative as of August 2022  AlkPhos = 100, AST = 23, ALT = 22 as of August 2022  RUQ U/S not concerning for [place] [address]     Plan:   -will continue surveillance of [place] [address]    -repeat AFP in 6 months  -Continue lasix and nadolol   -discussed necessity of colonoscopy in near future

## 2022-09-08 NOTE — ASSESSMENT & PLAN NOTE
No seizure like activity reported, no refills needed  Trileptal levels checked recently, WNL    Plan:   -continue keppra, trileptal and diazepam

## 2022-09-08 NOTE — ASSESSMENT & PLAN NOTE
Lower extremity swelling and ulceration appears to have greatly improved  Patient has a dermatology appointment on October 25, 2022  Patient complains of itchiness and continue to scratch at legs    Plan:  Continue diprosone cream   Continue lasix 20 mg bid   No aldactone needed at this time as swelling has improved

## 2022-09-08 NOTE — ASSESSMENT & PLAN NOTE
Patient's last EGD performed in Feb 2022 with subsequent banding x4  Per GI note repeat EGD planned for 12/19/22  Continue nadolol as prescribed

## 2022-09-21 ENCOUNTER — APPOINTMENT (OUTPATIENT)
Dept: RADIOLOGY | Facility: HOSPITAL | Age: 57
End: 2022-09-21
Payer: MEDICARE

## 2022-09-21 ENCOUNTER — HOSPITAL ENCOUNTER (EMERGENCY)
Facility: HOSPITAL | Age: 57
Discharge: HOME | End: 2022-09-21
Attending: EMERGENCY MEDICINE
Payer: MEDICARE

## 2022-09-21 VITALS
OXYGEN SATURATION: 98 % | BODY MASS INDEX: 25.36 KG/M2 | DIASTOLIC BLOOD PRESSURE: 68 MMHG | HEART RATE: 83 BPM | TEMPERATURE: 97.8 F | RESPIRATION RATE: 18 BRPM | SYSTOLIC BLOOD PRESSURE: 110 MMHG | HEIGHT: 72 IN

## 2022-09-21 DIAGNOSIS — S70.12XA CONTUSION OF LEFT THIGH, INITIAL ENCOUNTER: ICD-10-CM
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
## 2022-09-21 PROCEDURE — 73564 X-RAY EXAM KNEE 4 OR MORE: CPT

## 2022-09-21 PROCEDURE — 99283 EMERGENCY DEPT VISIT LOW MDM: CPT

## 2022-09-21 PROCEDURE — 73552 X-RAY EXAM OF FEMUR 2/>: CPT

## 2022-09-21 PROCEDURE — 99282 EMERGENCY DEPT VISIT SF MDM: CPT | Performed by: EMERGENCY MEDICINE

## 2022-09-21 RX ORDER — LIDOCAINE 50 MG/G
1 PATCH TOPICAL ONCE
Status: DISCONTINUED | OUTPATIENT
Start: 2022-09-21 | End: 2022-09-21 | Stop reason: HOSPADM

## 2022-09-21 RX ORDER — ACETAMINOPHEN 325 MG/1
650 TABLET ORAL ONCE
Status: COMPLETED | OUTPATIENT
Start: 2022-09-21 | End: 2022-09-21

## 2022-09-21 RX ADMIN — ACETAMINOPHEN 650 MG: 325 TABLET ORAL at 17:02

## 2022-09-21 RX ADMIN — LIDOCAINE 5% 1 PATCH: 700 PATCH TOPICAL at 17:02
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
## 2022-09-22 DIAGNOSIS — G40.909 NONINTRACTABLE EPILEPSY WITHOUT STATUS EPILEPTICUS, UNSPECIFIED EPILEPSY TYPE (HCC): ICD-10-CM

## 2022-09-22 RX ORDER — OXCARBAZEPINE 600 MG/1
TABLET, FILM COATED ORAL
Qty: 180 TABLET | Refills: 1 | Status: SHIPPED | OUTPATIENT
Start: 2022-09-22

## 2022-09-22 NOTE — TELEPHONE ENCOUNTER
Cee Payton has requested a 90 day supply of Oxcarbazepine (TRILEPTAL) 600 mg tablet  Would a 90 day supply be appropriate?
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
## 2022-09-27 ENCOUNTER — OFFICE VISIT (OUTPATIENT)
Dept: NEUROLOGY | Facility: CLINIC | Age: 57
End: 2022-09-27
Payer: MEDICARE

## 2022-09-27 VITALS
HEIGHT: 72 IN | WEIGHT: 189.8 LBS | SYSTOLIC BLOOD PRESSURE: 99 MMHG | HEART RATE: 81 BPM | BODY MASS INDEX: 25.71 KG/M2 | DIASTOLIC BLOOD PRESSURE: 64 MMHG

## 2022-09-27 DIAGNOSIS — G40.909 NONINTRACTABLE EPILEPSY WITHOUT STATUS EPILEPTICUS, UNSPECIFIED EPILEPSY TYPE (HCC): Primary | ICD-10-CM

## 2022-09-27 DIAGNOSIS — R29.898 DECREASED ARM SWING AMPLITUDE: ICD-10-CM

## 2022-09-27 DIAGNOSIS — G25.0 BENIGN HEAD TREMOR: ICD-10-CM

## 2022-09-27 DIAGNOSIS — F79 INTELLECTUAL DISABILITY: ICD-10-CM

## 2022-09-27 PROCEDURE — 99205 OFFICE O/P NEW HI 60 MIN: CPT | Performed by: STUDENT IN AN ORGANIZED HEALTH CARE EDUCATION/TRAINING PROGRAM

## 2022-09-27 NOTE — PATIENT INSTRUCTIONS
Continue with oxcarbazepine and keppra    Routine EEG     Follow up in 6 month to check up on the tremor

## 2022-09-27 NOTE — PROGRESS NOTES
[address] [facility] [address] [facility]  Initial Consultation    Impression/Plan    Mr Gonzalo Tirado is a 59 y o , male with PMH of seizures and intellectual disability presenting to [facility] care  His medical history is suggestive of a symptomatic epilepsy, possibly due to a febrile illness at 8years old like an encephalitis  The semiology of his seizures do not have a lot of epileptic traits  Regardless, his medical history places him at risk for it and he is seizure free on Keppra and Oxcarb dual therapy  His neurological exam is consistent with a mild intellectual disability  He also has a resting head tremor and decrease right arm swing  There are no other findings to suggest parkinson's disease or an ET  There is no tremor or cogwheeling of the arms  Fine alternating movements is largely normal  I will monitor these neurological findings at the next visit to see if they warrant further work up or treatment  Neither symptom is bothersome to the patient so conservative treatment is the plan for now  Plan outlined below:    #Symptomatic epilepsy  - C/w Oxcarbazepine 600 BID, Keppra 500 BID  - Sodium level normal on 8/11/2022  - Routine EEG pending    #Head tremor/decreased arm swing  - Monitor for possible ET vs  PD    - Follow up in 6 months or sooner if needed  We discussed the pathophysiology of epilepsy/seizure and seizure safety/precautions including driving restrictions following seizures  We discussed factors that can lower seizure threshold and the side effects of antiepileptic medications  Diagnoses and all orders for this visit:    Nonintractable epilepsy without status epilepticus, unspecified epilepsy type [facility])  -     Ambulatory Referral to Neurology  -     EEG awake or drowsy routine;  Future    Intellectual disability    Benign head tremor    Decreased arm swing amplitude        Subjective    Jose P Gonzalo Tirado is a 59 y o  {male with a PMH of intellectual disability and epilepsy presenting to the [facility] to establish care  The patient developed seizures at age 8 after what sounds like a febrile illness  Thereafter he would have seizures every 6 months to one year  Anxiety and stress are triggers  Per his brother, he would lay flat for 2-5 minutes with eyes closed and rolling back while having intermittent tremulousness  There was no prodrome or warning  The post-ictal state can last up to a day  There were also some possible nocturnal seizures because he would urinate in the bed which he doesn't typically do  This most recent seizure was 5-6 years ago  He currently tolerates his current AED regimen and has been on this for a long time  No refills needed  There were no major questions or concerns  His brother just wanted to establish care with someone  He has an intellectual disability since age 8  He has always lived with family  He can perform most ADLs without help  Can ride a bike  Special Features  Age/Date of seizure onset: 8 yo  Status epilepticus: no  Self Injury Seizures: no  Precipitating Factors: stress  Longest seizure free interval: 5-6 years (current)    Epilepsy Risk Factors:  Intellectual disability  Possible febrile illness as a child (?encephalitis)    Current AEDs:  Oxcarbazepine 600 BID, Keppra 500 BID  Medication side effects: None  Medication adherence: No    Prior AEDs:  None    Prior Evaluation:  - [facility] 2/8/2022: No structural abnormalities seen    History Reviewed: The following were reviewed and updated as appropriate: allergies, current medications, past family history, past medical history, past social history, past surgical history and problem list     Psychiatric History:  None    Social History:   Driving: No  Lives Alone: No  Occupation: on permanent disability    ROS:  Review of Systems   Constitutional: Negative  Negative for appetite change and fever  HENT: Negative    Negative for hearing loss, tinnitus, trouble swallowing and voice change  Eyes: Negative  Negative for photophobia and pain  Respiratory: Negative  Negative for shortness of breath  Cardiovascular: Negative  Negative for palpitations  Gastrointestinal: Negative  Negative for nausea and vomiting  Endocrine: Negative  Negative for cold intolerance  Genitourinary: Negative  Negative for dysuria, frequency and urgency  Musculoskeletal: Negative  Negative for myalgias and neck pain  Skin: Negative  Negative for rash  Neurological: Negative  Negative for dizziness, tremors, seizures, syncope, facial asymmetry, speech difficulty, weakness, light-headedness, numbness and headaches  Hematological: Negative  Does not bruise/bleed easily  Psychiatric/Behavioral: Negative  Negative for confusion, hallucinations and sleep disturbance  All other systems reviewed and are negative  Objective    BP 99/64 (BP Location: Left arm, Patient Position: Sitting, Cuff Size: Adult)   Pulse 81   Ht 6' (1 829 m)   Wt 86 1 kg (189 lb 12 8 oz)   BMI 25 74 kg/m²      General Exam  General: well developed, no acute distress  HEENT: mucous membranes moist, anicteric sclera  Neck: supple, good ROM  Extremities: no clubbing, cyanosis or edema  Skin: no rash on visible skin  Neurological Exam  Mental Status: awake, alert, and fully oriented to person, place, and time  Non contributory to history gathering  There is no neglect  Language: fluency, and comprehension normal        Cranial Nerves: Pupils equal and reactive to light  Visual fields full to confrontation  Extraocular motions intact with full versions, normal pursuits and saccades  Facial strength full and symmetric  Facial sensation intact in V1-V3  Hearing intact to voice  Tongue protrudes to midline  Palate elevates symmetrically  Speech clear without notable dysarthria  Shoulder shrug activation full and symmetric  Motor: Normal bulk and tone  No pronator drift  Strength is 5/5 proximally and distally in all 4 extremities  No involuntary movements  Sensory: Sensation intact to light touch distally in all extremities  Coordination: Normal finger-to-nose  Normal rapid alternating movements  Resting fine head (no) tremor  Station and gait: Casual normal except for decreased right arm swing  Normal Romberg  Reflexes: Reflexes 2+ throughout and symmetric       Curt Angel MD   [facility]  [facility] [address] [facility]

## 2022-10-05 ENCOUNTER — HOSPITAL ENCOUNTER (OUTPATIENT)
Dept: NEUROLOGY | Facility: CLINIC | Age: 57
Discharge: HOME | End: 2022-10-05
Payer: MEDICARE

## 2022-10-05 DIAGNOSIS — G40.909 NONINTRACTABLE EPILEPSY WITHOUT STATUS EPILEPTICUS, UNSPECIFIED EPILEPSY TYPE (HCC): ICD-10-CM

## 2022-10-05 PROCEDURE — 95816 EEG AWAKE AND DROWSY: CPT

## 2022-10-05 PROCEDURE — 95816 EEG AWAKE AND DROWSY: CPT | Performed by: PSYCHIATRY & NEUROLOGY

## 2022-10-07 DIAGNOSIS — I10 PRIMARY HYPERTENSION: ICD-10-CM

## 2022-10-07 RX ORDER — FUROSEMIDE 20 MG/1
20 TABLET ORAL 2 TIMES DAILY
Qty: 180 TABLET | Refills: 1 | Status: SHIPPED | OUTPATIENT
Start: 2022-10-07

## 2022-10-07 NOTE — TELEPHONE ENCOUNTER
Sylvia Moncada has requested a 90 day supply of furosemide (LASIX) 20 mg tablet  Would a 90 day supply be appropriate?

## 2022-10-13 DIAGNOSIS — I85.00 ESOPHAGEAL VARICES WITHOUT BLEEDING, UNSPECIFIED ESOPHAGEAL VARICES TYPE (HCC): ICD-10-CM

## 2022-10-13 DIAGNOSIS — G40.909 NONINTRACTABLE EPILEPSY WITHOUT STATUS EPILEPTICUS, UNSPECIFIED EPILEPSY TYPE (HCC): ICD-10-CM

## 2022-10-13 RX ORDER — NADOLOL 40 MG/1
40 TABLET ORAL DAILY
Qty: 90 TABLET | Refills: 1 | Status: SHIPPED | OUTPATIENT
Start: 2022-10-13

## 2022-10-13 RX ORDER — LEVETIRACETAM 500 MG/1
TABLET ORAL
Qty: 180 TABLET | Refills: 1 | Status: SHIPPED | OUTPATIENT
Start: 2022-10-13

## 2022-10-13 NOTE — TELEPHONE ENCOUNTER
Jong Fabian has requested a 90 day supply of nadalol (CORGARD) 40 mg tablet and levETIRAcetam (KEPPRA) 500 mg tablet  Would 90 day supplies be appropriate?

## 2022-10-25 ENCOUNTER — CONSULT (OUTPATIENT)
Dept: DERMATOLOGY | Facility: CLINIC | Age: 57
End: 2022-10-25

## 2022-10-25 VITALS — WEIGHT: 189 LBS | BODY MASS INDEX: 25.6 KG/M2 | HEIGHT: 72 IN | TEMPERATURE: 97.7 F

## 2022-10-25 DIAGNOSIS — I87.2 VENOUS STASIS DERMATITIS OF BOTH LOWER EXTREMITIES: ICD-10-CM

## 2022-10-25 RX ORDER — TRIAMCINOLONE ACETONIDE 1 MG/G
CREAM TOPICAL
Qty: 453.6 G | Refills: 2 | Status: CANCELLED | OUTPATIENT
Start: 2022-10-25

## 2022-10-25 NOTE — PROGRESS NOTES
[facility] Note     Patient Name: Daron Mcallister  Encounter Date: 10/25/2022     Have you been cared for by a [facility] Dermatologist in the last 3 years and, if so, which description applies to you? NO  I am considered a "new" patient and must complete all patient intake questions  I am MALE/not capable of bearing children  REVIEW OF SYSTEMS:  Have you recently had or currently have any of the following? · Recent fever or chills? No  · Any non-healing wound? YES, Bilateral legs   PAST MEDICAL HISTORY:  Have you personally ever had or currently have any of the following? If "YES," then please provide more detail  · Skin cancer (such as Melanoma, Basal Cell Carcinoma, Squamous Cell Carcinoma? No  · Tuberculosis, HIV/AIDS, Hepatitis B or C: YES, Tuberculosis  · Systemic Immunosuppression such as Diabetes, Biologic or Immunotherapy, Chemotherapy, Organ Transplantation, Bone Marrow Transplantation No  · Radiation Treatment No   FAMILY HISTORY:  Any "first degree relatives" (parent, brother, sister, or child) with the following? • Skin Cancer, Pancreatic or Other Cancer? No   PATIENT EXPERIENCE:    • Do you want the Dermatologist to perform a COMPLETE skin exam today including a clinical examination under the "bra and underwear" areas? NO  • If necessary, do we have your permission to call and leave a detailed message on your Preferred Phone number that includes your specific medical information?   Yes      No Known Allergies   Current Outpatient Medications:   •  betamethasone dipropionate (DIPROSONE) 0 05 % cream, Apply topically 2 (two) times a day, Disp: 30 g, Rfl: 1  •  diazepam (VALIUM) 10 mg tablet, Take 1 tablet (10 mg total) by mouth every 12 (twelve) hours, Disp: 60 tablet, Rfl: 2  •  folic acid (FOLVITE) 1 mg tablet, TAKE 1 TABLET (1 MG TOTAL) BY MOUTH IN THE MORNING , Disp: 90 tablet, Rfl: 1  •  furosemide (LASIX) 20 mg tablet, TAKE 1 TABLET (20 MG TOTAL) BY MOUTH IN THE MORNING AND 1 TABLET (20 MG TOTAL) IN THE EVENING , Disp: 180 tablet, Rfl: 1  •  iron polysaccharides (FERREX) 150 mg capsule, Take 1 capsule (150 mg total) by mouth 2 (two) times a day, Disp: 180 capsule, Rfl: 0  •  levETIRAcetam (KEPPRA) 500 mg tablet, TAKE 1 TABLET BY MOUTH EVERY 12 HOURS, Disp: 180 tablet, Rfl: 1  •  nadolol (CORGARD) 40 mg tablet, TAKE 1 TABLET (40 MG TOTAL) BY MOUTH IN THE MORNING , Disp: 90 tablet, Rfl: 1  •  OXcarbazepine (TRILEPTAL) 600 mg tablet, TAKE 1 TABLET BY MOUTH EVERY 12 HOURS , Disp: 180 tablet, Rfl: 1  •  pantoprazole (PROTONIX) 40 mg tablet, TAKE 1 TABLET BY MOUTH IN THE MORNING AND 1 TABLET IN THE EVENING  TAKE BEFORE MEALS, Disp: 180 tablet, Rfl: 1  •  sucralfate (CARAFATE) 1 g tablet, TAKE 1 TABLET (1 G TOTAL) BY MOUTH EVERY 6 (SIX) HOURS, Disp: 120 tablet, Rfl: 2          • Whom besides the patient is providing clinical information about today's encounter? o Brother is POA and spoke for patient - patient is mentally challenged  Physical Exam and Assessment/Plan by Diagnosis:        1  EDEMA W/ RASH    Physical Exam:  • Anatomic Location Affected:  Legs  • Morphological Description:  Erythema ? ??                    Assessment and Plan:  Based on a thorough discussion of this condition and the management approach to it (including a comprehensive discussion of the known risks, side effects and potential benefits of treatment), the patient (family) agrees to implement the following specific plan:    • Stop using Betamethasone cream   • Start to use Triamcinolone 0 1% cream  Apply to BOTH legs 2-4 times a day for up to 14 days  Do not exceed applying for more than 14 days at a time  If you need to use longer than 14 days, take a 7 day break before re-applying to the skin  This is a steroid cream and will thin your skin over time if you do not take a break from applying  • Discussed starting to use Luis Carlos stockings or compression stockings   Luis Carlos stockings can be found at most pharmacies  Compression stockings can be found online  Below is information on where to find compression stockings and what to look for in sizes  COMPRESSION STOCKINGS   After surgery on the legs, we advise people to elevate their leg whenever possible and start using compression stockings (at least to the leg we did surgery on) to optimize the healing environment  We recommend wearing these as long as possible/tolerated throughout the day  You may purchase these over the counter  They should be 15-30 mm Hg compression level  You can purchase these within several pharmacies, or online at:   [employer]  com  BRIANNA stockings    [employer]  com          Scribe Attestation    I,:  Enrrique Calix am acting as a scribe while in the presence of the attending physician :       I,:  Giovanna Garcia MD personally performed the services described in this documentation    as scribed in my presence :

## 2022-10-25 NOTE — PATIENT INSTRUCTIONS
RASH    Assessment and Plan:  Based on a thorough discussion of this condition and the management approach to it (including a comprehensive discussion of the known risks, side effects and potential benefits of treatment), the patient (family) agrees to implement the following specific plan:    Stop using Betamethasone cream   Start to use Triamcinolone 0 1% cream  Apply to BOTH legs 2-4 times a day for 14 days  Do not exceed applying for more than 14 days at a time  If you need to use longer than 14 days, take a 7 day break before re-applying to the skin  This is a steroid cream and will thin your skin over time if you do not take a break from applying  Discussed starting to use Brianna stockings or compression stockings  Brianna stockings can be found at most pharmacies  Compression stockings can be found online  Below is information on where to find compression stockings and what to look for in sizes  COMPRESSION STOCKINGS   After surgery on the legs, we advise people to elevate their leg whenever possible and start using compression stockings (at least to the leg we did surgery on) to optimize the healing environment  We recommend wearing these as long as possible/tolerated throughout the day  You may purchase these over the counter  They should be 15-30 mm Hg compression level  You can purchase these within several pharmacies, or online at:   [employer]  com  BRIANNA stockings    [employer]

## 2022-11-19 DIAGNOSIS — K21.9 GASTROESOPHAGEAL REFLUX DISEASE WITHOUT ESOPHAGITIS: ICD-10-CM

## 2022-11-21 DIAGNOSIS — D50.0 IRON DEFICIENCY ANEMIA DUE TO CHRONIC BLOOD LOSS: ICD-10-CM

## 2022-11-22 RX ORDER — IRON POLYSACCHARIDE COMPLEX 150 MG
CAPSULE ORAL
Qty: 180 CAPSULE | Refills: 0 | Status: SHIPPED | OUTPATIENT
Start: 2022-11-22

## 2022-11-22 RX ORDER — SUCRALFATE 1 G/1
1 TABLET ORAL EVERY 6 HOURS SCHEDULED
Qty: 120 TABLET | Refills: 2 | Status: SHIPPED | OUTPATIENT
Start: 2022-11-22

## 2022-11-22 NOTE — TELEPHONE ENCOUNTER
Stefany Cortes has requested a refill of iron polysaccharides (FERREX) 150 mg capsule  Would a refill be appropriate?

## 2022-11-22 NOTE — TELEPHONE ENCOUNTER
El Robles has requested a refill of sucralfate (CARAFATE) 1 g tablet  Pt meets the requirements placed by [facility]  Will refill medication

## 2022-11-25 DIAGNOSIS — D50.0 IRON DEFICIENCY ANEMIA DUE TO CHRONIC BLOOD LOSS: ICD-10-CM

## 2022-11-25 RX ORDER — FOLIC ACID 1 MG/1
1 TABLET ORAL DAILY
Qty: 90 TABLET | Refills: 1 | Status: SHIPPED | OUTPATIENT
Start: 2022-11-25

## 2022-11-25 NOTE — TELEPHONE ENCOUNTER
Shira Nagy has requested a refill of folic acid (FOLVITE) 1 mg tablet  Pt meets the requirements placed by [facility]  Will refill medication

## 2022-12-19 ENCOUNTER — HOSPITAL ENCOUNTER (OUTPATIENT)
Dept: GASTROENTEROLOGY | Facility: HOSPITAL | Age: 57
Setting detail: OUTPATIENT SURGERY
Discharge: HOME | End: 2022-12-19
Attending: INTERNAL MEDICINE

## 2022-12-19 ENCOUNTER — ANESTHESIA (OUTPATIENT)
Dept: GASTROENTEROLOGY | Facility: HOSPITAL | Age: 57
End: 2022-12-19

## 2022-12-19 ENCOUNTER — ANESTHESIA EVENT (OUTPATIENT)
Dept: GASTROENTEROLOGY | Facility: HOSPITAL | Age: 57
End: 2022-12-19

## 2022-12-19 VITALS
WEIGHT: 186 LBS | OXYGEN SATURATION: 96 % | HEART RATE: 70 BPM | BODY MASS INDEX: 25.19 KG/M2 | RESPIRATION RATE: 16 BRPM | TEMPERATURE: 97.8 F | SYSTOLIC BLOOD PRESSURE: 115 MMHG | HEIGHT: 72 IN | DIASTOLIC BLOOD PRESSURE: 70 MMHG

## 2022-12-19 DIAGNOSIS — I85.10 SECONDARY ESOPHAGEAL VARICES WITHOUT BLEEDING (HCC): ICD-10-CM

## 2022-12-19 DIAGNOSIS — K74.69 OTHER CIRRHOSIS OF LIVER (HCC): ICD-10-CM

## 2022-12-19 RX ORDER — LIDOCAINE HYDROCHLORIDE 20 MG/ML
INJECTION, SOLUTION EPIDURAL; INFILTRATION; INTRACAUDAL; PERINEURAL AS NEEDED
Status: DISCONTINUED | OUTPATIENT
Start: 2022-12-19 | End: 2022-12-19

## 2022-12-19 RX ORDER — SODIUM CHLORIDE, SODIUM LACTATE, POTASSIUM CHLORIDE, CALCIUM CHLORIDE 600; 310; 30; 20 MG/100ML; MG/100ML; MG/100ML; MG/100ML
INJECTION, SOLUTION INTRAVENOUS CONTINUOUS PRN
Status: DISCONTINUED | OUTPATIENT
Start: 2022-12-19 | End: 2022-12-19

## 2022-12-19 RX ORDER — PROPOFOL 10 MG/ML
INJECTION, EMULSION INTRAVENOUS AS NEEDED
Status: DISCONTINUED | OUTPATIENT
Start: 2022-12-19 | End: 2022-12-19

## 2022-12-19 RX ADMIN — PROPOFOL 50 MG: 10 INJECTION, EMULSION INTRAVENOUS at 12:31

## 2022-12-19 RX ADMIN — SODIUM CHLORIDE, SODIUM LACTATE, POTASSIUM CHLORIDE, AND CALCIUM CHLORIDE: .6; .31; .03; .02 INJECTION, SOLUTION INTRAVENOUS at 12:07

## 2022-12-19 RX ADMIN — PROPOFOL 50 MG: 10 INJECTION, EMULSION INTRAVENOUS at 12:38

## 2022-12-19 RX ADMIN — PROPOFOL 100 MG: 10 INJECTION, EMULSION INTRAVENOUS at 12:27

## 2022-12-19 RX ADMIN — LIDOCAINE HYDROCHLORIDE 100 MG: 20 INJECTION, SOLUTION EPIDURAL; INFILTRATION; INTRACAUDAL at 12:27

## 2022-12-19 NOTE — ANESTHESIA POSTPROCEDURE EVALUATION
Post-Op Assessment Note    CV Status:  Stable  Pain Score: 0    Pain management: adequate     Mental Status:  Alert and awake   Hydration Status:  Euvolemic   PONV Controlled:  Controlled   Airway Patency:  Patent      Post Op Vitals Reviewed: Yes      Staff: Anesthesiologist, CRNA         No notable events documented      BP   115/68   Temp      Pulse  56   Resp   16   SpO2   98%

## 2022-12-19 NOTE — H&P
History and Physical - [facility]  Formerly [facility] Lucinda Human 59 y o  male MRN: [id number]    HPI: Janna Brown is a 59y o  year old male who presents with cirrhosis with varices  Review of Systems    Historical Information   Past Medical History:   Diagnosis Date   • Anxiety    • GERD (gastroesophageal reflux disease)    • Hypertension    • Hypoxia 02/11/2022   • MRSA bacteremia 02/09/2022   • Seizures ([address] )      Past Surgical History:   Procedure Laterality Date   • COLONOSCOPY     • SPLENECTOMY     • UPPER GASTROINTESTINAL ENDOSCOPY       Social History   Social History     Substance and Sexual Activity   Alcohol Use Never     Social History     Substance and Sexual Activity   Drug Use Never     Social History     Tobacco Use   Smoking Status Never   Smokeless Tobacco Never     Family History   Problem Relation Age of Onset   • Depression Mother    • Heart disease Father        Meds/Allergies     (Not in a hospital admission)      No Known Allergies    Objective     /66   Pulse 72   Temp 97 6 °F (36 4 °C) (Temporal)   Resp 16   Ht 6' (1 829 m)   Wt 84 4 kg (186 lb)   SpO2 97%   BMI 25 23 kg/m²       PHYSICAL EXAM    Gen: NAD  CV: RRR  CHEST: Clear  ABD: soft, NT/ND  EXT: no edema  Neuro: AAO      ASSESSMENT/PLAN:  This is a 59y o  year old male here for  cirrhosis with varices         PLAN:   Procedure: Rhfaizan Keys

## 2022-12-19 NOTE — ANESTHESIA PREPROCEDURE EVALUATION
Procedure:  EGD    Relevant Problems   CARDIO   (+) Angiodysplasia   (+) Esophageal varices (HCC)   (+) Primary hypertension      GI/HEPATIC   (+) Gastroesophageal reflux disease without esophagitis   (+) Other cirrhosis of liver (HCC)      HEMATOLOGY   (+) Anemia      NEURO/PSYCH   (+) History of TB (tuberculosis)   (+) History of prediabetes   (+) Legally blind in right eye, as defined in [place]   (+) Nonintractable epilepsy without status epilepticus ([address] )      Nervous and Auditory   (+) Bilateral hearing loss      Musculoskeletal and Integument   (+) Venous stasis dermatitis of both lower extremities      Other   (+) Ambulatory dysfunction   (+) Cellulitis of left lower extremity   (+) Status post splenectomy      Lab Results   Component Value Date    SODIUM 142 08/11/2022    K 4 3 08/11/2022     (H) 08/11/2022    CO2 33 (H) 08/11/2022    AGAP 0 (L) 08/11/2022    BUN 12 08/11/2022    CREATININE 0 70 08/11/2022    GLUC 94 02/18/2022    GLUF 100 (H) 08/11/2022    CALCIUM 8 4 08/11/2022    AST 23 08/31/2022    ALT 22 08/31/2022    ALKPHOS 100 08/31/2022    TP 8 2 08/31/2022    TBILI 0 29 08/31/2022    EGFR 104 08/11/2022     Lab Results   Component Value Date    WBC 4 16 (L) 08/31/2022    HGB 8 4 (L) 08/31/2022    HCT 32 2 (L) 08/31/2022    MCV 80 (L) 08/31/2022     08/31/2022 2/9/22 TTE  Left Ventricle Left ventricular cavity size is upper normal  Wall thickness is normal  The left ventricular ejection fraction is 55%  Systolic function is normal   Wall motion is normal  Diastolic function is normal    Right Ventricle Right ventricular cavity size is normal  Systolic function is normal  Wall thickness is normal    Left Atrium The atrium is normal in size  Right Atrium The atrium is normal in size  Aortic Valve The aortic valve is trileaflet  The leaflets are not thickened  The leaflets are not calcified  The leaflets exhibit normal mobility  There is no evidence of regurgitation   There is no evidence of stenosis  Mitral Valve The mitral valve has normal structure and function  There is trace regurgitation  There is no evidence of stenosis  Tricuspid Valve Tricuspid valve structure is normal  There is mild regurgitation  There is no evidence of stenosis  Pulmonic Valve Pulmonic valve structure is normal  There is trace regurgitation  There is no evidence of stenosis  Ascending Aorta The aortic root is mildly dilated (4 0 cm)  The ascending aorta is normal in size  IVC/SVC The inferior vena cava is normal in size  Pericardium There is no pericardial effusion  The pericardium is normal in appearance  Pulmonary Artery The pulmonary artery systolic pressure is normal    Pulmonary Veins The pulmonary veins have normal venous flow  Flow pattern is normal             Anesthesia Plan  ASA Score- 3     Anesthesia Type- IV sedation with anesthesia with ASA Monitors  Additional Monitors:   Airway Plan:           Plan Factors-Exercise tolerance (METS): <4 METS  Chart reviewed  EKG reviewed  Imaging results reviewed  Existing labs reviewed  Patient summary reviewed  Induction- intravenous  Postoperative Plan-     Informed Consent- Anesthetic plan and risks discussed with patient and sibling  I personally reviewed this patient with the CRNA  Discussed and agreed on the Anesthesia Plan with the CRNA  Thomasina Cooks

## 2022-12-21 ENCOUNTER — TELEPHONE (OUTPATIENT)
Dept: GASTROENTEROLOGY | Facility: CLINIC | Age: 57
End: 2022-12-21

## 2022-12-21 NOTE — TELEPHONE ENCOUNTER
----- Message from Lore Alvarez MD sent at 12/19/2022 12:41 PM EST -----  Please schedule repeat EGD in 2 to 3 months for banding

## 2022-12-23 ENCOUNTER — PREP FOR PROCEDURE (OUTPATIENT)
Dept: GASTROENTEROLOGY | Facility: CLINIC | Age: 57
End: 2022-12-23

## 2022-12-23 DIAGNOSIS — K74.69 OTHER CIRRHOSIS OF LIVER (HCC): ICD-10-CM

## 2022-12-23 DIAGNOSIS — I85.00 ESOPHAGEAL VARICES WITHOUT BLEEDING, UNSPECIFIED ESOPHAGEAL VARICES TYPE (HCC): Primary | ICD-10-CM

## 2022-12-23 NOTE — TELEPHONE ENCOUNTER
Patient is scheduled for EGD on March 27 , 2023 at [facility] with Mansoor Uriarte MD  Patient is aware of pre-procedure prep of Nothing to eat after midnight, day of the procedure clear liquids only and nothing to drink 4 hours prior to the EGD and they will be called the day prior between 2 and 6 pm for time to report for procedure  Pre-procedure prep has been given to the patient  via [address] [facility] mail on December 23 , 2022

## 2022-12-29 ENCOUNTER — TELEPHONE (OUTPATIENT)
Dept: INTERNAL MEDICINE CLINIC | Facility: CLINIC | Age: 57
End: 2022-12-29

## 2022-12-29 NOTE — TELEPHONE ENCOUNTER
Patient is having a runny nose and negative for covid, can they give him something over the counter, everyone in house is sick and all are negative for covid

## 2022-12-30 NOTE — TELEPHONE ENCOUNTER
Spoke to sister in law and she said he is a little better they have been using vicks , seems to help

## 2023-01-10 ENCOUNTER — OFFICE VISIT (OUTPATIENT)
Dept: INTERNAL MEDICINE CLINIC | Facility: CLINIC | Age: 58
End: 2023-01-10

## 2023-01-10 VITALS
BODY MASS INDEX: 26.47 KG/M2 | HEIGHT: 72 IN | SYSTOLIC BLOOD PRESSURE: 134 MMHG | HEART RATE: 73 BPM | DIASTOLIC BLOOD PRESSURE: 80 MMHG | TEMPERATURE: 97.5 F | OXYGEN SATURATION: 96 % | WEIGHT: 195.4 LBS

## 2023-01-10 DIAGNOSIS — I85.11 SECONDARY ESOPHAGEAL VARICES WITH BLEEDING (HCC): ICD-10-CM

## 2023-01-10 DIAGNOSIS — I87.2 VENOUS STASIS DERMATITIS OF BOTH LOWER EXTREMITIES: ICD-10-CM

## 2023-01-10 DIAGNOSIS — K74.69 OTHER CIRRHOSIS OF LIVER (HCC): Primary | ICD-10-CM

## 2023-01-10 NOTE — ASSESSMENT & PLAN NOTE
Triamcinolone cream prescribed by dermatologist Dr Cyrus Navas has been helpful in healing patient's bilateral lower extremity venous stasis ulcers  Brianna stockings were also recommended but patient has not been using them  Use of long-term topical steroids resulting in thinning of the skin was emphasized to the patient  The benefit of using Brianna stockings to help mobilize lower extremity fluid was explained and emphasized to the patient  Triamcinolone cream was refilled, patient advised to only use the cream for 2 weeks at a time with at least a week of break in between  He was also advised to discontinue the betamethasone cream completely at this time  Patient is agreeable to the treatment plan and is willing to try the BRIANNA stockings at this time

## 2023-01-10 NOTE — PROGRESS NOTES
Name: Daniel Fletcher      : 1965      MRN: [id number]  Encounter Provider: Swathi Méndez MD  Encounter Date: 1/10/2023   Encounter department: [address] [place] [address]  Other cirrhosis of liver ([facility] [address] )  Assessment & Plan:  Patient has a cirrhosis of the liver with esophageal varices that have required banding  He does not have any smoking or alcohol use history  Patient has right upper quadrant ultrasound twice and AFP a year to screen for hepatic cancer  He did not complete the ordered lab work to assess for hepatitis a and B immunity  Patient advised to complete lab work and right upper quadrant ultrasound before next follow-up appointment with gastroenterology in March  Orders:  -     US right upper quadrant; Future; Expected date: 03/10/2023  -     AFP tumor marker; Future; Expected date: 02/10/2023  -     Comprehensive metabolic panel; Future; Expected date: 02/10/2023    2  Venous stasis dermatitis of both lower extremities  Assessment & Plan:  Triamcinolone cream prescribed by dermatologist Dr Francia Montiel has been helpful in healing patient's bilateral lower extremity venous stasis ulcers  Luis Carlos stockings were also recommended but patient has not been using them  Use of long-term topical steroids resulting in thinning of the skin was emphasized to the patient  The benefit of using Luis Carlos stockings to help mobilize lower extremity fluid was explained and emphasized to the patient  Triamcinolone cream was refilled, patient advised to only use the cream for 2 weeks at a time with at least a week of break in between  He was also advised to discontinue the betamethasone cream completely at this time  Patient is agreeable to the treatment plan and is willing to try the LUIS CARLOS stockings at this time  Orders:  -     triamcinolone (KENALOG) 0 1 % ointment; Apply sparingly to affected areas of legs 2-4 times a day for up to 2 weeks      3  Secondary esophageal varices with bleeding [facility])  Assessment & Plan:  Last EGD in December showed 4 large varices which required banding  Patient denies any hemoptysis, bloody stools, or dark stools at this time  He will follow-up with GI in March for repeat endoscopy  Subjective      Mr Kayy Sidhu is a 59 Y  O  male with history of liver cirrhosis, esophageal varices, seizure disorder, and venous statis of lower extremities  Patient presented to the office with his brother  He has found the Triamcinolone cream prescribed by Dermatologist Dr Lisa Cortes has helped heal is LE skin lesions due to chronic venous stasis  He also had a repeat endoscopy in December which showed 4 large varices which required banding  He will follow up with GI in march for a repeat endoscopy  He also established care with [facility] neurology and is advised to continue his current regimen of Oxcarbazepine and Keppra  He denies any seizure like activity  He has no complaints at this time  Review of Systems   Constitutional: Negative for activity change, appetite change, chills and fever  HENT: Negative for congestion, postnasal drip, rhinorrhea and sore throat  Respiratory: Negative for cough, choking, chest tightness and shortness of breath  Cardiovascular: Negative for chest pain, palpitations and leg swelling  Gastrointestinal: Negative for abdominal distention, blood in stool, constipation, diarrhea and nausea  Endocrine: Negative for polydipsia and polyphagia  Genitourinary: Negative for difficulty urinating, dysuria and hematuria  Musculoskeletal: Negative for arthralgias  Skin: Positive for color change (chronic changes in the legs, improving)  Neurological: Negative for dizziness, syncope and headaches  Psychiatric/Behavioral: Negative for confusion and sleep disturbance         Current Outpatient Medications on File Prior to Visit   Medication Sig   • diazepam (VALIUM) 10 mg tablet Take 1 tablet (10 mg total) by mouth every 12 (twelve) hours   • folic acid (FOLVITE) 1 mg tablet TAKE 1 TABLET (1 MG TOTAL) BY MOUTH IN THE MORNING  • furosemide (LASIX) 20 mg tablet TAKE 1 TABLET (20 MG TOTAL) BY MOUTH IN THE MORNING AND 1 TABLET (20 MG TOTAL) IN THE EVENING  • iron polysaccharides (FERREX) 150 mg capsule TAKE 1 CAPSULE BY MOUTH TWICE A DAY   • levETIRAcetam (KEPPRA) 500 mg tablet TAKE 1 TABLET BY MOUTH EVERY 12 HOURS   • nadolol (CORGARD) 40 mg tablet TAKE 1 TABLET (40 MG TOTAL) BY MOUTH IN THE MORNING  • OXcarbazepine (TRILEPTAL) 600 mg tablet TAKE 1 TABLET BY MOUTH EVERY 12 HOURS  • pantoprazole (PROTONIX) 40 mg tablet TAKE 1 TABLET BY MOUTH IN THE MORNING AND 1 TABLET IN THE EVENING  TAKE BEFORE MEALS   • sucralfate (CARAFATE) 1 g tablet TAKE 1 TABLET (1 G TOTAL) BY MOUTH EVERY 6 (SIX) HOURS   • [DISCONTINUED] betamethasone dipropionate (DIPROSONE) 0 05 % cream Apply topically 2 (two) times a day   • [DISCONTINUED] triamcinolone (KENALOG) 0 1 % ointment Apply sparingly to affected areas of legs 2-4 times a day for up to 2 weeks  Objective     /80 (BP Location: Left arm, Patient Position: Sitting, Cuff Size: Standard)   Pulse 73   Temp 97 5 °F (36 4 °C) (Tympanic)   Ht 6' (1 829 m)   Wt 88 6 kg (195 lb 6 4 oz)   SpO2 96%   BMI 26 50 kg/m²     Physical Exam  Constitutional:       General: He is not in acute distress  Appearance: Normal appearance  He is not ill-appearing  HENT:      Head: Normocephalic and atraumatic  Nose: Nose normal       Mouth/Throat:      Mouth: Mucous membranes are moist       Pharynx: Oropharynx is clear  Eyes:      General: No scleral icterus  Extraocular Movements: Extraocular movements intact  Pupils: Pupils are equal, round, and reactive to light  Cardiovascular:      Rate and Rhythm: Normal rate and regular rhythm  Heart sounds: No murmur heard  Pulmonary:      Effort: Pulmonary effort is normal  No respiratory distress        Breath sounds: Normal breath sounds  No stridor  No wheezing  Abdominal:      General: Abdomen is flat  There is no distension  Palpations: Abdomen is soft  There is no mass  Tenderness: There is no abdominal tenderness  There is no guarding  Musculoskeletal:         General: No swelling  Right lower leg: No edema  Left lower leg: No edema  Skin:     General: Skin is warm  Findings: Erythema (chronic venous stasis changes in LLE) present  Neurological:      General: No focal deficit present  Mental Status: He is alert  Mental status is at baseline     Psychiatric:         Mood and Affect: Mood normal        Eusebio Dalton MD

## 2023-01-10 NOTE — ASSESSMENT & PLAN NOTE
Last EGD in December showed 4 large varices which required banding  Patient denies any hemoptysis, bloody stools, or dark stools at this time  He will follow-up with GI in March for repeat endoscopy

## 2023-01-10 NOTE — ASSESSMENT & PLAN NOTE
Patient has a cirrhosis of the liver with esophageal varices that have required banding  He does not have any smoking or alcohol use history  Patient has right upper quadrant ultrasound twice and AFP a year to screen for hepatic cancer  He did not complete the ordered lab work to assess for hepatitis a and B immunity  Patient advised to complete lab work and right upper quadrant ultrasound before next follow-up appointment with gastroenterology in March

## 2023-01-11 DIAGNOSIS — K21.9 GASTROESOPHAGEAL REFLUX DISEASE WITHOUT ESOPHAGITIS: ICD-10-CM

## 2023-01-11 RX ORDER — PANTOPRAZOLE SODIUM 40 MG/1
TABLET, DELAYED RELEASE ORAL
Qty: 180 TABLET | Refills: 1 | Status: SHIPPED | OUTPATIENT
Start: 2023-01-11

## 2023-01-11 NOTE — TELEPHONE ENCOUNTER
Christoph Spencer has requested a refill of pantoprazole (PROTONIX) 40 mg tablet  Pt meets the requirements placed by [facility]  Will refill medication

## 2023-01-25 NOTE — ASSESSMENT & PLAN NOTE
Continue with betamethasone cream  Leg elevation  Wound care consulted V-Y Flap Text: The defect edges were debeveled with a #15 scalpel blade.  Given the location of the defect, shape of the defect and the proximity to free margins a V-Y flap was deemed most appropriate.  Using a sterile surgical marker, an appropriate advancement flap was drawn incorporating the defect and placing the expected incisions within the relaxed skin tension lines where possible.    The area thus outlined was incised deep to adipose tissue with a #15 scalpel blade.  The skin margins were undermined to an appropriate distance in all directions utilizing iris scissors.

## 2023-02-03 ENCOUNTER — TELEPHONE (OUTPATIENT)
Dept: GASTROENTEROLOGY | Facility: AMBULARY SURGERY CENTER | Age: 58
End: 2023-02-03

## 2023-02-21 ENCOUNTER — HOSPITAL ENCOUNTER (OUTPATIENT)
Dept: ULTRASOUND IMAGING | Facility: HOSPITAL | Age: 58
Discharge: HOME | End: 2023-02-21

## 2023-02-21 DIAGNOSIS — K74.69 OTHER CIRRHOSIS OF LIVER (HCC): ICD-10-CM

## 2023-02-21 DIAGNOSIS — K74.69 OTHER CIRRHOSIS OF LIVER (HCC): Primary | ICD-10-CM

## 2023-02-21 NOTE — PROGRESS NOTES
Called patient and spoke with his brother regarding the Ultrasound finding concerning for a gallbladder mass and need for follow up MRI  Patient's brother in agreement with MRI  MRI of abdomen with contrast order placed  As per brother patient does not have any metallic implants in his body, he is not claustrophobic  All questions answered

## 2023-02-22 DIAGNOSIS — K82.8 MASS OF GALLBLADDER: ICD-10-CM

## 2023-02-22 DIAGNOSIS — F41.8 TEST ANXIETY: Primary | ICD-10-CM

## 2023-02-22 RX ORDER — LORAZEPAM 0.5 MG/1
0.5 TABLET ORAL ONCE AS NEEDED
Qty: 2 TABLET | Refills: 0 | Status: SHIPPED | OUTPATIENT
Start: 2023-02-22

## 2023-02-22 NOTE — PROGRESS NOTES
Ativan PRN for sedation prior to MRI, referral to Gen surgery for eval as well for mass within gallbladder

## 2023-02-27 DIAGNOSIS — I87.2 VENOUS STASIS DERMATITIS OF BOTH LOWER EXTREMITIES: ICD-10-CM

## 2023-02-27 NOTE — TELEPHONE ENCOUNTER
Hi I am calling for my brother-in-law, Carmelita García  Date of birth is [birth year]  I was wondering if I can get a refill on his ointment  It is, I'm going to spell it  TRIAMCI N0L 0NES, acetonide ointment 0 1  He has probably about another 2 weeks and then he'll be out of it  If you can call it into our pharmacy at [address] [place] [address] on [address] [place] [address]  My phone number is [phone]  Thank you

## 2023-03-14 ENCOUNTER — APPOINTMENT (OUTPATIENT)
Dept: LAB | Facility: AMBULARY SURGERY CENTER | Age: 58
End: 2023-03-14

## 2023-03-14 DIAGNOSIS — K74.69 OTHER CIRRHOSIS OF LIVER (HCC): ICD-10-CM

## 2023-03-14 DIAGNOSIS — D50.9 IRON DEFICIENCY ANEMIA, UNSPECIFIED IRON DEFICIENCY ANEMIA TYPE: ICD-10-CM

## 2023-03-14 LAB
AFP-TM SERPL-MCNC: 2.1 NG/ML (ref 0.5–8)
ALBUMIN SERPL BCP-MCNC: 3 G/DL (ref 3.5–5)
ALP SERPL-CCNC: 84 U/L (ref 46–116)
ALT SERPL W P-5'-P-CCNC: 22 U/L (ref 12–78)
ANION GAP SERPL CALCULATED.3IONS-SCNC: -2 MMOL/L (ref 4–13)
AST SERPL W P-5'-P-CCNC: 29 U/L (ref 5–45)
BASOPHILS # BLD AUTO: 0.03 THOUSANDS/ÂΜL (ref 0–0.1)
BASOPHILS NFR BLD AUTO: 1 % (ref 0–1)
BILIRUB SERPL-MCNC: 0.33 MG/DL (ref 0.2–1)
BUN SERPL-MCNC: 11 MG/DL (ref 5–25)
CALCIUM ALBUM COR SERPL-MCNC: 9.9 MG/DL (ref 8.3–10.1)
CALCIUM SERPL-MCNC: 9.1 MG/DL (ref 8.3–10.1)
CHLORIDE SERPL-SCNC: 108 MMOL/L (ref 96–108)
CO2 SERPL-SCNC: 34 MMOL/L (ref 21–32)
CREAT SERPL-MCNC: 0.55 MG/DL (ref 0.6–1.3)
EOSINOPHIL # BLD AUTO: 0.62 THOUSAND/ÂΜL (ref 0–0.61)
EOSINOPHIL NFR BLD AUTO: 17 % (ref 0–6)
ERYTHROCYTE [DISTWIDTH] IN BLOOD BY AUTOMATED COUNT: 15.9 % (ref 11.6–15.1)
FERRITIN SERPL-MCNC: 14 NG/ML (ref 8–388)
GFR SERPL CREATININE-BSD FRML MDRD: 114 ML/MIN/1.73SQ M
GLUCOSE P FAST SERPL-MCNC: 89 MG/DL (ref 65–99)
HAV AB SER QL IA: NORMAL
HBV SURFACE AB SER-ACNC: <3.1 MIU/ML
HCT VFR BLD AUTO: 33.9 % (ref 36.5–49.3)
HGB BLD-MCNC: 9.7 G/DL (ref 12–17)
IMM GRANULOCYTES # BLD AUTO: 0 THOUSAND/UL (ref 0–0.2)
IMM GRANULOCYTES NFR BLD AUTO: 0 % (ref 0–2)
IRON SATN MFR SERPL: 7 % (ref 20–50)
IRON SERPL-MCNC: 28 UG/DL (ref 65–175)
LYMPHOCYTES # BLD AUTO: 1.33 THOUSANDS/ÂΜL (ref 0.6–4.47)
LYMPHOCYTES NFR BLD AUTO: 36 % (ref 14–44)
MCH RBC QN AUTO: 24 PG (ref 26.8–34.3)
MCHC RBC AUTO-ENTMCNC: 28.6 G/DL (ref 31.4–37.4)
MCV RBC AUTO: 84 FL (ref 82–98)
MONOCYTES # BLD AUTO: 0.26 THOUSAND/ÂΜL (ref 0.17–1.22)
MONOCYTES NFR BLD AUTO: 7 % (ref 4–12)
NEUTROPHILS # BLD AUTO: 1.44 THOUSANDS/ÂΜL (ref 1.85–7.62)
NEUTS SEG NFR BLD AUTO: 39 % (ref 43–75)
NRBC BLD AUTO-RTO: 0 /100 WBCS
PLATELET # BLD AUTO: 166 THOUSANDS/UL (ref 149–390)
PMV BLD AUTO: 9.8 FL (ref 8.9–12.7)
POTASSIUM SERPL-SCNC: 3.8 MMOL/L (ref 3.5–5.3)
PROT SERPL-MCNC: 7.2 G/DL (ref 6.4–8.4)
RBC # BLD AUTO: 4.05 MILLION/UL (ref 3.88–5.62)
SODIUM SERPL-SCNC: 140 MMOL/L (ref 135–147)
TIBC SERPL-MCNC: 377 UG/DL (ref 250–450)
WBC # BLD AUTO: 3.68 THOUSAND/UL (ref 4.31–10.16)

## 2023-03-15 ENCOUNTER — TELEPHONE (OUTPATIENT)
Dept: INTERNAL MEDICINE CLINIC | Facility: CLINIC | Age: 58
End: 2023-03-15

## 2023-03-15 NOTE — TELEPHONE ENCOUNTER
----- Message from Sulma Swenson MD sent at 3/14/2023  5:05 PM EDT -----    ----- Message -----  From: Lab, Background User  Sent: 3/14/2023  12:56 PM EDT  To: Abiel Marcos MD

## 2023-03-15 NOTE — TELEPHONE ENCOUNTER
Hi, my name is Delano Wells  I am calling for Doctor [place]  He had called to speak to my brother-in-law, Manjula Crain  Date of birth is [birth year]  About his lab work that he had done yesterday  I am headed back to work  I've been trying to call since this morning  So if you want to try to call me tomorrow after 10:00 o'clock will be better  Between 10:00 and two is the best time to reach me  My brother-in-law is not able to really understand what you guys are trying to explain to him  [phone] going to or the [place] number [phone] and my  is home  You can talk to him then  Thank you

## 2023-03-23 DIAGNOSIS — G40.909 NONINTRACTABLE EPILEPSY WITHOUT STATUS EPILEPTICUS, UNSPECIFIED EPILEPSY TYPE (HCC): ICD-10-CM

## 2023-03-23 RX ORDER — OXCARBAZEPINE 600 MG/1
TABLET, FILM COATED ORAL
Qty: 180 TABLET | Refills: 1 | Status: SHIPPED | OUTPATIENT
Start: 2023-03-23

## 2023-03-27 ENCOUNTER — HOSPITAL ENCOUNTER (OUTPATIENT)
Dept: GASTROENTEROLOGY | Facility: HOSPITAL | Age: 58
Setting detail: OUTPATIENT SURGERY
Discharge: HOME | End: 2023-03-27
Attending: INTERNAL MEDICINE | Admitting: INTERNAL MEDICINE

## 2023-03-27 ENCOUNTER — ANESTHESIA EVENT (OUTPATIENT)
Dept: GASTROENTEROLOGY | Facility: HOSPITAL | Age: 58
End: 2023-03-27

## 2023-03-27 ENCOUNTER — ANESTHESIA (OUTPATIENT)
Dept: GASTROENTEROLOGY | Facility: HOSPITAL | Age: 58
End: 2023-03-27

## 2023-03-27 VITALS
BODY MASS INDEX: 28.35 KG/M2 | HEIGHT: 70 IN | OXYGEN SATURATION: 100 % | RESPIRATION RATE: 18 BRPM | HEART RATE: 59 BPM | SYSTOLIC BLOOD PRESSURE: 115 MMHG | DIASTOLIC BLOOD PRESSURE: 71 MMHG | WEIGHT: 198 LBS | TEMPERATURE: 97 F

## 2023-03-27 DIAGNOSIS — I85.00 ESOPHAGEAL VARICES WITHOUT BLEEDING, UNSPECIFIED ESOPHAGEAL VARICES TYPE (HCC): ICD-10-CM

## 2023-03-27 DIAGNOSIS — K74.69 OTHER CIRRHOSIS OF LIVER (HCC): ICD-10-CM

## 2023-03-27 RX ORDER — PROPOFOL 10 MG/ML
INJECTION, EMULSION INTRAVENOUS AS NEEDED
Status: DISCONTINUED | OUTPATIENT
Start: 2023-03-27 | End: 2023-03-27

## 2023-03-27 RX ORDER — LIDOCAINE HYDROCHLORIDE 10 MG/ML
INJECTION, SOLUTION EPIDURAL; INFILTRATION; INTRACAUDAL; PERINEURAL AS NEEDED
Status: DISCONTINUED | OUTPATIENT
Start: 2023-03-27 | End: 2023-03-27

## 2023-03-27 RX ORDER — SODIUM CHLORIDE, SODIUM LACTATE, POTASSIUM CHLORIDE, CALCIUM CHLORIDE 600; 310; 30; 20 MG/100ML; MG/100ML; MG/100ML; MG/100ML
INJECTION, SOLUTION INTRAVENOUS CONTINUOUS PRN
Status: DISCONTINUED | OUTPATIENT
Start: 2023-03-27 | End: 2023-03-27

## 2023-03-27 RX ADMIN — PROPOFOL 100 MG: 10 INJECTION, EMULSION INTRAVENOUS at 11:51

## 2023-03-27 RX ADMIN — LIDOCAINE HYDROCHLORIDE 50 MG: 10 INJECTION, SOLUTION EPIDURAL; INFILTRATION; INTRACAUDAL at 11:51

## 2023-03-27 RX ADMIN — PROPOFOL 50 MG: 10 INJECTION, EMULSION INTRAVENOUS at 11:57

## 2023-03-27 RX ADMIN — PROPOFOL 50 MG: 10 INJECTION, EMULSION INTRAVENOUS at 11:53

## 2023-03-27 RX ADMIN — SODIUM CHLORIDE, SODIUM LACTATE, POTASSIUM CHLORIDE, AND CALCIUM CHLORIDE: .6; .31; .03; .02 INJECTION, SOLUTION INTRAVENOUS at 11:25

## 2023-03-27 NOTE — ANESTHESIA POSTPROCEDURE EVALUATION
Post-Op Assessment Note    CV Status:  Stable  Pain Score: 0    Pain management: adequate     Mental Status:  Sleepy   Hydration Status:  Euvolemic   PONV Controlled:  Controlled   Airway Patency:  Patent      Post Op Vitals Reviewed: Yes      Staff: CRNA   Comments: vss sv nonobstructed uneventful        No notable events documented      BP   117/75   Temp     Pulse 65   Resp 24   SpO2 99

## 2023-03-27 NOTE — H&P
"[facility]  Jose Gonzalez Figures 59 y o  male MRN: [id number]    HPI: Rachel Quinones is a 59y o  year old male who presents with cirrhosis and varices  Review of Systems    Historical Information   Past Medical History:   Diagnosis Date   • Anxiety    • GERD (gastroesophageal reflux disease)    • Hypertension    • Hypoxia 02/11/2022   • MRSA bacteremia 02/09/2022   • Seizures ([address] )      Past Surgical History:   Procedure Laterality Date   • COLONOSCOPY     • SPLENECTOMY     • UPPER GASTROINTESTINAL ENDOSCOPY       Social History   Social History     Substance and Sexual Activity   Alcohol Use Never     Social History     Substance and Sexual Activity   Drug Use Never     Social History     Tobacco Use   Smoking Status Never   Smokeless Tobacco Never     Family History   Problem Relation Age of Onset   • Depression Mother    • Heart disease Father        Meds/Allergies     (Not in a hospital admission)      No Known Allergies    Objective     /65   Pulse 72   Temp (!) 96 7 °F (35 9 °C) (Temporal)   Resp 18   Ht 5' 10\" (1 778 m)   Wt 89 8 kg (198 lb)   SpO2 95%   BMI 28 41 kg/m²       PHYSICAL EXAM    Gen: NAD  CV: RRR  CHEST: Clear  ABD: soft, NT/ND  EXT: no edema  Neuro: AAO      ASSESSMENT/PLAN:  This is a 59y o  year old male here for cirrhosis and varices         PLAN:   Procedure: egd w banding    "

## 2023-03-27 NOTE — ANESTHESIA PREPROCEDURE EVALUATION
Procedure:  EGD    Relevant Problems   ANESTHESIA (within normal limits)      CARDIO   (+) Primary hypertension      ENDO   (-) Diabetes mellitus, type 2 (HCC)   (-) Hyperthyroidism   (-) Hypothyroidism      GI/HEPATIC   (+) Gastroesophageal reflux disease without esophagitis   (+) Other cirrhosis of liver (HCC)      /RENAL (within normal limits)      HEMATOLOGY   (+) Anemia      MUSCULOSKELETAL (within normal limits)      NEURO/PSYCH   (+) History of TB (tuberculosis)   (+) History of prediabetes   (+) Legally blind in right eye, as defined in [place]   (+) Nonintractable epilepsy without status epilepticus ([facility] [address] )      PULMONARY   (-) Asthma   (-) Sleep apnea        TTE 2/9/2022  •  Left Ventricle: Left ventricular cavity size is upper normal  Wall thickness is normal  The left ventricular ejection fraction is 55%  Systolic function is normal  Wall motion is normal  Diastolic function is normal   •  Mitral Valve: There is trace regurgitation  •  Tricuspid Valve: There is mild regurgitation  •  Aorta: The aortic root is mildly dilated (4 0 cm)  The ascending aorta is normal in size  •  Pulmonary Artery: The pulmonary artery systolic pressure is normal       Physical Exam    Airway    Mallampati score: III  TM Distance: >3 FB  Neck ROM: full     Dental       Cardiovascular      Pulmonary      Other Findings        Anesthesia Plan  ASA Score- 3     Anesthesia Type- IV sedation with anesthesia with ASA Monitors  Additional Monitors:   Airway Plan: LMA  Plan Factors-Exercise tolerance (METS): >4 METS  Chart reviewed  EKG reviewed  Existing labs reviewed  Patient summary reviewed  Patient is not a current smoker  Induction- intravenous  Postoperative Plan-     Informed Consent- Anesthetic plan and risks discussed with patient  I personally reviewed this patient with the CRNA  Discussed and agreed on the Anesthesia Plan with the KATERINA Jean-Baptiste

## 2023-03-31 ENCOUNTER — HOSPITAL ENCOUNTER (OUTPATIENT)
Dept: MRI IMAGING | Facility: HOSPITAL | Age: 58
Discharge: HOME | End: 2023-03-31

## 2023-03-31 ENCOUNTER — HOSPITAL ENCOUNTER (OUTPATIENT)
Dept: RADIOLOGY | Facility: HOSPITAL | Age: 58
Discharge: HOME | End: 2023-03-31

## 2023-03-31 DIAGNOSIS — K74.69 OTHER CIRRHOSIS OF LIVER (HCC): ICD-10-CM

## 2023-03-31 RX ADMIN — GADOBUTROL 8 ML: 604.72 INJECTION INTRAVENOUS at 14:53

## 2023-04-04 ENCOUNTER — OFFICE VISIT (OUTPATIENT)
Dept: NEUROLOGY | Facility: CLINIC | Age: 58
End: 2023-04-04

## 2023-04-04 VITALS
BODY MASS INDEX: 28.43 KG/M2 | HEIGHT: 70 IN | DIASTOLIC BLOOD PRESSURE: 61 MMHG | HEART RATE: 88 BPM | WEIGHT: 198.6 LBS | SYSTOLIC BLOOD PRESSURE: 100 MMHG

## 2023-04-04 DIAGNOSIS — G25.0 BENIGN HEAD TREMOR: ICD-10-CM

## 2023-04-04 DIAGNOSIS — F79 INTELLECTUAL DISABILITY: ICD-10-CM

## 2023-04-04 DIAGNOSIS — G40.909 NONINTRACTABLE EPILEPSY WITHOUT STATUS EPILEPTICUS, UNSPECIFIED EPILEPSY TYPE (HCC): Primary | ICD-10-CM

## 2023-04-04 NOTE — PROGRESS NOTES
[facility] -   Follow up appointment    Impression/Plan    Lisa Aguirre is a 59 y o  male with a PMH of intellectual disability and epilepsy  presenting to the [place] [address] [facility] for follow up  His neurological exam is stable since last seen  There have been no reported seizures since the last appointment  He continues to tolerate his AEDs  There was some concern for nocturnal seizures based on drowsiness on awakening  I offered an ambulatory EEG but this was deferred as the concern is low  Family is aware that we can do this at a later point if interested  Plan outlined below:    #Symptomatic epilepsy  - C/w Oxcarbazepine 600 BID, Keppra 500 BID  - CMP, Keppra and OXC level pending     #Head tremor  - Monitor for possible ET   - No significant worsening since last seen    - Follow up in 1 year or sooner if needed  Diagnoses and all orders for this visit:    Nonintractable epilepsy without status epilepticus, unspecified epilepsy type ([facility] [address] )  -     Levetiracetam level; Future  -     Oxcarbazepine level; Future  -     Comprehensive metabolic panel; Future    Benign head tremor    Intellectual disability        Subjective    Jose Chubrenton Tolleson is a 59 y o  male with a PMH of intellectual disability and epilepsy  presenting to the [place] [address] [facility] for follow up  For review:     The patient developed seizures at age 8 after what sounds like a febrile illness  Thereafter he would have seizures every 6 months to one year      Anxiety and stress are triggers  Per his brother, he would lay flat for 2-5 minutes with eyes closed and rolling back while having intermittent tremulousness  There was no prodrome or warning  The post-ictal state can last up to a day  There were also some possible nocturnal seizures because he would urinate in the bed which he doesn't typically do      This most recent seizure was around 2017       The patient established care with me in 9/27/2022  He was tolerating his current AED regimen and has been on this for a long time  Plan at that time was to continue his medication regimen and complete a Routine EEG  Interval history:    Since last seen, the patient has been doing well  There have been no seizures since the last appointment  The patients AEDs were being tolerated well with no side effects  There is no concern for medication non-adherence  He has a motorized bicycle and he rides it around  His brother who is here today says he is doing quite well  He wonders if he is having any nighttime seizures because some days he wakes up more tired  However, in the past when he had night time seizures he would wake up with excessive drool on his pillow and family hasn't noticed that in a very long time  Special Features  Age/Date of seizure onset: 8 yo  Status epilepticus: no  Self Injury Seizures: no  Precipitating Factors: stress  Longest seizure free interval: 5-6 years (current)     Epilepsy Risk Factors:  Intellectual disability  Possible febrile illness as a child (?encephalitis)     Current AEDs:  Oxcarbazepine 600 BID, Keppra 500 BID  Medication side effects: None  Medication adherence: No     Prior AEDs:  None     Prior Evaluation:  - Routine EEG 10/5/2022: Occasional right anterior temporal sharp wave discharges followed by right temporal delta activities suggests an underlying area of neuronal dysfunction that is a potential source of seizures  - [facility] 2/8/2022: No structural abnormalities seen     History Reviewed: The following were reviewed and updated as appropriate: allergies, current medications, past family history, past medical history, past social history, past surgical history and problem list     Psychiatric History:  He has an intellectual disability since age 8  He has always lived with family  He can perform most ADLs without help   Can ride a bike         Social History:   Driving: No  Lives Alone: "No  Occupation: on permanent disability    History Reviewed: The following were reviewed and updated as appropriate: allergies, current medications, past family history, past medical history, past social history, past surgical history and problem list    ROS:  Constitutional: Negative  Negative for appetite change and fever  HENT: Negative  Negative for hearing loss, tinnitus, trouble swallowing and voice change  Eyes: Negative  Negative for photophobia, pain and visual disturbance  Respiratory: Negative  Negative for shortness of breath  Cardiovascular: Negative  Negative for palpitations  Gastrointestinal: Negative  Negative for nausea and vomiting  Endocrine: Negative  Negative for cold intolerance  Genitourinary: Negative  Negative for dysuria, frequency and urgency  Musculoskeletal: Negative  Negative for gait problem, myalgias and neck pain  Skin: Negative  Negative for rash  Allergic/Immunologic: Negative  Neurological: Negative  Negative for dizziness, tremors, seizures, syncope, facial asymmetry, speech difficulty, weakness, light-headedness, numbness and headaches  Hematological: Negative  Does not bruise/bleed easily  Psychiatric/Behavioral: Negative  Negative for confusion, hallucinations and sleep disturbance  All other systems reviewed and are negative  Objective    /61 (BP Location: Left arm, Patient Position: Sitting, Cuff Size: Adult)   Pulse 88   Ht 5' 10\" (1 778 m)   Wt 90 1 kg (198 lb 9 6 oz)   BMI 28 50 kg/m²      General Exam  General: well developed, no acute distress  HEENT: mucous membranes moist, anicteric sclera  Neck: supple, good ROM  Extremities: no clubbing, cyanosis or edema  Skin: no rash on visible skin      Neurological Exam  Mental Status: awake, alert, and fully oriented to person, place, and time  Non contributory to history gathering   There is no neglect      Language: Comprehension normal             Cranial " Nerves: Pupils equal and reactive to light  Visual fields full to confrontation  Extraocular motions intact with full versions, normal pursuits and saccades  Facial strength full and symmetric  Facial sensation intact in V1-V3  Hearing intact to voice  Tongue protrudes to midline  Palate elevates symmetrically  Speech dysarthric  Shoulder shrug activation full and symmetric      Motor: Normal bulk and tone  No pronator drift  Strength is 5/5 proximally and distally in all 4 extremities  No involuntary movements      Sensory: Sensation intact to light touch distally in all extremities      Coordination: Normal finger-to-nose  Normal rapid alternating movements  Resting fine head (no) tremor       Station and gait: Casual normal except for decreased right arm swing  Normal Romberg      Reflexes: Reflexes 2+ throughout and symmetric         China Quinones MD   [facility]  [facility] [address] [facility]

## 2023-04-05 ENCOUNTER — CONSULT (OUTPATIENT)
Dept: SURGERY | Facility: CLINIC | Age: 58
End: 2023-04-05

## 2023-04-05 VITALS
RESPIRATION RATE: 18 BRPM | HEIGHT: 70 IN | TEMPERATURE: 97.8 F | OXYGEN SATURATION: 98 % | BODY MASS INDEX: 28.63 KG/M2 | DIASTOLIC BLOOD PRESSURE: 64 MMHG | WEIGHT: 200 LBS | SYSTOLIC BLOOD PRESSURE: 102 MMHG | HEART RATE: 78 BPM

## 2023-04-05 DIAGNOSIS — I10 PRIMARY HYPERTENSION: ICD-10-CM

## 2023-04-05 DIAGNOSIS — D50.0 IRON DEFICIENCY ANEMIA DUE TO CHRONIC BLOOD LOSS: ICD-10-CM

## 2023-04-05 DIAGNOSIS — G40.909 NONINTRACTABLE EPILEPSY WITHOUT STATUS EPILEPTICUS, UNSPECIFIED EPILEPSY TYPE (HCC): ICD-10-CM

## 2023-04-05 DIAGNOSIS — K82.8 MASS OF GALLBLADDER: Primary | ICD-10-CM

## 2023-04-05 DIAGNOSIS — K74.69 OTHER CIRRHOSIS OF LIVER (HCC): ICD-10-CM

## 2023-04-05 DIAGNOSIS — I85.11 SECONDARY ESOPHAGEAL VARICES WITH BLEEDING (HCC): ICD-10-CM

## 2023-04-05 DIAGNOSIS — Z90.81 STATUS POST SPLENECTOMY: ICD-10-CM

## 2023-04-06 NOTE — PROGRESS NOTES
Assessment/Plan: Patient had an MRCP/MRI for ruling out gallbladder mass  It has not been reported  We will follow-up the report  Considering the patient's extensive history I think he should have a colonoscopy to complete the work-up for profound anemia  This we will perform next week  Bowel prep instructions given  Consent signed by healthcare proxy  Further management would be decided based upon the findings on colonoscopy and MRCP results  I had an extensive discussion with the patient and his family members  I had reviewed patient's ultrasound, images of MRCP, images of EGD   Extensive review of prior admission notes and lab work was done  More than 60 minutes was spent in above and coordinating his care  No problem-specific Assessment & Plan notes found for this encounter  Diagnoses and all orders for this visit:    Mass of gallbladder  -     Ambulatory Referral to General Surgery    Nonintractable epilepsy without status epilepticus, unspecified epilepsy type ([facility] [address] )    Primary hypertension    Iron deficiency anemia due to chronic blood loss    Status post splenectomy    Secondary [facility]ices with bleeding [facility]    Other cirrhosis of liver (HCC)          Subjective:      Patient ID: Aman Owusu is a 59 y o  male  68-year-old male patient who has some degree of autism was brought to my office by his brother and sister-in-law  The consultation was supposed to be for a gallbladder mass  This mass was found when an ultrasound was done for work-up of cirrhosis  The patient moved to this country from [place] about 2 years ago  Last here he was admitted to [address] [place] [address] because of severe infection of his leg  He had multiple debridements at that time  Patient had an ultrasound of his abdomen at that time which showed some element of cirrhosis  Patient has no history of alcohol abuse  Patient has no history of hepatitis    Patient has history of tuberculosis during childhood  Patient has history of trauma laparotomy and splenectomy for trauma when he was 23years old  Patient was found to have varices in the esophagus and has had banding done twice however patient tells me that he has never had any hemoptysis or hematemesis  No complaints of blood in stools  No complaints of weight loss  No complaints of melenic stools  No complaints of abdominal pain  Patient has never had colonoscopy which has been documented  Patient had a full work-up for cirrhosis in terms of hepatic markers, hepatitis markers and it was all negative  Patient for the last 1 year has had profound anemia and is on iron replacement therapy  In short patient has no symptoms  The ultrasound which was done did show some gallstones  The following portions of the patient's history were reviewed and updated as appropriate:   He  has a past medical history of Anxiety, GERD (gastroesophageal reflux disease), Hypertension, Hypoxia (02/11/2022), MRSA bacteremia (02/09/2022), and Seizures ([facility] [address] )  He   Patient Active Problem List    Diagnosis Date Noted   • History of prediabetes 06/09/2022   • Other cirrhosis of liver ([facility] [address] ) 06/09/2022   • Legally blind in right eye, as defined in [place] 05/11/2022   • Bilateral hearing loss 05/11/2022   • Angiodysplasia 05/11/2022   • History of TB (tuberculosis) 05/11/2022   • Esophageal varices ([address] ) 02/12/2022   • Anemia 02/10/2022   • Status post splenectomy 02/10/2022   • Cellulitis of left lower extremity 02/08/2022   • Ambulatory dysfunction 02/08/2022   • Nonintractable epilepsy without status epilepticus ([address] ) 02/08/2022   • Primary hypertension 02/08/2022   • Gastroesophageal reflux disease without esophagitis 02/08/2022   • Venous stasis dermatitis of both lower extremities 02/08/2022     He  has a past surgical history that includes Splenectomy; Colonoscopy; and Upper gastrointestinal endoscopy    His family history includes Depression in his mother; Heart disease in his father  He  reports that he has never smoked  He has never used smokeless tobacco  He reports that he does not drink alcohol and does not use drugs  Current Outpatient Medications   Medication Sig Dispense Refill   • diazepam (VALIUM) 10 mg tablet Take 1 tablet (10 mg total) by mouth every 12 (twelve) hours 60 tablet 2   • folic acid (FOLVITE) 1 mg tablet TAKE 1 TABLET (1 MG TOTAL) BY MOUTH IN THE MORNING  90 tablet 1   • furosemide (LASIX) 20 mg tablet TAKE 1 TABLET (20 MG TOTAL) BY MOUTH IN THE MORNING AND 1 TABLET (20 MG TOTAL) IN THE EVENING  180 tablet 1   • iron polysaccharides (FERREX) 150 mg capsule Take 1 capsule (150 mg total) by mouth in the morning 90 capsule 0   • levETIRAcetam (KEPPRA) 500 mg tablet TAKE 1 TABLET BY MOUTH EVERY 12 HOURS 180 tablet 1   • LORazepam (Ativan) 0 5 mg tablet Take 1 tablet (0 5 mg total) by mouth once as needed for sedation (60-30 minutes before procedure/test) for up to 2 doses 2 tablet 0   • nadolol (CORGARD) 40 mg tablet TAKE 1 TABLET (40 MG TOTAL) BY MOUTH IN THE MORNING  90 tablet 1   • OXcarbazepine (TRILEPTAL) 600 mg tablet TAKE 1 TABLET BY MOUTH EVERY 12 HOURS 180 tablet 1   • pantoprazole (PROTONIX) 40 mg tablet TAKE 1 TABLET BY MOUTH IN THE MORNING AND 1 TABLET IN THE EVENING  TAKE BEFORE MEALS 180 tablet 1   • sucralfate (CARAFATE) 1 g tablet TAKE 1 TABLET (1 G TOTAL) BY MOUTH EVERY 6 (SIX) HOURS 120 tablet 2   • triamcinolone (KENALOG) 0 1 % ointment Apply sparingly to affected areas of legs 2-4 times a day for up to 2 weeks  453 6 g 0     No current facility-administered medications for this visit  Current Outpatient Medications on File Prior to Visit   Medication Sig   • diazepam (VALIUM) 10 mg tablet Take 1 tablet (10 mg total) by mouth every 12 (twelve) hours   • folic acid (FOLVITE) 1 mg tablet TAKE 1 TABLET (1 MG TOTAL) BY MOUTH IN THE MORNING     • furosemide (LASIX) 20 mg tablet TAKE 1 TABLET (20 MG TOTAL) BY MOUTH IN THE MORNING AND 1 "TABLET (20 MG TOTAL) IN THE EVENING  • iron polysaccharides (FERREX) 150 mg capsule Take 1 capsule (150 mg total) by mouth in the morning   • levETIRAcetam (KEPPRA) 500 mg tablet TAKE 1 TABLET BY MOUTH EVERY 12 HOURS   • LORazepam (Ativan) 0 5 mg tablet Take 1 tablet (0 5 mg total) by mouth once as needed for sedation (60-30 minutes before procedure/test) for up to 2 doses   • nadolol (CORGARD) 40 mg tablet TAKE 1 TABLET (40 MG TOTAL) BY MOUTH IN THE MORNING  • OXcarbazepine (TRILEPTAL) 600 mg tablet TAKE 1 TABLET BY MOUTH EVERY 12 HOURS   • pantoprazole (PROTONIX) 40 mg tablet TAKE 1 TABLET BY MOUTH IN THE MORNING AND 1 TABLET IN THE EVENING  TAKE BEFORE MEALS   • sucralfate (CARAFATE) 1 g tablet TAKE 1 TABLET (1 G TOTAL) BY MOUTH EVERY 6 (SIX) HOURS   • triamcinolone (KENALOG) 0 1 % ointment Apply sparingly to affected areas of legs 2-4 times a day for up to 2 weeks  No current facility-administered medications on file prior to visit  He has No Known Allergies       Review of Systems   Constitutional: Negative  HENT: Negative  Eyes: Negative  Respiratory: Negative  Cardiovascular: Negative  Gastrointestinal: Negative  Endocrine: Negative  Genitourinary: Negative  Musculoskeletal: Negative  Skin: Negative  Allergic/Immunologic: Negative  Neurological: Negative  Hematological: Negative  Psychiatric/Behavioral: Negative  Objective:      /64 (BP Location: Left arm, Patient Position: Sitting, Cuff Size: Standard)   Pulse 78   Temp 97 8 °F (36 6 °C) (Tympanic)   Resp 18   Ht 5' 10\" (1 778 m)   Wt 90 7 kg (200 lb)   SpO2 98%   BMI 28 70 kg/m²          Physical Exam  Vitals reviewed  Constitutional:       Appearance: Normal appearance  HENT:      Head: Normocephalic  Nose: Nose normal       Mouth/Throat:      Mouth: Mucous membranes are moist    Eyes:      General: No scleral icterus       Pupils: Pupils are equal, round, and reactive to " light       Comments: Patient has profound pallor   Cardiovascular:      Rate and Rhythm: Normal rate and regular rhythm  Pulses: Normal pulses  Heart sounds: Normal heart sounds  Pulmonary:      Effort: Pulmonary effort is normal       Breath sounds: Normal breath sounds  Abdominal:      General: Abdomen is flat  A surgical scar is present  Bowel sounds are normal       Palpations: Abdomen is soft  Tenderness: There is no abdominal tenderness  Hernia: No hernia is present  Musculoskeletal:         General: Normal range of motion  Cervical back: Normal range of motion  Skin:     Capillary Refill: Capillary refill takes less than 2 seconds  Coloration: Skin is pale  Neurological:      General: No focal deficit present  Mental Status: He is alert     Psychiatric:         Mood and Affect: Mood normal          Behavior: Behavior normal

## 2023-04-13 RX ORDER — LIDOCAINE HYDROCHLORIDE 10 MG/ML
0.5 INJECTION, SOLUTION EPIDURAL; INFILTRATION; INTRACAUDAL; PERINEURAL ONCE AS NEEDED
Status: CANCELLED | OUTPATIENT
Start: 2023-04-13

## 2023-04-13 RX ORDER — SODIUM CHLORIDE, SODIUM LACTATE, POTASSIUM CHLORIDE, CALCIUM CHLORIDE 600; 310; 30; 20 MG/100ML; MG/100ML; MG/100ML; MG/100ML
125 INJECTION, SOLUTION INTRAVENOUS CONTINUOUS
Status: CANCELLED | OUTPATIENT
Start: 2023-04-13

## 2023-04-14 DIAGNOSIS — K82.8 MASS OF GALLBLADDER: Primary | ICD-10-CM

## 2023-04-24 ENCOUNTER — TELEMEDICINE (OUTPATIENT)
Dept: INTERVENTIONAL RADIOLOGY/VASCULAR | Facility: CLINIC | Age: 58
End: 2023-04-24

## 2023-04-24 DIAGNOSIS — K82.8 GALLBLADDER MASS: Primary | ICD-10-CM

## 2023-04-24 NOTE — PROGRESS NOTES
INTERPROFESSIONAL (PHONE) [address] [facility] 59 y o  male MRN: [id number]  Encounter Date: 04/24/23    Reason for Consult / Principal Problem: Gallbladder mass    Consulting Provider: Dereck Wray MD    Requesting Provider: Jarad Pepe MD       ASSESSMENT:  59 y o  male with anemia, cirrhosis and esophageal varices s/p banding who was found on US and MRI to have 2 gallbladder masses as well as 2 non-specific mesenteric lymph nodes  The patient is scheduled for a CT scan which could add some good information to help guide the decision process  IR was consulted for possible biopsy  After review of the images, I would suggest first obtaining a CT scan followed by cholecystectomy as first line if the patient is a surgical candidate  If not, then I would suggest endoscopic ultrasound biopsy by GI  I spoke with Dr Luis Carlos Stanley, Dr Lindsay Earl and Dr Maxime García from surgical oncology, GI and general surgery respectively  I also spoke with Dr Jarad Waggoner will speak with Dr Juarez Sow and come up with a game plan to decide which is the best way to get a diagnosis and if positive, to treat the patient  RECOMMENDATIONS:  CT scan  Surgical consult and possible GI consult    Total time spent in review of data, discussion with requesting provider and rendering advice was  31 + minutes, >50% of the total time devoted to medical consultative verbal/EMR discussion between providers  Written report will be generated in the EMR

## 2023-04-24 NOTE — LETTER
April 24, 2023     [place], [address] [facility] [address]    Patient: Robb Guadalupe   YOB: 1965   Date of Visit: 4/24/2023       Dear Dr Frieda Art Recipients: Thank you for referring Melissa Moss to me for evaluation  Below are my notes for this consultation  If you have questions, please do not hesitate to call me  I look forward to following your patient along with you  Sincerely,        Edenilson Buenrostro MD        CC: Phyllistine Castleman, MD Zorita Oram, MD Esta Leaks, MD Pamela Sears Conron, DO  Edenilson Buenrostro MD  4/24/2023 12:51 PM  Sign when Signing Visit  [address] [facility] (PHONE) [address] [facility] 59 y o  male MRN: [id number]  Encounter Date: 04/24/23    Reason for Consult / Principal Problem: Gallbladder mass    Consulting Provider: Edenilson Buenrostro MD    Requesting Provider: Vipul Fitzpatrick MD       ASSESSMENT:  59 y o  male with anemia, cirrhosis and esophageal varices s/p banding who was found on US and MRI to have 2 gallbladder masses as well as 2 non-specific mesenteric lymph nodes  The patient is scheduled for a CT scan which could add some good information to help guide the decision process  IR was consulted for possible biopsy  After review of the images, I would suggest first obtaining a CT scan followed by cholecystectomy as first line if the patient is a surgical candidate  If not, then I would suggest endoscopic ultrasound biopsy by GI  I spoke with Dr Phyllistine Castleman, Dr Terri Terry and Dr Galvan Speaks from surgical oncology, GI and general surgery respectively  I also spoke with Dr Vipul Elizondo will speak with Dr Hailee Maddox and come up with a game plan to decide which is the best way to get a diagnosis and if positive, to treat the patient       RECOMMENDATIONS:  CT scan  Surgical consult and possible GI consult    Total time spent in review of data, discussion with requesting provider and rendering advice was  31 + minutes, >50% of the total time devoted to medical consultative verbal/EMR discussion between providers  Written report will be generated in the EMR

## 2023-04-26 ENCOUNTER — TELEPHONE (OUTPATIENT)
Dept: HEMATOLOGY ONCOLOGY | Facility: CLINIC | Age: 58
End: 2023-04-26

## 2023-04-26 DIAGNOSIS — K82.8 GALLBLADDER MASS: Primary | ICD-10-CM

## 2023-04-26 NOTE — TELEPHONE ENCOUNTER
Patient Call    Who are you speaking with? Sister of patient   If it is not the patient, are they listed on an active communication consent form? yes   What is the reason for this call? Returning call   Does this require a call back? yes   If a call back is required, please list best call back number [phone]   If a call back is required, advise that a message will be forwarded to their care team and someone will return their call as soon as possible  Did you relay this information to the patient?  yes

## 2023-04-26 NOTE — TELEPHONE ENCOUNTER
Consult placed for Dr Hailee Maddox  Will send back to [facility] to set up appt  Lisa frank of plan

## 2023-04-26 NOTE — TELEPHONE ENCOUNTER
Spoke with Catherine Mason, patient's sister in law  Explained plan to sister in law:     IR was consulted for possible biopsy  After review of the images, I would suggest first obtaining a CT scan followed by cholecystectomy as first line if the patient is a surgical candidate  If not, then I would suggest endoscopic ultrasound biopsy by GI  I spoke with Dr Cara Baker, Dr Ted Flynn and Dr Sophy Herndon from surgical oncology, GI and general surgery respectively  I also spoke with Dr Cece Avila will speak with Dr Aranza Holbrook and come up with a game plan to decide which is the best way to get a diagnosis and if positive, to treat the patient       RECOMMENDATIONS:  CT scan  Surgical consult and possible GI consult    Catherine Mason, sister in law aware of plan

## 2023-05-01 ENCOUNTER — TELEPHONE (OUTPATIENT)
Dept: GASTROENTEROLOGY | Facility: CLINIC | Age: 58
End: 2023-05-01

## 2023-05-01 PROBLEM — K82.8 GALLBLADDER MASS: Status: ACTIVE | Noted: 2023-05-01

## 2023-05-01 NOTE — TELEPHONE ENCOUNTER
Left message to schedule repeat EGD in June with Dr Paradise Dennis  Needs to be performed in the hospital setting

## 2023-05-01 NOTE — TELEPHONE ENCOUNTER
----- Message from Estefanía De Jesus MD sent at 3/27/2023 12:06 PM EDT -----  Repeat EGD in 3 months    Office follow-up in 2 months

## 2023-05-02 ENCOUNTER — DOCUMENTATION (OUTPATIENT)
Dept: HEMATOLOGY ONCOLOGY | Facility: CLINIC | Age: 58
End: 2023-05-02

## 2023-05-02 ENCOUNTER — CONSULT (OUTPATIENT)
Dept: SURGICAL ONCOLOGY | Facility: CLINIC | Age: 58
End: 2023-05-02

## 2023-05-02 VITALS
SYSTOLIC BLOOD PRESSURE: 100 MMHG | OXYGEN SATURATION: 95 % | DIASTOLIC BLOOD PRESSURE: 64 MMHG | WEIGHT: 199.5 LBS | HEART RATE: 80 BPM | BODY MASS INDEX: 28.56 KG/M2 | RESPIRATION RATE: 21 BRPM | TEMPERATURE: 97.2 F | HEIGHT: 70 IN

## 2023-05-02 DIAGNOSIS — K82.8 GALLBLADDER MASS: Primary | ICD-10-CM

## 2023-05-02 NOTE — PROGRESS NOTES
Surgical Oncology Consult       [address] [facility]  [facility]  [address]  [facility] [address]    Jose Jones  1965  [id number]  [address] [facility]  [facility]  [address] [facility] [address] [facility] [address]    Diagnoses and all orders for this visit:    Gallbladder mass  -     Ambulatory referral to Surgical Oncology  -     CT chest abdomen pelvis w contrast; Future  -     BUN; Future  -     Creatinine, serum; Future        Chief Complaint   Patient presents with    Consult       Return in about 11 weeks (around 7/18/2023) for Office Visit, Imaging - See orders  Oncology History    No history exists  History of Present Illness: 57-year-old male who had a right upper quadrant ultrasound for some unknown reason  Right upper quadrant ultrasound from February 21, 2023 shows an echogenic lesion in the gallbladder with vascularity  Follow-up MRI on March 31, 2023 revealed dilatation of the left hepatic duct  The gallbladder was distended with 2 diffusion restricting masses in the gallbladder lumen  The larger lesion measured 3 1 cm and the smaller lesion measured 1 8 cm  There were adjacent enhancing lesions in the anterior abdomen and the omentum  There was nodularity seen in the omentum as well as the right paracolic gutter  It is unclear if these were splenosis or nodular metastatic implants  I personally reviewed the films  He comes in now to discuss further therapy  He denies any abdominal pain, nausea or vomiting  No change in appetite  No unintentional weight loss  He had a splenectomy when he was a teenager  Review of Systems  Complete ROS Surg Onc:   Constitutional: The patient denies new or recent history of general fatigue, no recent weight loss, no change in appetite  Eyes: No complaints of visual problems, no scleral icterus     ENT: no complaints of ear pain, no hoarseness, no difficulty swallowing,  no tinnitus and no new masses in head, oral cavity, or neck  Cardiovascular: No complaints of chest pain, no palpitations, no ankle edema  Respiratory: No complaints of shortness of breath, no cough  Gastrointestinal: No complaints of jaundice, no bloody stools, no pale stools  Genitourinary: No complaints of dysuria, no hematuria, no nocturia, no frequent urination, no urethral discharge  Musculoskeletal: No complaints of weakness, paralysis, joint stiffness or arthralgias  Integumentary: No complaints of rash, no new lesions  Neurological: No complaints of convulsions, no seizures, no dizziness  Hematologic/Lymphatic: No complaints of easy bruising  Endocrine:  No hot or cold intolerance  No polydipsia, polyphagia, or polyuria  Allergy/immunology:  No environmental allergies  No food allergies  Not immunocompromised  Skin:  No pallor or rash  No wound            Patient Active Problem List   Diagnosis    Cellulitis of left lower extremity    Ambulatory dysfunction    Nonintractable epilepsy without status epilepticus ([facility] )    Primary hypertension    Gastroesophageal reflux disease without esophagitis    Venous stasis dermatitis of both lower extremities    Anemia    Status post splenectomy    Esophageal varices (HCC)    Legally blind in right eye, as defined in [address] Bilateral hearing loss    Angiodysplasia    History of TB (tuberculosis)    History of prediabetes    Other cirrhosis of liver ([facility] )    Gallbladder mass     Past Medical History:   Diagnosis Date    Anxiety     GERD (gastroesophageal reflux disease)     Hypertension     Hypoxia 02/11/2022    MRSA bacteremia 02/09/2022    Seizures ([facility] )      Past Surgical History:   Procedure Laterality Date    COLONOSCOPY      SPLENECTOMY      UPPER GASTROINTESTINAL ENDOSCOPY       Family History   Problem Relation Age of Onset    Depression Mother     Heart disease Father Social History     Socioeconomic History    Marital status: Single     Spouse name: Not on file    Number of children: Not on file    Years of education: Not on file    Highest education level: Not on file   Occupational History    Not on file   Tobacco Use    Smoking status: Never    Smokeless tobacco: Never   Vaping Use    Vaping Use: Never used   Substance and Sexual Activity    Alcohol use: Never    Drug use: Never    Sexual activity: Not Currently   Other Topics Concern    Not on file   Social History Narrative    Not on file     Social Determinants of Health     Financial Resource Strain: Not on file   Food Insecurity: Not on file   Transportation Needs: Not on file   Physical Activity: Not on file   Stress: Not on file   Social Connections: Not on file   Intimate Partner Violence: Not on file   Housing Stability: Not on file       Current Outpatient Medications:     diazepam (VALIUM) 10 mg tablet, Take 1 tablet (10 mg total) by mouth every 12 (twelve) hours, Disp: 60 tablet, Rfl: 2    folic acid (FOLVITE) 1 mg tablet, TAKE 1 TABLET (1 MG TOTAL) BY MOUTH IN THE MORNING , Disp: 90 tablet, Rfl: 1    furosemide (LASIX) 20 mg tablet, TAKE 1 TABLET (20 MG TOTAL) BY MOUTH IN THE MORNING AND 1 TABLET (20 MG TOTAL) IN THE EVENING , Disp: 180 tablet, Rfl: 1    iron polysaccharides (FERREX) 150 mg capsule, Take 1 capsule (150 mg total) by mouth in the morning, Disp: 90 capsule, Rfl: 1    levETIRAcetam (KEPPRA) 500 mg tablet, Take 1 tablet (500 mg total) by mouth every 12 (twelve) hours, Disp: 180 tablet, Rfl: 1    LORazepam (Ativan) 0 5 mg tablet, Take 1 tablet (0 5 mg total) by mouth once as needed for sedation (60-30 minutes before procedure/test) for up to 2 doses, Disp: 2 tablet, Rfl: 0    nadolol (CORGARD) 40 mg tablet, TAKE 1 TABLET (40 MG TOTAL) BY MOUTH IN THE MORNING , Disp: 90 tablet, Rfl: 1    OXcarbazepine (TRILEPTAL) 600 mg tablet, TAKE 1 TABLET BY MOUTH EVERY 12 HOURS, Disp: 180 tablet, Rfl: 1    pantoprazole (PROTONIX) 40 mg tablet, TAKE 1 TABLET BY MOUTH IN THE MORNING AND 1 TABLET IN THE EVENING  TAKE BEFORE MEALS, Disp: 180 tablet, Rfl: 1    sucralfate (CARAFATE) 1 g tablet, TAKE 1 TABLET BY MOUTH EVERY 6 HOURS, Disp: 360 tablet, Rfl: 1    triamcinolone (KENALOG) 0 1 % ointment, Apply sparingly to affected areas of legs 2-4 times a day for up to 2 weeks  , Disp: 453 6 g, Rfl: 0  No Known Allergies  Vitals:    05/02/23 0856   BP: 100/64   Pulse: 80   Resp: 21   Temp: (!) 97 2 °F (36 2 °C)   SpO2: 95%       Physical Exam   Constitutional: General appearance: The Patient is well-developed and well-nourished who appears the stated age in no acute distress  Patient is pleasant and talkative  HEENT:  Normocephalic  Sclerae are anicteric  Mucous membranes are moist  Neck is supple without adenopathy  No JVD  Chest: The lungs are clear to auscultation  Cardiac: Heart is regular rate  Abdomen: Abdomen is soft, non-tender, non-distended and without masses  Extremities: There is no clubbing or cyanosis  There is no edema  Symmetric  Neuro: Grossly nonfocal  Gait is normal      Lymphatic: No evidence of cervical adenopathy bilaterally  No evidence of axillary adenopathy bilaterally  No evidence of inguinal adenopathy bilaterally  Skin: Warm, anicteric  Psych:  Patient is pleasant and talkative  Breasts:      Pathology:  [unfilled]    Labs:      Imaging  Colonoscopy    Result Date: 4/14/2023  Narrative: Table formatting from the original result was not included  KALI Lobo [place] [id number] [facility] [id number] [facility] [phone] DATE OF SERVICE: 4/14/23 PHYSICIAN(S): Attending: Merlin Warren MD Fellow: No Staff Documented INDICATION: Encounter for screening colonoscopy POST-OP DIAGNOSIS: See the impression below  HISTORY: Prior colonoscopy: No prior colonoscopy   BOWEL PREPARATION: Miralax/Dulcolax PREPROCEDURE: Informed consent was obtained for the procedure, including sedation  Risks including but not limited to bleeding, infection, perforation, adverse drug reaction and aspiration were explained in detail  Also explained about less than 100% sensitivity with the exam and other alternatives  The patient was placed in the left lateral decubitus position  Procedure: Colonoscopy DETAILS OF PROCEDURE: Patient was taken to the procedure room where a time out was performed to confirm correct patient and correct procedure  The patient underwent monitored anesthesia care, which was administered by an anesthesia professional  The patient's blood pressure, ECG, ETCO2, heart rate, level of consciousness, respirations and oxygen were monitored throughout the procedure  A digital rectal exam was performed  A perianal exam was performed  The scope was introduced through the anus and advanced to the cecum  Retroflexion was performed in the rectum  The quality of bowel preparation was evaluated using the [facility] Bowel Preparation Scale with scores of: right colon = 2, transverse colon = 2, left colon = 2  The total BBPS score was 6  Bowel prep was adequate  The patient experienced no blood loss  The procedure was not difficult  The patient tolerated the procedure well  There were no apparent complications  ANESTHESIA INFORMATION: ASA: III Anesthesia Type: IV Sedation with Anesthesia MEDICATIONS: No administrations occurring from 1045 to 1116 on 04/14/23 FINDINGS: All observed locations appeared normal  EVENTS: Procedure Events Event Event Time ENDO CECUM REACHED 4/14/2023 11:08 AM ENDO SCOPE OUT TIME 4/14/2023 11:16 AM SPECIMENS: * No specimens in log * EQUIPMENT: Colonoscope -[id number]     Impression: Normal screening colonoscopy RECOMMENDATION:  Repeat screening colonoscopy in 10 years  Follow up with me in clinic Referral placed for oncology regarding gallbladder mass and CBD stricture    Eric Barton MD     I reviewed the above laboratory and imaging data     Discussion/Summary: 66-year-old male with possible gallbladder cancer  I doubt that the other lesions in the abdomen are related to metastasis, those are likely splenosis  We had a long discussion regarding treatment options  Because of the possibility of gallbladder cancer, I discussed performing a radical cholecystectomy along with portal lymph node dissection  We also discussed laparoscopic cholecystectomy followed by more aggressive surgery if cancer is found  I did discussed older data that suggest that single-stage procedure confers better survival   The patient and his family are going to [place] in 1 month  We discussed the typical recovery from radical cholecystectomy  The patient's brother who is his primary caregiver and the patient are not inclined to skip this trip to see their mother  We discussed the pros and cons of this approach  Ultimately, we have elected on repeating his CT as soon as he returns from [place]   If there is no change or any progression of disease, we will plan on surgical intervention at that time  I did discuss that my first recommendation would be that he have radical cholecystectomy now, but given the social constraints as well as potential recovery and transatlantic flight after major abdominal surgery they are going to proceed with surgery after they return from this trip  All of their questions were answered

## 2023-05-02 NOTE — PROGRESS NOTES
Patient had a right upper quadrant ultrasound on 2/21/23 that showed an echogenic lesion in the gallbladder with vascularity  Follow-up MRI on 3/31/23 revealed 2 nodular enhancing masses within the gallbladder, adjacent enhancing lesions in the anterior abdomen and the omentum, and dilatation of the left hepatic duct  Consult with Dr Leonarda Wheeler on 5/2 reports pt will go on vacation first and proceed with treatment when he returns  It was agreed for a CT c/a/p upon return for evaluation scheulded for 7/12/23 and then proceed with treatment  Patient is scheduled with Dr Leonarda Wheeler on 7/26 and with Dr Za Barkley on 6/1/23

## 2023-05-26 ENCOUNTER — APPOINTMENT (OUTPATIENT)
Dept: LAB | Facility: AMBULARY SURGERY CENTER | Age: 58
End: 2023-05-26

## 2023-05-26 ENCOUNTER — TELEPHONE (OUTPATIENT)
Dept: HEMATOLOGY ONCOLOGY | Facility: MEDICAL CENTER | Age: 58
End: 2023-05-26

## 2023-05-26 DIAGNOSIS — K82.8 MASS OF GALLBLADDER: ICD-10-CM

## 2023-05-26 NOTE — TELEPHONE ENCOUNTER
I spoke with the patients sister in law Grisel Roger in regards the patients lab work that needs to be completed prior to his appt on 6/1/23 at 3:00pm  Grisel Roger states they weren't aware of labs  I informed them that they were ordered at his consult appt  Grisel Roger states she will have the patient go sometime this weekend

## 2023-05-27 LAB — CANCER AG19-9 SERPL-ACNC: 6 U/ML (ref 0–35)

## 2023-05-31 NOTE — ASSESSMENT & PLAN NOTE
Pt is alert and oriented with no s/s of distress. Pt denies N/V, Denies ain. Able to tolerate PO fluids without difficulty. VSS. DC instructions provided   · Patient has a history of seizures since being a child  · Will continue with Keppra and Trileptal  · Seizure precautions

## 2023-06-01 ENCOUNTER — OFFICE VISIT (OUTPATIENT)
Dept: HEMATOLOGY ONCOLOGY | Facility: CLINIC | Age: 58
End: 2023-06-01

## 2023-06-01 VITALS
SYSTOLIC BLOOD PRESSURE: 104 MMHG | TEMPERATURE: 97.9 F | OXYGEN SATURATION: 96 % | RESPIRATION RATE: 15 BRPM | HEIGHT: 70 IN | DIASTOLIC BLOOD PRESSURE: 68 MMHG | BODY MASS INDEX: 28.92 KG/M2 | HEART RATE: 85 BPM | WEIGHT: 202 LBS

## 2023-06-01 DIAGNOSIS — D50.0 IRON DEFICIENCY ANEMIA DUE TO CHRONIC BLOOD LOSS: Primary | ICD-10-CM

## 2023-06-01 DIAGNOSIS — K74.69 OTHER CIRRHOSIS OF LIVER (HCC): ICD-10-CM

## 2023-06-01 NOTE — PROGRESS NOTES
Jose Stewart  1965  [address] [facility] [place]  [address] [facility]  [place] [address]  HEMATOLOGY/ONCOLOGY CONSULTATION REPORT    DISCUSSION/SUMMARY:    [id number]-[id number] male with complicated recent GI history  Issues:    Gallbladder findings  Ultrasound and MRI results are listed below  Patient has steatosis (query cause for the cirrhosis)  The left hepatic duct is dilated, right looks normal   Gallbladder is distended; there are 2 lesions (3 1 cm, 1 8 cm) that are concerning for malignancy  There are number of other lesions in the anterior abdomen and omentum that are also concerning for malignancy  Patient was previously presented to IR for the possibility of biopsy  This was felt to be too risky and the likelihood of obtaining sufficient tissue was low  Patient was subsequently seen by Dr Gigi Amador for surgical resection  Patient/family opted to wait until after Mr Giuliana Panda gets back from [place]   Patient has a pending appointment with surgical oncology after he returns in approximately 8 weeks  Patient was given a follow-up for 3 months but this may change depending upon what patient/family decide to do after returning to the [address] [facility]  As discussed previously, patient has cognitive issues and does not understand everything  Family members understand that the concern is a malignancy (gallbladder cancer) but tissue is needed to confirm  Anemia  The MCV is low normal, hemoglobin level is obviously decreased  Iron studies point towards iron deficiency  Patient has a history of esophageal varices but no evidence of recent bleeding  Patient can continue with an oral iron supplement monitoring for GI side effects    Family members are monitoring for any signs of GI bleeding, worsening pallor, decreased activities, respiratory distress etc     Patient/family know to call the hematology/oncology office if there are any other questions or concerns  Carefully review your medication list and verify that the list is accurate and up-to-date  Please call the hematology/oncology office if there are medications missing from the list, medications on the list that you are not currently taking or if there is a dosage or instruction that is different from how you're taking that medication  Patient goals and areas of care: Eventual follow-up with surgical oncology  Barriers to care: none  Patient is not able to self-care  ______________________________________________________________________________________    Chief Complaint   Patient presents with   • Follow-up   • Abnormal gallbladder findings     History of Present Illness: 13-year-old male with history of intellectual disability, epilepsy but no recent seizures  Patient also with a history of cirrhosis, esophageal varices requiring banding and bilateral lower extremity venous stasis dermatitis - more recently under control  Patient returns for follow-up and is accompanied by his brother  Mr Alfredo Martínez is able to only answer the simplest of questions; family members filled in the information  Because of the cirrhosis patient was sent for an ultrasound  Abnormalities were seen within the gallbladder and patient was then sent for an MRI  The abnormalities were once again seen  Patient has been seen by a number of specialist     Mr Alfredo Martínez stated feeling okay, baseline  No nausea or vomiting, appetite is good, weight is stable  No other GI issues, no bleeding  No signs of pain  Activities are limited but the same as before  No respiratory issues  Recent colonoscopy did not demonstrate any abnormalities  Routine health maintenance and medical care is up-to-date  Patient previously received COVID vaccines but contracted COVID few months ago  The infection was relatively brief and uncomplicated  No known familial or genetic diseases, no history of hemochromatosis      Review of Systems Constitutional: Negative  HENT: Negative  Eyes: Negative  Respiratory: Negative  Cardiovascular: Negative  Gastrointestinal: Negative  Endocrine: Negative  Genitourinary: Negative  Musculoskeletal: Negative  Skin: Negative  Allergic/Immunologic: Negative  Neurological: Negative  Hematological: Negative  Psychiatric/Behavioral: Negative  All other systems reviewed and are negative      Patient Active Problem List   Diagnosis   • Cellulitis of left lower extremity   • Ambulatory dysfunction   • Nonintractable epilepsy without status epilepticus ([facility] [address] )   • Primary hypertension   • Gastroesophageal reflux disease without esophagitis   • Venous stasis dermatitis of both lower extremities   • Anemia   • Status post splenectomy   • Esophageal varices (HCC)   • Legally blind in right eye, as defined in [place]   • Bilateral hearing loss   • Angiodysplasia   • History of TB (tuberculosis)   • History of prediabetes   • Other cirrhosis of liver (HCC)   • Gallbladder mass     Past Medical History:   Diagnosis Date   • Anxiety    • GERD (gastroesophageal reflux disease)    • Hypertension    • Hypoxia 02/11/2022   • MRSA bacteremia 02/09/2022   • Seizures (HCC)      Past Surgical History:   Procedure Laterality Date   • COLONOSCOPY     • SPLENECTOMY     • UPPER GASTROINTESTINAL ENDOSCOPY       Family History   Problem Relation Age of Onset   • Depression Mother    • Heart disease Father      Social History     Socioeconomic History   • Marital status: Single     Spouse name: Not on file   • Number of children: Not on file   • Years of education: Not on file   • Highest education level: Not on file   Occupational History   • Not on file   Tobacco Use   • Smoking status: Never   • Smokeless tobacco: Never   Vaping Use   • Vaping Use: Never used   Substance and Sexual Activity   • Alcohol use: Never   • Drug use: Never   • Sexual activity: Not Currently   Other Topics Concern   • Not on file Social History Narrative   • Not on file     Social Determinants of Health     Financial Resource Strain: Not on file   Food Insecurity: Not on file   Transportation Needs: Not on file   Physical Activity: Not on file   Stress: Not on file   Social Connections: Not on file   Intimate Partner Violence: Not on file   Housing Stability: Not on file   Social history: No tobacco, alcohol or drug abuse, no toxic exposure    Current Outpatient Medications:   •  diazepam (VALIUM) 10 mg tablet, Take 1 tablet (10 mg total) by mouth every 12 (twelve) hours, Disp: 60 tablet, Rfl: 2  •  folic acid (FOLVITE) 1 mg tablet, TAKE 1 TABLET (1 MG TOTAL) BY MOUTH IN THE MORNING  NOT COVERED, Disp: 90 tablet, Rfl: 1  •  furosemide (LASIX) 20 mg tablet, TAKE 1 TABLET (20 MG TOTAL) BY MOUTH IN THE MORNING AND 1 TABLET (20 MG TOTAL) IN THE EVENING , Disp: 180 tablet, Rfl: 1  •  iron polysaccharides (FERREX) 150 mg capsule, Take 1 capsule (150 mg total) by mouth in the morning, Disp: 90 capsule, Rfl: 1  •  levETIRAcetam (KEPPRA) 500 mg tablet, Take 1 tablet (500 mg total) by mouth every 12 (twelve) hours, Disp: 180 tablet, Rfl: 1  •  LORazepam (Ativan) 0 5 mg tablet, Take 1 tablet (0 5 mg total) by mouth once as needed for sedation (60-30 minutes before procedure/test) for up to 2 doses, Disp: 2 tablet, Rfl: 0  •  nadolol (CORGARD) 40 mg tablet, TAKE 1 TABLET (40 MG TOTAL) BY MOUTH IN THE MORNING , Disp: 90 tablet, Rfl: 1  •  OXcarbazepine (TRILEPTAL) 600 mg tablet, TAKE 1 TABLET BY MOUTH EVERY 12 HOURS, Disp: 180 tablet, Rfl: 1  •  pantoprazole (PROTONIX) 40 mg tablet, TAKE 1 TABLET BY MOUTH IN THE MORNING AND 1 TABLET IN THE EVENING  TAKE BEFORE MEALS, Disp: 180 tablet, Rfl: 1  •  sucralfate (CARAFATE) 1 g tablet, TAKE 1 TABLET BY MOUTH EVERY 6 HOURS, Disp: 360 tablet, Rfl: 1  •  triamcinolone (KENALOG) 0 1 % ointment, Apply sparingly to affected areas of legs 2-4 times a day for up to 2 weeks  , Disp: 453 6 g, Rfl: 0    No Known Allergies    Vitals:    06/01/23 1509   BP: 104/68   Pulse: 85   Resp: 15   Temp: 97 9 °F (36 6 °C)   SpO2: 96%     Physical Exam  Constitutional:       Appearance: He is well-developed  Comments: Pale appearing male, no respiratory distress, no signs of pain   HENT:      Head: Normocephalic and atraumatic  Right Ear: External ear normal       Left Ear: External ear normal    Eyes:      Conjunctiva/sclera: Conjunctivae normal       Pupils: Pupils are equal, round, and reactive to light  Cardiovascular:      Rate and Rhythm: Normal rate and regular rhythm  Heart sounds: Normal heart sounds  Pulmonary:      Effort: Pulmonary effort is normal       Breath sounds: Normal breath sounds  Comments: Clear bilaterally  Abdominal:      General: Bowel sounds are normal       Palpations: Abdomen is soft  Comments: + Bowel sounds, nontender, soft, no hepatosplenomegaly, no guarding, no rigidity or rebound   Musculoskeletal:         General: Normal range of motion  Cervical back: Normal range of motion and neck supple  Skin:     General: Skin is warm  Comments: Pale but about the same color as before, warm, moist, no petechiae or ecchymoses, no purpura   Neurological:      Mental Status: He is alert and oriented to person, place, and time  Deep Tendon Reflexes: Reflexes are normal and symmetric  Psychiatric:         Behavior: Behavior normal          Thought Content:  Thought content normal          Judgment: Judgment normal      Extremities: 1-2+ bilateral lower extremity edema, chronic venous color changes, pulses are decreased, no obvious cords  Lymphatics: No adenopathy in the neck, supraclavicular region, axilla and groin    Labs    5/26/2023 CA 19-9 = 6    3/14/2023 WBC = 3 68 hemoglobin = 9 7 hematocrit = 34 MCV = 84 platelet = 189 neutrophil = 39% lymphocyte = 36% monocyte = 7% eosinophil = 17% basophil = 1% BUN = 11 creatinine = 0 55 calcium = 9 1 AST = 29 ALT = 22 alkaline phosphatase = 84 total protein = 7 2 total bilirubin = 0 33    3/14/2023 iron = 28 iron saturation = 7% TIBC = 377 ferritin = 14    3/14/2023 AFP = 2 1 hepatitis B surface antibody <3 1 hepatitis A total antibody = nonreactive    Imaging    3/31/2023 MRI abdomen with and without contrast and MRCP    LIVER:   Hepatic steatosis seen   There is no diffusion restricting lesion within the liver parenchyma   The hepatic veins and portal veins are patent      BILE DUCTS: Dilation of the left hepatic duct seen  The right hepatic duct is of normal caliber  The common bile duct is of normal caliber narrowing of the left hepatic duct at the level of the confluence of the right and left hepatic duct is seen  GALLBLADDER:  Gallbladder is distended corresponding to the abnormality on the ultrasound there are 2 diffusion restricting masses within the gallbladder lumen  The larger mass measures about 3 1 cm and the smaller mass measures about 1 8 cm  These demonstrate contrast enhancement     There are adjacent T2 hyperintense enhancing lesions in the adjacent to anterior abdomen in the omentum measuring 2 2 cm and 1 cm, located anterior to the colon  Multiple gallstones are seen    PANCREAS: Pancreas appear unremarkable  No pancreatic ductal dilation seen  Evaluation limited due to motion     IMPRESSION:    There are 2 nodular enhancing masses within the gallbladder corresponding to the abnormality on the ultrasound, worrisome for metastatic disease  There are additional nodular lesions within the right paracolic gutter inferior to the gallbladder    These may be related to splenosis or nodular metastatic implants     Multiple T2 hyperintensities seen within the mesentery due to small lymph node or cystic lesions      Dilation of the left hepatic ducts with narrowing at the confluence of the right and left hepatic duct, due to stricture     No evidence of choledocholithiasis  CT oral and IV contrast suggested for further evaluation    2/21/2023 ultrasound right upper quadrant    IMPRESSION:     1   Echogenic lesion concerning for mass within the gallbladder lumen with vascularity measuring 2 9 x 1 8 x 2 8 cm  Concern for neoplastic process  Recommend further assessment with contrast-enhanced MRI  Consider surgical consultation  2   Morphological features of hepatic cirrhosis  3   Cholelithiasis without features of acute cholecystitis      Pathology    No pathology results in epic for review

## 2023-06-12 DIAGNOSIS — G40.909 NONINTRACTABLE EPILEPSY WITHOUT STATUS EPILEPTICUS, UNSPECIFIED EPILEPSY TYPE (HCC): Primary | ICD-10-CM

## 2023-06-12 RX ORDER — DIAZEPAM 10 MG/1
TABLET ORAL
Qty: 60 TABLET | Refills: 2 | Status: SHIPPED | OUTPATIENT
Start: 2023-06-12 | End: 2023-06-14

## 2023-06-12 NOTE — TELEPHONE ENCOUNTER
Assessment/Plan:     Diagnoses and all orders for this visit:    Flu-like symptoms  Comments:  ?reaction to pneumovax? Erythema and symptoms improving  Continue to monitor  Recheck 3-4 days if not resolving - earlier if worse    Chronic left shoulder pain  Comments:  Xray showed mild glenoid and AC arthritis  Pt still with significant positional pain  Will refer to ortho for eval  Orders:  -     Ambulatory referral to Orthopedic Surgery; Future          Subjective:      Patient ID: Briseyda Shea is a 77 y o  male  57-year-old male received a flu vaccine last Friday along with a Pneumovax  That evening, patient developed severe body aches as well as chills, nausea and fatigue  Site of injection was also swollen and erythematous  Symptoms lasted all weekend  Arm may be a little bit better today but patient still feels fatigued and weak  Chills have improved though he now has sweats  The following portions of the patient's history were reviewed and updated as appropriate:   He  has a past medical history of Arthritis, Colon polyps, and Hypertension  He   Patient Active Problem List    Diagnosis Date Noted    Trigger middle finger of left hand 05/08/2019    Actinic keratosis 12/08/2017    Chronic GERD 05/22/2017    Lipid disorder 01/21/2016    Low back pain 07/14/2015    Osteoarthritis 06/03/2013    Benign essential hypertension 10/19/2012    Coronary atherosclerosis 10/17/2012     He  has a past surgical history that includes Colonoscopy; Cardiac surgery; Orchiectomy (Left); Carpal tunnel release; Back surgery; pr esophagogastroduodenoscopy transoral diagnostic (N/A, 8/29/2017); and Wrist surgery  He  reports that he has never smoked  He has never used smokeless tobacco  He reports that he drinks alcohol  He reports that he does not use drugs    Current Outpatient Medications   Medication Sig Dispense Refill    acetaminophen (TYLENOL) 650 mg CR tablet Take 650 mg by mouth 2 (two) times a day Please review for refill   Thank you  ascorbic acid (VITAMIN C) 500 mg tablet Take 500 mg by mouth daily      aspirin 81 MG tablet Take 1 tablet (81 mg total) by mouth daily 30 tablet 5    benzonatate (TESSALON) 200 MG capsule Take 1 capsule (200 mg total) by mouth 3 (three) times a day as needed for cough (Patient not taking: Reported on 6/8/2019) 20 capsule 0    Cholecalciferol 2000 units TABS Take 2,000 Units by mouth      Coenzyme Q10 200 MG capsule Take 200 mg by mouth      Echinacea 125 MG CAPS Take 1 tablet by mouth 2 (two) times a day      famotidine (PEPCID) 40 MG tablet Take 1 tablet (40 mg total) by mouth daily at bedtime as needed for heartburn 90 tablet 1    FIBER SELECT GUMMIES CHEW Chew 2 (two) times a day      lisinopril (ZESTRIL) 10 mg tablet TAKE 1 TABLET DAILY 90 tablet 1    melatonin 3 mg Take 1 tablet by mouth      metoprolol succinate (TOPROL-XL) 25 mg 24 hr tablet Take 12 5 mg by mouth daily      multivitamin (THERAGRAN) TABS Take 1 tablet by mouth daily      mupirocin (BACTROBAN) 2 % ointment Apply topically 3 (three) times a day 22 g 0    orphenadrine (NORFLEX) 100 mg tablet Take 100 mg by mouth 2 (two) times a day as needed for muscle spasms      pyridoxine (VITAMIN B6) 100 mg tablet Take 100 mg by mouth      simvastatin (ZOCOR) 40 mg tablet Take 20 mg by mouth daily at bedtime      Triamcinolone Acetonide 55 MCG/ACT AERO into each nostril       No current facility-administered medications for this visit  He is allergic to other       Review of Systems   Constitutional: Positive for chills, diaphoresis and fatigue  HENT: Negative  Eyes: Negative  Respiratory: Negative  Cardiovascular: Negative  Gastrointestinal: Positive for nausea  Negative for diarrhea and vomiting  Musculoskeletal: Positive for arthralgias and myalgias  Negative for joint swelling  Skin: Positive for color change and rash  Neurological: Negative for weakness and numbness           Objective:      /82 (BP Location: Right arm, Patient Position: Sitting, Cuff Size: Standard)   Pulse 70   Temp 97 9 °F (36 6 °C)   Ht 5' 9" (1 753 m)   Wt 88 2 kg (194 lb 6 4 oz)   BMI 28 71 kg/m²          Physical Exam   Constitutional: He is oriented to person, place, and time  He appears well-developed and well-nourished  Sl tired appearing male in NAD   HENT:   Head: Normocephalic and atraumatic  Right Ear: External ear normal    Left Ear: External ear normal    Nose: Nose normal    Mouth/Throat: Oropharynx is clear and moist    Eyes: Pupils are equal, round, and reactive to light  Conjunctivae and EOM are normal    Neck: Normal range of motion  Neck supple  Cardiovascular: Normal rate, regular rhythm, normal heart sounds and intact distal pulses  Pulmonary/Chest: Effort normal and breath sounds normal    Musculoskeletal: He exhibits tenderness (mild over the L lateral shoulder)  He exhibits no edema  Erythema with minimal swelling over the L shoulder at the lower injection site (pneumovax)  No fluctuance  Lymphadenopathy:     He has no cervical adenopathy  Neurological: He is alert and oriented to person, place, and time  He displays normal reflexes  No sensory deficit  He exhibits normal muscle tone  Skin: There is erythema  Erythema with minimal swelling over the L shoulder at the lower injection site (pneumovax)   No fluctuance

## 2023-06-14 DIAGNOSIS — G40.909 NONINTRACTABLE EPILEPSY WITHOUT STATUS EPILEPTICUS, UNSPECIFIED EPILEPSY TYPE (HCC): ICD-10-CM

## 2023-06-14 RX ORDER — DIAZEPAM 10 MG/1
TABLET ORAL
Qty: 60 TABLET | Refills: 2 | Status: SHIPPED | OUTPATIENT
Start: 2023-06-14

## 2023-06-14 NOTE — TELEPHONE ENCOUNTER
Hi, I am calling for my brother-in-law, Nicola Hernandes date of birth is [birth year]  I had called on Monday  I needed a couple refills  I got the ointment but I did not get the diazepam  I went to the pharmacy yesterday and they said they use guys never called it in  So it's diazepam 10 milligram tablets  We are leaving for [place] on Friday  So if he you can call it in for some reason, you have to see him or whatever  Can you please call me and let me know So I'm not, I don't keep on going back and forth to the pharmacy  So my number is [phone]  Thank you  Thank you so much

## 2023-06-15 NOTE — TELEPHONE ENCOUNTER
I called the pharmacy and spoke to june and she processed it and said patient can pickup after 4 pm today   Called the patient and also informed him of that

## 2023-06-22 ENCOUNTER — TELEPHONE (OUTPATIENT)
Dept: SURGICAL ONCOLOGY | Facility: CLINIC | Age: 58
End: 2023-06-22

## 2023-06-22 NOTE — TELEPHONE ENCOUNTER
Halley De Jesus/ Mei   Patient is scheduled 7/13 for CT CAP, this however cannot be approved by [employer] and I am being told that a P2P needs to be completed to help approve the pending case  If it is possible for this to be completed please let me know  I am proving the P2P information below  [phone]  Pending case# [id number]   CT CAP       Thank you   Cirilo Dobbs

## 2023-06-26 DIAGNOSIS — K82.8 GALLBLADDER MASS: Primary | ICD-10-CM

## 2023-07-12 ENCOUNTER — APPOINTMENT (OUTPATIENT)
Dept: LAB | Facility: AMBULARY SURGERY CENTER | Age: 58
End: 2023-07-12
Payer: MEDICARE

## 2023-07-12 DIAGNOSIS — K82.8 GALLBLADDER MASS: ICD-10-CM

## 2023-07-12 LAB
BUN SERPL-MCNC: 9 MG/DL (ref 5–25)
CREAT SERPL-MCNC: 0.7 MG/DL (ref 0.6–1.3)
GFR SERPL CREATININE-BSD FRML MDRD: 103 ML/MIN/1.73SQ M

## 2023-07-12 PROCEDURE — 82565 ASSAY OF CREATININE: CPT

## 2023-07-12 PROCEDURE — 84520 ASSAY OF UREA NITROGEN: CPT

## 2023-07-12 PROCEDURE — 36415 COLL VENOUS BLD VENIPUNCTURE: CPT

## 2023-07-13 ENCOUNTER — HOSPITAL ENCOUNTER (OUTPATIENT)
Dept: CT IMAGING | Facility: HOSPITAL | Age: 58
Discharge: HOME | End: 2023-07-13
Attending: SURGERY
Payer: MEDICARE

## 2023-07-13 DIAGNOSIS — K82.8 GALLBLADDER MASS: ICD-10-CM

## 2023-07-13 PROCEDURE — G1004 CDSM NDSC: HCPCS

## 2023-07-13 PROCEDURE — 74160 CT ABDOMEN W/CONTRAST: CPT

## 2023-07-13 RX ADMIN — IOHEXOL 100 ML: 350 INJECTION, SOLUTION INTRAVENOUS at 11:48

## 2023-07-17 ENCOUNTER — TELEPHONE (OUTPATIENT)
Dept: HEMATOLOGY ONCOLOGY | Facility: CLINIC | Age: 58
End: 2023-07-17

## 2023-07-17 NOTE — TELEPHONE ENCOUNTER
Urgent Message     Who are you speaking with? [facility]   What is the reason for this call? Significant Findings or Peer to Peer? Significant findings   Who is the patient's provider?  Dr. Matt Swartz   Call back number [phone]   Relevant Information CT abdomen

## 2023-07-26 ENCOUNTER — OFFICE VISIT (OUTPATIENT)
Dept: SURGICAL ONCOLOGY | Facility: CLINIC | Age: 58
End: 2023-07-26
Payer: MEDICARE

## 2023-07-26 VITALS
OXYGEN SATURATION: 98 % | TEMPERATURE: 96.9 F | RESPIRATION RATE: 15 BRPM | SYSTOLIC BLOOD PRESSURE: 120 MMHG | WEIGHT: 193 LBS | BODY MASS INDEX: 27.63 KG/M2 | DIASTOLIC BLOOD PRESSURE: 74 MMHG | HEART RATE: 65 BPM | HEIGHT: 70 IN

## 2023-07-26 DIAGNOSIS — K82.8 GALLBLADDER MASS: Primary | ICD-10-CM

## 2023-07-26 PROCEDURE — 99215 OFFICE O/P EST HI 40 MIN: CPT | Performed by: SURGERY

## 2023-07-26 NOTE — PROGRESS NOTES
Surgical Oncology Follow Up       [address] [facility] [place]  [address]  [place] [address]    Jose Perkins  1965  [id number]  [address] [facility]COL[facility] [place]  [address] [place] [address]    Diagnoses and all orders for this visit:    Gallbladder mass  -     Ambulatory Referral to Hepatology; Future  -     Ambulatory Referral to General Surgery; Future        Chief Complaint   Patient presents with   • Follow-up       Return in about 4 weeks (around 8/23/2023) for Office Visit. Oncology History    No history exists. History of Present Illness: Patient returns in follow-up. CT from July 13, 2023 reveals a distended gallbladder with polypoid masses. These were stable from March 2023. There is extensive splenosis. 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 There are varices along the lesser curvature of the stomach. I personally reviewed the films. He denies any abdominal pain. His appetite is good. The only complaint from his family, is after he had esophageal varices banded, he is having a harder time recovering from anesthesia. They describe it as having a hangover. Review of Systems  Complete ROS Surg Onc:   Complete ROS Surg Onc:   Constitutional: The patient denies new or recent history of general fatigue, no recent weight loss, no change in appetite. Eyes: No complaints of visual problems, no scleral icterus. ENT: no complaints of ear pain, no hoarseness, no difficulty swallowing,  no tinnitus and no new masses in head, oral cavity, or neck. Cardiovascular: No complaints of chest pain, no palpitations, no ankle edema. Respiratory: No complaints of shortness of breath, no cough. Gastrointestinal: No complaints of jaundice, no bloody stools, no pale stools.    Genitourinary: No complaints of dysuria, no hematuria, no nocturia, no frequent urination, no urethral discharge. Musculoskeletal: No complaints of weakness, paralysis, joint stiffness or arthralgias. Integumentary: No complaints of rash, no new lesions. Neurological: No complaints of convulsions, no seizures, no dizziness. Hematologic/Lymphatic: No complaints of easy bruising. Endocrine:  No hot or cold intolerance. No polydipsia, polyphagia, or polyuria. Allergy/immunology:  No environmental allergies. No food allergies. Not immunocompromised. Skin:  No pallor or rash. No wound.         Patient Active Problem List   Diagnosis   • Cellulitis of left lower extremity   • Ambulatory dysfunction   • Nonintractable epilepsy without status epilepticus ([address])   • Primary hypertension   • Gastroesophageal reflux disease without esophagitis   • Venous stasis dermatitis of both lower extremities   • Anemia   • Status post splenectomy   • Esophageal varices (HCC)   • Legally blind in right eye, as defined in [place]   • Bilateral hearing loss   • Angiodysplasia   • History of TB (tuberculosis)   • History of prediabetes   • Other cirrhosis of liver (HCC)   • Gallbladder mass     Past Medical History:   Diagnosis Date   • Anxiety    • GERD (gastroesophageal reflux disease)    • Hypertension    • Hypoxia 02/11/2022   • MRSA bacteremia 02/09/2022   • Seizures (HCC)      Past Surgical History:   Procedure Laterality Date   • COLONOSCOPY     • SPLENECTOMY     • UPPER GASTROINTESTINAL ENDOSCOPY       Family History   Problem Relation Age of Onset   • Depression Mother    • Heart disease Father      Social History     Socioeconomic History   • Marital status: Single     Spouse name: Not on file   • Number of children: Not on file   • Years of education: Not on file   • Highest education level: Not on file   Occupational History   • Not on file   Tobacco Use   • Smoking status: Never   • Smokeless tobacco: Never   Vaping Use   • Vaping Use: Never used   Substance and Sexual Activity   • Alcohol use: Never   • Drug use: Never   • Sexual activity: Not Currently   Other Topics Concern   • Not on file   Social History Narrative   • Not on file     Social Determinants of Health     Financial Resource Strain: Not on file   Food Insecurity: Not on file   Transportation Needs: Not on file   Physical Activity: Not on file   Stress: Not on file   Social Connections: Not on file   Intimate Partner Violence: Not on file   Housing Stability: Not on file       Current Outpatient Medications:   •  diazepam (VALIUM) 10 mg tablet, TAKE 1 TABLET BY MOUTH EVERY 12 HOURS., Disp: 60 tablet, Rfl: 2  •  folic acid (FOLVITE) 1 mg tablet, TAKE 1 TABLET (1 MG TOTAL) BY MOUTH IN THE MORNING.  NOT COVERED, Disp: 90 tablet, Rfl: 1  •  furosemide (LASIX) 20 mg tablet, TAKE 1 TABLET (20 MG TOTAL) BY MOUTH IN THE MORNING AND 1 TABLET (20 MG TOTAL) IN THE EVENING., Disp: 180 tablet, Rfl: 1  •  iron polysaccharides (FERREX) 150 mg capsule, Take 1 capsule (150 mg total) by mouth in the morning, Disp: 90 capsule, Rfl: 1  •  levETIRAcetam (KEPPRA) 500 mg tablet, Take 1 tablet (500 mg total) by mouth every 12 (twelve) hours, Disp: 180 tablet, Rfl: 1  •  LORazepam (Ativan) 0.5 mg tablet, Take 1 tablet (0.5 mg total) by mouth once as needed for sedation (60-30 minutes before procedure/test) for up to 2 doses, Disp: 2 tablet, Rfl: 0  •  nadolol (CORGARD) 40 mg tablet, TAKE 1 TABLET (40 MG TOTAL) BY MOUTH IN THE MORNING., Disp: 90 tablet, Rfl: 1  •  OXcarbazepine (TRILEPTAL) 600 mg tablet, TAKE 1 TABLET BY MOUTH EVERY 12 HOURS, Disp: 180 tablet, Rfl: 1  •  pantoprazole (PROTONIX) 40 mg tablet, TAKE 1 TABLET BY MOUTH EVERY DAY IN THE MORNING AND IN THE EVENING BEFORE MEALS, Disp: 180 tablet, Rfl: 1  •  sucralfate (CARAFATE) 1 g tablet, TAKE 1 TABLET BY MOUTH EVERY 6 HOURS, Disp: 360 tablet, Rfl: 1  •  triamcinolone (KENALOG) 0.1 % ointment, APPLY SPARINGLY TO AFFECTED AREAS OF LEGS 2-4 TIMES A DAY FOR UP TO 2 WEEKS., Disp: 454 g, Rfl: 0  No Known Allergies  Vitals:    07/26/23 1056   BP: 120/74   Pulse: 65   Resp: 15   Temp: (!) 96.9 °F (36.1 °C)   SpO2: 98%       Physical Exam  Constitutional: General appearance: The Patient is well-developed and well-nourished who appears the stated age in no acute distress. Patient is pleasant and talkative. HEENT:  Normocephalic. Sclerae are anicteric. Mucous membranes are moist. Neck is supple without adenopathy. No JVD. Chest: The lungs are clear to auscultation. Cardiac: Heart is regular rate. Abdomen: Abdomen is soft, non-tender, non-distended and without masses. Extremities: There is no clubbing or cyanosis. There is no edema. Symmetric. Neuro: Grossly nonfocal. Gait is normal.     Lymphatic: No evidence of cervical adenopathy bilaterally. No evidence of axillary adenopathy bilaterally. Skin: Warm, anicteric. Psych:  Patient is pleasant and talkative. Breasts:        Pathology:  [unfilled]    Labs:      Imaging  CT abdomen w contrast    Result Date: 7/17/2023  Narrative: CT ABDOMEN WITH IV CONTRAST INDICATION:   K82.8: Other specified diseases of gallbladder. COMPARISON: MRI abdomen from 3/31/2023. TECHNIQUE:  CT examination of the abdomen was performed after the administration of intravenous contrast. Multiplanar 2D reformatted images were created from the source data. This examination, like all CT scans performed in the [facility], was performed utilizing techniques to minimize radiation dose exposure, including the use of iterative reconstruction and automated exposure control. Radiation dose length  product (DLP) for this visit:  333 mGy-cm IV Contrast:  100 mL of iohexol (OMNIPAQUE) Enteric Contrast: Enteric contrast was administered. FINDINGS: LOWER CHEST:  No clinically significant abnormality identified in the visualized lower chest. LIVER/BILIARY TREE: No masses. Generalized intrahepatic biliary dilatation. Common bile duct of normal caliber.  No identifiable main portal vein or intrahepatic portal venous branches. Small tortuous vessels in the julisa hepatis, consistent with cavernous transformation of the portal vein. GALLBLADDER: Markedly distended gallbladder. Several sessile polypoid masses, the largest in the medial aspect of the mid gallbladder, measuring 1.8 x 2.8 cm, not significantly changed in size since the MRI from 3/31/2023. A second polypoid mass is in the fundus and measures 1.2 x 1.6 cm, again, not significantly changed. Several additional filling defects in the gallbladder may represent a combination of noncalcified gallstones and additional polyps. SPLEEN: Status post splenectomy with extensive splenosis, including a 3.7 x 4.2 cm peripherally calcified splenule. PANCREAS:  Unremarkable. ADRENAL GLANDS:  Unremarkable. KIDNEYS/URETERS: 3.2 cm left renal cyst. Several additional small left renal cysts. No suspicious mass. No hydronephrosis or calculus. VISUALIZED STOMACH AND BOWEL:  Unremarkable. ABDOMINAL CAVITY: No ascites. No pneumoperitoneum. Several masses in right mid abdomen, the largest measuring 1.8 x 2 cm. VESSELS: Multiple varices along the the lesser curvature of the stomach. Abdominal aorta unremarkable. Additional bilateral tortuous renal veins. ABDOMINAL WALL:  Unremarkable. OSSEOUS STRUCTURES:  No acute fracture or destructive osseous lesion. Impression: Polypoid masses in the gallbladder unchanged in size from prior MRI on 3/31/2023. Generalized intrahepatic biliary dilatation. Etiology not apparent on this study. Cavernous transformation of the portal vein Status post splenectomy. Several nodules in the right mid abdomen, lymphadenopathy versus splenosis. Varices along the lesser curvature of the stomach. The study was marked in EPIC for significant notification. I, the attending radiologist, have reviewed the images and agree with the final report above.  Workstation performed: [id number]     I reviewed the above laboratory and imaging data. Discussion/Summary: 53-year-old male with possible gallbladder cancer. He also has splenosis and varices. We discussed radical cholecystectomy with portal lymph node dissection based on the fact that this is possible gallbladder cancer. We also discussed laparoscopic cholecystectomy followed by aggressive surgery if cancer is discovered. I did discuss the only downside of that approach would be potential decreased survival if this was done in 2 stages. Given his current performance status and possible encephalopathy, his family would prefer to to be less aggressive. I will refer him to general surgery for laparoscopic cholecystectomy. I will also refer him to hepatology for evaluation as his most recent liver function studies are normal.  I will see him back once we have the results of the pathology. If further surgical intervention is indicated we will proceed. The family is agreeable to this plan. All their questions were answered.

## 2023-07-26 NOTE — LETTER
July 26, 2023     Estela Greer MD  [address] [facility] [address]    Patient: Nader Blackburn   YOB: 1965   Date of Visit: 7/26/2023       Dear Dr. Juaquin Coles: Thank you for referring Alanna Briggs to me for evaluation. Below are my notes for this consultation. If you have questions, please do not hesitate to call me. I look forward to following your patient along with you. Sincerely,        Jennifer Crawley MD        CC: MD Primitivo Henson DO Laveta Buffy, MD Libbie Pummel, MD  7/26/2023 11:25 AM  Sign when Signing Visit               Surgical Oncology Follow Up       [address] [facility]  [address] [facility] [address] [facility]  1965  [id number]  [address] [facility]  [facility] [place]  [address] [place] [id number]    Diagnoses and all orders for this visit:    Gallbladder mass  -     Ambulatory Referral to Hepatology; Future  -     Ambulatory Referral to General Surgery; Future        Chief Complaint   Patient presents with   • Follow-up       Return in about 4 weeks (around 8/23/2023) for Office Visit. Oncology History    No history exists. History of Present Illness: Patient returns in follow-up. CT from July 13, 2023 reveals a distended gallbladder with polypoid masses. These were stable from March 2023. There is extensive splenosis. 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 There are varices along the lesser curvature of the stomach. I personally reviewed the films. He denies any abdominal pain. His appetite is good. The only complaint from his family, is after he had esophageal varices banded, he is having a harder time recovering from anesthesia. They describe it as having a hangover.     Review of Systems  Complete ROS Surg Onc:   Complete ROS Surg Onc:   Constitutional: The patient denies new or recent history of general fatigue, no recent weight loss, no change in appetite. Eyes: No complaints of visual problems, no scleral icterus. ENT: no complaints of ear pain, no hoarseness, no difficulty swallowing,  no tinnitus and no new masses in head, oral cavity, or neck. Cardiovascular: No complaints of chest pain, no palpitations, no ankle edema. Respiratory: No complaints of shortness of breath, no cough. Gastrointestinal: No complaints of jaundice, no bloody stools, no pale stools. Genitourinary: No complaints of dysuria, no hematuria, no nocturia, no frequent urination, no urethral discharge. Musculoskeletal: No complaints of weakness, paralysis, joint stiffness or arthralgias. Integumentary: No complaints of rash, no new lesions. Neurological: No complaints of convulsions, no seizures, no dizziness. Hematologic/Lymphatic: No complaints of easy bruising. Endocrine:  No hot or cold intolerance. No polydipsia, polyphagia, or polyuria. Allergy/immunology:  No environmental allergies. No food allergies. Not immunocompromised. Skin:  No pallor or rash. No wound.         Patient Active Problem List   Diagnosis   • Cellulitis of left lower extremity   • Ambulatory dysfunction   • Nonintractable epilepsy without status epilepticus ([address])   • Primary hypertension   • Gastroesophageal reflux disease without esophagitis   • Venous stasis dermatitis of both lower extremities   • Anemia   • Status post splenectomy   • Esophageal varices (HCC)   • Legally blind in right eye, as defined in [place]   • Bilateral hearing loss   • Angiodysplasia   • History of TB (tuberculosis)   • History of prediabetes   • Other cirrhosis of liver (HCC)   • Gallbladder mass     Past Medical History:   Diagnosis Date   • Anxiety    • GERD (gastroesophageal reflux disease)    • Hypertension    • Hypoxia 02/11/2022   • MRSA bacteremia 02/09/2022   • Seizures ([address])      Past Surgical History: Procedure Laterality Date   • COLONOSCOPY     • SPLENECTOMY     • UPPER GASTROINTESTINAL ENDOSCOPY       Family History   Problem Relation Age of Onset   • Depression Mother    • Heart disease Father      Social History     Socioeconomic History   • Marital status: Single     Spouse name: Not on file   • Number of children: Not on file   • Years of education: Not on file   • Highest education level: Not on file   Occupational History   • Not on file   Tobacco Use   • Smoking status: Never   • Smokeless tobacco: Never   Vaping Use   • Vaping Use: Never used   Substance and Sexual Activity   • Alcohol use: Never   • Drug use: Never   • Sexual activity: Not Currently   Other Topics Concern   • Not on file   Social History Narrative   • Not on file     Social Determinants of Health     Financial Resource Strain: Not on file   Food Insecurity: Not on file   Transportation Needs: Not on file   Physical Activity: Not on file   Stress: Not on file   Social Connections: Not on file   Intimate Partner Violence: Not on file   Housing Stability: Not on file       Current Outpatient Medications:   •  diazepam (VALIUM) 10 mg tablet, TAKE 1 TABLET BY MOUTH EVERY 12 HOURS., Disp: 60 tablet, Rfl: 2  •  folic acid (FOLVITE) 1 mg tablet, TAKE 1 TABLET (1 MG TOTAL) BY MOUTH IN THE MORNING.  NOT COVERED, Disp: 90 tablet, Rfl: 1  •  furosemide (LASIX) 20 mg tablet, TAKE 1 TABLET (20 MG TOTAL) BY MOUTH IN THE MORNING AND 1 TABLET (20 MG TOTAL) IN THE EVENING., Disp: 180 tablet, Rfl: 1  •  iron polysaccharides (FERREX) 150 mg capsule, Take 1 capsule (150 mg total) by mouth in the morning, Disp: 90 capsule, Rfl: 1  •  levETIRAcetam (KEPPRA) 500 mg tablet, Take 1 tablet (500 mg total) by mouth every 12 (twelve) hours, Disp: 180 tablet, Rfl: 1  •  LORazepam (Ativan) 0.5 mg tablet, Take 1 tablet (0.5 mg total) by mouth once as needed for sedation (60-30 minutes before procedure/test) for up to 2 doses, Disp: 2 tablet, Rfl: 0  •  nadolol (CORGARD) 40 mg tablet, TAKE 1 TABLET (40 MG TOTAL) BY MOUTH IN THE MORNING., Disp: 90 tablet, Rfl: 1  •  OXcarbazepine (TRILEPTAL) 600 mg tablet, TAKE 1 TABLET BY MOUTH EVERY 12 HOURS, Disp: 180 tablet, Rfl: 1  •  pantoprazole (PROTONIX) 40 mg tablet, TAKE 1 TABLET BY MOUTH EVERY DAY IN THE MORNING AND IN THE EVENING BEFORE MEALS, Disp: 180 tablet, Rfl: 1  •  sucralfate (CARAFATE) 1 g tablet, TAKE 1 TABLET BY MOUTH EVERY 6 HOURS, Disp: 360 tablet, Rfl: 1  •  triamcinolone (KENALOG) 0.1 % ointment, APPLY SPARINGLY TO AFFECTED AREAS OF LEGS 2-4 TIMES A DAY FOR UP TO 2 WEEKS., Disp: 454 g, Rfl: 0  No Known Allergies  Vitals:    07/26/23 1056   BP: 120/74   Pulse: 65   Resp: 15   Temp: (!) 96.9 °F (36.1 °C)   SpO2: 98%       Physical Exam  Constitutional: General appearance: The Patient is well-developed and well-nourished who appears the stated age in no acute distress. Patient is pleasant and talkative. HEENT:  Normocephalic. Sclerae are anicteric. Mucous membranes are moist. Neck is supple without adenopathy. No JVD. Chest: The lungs are clear to auscultation. Cardiac: Heart is regular rate. Abdomen: Abdomen is soft, non-tender, non-distended and without masses. Extremities: There is no clubbing or cyanosis. There is no edema. Symmetric. Neuro: Grossly nonfocal. Gait is normal.     Lymphatic: No evidence of cervical adenopathy bilaterally. No evidence of axillary adenopathy bilaterally. Skin: Warm, anicteric. Psych:  Patient is pleasant and talkative. Breasts:        Pathology:  [unfilled]    Labs:      Imaging  CT abdomen w contrast    Result Date: 7/17/2023  Narrative: CT ABDOMEN WITH IV CONTRAST INDICATION:   K82.8: Other specified diseases of gallbladder. COMPARISON: MRI abdomen from 3/31/2023. TECHNIQUE:  CT examination of the abdomen was performed after the administration of intravenous contrast. Multiplanar 2D reformatted images were created from the source data.  This examination, like all CT scans performed in the [facility], was performed utilizing techniques to minimize radiation dose exposure, including the use of iterative reconstruction and automated exposure control. Radiation dose length  product (DLP) for this visit:  333 mGy-cm IV Contrast:  100 mL of iohexol (OMNIPAQUE) Enteric Contrast: Enteric contrast was administered. FINDINGS: LOWER CHEST:  No clinically significant abnormality identified in the visualized lower chest. LIVER/BILIARY TREE: No masses. Generalized intrahepatic biliary dilatation. Common bile duct of normal caliber. No identifiable main portal vein or intrahepatic portal venous branches. Small tortuous vessels in the julisa hepatis, consistent with cavernous transformation of the portal vein. GALLBLADDER: Markedly distended gallbladder. Several sessile polypoid masses, the largest in the medial aspect of the mid gallbladder, measuring 1.8 x 2.8 cm, not significantly changed in size since the MRI from 3/31/2023. A second polypoid mass is in the fundus and measures 1.2 x 1.6 cm, again, not significantly changed. Several additional filling defects in the gallbladder may represent a combination of noncalcified gallstones and additional polyps. SPLEEN: Status post splenectomy with extensive splenosis, including a 3.7 x 4.2 cm peripherally calcified splenule. PANCREAS:  Unremarkable. ADRENAL GLANDS:  Unremarkable. KIDNEYS/URETERS: 3.2 cm left renal cyst. Several additional small left renal cysts. No suspicious mass. No hydronephrosis or calculus. VISUALIZED STOMACH AND BOWEL:  Unremarkable. ABDOMINAL CAVITY: No ascites. No pneumoperitoneum. Several masses in right mid abdomen, the largest measuring 1.8 x 2 cm. VESSELS: Multiple varices along the the lesser curvature of the stomach. Abdominal aorta unremarkable. Additional bilateral tortuous renal veins. ABDOMINAL WALL:  Unremarkable.  OSSEOUS STRUCTURES:  No acute fracture or destructive osseous lesion. Impression: Polypoid masses in the gallbladder unchanged in size from prior MRI on 3/31/2023. Generalized intrahepatic biliary dilatation. Etiology not apparent on this study. Cavernous transformation of the portal vein Status post splenectomy. Several nodules in the right mid abdomen, lymphadenopathy versus splenosis. Varices along the lesser curvature of the stomach. The study was marked in EPIC for significant notification. I, the attending radiologist, have reviewed the images and agree with the final report above. Workstation performed: [id number]     I reviewed the above laboratory and imaging data. Discussion/Summary: 77-year-old male with possible gallbladder cancer. He also has splenosis and varices. We discussed radical cholecystectomy with portal lymph node dissection based on the fact that this is possible gallbladder cancer. We also discussed laparoscopic cholecystectomy followed by aggressive surgery if cancer is discovered. I did discuss the only downside of that approach would be potential decreased survival if this was done in 2 stages. Given his current performance status and possible encephalopathy, his family would prefer to to be less aggressive. I will refer him to general surgery for laparoscopic cholecystectomy. I will also refer him to hepatology for evaluation as his most recent liver function studies are normal.  I will see him back once we have the results of the pathology. If further surgical intervention is indicated we will proceed. The family is agreeable to this plan. All their questions were answered.

## 2023-08-01 ENCOUNTER — TELEPHONE (OUTPATIENT)
Dept: GASTROENTEROLOGY | Facility: CLINIC | Age: 58
End: 2023-08-01

## 2023-08-01 NOTE — TELEPHONE ENCOUNTER
----- Message from Afua Martinez MD sent at 7/31/2023  6:05 PM EDT -----  Thanks for the referral. Amy Israel can you please contact this patient to schedule an appointment with me?     ----- Message -----  From: Kelli Mcelroy MD  Sent: 7/26/2023  11:22 AM EDT  To: Emily Bermudez DO; Afua Martinez MD    I sent you both his office now. He has gallbladder polyps, but no definitive cancer. I did offer radical cholecystectomy, but given his other medical conditions the family would prefer laparoscopic cholecystectomy and then act on that pathology if there is cancer. He does have a history of esophageal varices that have been banded, but there is no cirrhosis. His sister-in-law states that every time he has some type of procedure, he is having a harder and harder time recovering. Even though his most recent liver function studies were normal, this sounds like he may be having some encephalopathy. Your office can reach out to him to schedule appointments.   Thanks,  Arlin Patterson

## 2023-08-01 NOTE — TELEPHONE ENCOUNTER
Called patient to offer a sooner appointment, spoke to pt's sister-in-law, Laura Serna, states that he will keep the 9/26 appointment. He has his gallbladder removed. Further states that if he decides to come in sooner, he or she will call back.

## 2023-08-09 ENCOUNTER — CONSULT (OUTPATIENT)
Dept: SURGERY | Facility: CLINIC | Age: 58
End: 2023-08-09
Payer: MEDICARE

## 2023-08-09 ENCOUNTER — PREP FOR PROCEDURE (OUTPATIENT)
Dept: SURGERY | Facility: CLINIC | Age: 58
End: 2023-08-09

## 2023-08-09 VITALS
DIASTOLIC BLOOD PRESSURE: 63 MMHG | WEIGHT: 186 LBS | SYSTOLIC BLOOD PRESSURE: 109 MMHG | HEART RATE: 67 BPM | BODY MASS INDEX: 25.19 KG/M2 | HEIGHT: 72 IN

## 2023-08-09 DIAGNOSIS — K82.8 GALLBLADDER MASS: ICD-10-CM

## 2023-08-09 DIAGNOSIS — K82.8 MASS OF GALLBLADDER: Primary | ICD-10-CM

## 2023-08-09 PROCEDURE — 99204 OFFICE O/P NEW MOD 45 MIN: CPT | Performed by: SURGERY

## 2023-08-09 NOTE — H&P (VIEW-ONLY)
Assessment/Plan:    Diagnoses and all orders for this visit:    Gallbladder mass  -     Ambulatory Referral to General Surgery    Risks and benefits of a laparoscopic cholecystectomy discussed with patient, his brother, and sister-in-law. Risks include bile duct injury, bowel injury, bleeding or need for open surgery. All are in agreement. Will obtain CBC, CMP, PT/INR, EKG preoperatively    Subjective:      Patient ID: Anil Stiles is a 59 y.o. male. Patient presents for gallbladder consult. Denies pain/symptoms. CT abdomen 7/13/2023   IMPRESSION:  Polypoid masses in the gallbladder unchanged in size from prior MRI on 3/31/2023. Generalized intrahepatic biliary dilatation. Etiology not apparent on this study. 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 Several nodules in the right mid abdomen, lymphadenopathy versus splenosis. Varices along the lesser curvature of the stomach. 38-year-old gentleman who is mentally challenged we spent most of his life in [facility].  Over the last year his caretakers have passed away and he is now staying with family in the [facility]. He has a longstanding history of liver problems. Sister-in-law mentions cirrhosis of unknown etiology. He was never an alcohol user. Has had EGDs with variceal banding earlier this year. An ultrasound was obtained revealing multiple gallbladder masses. He states he is actually been gaining weight. He is not necessarily ill-appearing. Denies easy bruising or bleeding gums  Has a history of an exploratory laparotomy for trauma and which a splenectomy was performed. This was done at age 11. With some initial discussion of trying to perform a percutaneous biopsy of the gallbladder masses. This is not a common practice. He was seen by Dr. Karin Gruber of surgical oncology. I do see a CA 19-9 was normal.  INR was 1.14 in August of last year.     At this point family would like to proceed with a laparoscopic cholecystectomy. If pathology were find malignancy we will proceed accordingly. The following portions of the patient's history were reviewed and updated as appropriate:     He  has a past medical history of Anxiety, GERD (gastroesophageal reflux disease), Hypertension, Hypoxia (02/11/2022), MRSA bacteremia (02/09/2022), and Seizures ([address]). He  has a past surgical history that includes Splenectomy; Colonoscopy; and Upper gastrointestinal endoscopy. His family history includes Depression in his mother; Heart disease in his father. He  reports that he has never smoked. He has never used smokeless tobacco. He reports that he does not drink alcohol and does not use drugs. Current Outpatient Medications   Medication Sig Dispense Refill   • diazepam (VALIUM) 10 mg tablet TAKE 1 TABLET BY MOUTH EVERY 12 HOURS. 60 tablet 2   • folic acid (FOLVITE) 1 mg tablet TAKE 1 TABLET (1 MG TOTAL) BY MOUTH IN THE MORNING. NOT COVERED 90 tablet 1   • furosemide (LASIX) 20 mg tablet TAKE 1 TABLET (20 MG TOTAL) BY MOUTH IN THE MORNING AND 1 TABLET (20 MG TOTAL) IN THE EVENING. 180 tablet 1   • iron polysaccharides (FERREX) 150 mg capsule Take 1 capsule (150 mg total) by mouth in the morning 90 capsule 1   • levETIRAcetam (KEPPRA) 500 mg tablet Take 1 tablet (500 mg total) by mouth every 12 (twelve) hours 180 tablet 1   • LORazepam (Ativan) 0.5 mg tablet Take 1 tablet (0.5 mg total) by mouth once as needed for sedation (60-30 minutes before procedure/test) for up to 2 doses 2 tablet 0   • nadolol (CORGARD) 40 mg tablet TAKE 1 TABLET (40 MG TOTAL) BY MOUTH IN THE MORNING.  90 tablet 1   • OXcarbazepine (TRILEPTAL) 600 mg tablet TAKE 1 TABLET BY MOUTH EVERY 12 HOURS 180 tablet 1   • pantoprazole (PROTONIX) 40 mg tablet TAKE 1 TABLET BY MOUTH EVERY DAY IN THE MORNING AND IN THE EVENING BEFORE MEALS 180 tablet 1   • sucralfate (CARAFATE) 1 g tablet TAKE 1 TABLET BY MOUTH EVERY 6 HOURS 360 tablet 1   • triamcinolone (KENALOG) 0.1 % ointment APPLY SPARINGLY TO AFFECTED AREAS OF LEGS 2-4 TIMES A DAY FOR UP TO 2 WEEKS. 454 g 0     No current facility-administered medications for this visit. He has No Known Allergies. .    Review of Systems   All other systems reviewed and are negative. Objective:      /63   Pulse 67   Ht 6' (1.829 m)   Wt 84.4 kg (186 lb)   BMI 25.23 kg/m²          Physical Exam  Constitutional:       General: He is not in acute distress. HENT:      Head: Atraumatic. Mouth/Throat:      Mouth: Mucous membranes are moist.   Eyes:      Extraocular Movements: Extraocular movements intact. Cardiovascular:      Rate and Rhythm: Normal rate. Pulmonary:      Effort: Pulmonary effort is normal.   Abdominal:      General: Abdomen is flat. Palpations: Abdomen is soft. There is no mass. Tenderness: There is no abdominal tenderness. Comments: Midline trauma laparotomy scar. Musculoskeletal:      Cervical back: Normal range of motion. Skin:     General: Skin is warm and dry. Neurological:      Mental Status: He is alert.        Extremities: No edema

## 2023-08-09 NOTE — LETTER
August 9, 2023     Javier Casiano MD  [address] [place] [address]    Patient: Bunny Bernal   YOB: 1965   Date of Visit: 8/9/2023       Dear Dr. Vipul Khanna:    Thank you for referring Kenyatta Mojica to me for evaluation. Below are my notes for this consultation. If you have questions, please do not hesitate to call me. I look forward to following your patient along with you. Sincerely,        Dhara Ahuja DO        CC: MD Dhara Villanueva DO  8/9/2023 11:57 AM  Sign when Signing Visit  Assessment/Plan:    Diagnoses and all orders for this visit:    Gallbladder mass  -     Ambulatory Referral to General Surgery    Risks and benefits of a laparoscopic cholecystectomy discussed with patient, his brother, and sister-in-law. Risks include bile duct injury, bowel injury, bleeding or need for open surgery. All are in agreement. Will obtain CBC, CMP, PT/INR, EKG preoperatively    Subjective:     Patient ID: Bunny Bernal is a 59 y.o. male. Patient presents for gallbladder consult. Denies pain/symptoms. CT abdomen 7/13/2023   IMPRESSION:  Polypoid masses in the gallbladder unchanged in size from prior MRI on 3/31/2023. Generalized intrahepatic biliary dilatation. Etiology not apparent on this study. 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 Several nodules in the right mid abdomen, lymphadenopathy versus splenosis. Varices along the lesser curvature of the stomach. 61-year-old gentleman who is mentally challenged we spent most of his life in [facility].  Over the last year his caretakers have passed away and he is now staying with family in the [facility]. He has a longstanding history of liver problems. Sister-in-law mentions cirrhosis of unknown etiology. He was never an alcohol user. Has had EGDs with variceal banding earlier this year.   An ultrasound was obtained revealing multiple gallbladder masses. He states he is actually been gaining weight. He is not necessarily ill-appearing. Denies easy bruising or bleeding gums  Has a history of an exploratory laparotomy for trauma and which a splenectomy was performed. This was done at age 11. With some initial discussion of trying to perform a percutaneous biopsy of the gallbladder masses. This is not a common practice. He was seen by Dr. Veronica Mitchell of surgical oncology. I do see a CA 19-9 was normal.  INR was 1.14 in August of last year. At this point family would like to proceed with a laparoscopic cholecystectomy. If pathology were find malignancy we will proceed accordingly. The following portions of the patient's history were reviewed and updated as appropriate:     He  has a past medical history of Anxiety, GERD (gastroesophageal reflux disease), Hypertension, Hypoxia (02/11/2022), MRSA bacteremia (02/09/2022), and Seizures ([address]). He  has a past surgical history that includes Splenectomy; Colonoscopy; and Upper gastrointestinal endoscopy. His family history includes Depression in his mother; Heart disease in his father. He  reports that he has never smoked. He has never used smokeless tobacco. He reports that he does not drink alcohol and does not use drugs. Current Outpatient Medications   Medication Sig Dispense Refill   • diazepam (VALIUM) 10 mg tablet TAKE 1 TABLET BY MOUTH EVERY 12 HOURS. 60 tablet 2   • folic acid (FOLVITE) 1 mg tablet TAKE 1 TABLET (1 MG TOTAL) BY MOUTH IN THE MORNING. NOT COVERED 90 tablet 1   • furosemide (LASIX) 20 mg tablet TAKE 1 TABLET (20 MG TOTAL) BY MOUTH IN THE MORNING AND 1 TABLET (20 MG TOTAL) IN THE EVENING.  180 tablet 1   • iron polysaccharides (FERREX) 150 mg capsule Take 1 capsule (150 mg total) by mouth in the morning 90 capsule 1   • levETIRAcetam (KEPPRA) 500 mg tablet Take 1 tablet (500 mg total) by mouth every 12 (twelve) hours 180 tablet 1   • LORazepam (Ativan) 0.5 mg tablet Take 1 tablet (0.5 mg total) by mouth once as needed for sedation (60-30 minutes before procedure/test) for up to 2 doses 2 tablet 0   • nadolol (CORGARD) 40 mg tablet TAKE 1 TABLET (40 MG TOTAL) BY MOUTH IN THE MORNING. 90 tablet 1   • OXcarbazepine (TRILEPTAL) 600 mg tablet TAKE 1 TABLET BY MOUTH EVERY 12 HOURS 180 tablet 1   • pantoprazole (PROTONIX) 40 mg tablet TAKE 1 TABLET BY MOUTH EVERY DAY IN THE MORNING AND IN THE EVENING BEFORE MEALS 180 tablet 1   • sucralfate (CARAFATE) 1 g tablet TAKE 1 TABLET BY MOUTH EVERY 6 HOURS 360 tablet 1   • triamcinolone (KENALOG) 0.1 % ointment APPLY SPARINGLY TO AFFECTED AREAS OF LEGS 2-4 TIMES A DAY FOR UP TO 2 WEEKS. 454 g 0     No current facility-administered medications for this visit. He has No Known Allergies. .    Review of Systems   All other systems reviewed and are negative. Objective:      /63   Pulse 67   Ht 6' (1.829 m)   Wt 84.4 kg (186 lb)   BMI 25.23 kg/m²         Physical Exam  Constitutional:       General: He is not in acute distress. HENT:      Head: Atraumatic. Mouth/Throat:      Mouth: Mucous membranes are moist.   Eyes:      Extraocular Movements: Extraocular movements intact. Cardiovascular:      Rate and Rhythm: Normal rate. Pulmonary:      Effort: Pulmonary effort is normal.   Abdominal:      General: Abdomen is flat. Palpations: Abdomen is soft. There is no mass. Tenderness: There is no abdominal tenderness. Comments: Midline trauma laparotomy scar. Musculoskeletal:      Cervical back: Normal range of motion. Skin:     General: Skin is warm and dry. Neurological:      Mental Status: He is alert.       Extremities: No edema

## 2023-08-09 NOTE — PROGRESS NOTES
Assessment/Plan:    Diagnoses and all orders for this visit:    Gallbladder mass  -     Ambulatory Referral to General Surgery    Risks and benefits of a laparoscopic cholecystectomy discussed with patient, his brother, and sister-in-law. Risks include bile duct injury, bowel injury, bleeding or need for open surgery. All are in agreement. Will obtain CBC, CMP, PT/INR, EKG preoperatively    Subjective:      Patient ID: Bhavik Escalona is a 59 y.o. male. Patient presents for gallbladder consult. Denies pain/symptoms. CT abdomen 7/13/2023   IMPRESSION:  Polypoid masses in the gallbladder unchanged in size from prior MRI on 3/31/2023. Generalized intrahepatic biliary dilatation. Etiology not apparent on this study. 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 Several nodules in the right mid abdomen, lymphadenopathy versus splenosis. Varices along the lesser curvature of the stomach. 59-year-old gentleman who is mentally challenged we spent most of his life in [facility].  Over the last year his caretakers have passed away and he is now staying with family in the [facility] area. He has a longstanding history of liver problems. Sister-in-law mentions cirrhosis of unknown etiology. He was never an alcohol user. Has had EGDs with variceal banding earlier this year. An ultrasound was obtained revealing multiple gallbladder masses. He states he is actually been gaining weight. He is not necessarily ill-appearing. Denies easy bruising or bleeding gums  Has a history of an exploratory laparotomy for trauma and which a splenectomy was performed. This was done at age 11. With some initial discussion of trying to perform a percutaneous biopsy of the gallbladder masses. This is not a common practice. He was seen by Dr. Memo Loya of surgical oncology. I do see a CA 19-9 was normal.  INR was 1.14 in August of last year.     At this point family would like to proceed with a laparoscopic cholecystectomy. If pathology were find malignancy we will proceed accordingly. The following portions of the patient's history were reviewed and updated as appropriate:     He  has a past medical history of Anxiety, GERD (gastroesophageal reflux disease), Hypertension, Hypoxia (02/11/2022), MRSA bacteremia (02/09/2022), and Seizures ([address]). He  has a past surgical history that includes Splenectomy; Colonoscopy; and Upper gastrointestinal endoscopy. His family history includes Depression in his mother; Heart disease in his father. He  reports that he has never smoked. He has never used smokeless tobacco. He reports that he does not drink alcohol and does not use drugs. Current Outpatient Medications   Medication Sig Dispense Refill   • diazepam (VALIUM) 10 mg tablet TAKE 1 TABLET BY MOUTH EVERY 12 HOURS. 60 tablet 2   • folic acid (FOLVITE) 1 mg tablet TAKE 1 TABLET (1 MG TOTAL) BY MOUTH IN THE MORNING. NOT COVERED 90 tablet 1   • furosemide (LASIX) 20 mg tablet TAKE 1 TABLET (20 MG TOTAL) BY MOUTH IN THE MORNING AND 1 TABLET (20 MG TOTAL) IN THE EVENING. 180 tablet 1   • iron polysaccharides (FERREX) 150 mg capsule Take 1 capsule (150 mg total) by mouth in the morning 90 capsule 1   • levETIRAcetam (KEPPRA) 500 mg tablet Take 1 tablet (500 mg total) by mouth every 12 (twelve) hours 180 tablet 1   • LORazepam (Ativan) 0.5 mg tablet Take 1 tablet (0.5 mg total) by mouth once as needed for sedation (60-30 minutes before procedure/test) for up to 2 doses 2 tablet 0   • nadolol (CORGARD) 40 mg tablet TAKE 1 TABLET (40 MG TOTAL) BY MOUTH IN THE MORNING.  90 tablet 1   • OXcarbazepine (TRILEPTAL) 600 mg tablet TAKE 1 TABLET BY MOUTH EVERY 12 HOURS 180 tablet 1   • pantoprazole (PROTONIX) 40 mg tablet TAKE 1 TABLET BY MOUTH EVERY DAY IN THE MORNING AND IN THE EVENING BEFORE MEALS 180 tablet 1   • sucralfate (CARAFATE) 1 g tablet TAKE 1 TABLET BY MOUTH EVERY 6 HOURS 360 tablet 1   • triamcinolone (KENALOG) 0.1 % ointment APPLY SPARINGLY TO AFFECTED AREAS OF LEGS 2-4 TIMES A DAY FOR UP TO 2 WEEKS. 454 g 0     No current facility-administered medications for this visit. He has No Known Allergies. .    Review of Systems   All other systems reviewed and are negative. Objective:      /63   Pulse 67   Ht 6' (1.829 m)   Wt 84.4 kg (186 lb)   BMI 25.23 kg/m²          Physical Exam  Constitutional:       General: He is not in acute distress. HENT:      Head: Atraumatic. Mouth/Throat:      Mouth: Mucous membranes are moist.   Eyes:      Extraocular Movements: Extraocular movements intact. Cardiovascular:      Rate and Rhythm: Normal rate. Pulmonary:      Effort: Pulmonary effort is normal.   Abdominal:      General: Abdomen is flat. Palpations: Abdomen is soft. There is no mass. Tenderness: There is no abdominal tenderness. Comments: Midline trauma laparotomy scar. Musculoskeletal:      Cervical back: Normal range of motion. Skin:     General: Skin is warm and dry. Neurological:      Mental Status: He is alert.        Extremities: No edema

## 2023-08-11 DIAGNOSIS — D50.0 IRON DEFICIENCY ANEMIA DUE TO CHRONIC BLOOD LOSS: ICD-10-CM

## 2023-08-14 RX ORDER — IRON POLYSACCHARIDE COMPLEX 150 MG
CAPSULE ORAL
Qty: 90 CAPSULE | Refills: 1 | Status: ON HOLD | OUTPATIENT
Start: 2023-08-14

## 2023-08-15 ENCOUNTER — APPOINTMENT (OUTPATIENT)
Dept: LAB | Facility: AMBULARY SURGERY CENTER | Age: 58
DRG: 415 | End: 2023-08-15
Payer: MEDICARE

## 2023-08-15 DIAGNOSIS — K82.8 MASS OF GALLBLADDER: ICD-10-CM

## 2023-08-15 LAB
ALBUMIN SERPL BCP-MCNC: 2.9 G/DL (ref 3.5–5)
ALP SERPL-CCNC: 87 U/L (ref 46–116)
ALT SERPL W P-5'-P-CCNC: 16 U/L (ref 12–78)
ANION GAP SERPL CALCULATED.3IONS-SCNC: 3 MMOL/L
AST SERPL W P-5'-P-CCNC: 21 U/L (ref 5–45)
BILIRUB SERPL-MCNC: 0.4 MG/DL (ref 0.2–1)
BUN SERPL-MCNC: 8 MG/DL (ref 5–25)
CALCIUM ALBUM COR SERPL-MCNC: 9.8 MG/DL (ref 8.3–10.1)
CALCIUM SERPL-MCNC: 8.9 MG/DL (ref 8.3–10.1)
CHLORIDE SERPL-SCNC: 106 MMOL/L (ref 96–108)
CO2 SERPL-SCNC: 31 MMOL/L (ref 21–32)
CREAT SERPL-MCNC: 0.69 MG/DL (ref 0.6–1.3)
ERYTHROCYTE [DISTWIDTH] IN BLOOD BY AUTOMATED COUNT: 15.7 % (ref 11.6–15.1)
GFR SERPL CREATININE-BSD FRML MDRD: 103 ML/MIN/1.73SQ M
GLUCOSE P FAST SERPL-MCNC: 93 MG/DL (ref 65–99)
HCT VFR BLD AUTO: 34.2 % (ref 36.5–49.3)
HGB BLD-MCNC: 10.2 G/DL (ref 12–17)
INR PPP: 1.13 (ref 0.84–1.19)
MCH RBC QN AUTO: 25.2 PG (ref 26.8–34.3)
MCHC RBC AUTO-ENTMCNC: 29.8 G/DL (ref 31.4–37.4)
MCV RBC AUTO: 84 FL (ref 82–98)
PLATELET # BLD AUTO: 204 THOUSANDS/UL (ref 149–390)
PMV BLD AUTO: 9.5 FL (ref 8.9–12.7)
POTASSIUM SERPL-SCNC: 3.5 MMOL/L (ref 3.5–5.3)
PROT SERPL-MCNC: 7.5 G/DL (ref 6.4–8.4)
PROTHROMBIN TIME: 14.7 SECONDS (ref 11.6–14.5)
RBC # BLD AUTO: 4.05 MILLION/UL (ref 3.88–5.62)
SODIUM SERPL-SCNC: 140 MMOL/L (ref 135–147)
WBC # BLD AUTO: 3.58 THOUSAND/UL (ref 4.31–10.16)

## 2023-08-15 PROCEDURE — 36415 COLL VENOUS BLD VENIPUNCTURE: CPT

## 2023-08-15 PROCEDURE — 85610 PROTHROMBIN TIME: CPT

## 2023-08-15 PROCEDURE — 80053 COMPREHEN METABOLIC PANEL: CPT

## 2023-08-15 PROCEDURE — 85027 COMPLETE CBC AUTOMATED: CPT

## 2023-08-17 ENCOUNTER — ANESTHESIA EVENT (OUTPATIENT)
Dept: PERIOP | Facility: HOSPITAL | Age: 58
DRG: 415 | End: 2023-08-17
Payer: MEDICARE

## 2023-08-20 RX ORDER — CEFAZOLIN SODIUM 2 G/50ML
2000 SOLUTION INTRAVENOUS ONCE
Status: COMPLETED | OUTPATIENT
Start: 2023-08-25 | End: 2023-08-25

## 2023-08-21 NOTE — PRE-PROCEDURE INSTRUCTIONS
Pre-Surgery Instructions:   Medication Instructions   • diazepam (VALIUM) 10 mg tablet Take day of surgery. • folic acid (FOLVITE) 1 mg tablet Hold day of surgery. • furosemide (LASIX) 20 mg tablet Hold day of surgery. • iron polysaccharides (FERREX) 150 mg capsule Hold day of surgery. • levETIRAcetam (KEPPRA) 500 mg tablet Take day of surgery. • LORazepam (Ativan) 0.5 mg tablet Take day of surgery. • nadolol (CORGARD) 40 mg tablet Take day of surgery. • OXcarbazepine (TRILEPTAL) 600 mg tablet Take day of surgery. • pantoprazole (PROTONIX) 40 mg tablet Take day of surgery. • sucralfate (CARAFATE) 1 g tablet Take day of surgery. • triamcinolone (KENALOG) 0.1 % ointment Hold day of surgery. Phone call completed with sister in [facility]. Patient agreeable. 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 Medication instructions for day surgery reviewed. Please use only a sip of water to take your instructed medications. Avoid all over the counter vitamins, supplements and NSAIDS for one week prior to surgery per anesthesia guidelines. Tylenol is ok to take as needed. You will receive a call one business day prior to surgery with an arrival time and hospital directions. If your surgery is scheduled on a Monday, the hospital will be calling you on the Friday prior to your surgery. If you have not heard from anyone by 8pm, please call the hospital supervisor through the hospital  at [phone]. Alyssa Merrill [phone]). Do not eat or drink anything after midnight the night before your surgery, including candy, mints, lifesavers, or chewing gum. Do not drink alcohol 24hrs before your surgery. Try not to smoke at least 24hrs before your surgery. Follow the pre surgery showering instructions as listed in the [facility] Surgical Experience Booklet” or otherwise provided by your surgeon's office. Do not shave the surgical area 24 hours before surgery.  Do not apply any lotions, creams, including makeup, cologne, deodorant, or perfumes after showering on the day of your surgery. No contact lenses, eye make-up, or artificial eyelashes. Remove nail polish, including gel polish, and any artificial, gel, or acrylic nails if possible. Remove all jewelry including rings and body piercing jewelry. Wear causal clothing that is easy to take on and off. Consider your type of surgery. Keep any valuables, jewelry, piercings at home. Please bring any specially ordered equipment (sling, braces) if indicated. Arrange for a responsible person to drive you to and from the hospital on the day of your surgery. Visitor Guidelines discussed. Call the surgeon's office with any new illnesses, exposures, or additional questions prior to surgery. Please reference your [facility] Surgical Experience Booklet” for additional information to prepare for your upcoming surgery.

## 2023-08-25 ENCOUNTER — HOSPITAL ENCOUNTER (INPATIENT)
Facility: HOSPITAL | Age: 58
DRG: 415 | End: 2023-08-25
Attending: SURGERY | Admitting: SURGERY
Payer: MEDICARE

## 2023-08-25 ENCOUNTER — ANESTHESIA (OUTPATIENT)
Dept: PERIOP | Facility: HOSPITAL | Age: 58
DRG: 415 | End: 2023-08-25
Payer: MEDICARE

## 2023-08-25 DIAGNOSIS — K82.8 GALLBLADDER MASS: Primary | ICD-10-CM

## 2023-08-25 DIAGNOSIS — K82.8 MASS OF GALLBLADDER: ICD-10-CM

## 2023-08-25 DIAGNOSIS — K82.4 GALLBLADDER POLYP: ICD-10-CM

## 2023-08-25 DIAGNOSIS — H54.8 LEGALLY BLIND IN RIGHT EYE, AS DEFINED IN USA: ICD-10-CM

## 2023-08-25 DIAGNOSIS — I10 PRIMARY HYPERTENSION: ICD-10-CM

## 2023-08-25 DIAGNOSIS — K82.8: ICD-10-CM

## 2023-08-25 DIAGNOSIS — R26.2 AMBULATORY DYSFUNCTION: ICD-10-CM

## 2023-08-25 DIAGNOSIS — G40.909 NONINTRACTABLE EPILEPSY WITHOUT STATUS EPILEPTICUS, UNSPECIFIED EPILEPSY TYPE (HCC): ICD-10-CM

## 2023-08-25 DIAGNOSIS — Z90.81 STATUS POST SPLENECTOMY: ICD-10-CM

## 2023-08-25 LAB
ABO GROUP BLD: NORMAL
ABO GROUP BLD: NORMAL
ANION GAP SERPL CALCULATED.3IONS-SCNC: 2 MMOL/L
BASE EXCESS BLDA CALC-SCNC: 3 MMOL/L (ref -2–3)
BASE EXCESS BLDA CALC-SCNC: 3 MMOL/L (ref -2–3)
BLD GP AB SCN SERPL QL: NEGATIVE
BLD GP AB SCN SERPL QL: NEGATIVE
BUN SERPL-MCNC: 14 MG/DL (ref 5–25)
CA-I BLD-SCNC: 1.18 MMOL/L (ref 1.12–1.32)
CA-I BLD-SCNC: 1.2 MMOL/L (ref 1.12–1.32)
CALCIUM SERPL-MCNC: 8 MG/DL (ref 8.4–10.2)
CHLORIDE SERPL-SCNC: 107 MMOL/L (ref 96–108)
CO2 SERPL-SCNC: 31 MMOL/L (ref 21–32)
CREAT SERPL-MCNC: 0.57 MG/DL (ref 0.6–1.3)
ERYTHROCYTE [DISTWIDTH] IN BLOOD BY AUTOMATED COUNT: 15.2 % (ref 11.6–15.1)
ERYTHROCYTE [DISTWIDTH] IN BLOOD BY AUTOMATED COUNT: 15.8 % (ref 11.6–15.1)
GFR SERPL CREATININE-BSD FRML MDRD: 112 ML/MIN/1.73SQ M
GLUCOSE SERPL-MCNC: 122 MG/DL (ref 65–140)
GLUCOSE SERPL-MCNC: 132 MG/DL (ref 65–140)
GLUCOSE SERPL-MCNC: 153 MG/DL (ref 65–140)
HCO3 BLDA-SCNC: 28.9 MMOL/L (ref 24–30)
HCO3 BLDA-SCNC: 29 MMOL/L (ref 24–30)
HCT VFR BLD AUTO: 26.7 % (ref 36.5–49.3)
HCT VFR BLD AUTO: 33.5 % (ref 36.5–49.3)
HCT VFR BLD CALC: 22 % (ref 36.5–49.3)
HCT VFR BLD CALC: 26 % (ref 36.5–49.3)
HGB BLD-MCNC: 10.3 G/DL (ref 12–17)
HGB BLD-MCNC: 7.7 G/DL (ref 12–17)
HGB BLDA-MCNC: 7.5 G/DL (ref 12–17)
HGB BLDA-MCNC: 8.8 G/DL (ref 12–17)
INR PPP: 1.3 (ref 0.84–1.19)
MAGNESIUM SERPL-MCNC: 1.6 MG/DL (ref 1.9–2.7)
MCH RBC QN AUTO: 25 PG (ref 26.8–34.3)
MCH RBC QN AUTO: 26.8 PG (ref 26.8–34.3)
MCHC RBC AUTO-ENTMCNC: 28.8 G/DL (ref 31.4–37.4)
MCHC RBC AUTO-ENTMCNC: 30.7 G/DL (ref 31.4–37.4)
MCV RBC AUTO: 87 FL (ref 82–98)
MCV RBC AUTO: 87 FL (ref 82–98)
PCO2 BLD: 30 MMOL/L (ref 21–32)
PCO2 BLD: 31 MMOL/L (ref 21–32)
PCO2 BLD: 49.7 MM HG (ref 42–50)
PCO2 BLD: 54.2 MM HG (ref 42–50)
PH BLD: 7.33 [PH] (ref 7.3–7.4)
PH BLD: 7.37 [PH] (ref 7.3–7.4)
PLATELET # BLD AUTO: 146 THOUSANDS/UL (ref 149–390)
PLATELET # BLD AUTO: 147 THOUSANDS/UL (ref 149–390)
PMV BLD AUTO: 9 FL (ref 8.9–12.7)
PMV BLD AUTO: 9.3 FL (ref 8.9–12.7)
PO2 BLD: 38 MM HG (ref 35–45)
PO2 BLD: 54 MM HG (ref 35–45)
POTASSIUM BLD-SCNC: 3.2 MMOL/L (ref 3.5–5.3)
POTASSIUM BLD-SCNC: 4 MMOL/L (ref 3.5–5.3)
POTASSIUM SERPL-SCNC: 4.7 MMOL/L (ref 3.5–5.3)
PROTHROMBIN TIME: 16.9 SECONDS (ref 11.6–14.5)
RBC # BLD AUTO: 3.08 MILLION/UL (ref 3.88–5.62)
RBC # BLD AUTO: 3.84 MILLION/UL (ref 3.88–5.62)
RH BLD: POSITIVE
RH BLD: POSITIVE
SAO2 % BLD FROM PO2: 68 % (ref 60–85)
SAO2 % BLD FROM PO2: 86 % (ref 60–85)
SODIUM BLD-SCNC: 142 MMOL/L (ref 136–145)
SODIUM BLD-SCNC: 142 MMOL/L (ref 136–145)
SODIUM SERPL-SCNC: 140 MMOL/L (ref 135–147)
SPECIMEN EXPIRATION DATE: NORMAL
SPECIMEN EXPIRATION DATE: NORMAL
SPECIMEN SOURCE: ABNORMAL
SPECIMEN SOURCE: ABNORMAL
WBC # BLD AUTO: 14.1 THOUSAND/UL (ref 4.31–10.16)
WBC # BLD AUTO: 9.64 THOUSAND/UL (ref 4.31–10.16)

## 2023-08-25 PROCEDURE — 88341 IMHCHEM/IMCYTCHM EA ADD ANTB: CPT | Performed by: PATHOLOGY

## 2023-08-25 PROCEDURE — 85014 HEMATOCRIT: CPT

## 2023-08-25 PROCEDURE — 0FT40ZZ RESECTION OF GALLBLADDER, OPEN APPROACH: ICD-10-PCS | Performed by: SURGERY

## 2023-08-25 PROCEDURE — 0FJ44ZZ INSPECTION OF GALLBLADDER, PERCUTANEOUS ENDOSCOPIC APPROACH: ICD-10-PCS | Performed by: SURGERY

## 2023-08-25 PROCEDURE — 85027 COMPLETE CBC AUTOMATED: CPT | Performed by: SURGERY

## 2023-08-25 PROCEDURE — 82947 ASSAY GLUCOSE BLOOD QUANT: CPT

## 2023-08-25 PROCEDURE — 88304 TISSUE EXAM BY PATHOLOGIST: CPT | Performed by: PATHOLOGY

## 2023-08-25 PROCEDURE — C9290 INJ, BUPIVACAINE LIPOSOME: HCPCS | Performed by: NURSE ANESTHETIST, CERTIFIED REGISTERED

## 2023-08-25 PROCEDURE — 82330 ASSAY OF CALCIUM: CPT

## 2023-08-25 PROCEDURE — 86901 BLOOD TYPING SEROLOGIC RH(D): CPT | Performed by: NURSE ANESTHETIST, CERTIFIED REGISTERED

## 2023-08-25 PROCEDURE — 0W3G0ZZ CONTROL BLEEDING IN PERITONEAL CAVITY, OPEN APPROACH: ICD-10-PCS | Performed by: SURGERY

## 2023-08-25 PROCEDURE — 86923 COMPATIBILITY TEST ELECTRIC: CPT

## 2023-08-25 PROCEDURE — 84132 ASSAY OF SERUM POTASSIUM: CPT

## 2023-08-25 PROCEDURE — 88342 IMHCHEM/IMCYTCHM 1ST ANTB: CPT | Performed by: PATHOLOGY

## 2023-08-25 PROCEDURE — 30233N1 TRANSFUSION OF NONAUTOLOGOUS RED BLOOD CELLS INTO PERIPHERAL VEIN, PERCUTANEOUS APPROACH: ICD-10-PCS | Performed by: ANESTHESIOLOGY

## 2023-08-25 PROCEDURE — 86900 BLOOD TYPING SEROLOGIC ABO: CPT | Performed by: ANESTHESIOLOGY

## 2023-08-25 PROCEDURE — P9016 RBC LEUKOCYTES REDUCED: HCPCS

## 2023-08-25 PROCEDURE — 86850 RBC ANTIBODY SCREEN: CPT | Performed by: ANESTHESIOLOGY

## 2023-08-25 PROCEDURE — 99223 1ST HOSP IP/OBS HIGH 75: CPT | Performed by: SURGERY

## 2023-08-25 PROCEDURE — 82803 BLOOD GASES ANY COMBINATION: CPT

## 2023-08-25 PROCEDURE — 86901 BLOOD TYPING SEROLOGIC RH(D): CPT | Performed by: ANESTHESIOLOGY

## 2023-08-25 PROCEDURE — 80048 BASIC METABOLIC PNL TOTAL CA: CPT

## 2023-08-25 PROCEDURE — 85610 PROTHROMBIN TIME: CPT | Performed by: SURGERY

## 2023-08-25 PROCEDURE — 84295 ASSAY OF SERUM SODIUM: CPT

## 2023-08-25 PROCEDURE — 86900 BLOOD TYPING SEROLOGIC ABO: CPT | Performed by: NURSE ANESTHETIST, CERTIFIED REGISTERED

## 2023-08-25 PROCEDURE — 86850 RBC ANTIBODY SCREEN: CPT | Performed by: NURSE ANESTHETIST, CERTIFIED REGISTERED

## 2023-08-25 PROCEDURE — 83735 ASSAY OF MAGNESIUM: CPT

## 2023-08-25 RX ORDER — SODIUM CHLORIDE, SODIUM LACTATE, POTASSIUM CHLORIDE, CALCIUM CHLORIDE 600; 310; 30; 20 MG/100ML; MG/100ML; MG/100ML; MG/100ML
INJECTION, SOLUTION INTRAVENOUS CONTINUOUS PRN
Status: DISCONTINUED | OUTPATIENT
Start: 2023-08-25 | End: 2023-08-25

## 2023-08-25 RX ORDER — ROCURONIUM BROMIDE 10 MG/ML
INJECTION, SOLUTION INTRAVENOUS AS NEEDED
Status: DISCONTINUED | OUTPATIENT
Start: 2023-08-25 | End: 2023-08-25

## 2023-08-25 RX ORDER — POTASSIUM CHLORIDE 14.9 MG/ML
INJECTION INTRAVENOUS CONTINUOUS PRN
Status: DISCONTINUED | OUTPATIENT
Start: 2023-08-25 | End: 2023-08-25

## 2023-08-25 RX ORDER — ONDANSETRON 2 MG/ML
4 INJECTION INTRAMUSCULAR; INTRAVENOUS EVERY 4 HOURS PRN
Status: DISCONTINUED | OUTPATIENT
Start: 2023-08-25 | End: 2023-08-29 | Stop reason: HOSPADM

## 2023-08-25 RX ORDER — KETAMINE HCL IN NACL, ISO-OSM 100MG/10ML
SYRINGE (ML) INJECTION AS NEEDED
Status: DISCONTINUED | OUTPATIENT
Start: 2023-08-25 | End: 2023-08-25

## 2023-08-25 RX ORDER — OXYCODONE HYDROCHLORIDE 5 MG/1
5 TABLET ORAL EVERY 4 HOURS PRN
Qty: 10 TABLET | Refills: 0 | Status: SHIPPED | OUTPATIENT
Start: 2023-08-25 | End: 2023-09-04

## 2023-08-25 RX ORDER — MIDAZOLAM HYDROCHLORIDE 2 MG/2ML
INJECTION, SOLUTION INTRAMUSCULAR; INTRAVENOUS AS NEEDED
Status: DISCONTINUED | OUTPATIENT
Start: 2023-08-25 | End: 2023-08-25

## 2023-08-25 RX ORDER — GLYCOPYRROLATE 0.2 MG/ML
INJECTION INTRAMUSCULAR; INTRAVENOUS AS NEEDED
Status: DISCONTINUED | OUTPATIENT
Start: 2023-08-25 | End: 2023-08-25

## 2023-08-25 RX ORDER — SODIUM CHLORIDE, SODIUM LACTATE, POTASSIUM CHLORIDE, CALCIUM CHLORIDE 600; 310; 30; 20 MG/100ML; MG/100ML; MG/100ML; MG/100ML
125 INJECTION, SOLUTION INTRAVENOUS CONTINUOUS
Status: DISCONTINUED | OUTPATIENT
Start: 2023-08-25 | End: 2023-08-25

## 2023-08-25 RX ORDER — DEXAMETHASONE SODIUM PHOSPHATE 10 MG/ML
INJECTION, SOLUTION INTRAMUSCULAR; INTRAVENOUS AS NEEDED
Status: DISCONTINUED | OUTPATIENT
Start: 2023-08-25 | End: 2023-08-25

## 2023-08-25 RX ORDER — HYDROMORPHONE HCL IN WATER/PF 6 MG/30 ML
0.2 PATIENT CONTROLLED ANALGESIA SYRINGE INTRAVENOUS
Status: DISCONTINUED | OUTPATIENT
Start: 2023-08-25 | End: 2023-08-25 | Stop reason: HOSPADM

## 2023-08-25 RX ORDER — EPHEDRINE SULFATE 50 MG/ML
INJECTION INTRAVENOUS AS NEEDED
Status: DISCONTINUED | OUTPATIENT
Start: 2023-08-25 | End: 2023-08-25

## 2023-08-25 RX ORDER — ALBUMIN, HUMAN INJ 5% 5 %
SOLUTION INTRAVENOUS CONTINUOUS PRN
Status: DISCONTINUED | OUTPATIENT
Start: 2023-08-25 | End: 2023-08-25

## 2023-08-25 RX ORDER — LEVETIRACETAM 500 MG/1
500 TABLET ORAL EVERY 12 HOURS SCHEDULED
Status: DISCONTINUED | OUTPATIENT
Start: 2023-08-25 | End: 2023-08-29 | Stop reason: HOSPADM

## 2023-08-25 RX ORDER — FENTANYL CITRATE 50 UG/ML
INJECTION, SOLUTION INTRAMUSCULAR; INTRAVENOUS AS NEEDED
Status: DISCONTINUED | OUTPATIENT
Start: 2023-08-25 | End: 2023-08-25

## 2023-08-25 RX ORDER — LIDOCAINE HYDROCHLORIDE 10 MG/ML
INJECTION, SOLUTION EPIDURAL; INFILTRATION; INTRACAUDAL; PERINEURAL AS NEEDED
Status: DISCONTINUED | OUTPATIENT
Start: 2023-08-25 | End: 2023-08-25

## 2023-08-25 RX ORDER — OXYCODONE HYDROCHLORIDE 5 MG/1
5 TABLET ORAL EVERY 4 HOURS PRN
Status: DISCONTINUED | OUTPATIENT
Start: 2023-08-25 | End: 2023-08-29 | Stop reason: HOSPADM

## 2023-08-25 RX ORDER — ATROPINE SULFATE 1 MG/ML
INJECTION, SOLUTION INTRAVENOUS AS NEEDED
Status: DISCONTINUED | OUTPATIENT
Start: 2023-08-25 | End: 2023-08-25

## 2023-08-25 RX ORDER — FENTANYL CITRATE/PF 50 MCG/ML
25 SYRINGE (ML) INJECTION
Status: DISCONTINUED | OUTPATIENT
Start: 2023-08-25 | End: 2023-08-25 | Stop reason: HOSPADM

## 2023-08-25 RX ORDER — PHENYLEPHRINE HCL IN 0.9% NACL 1 MG/10 ML
SYRINGE (ML) INTRAVENOUS AS NEEDED
Status: DISCONTINUED | OUTPATIENT
Start: 2023-08-25 | End: 2023-08-25

## 2023-08-25 RX ORDER — HYDROMORPHONE HCL/PF 1 MG/ML
0.5 SYRINGE (ML) INJECTION
Status: DISCONTINUED | OUTPATIENT
Start: 2023-08-25 | End: 2023-08-29 | Stop reason: HOSPADM

## 2023-08-25 RX ORDER — ONDANSETRON 2 MG/ML
INJECTION INTRAMUSCULAR; INTRAVENOUS AS NEEDED
Status: DISCONTINUED | OUTPATIENT
Start: 2023-08-25 | End: 2023-08-25

## 2023-08-25 RX ORDER — MAGNESIUM SULFATE HEPTAHYDRATE 40 MG/ML
2 INJECTION, SOLUTION INTRAVENOUS ONCE
Status: COMPLETED | OUTPATIENT
Start: 2023-08-25 | End: 2023-08-25

## 2023-08-25 RX ORDER — SODIUM CHLORIDE 9 MG/ML
INJECTION, SOLUTION INTRAVENOUS CONTINUOUS PRN
Status: DISCONTINUED | OUTPATIENT
Start: 2023-08-25 | End: 2023-08-25

## 2023-08-25 RX ORDER — PANTOPRAZOLE SODIUM 40 MG/1
40 TABLET, DELAYED RELEASE ORAL
Status: DISCONTINUED | OUTPATIENT
Start: 2023-08-25 | End: 2023-08-29 | Stop reason: HOSPADM

## 2023-08-25 RX ORDER — OXCARBAZEPINE 150 MG/1
600 TABLET, FILM COATED ORAL EVERY 12 HOURS SCHEDULED
Status: DISCONTINUED | OUTPATIENT
Start: 2023-08-25 | End: 2023-08-29 | Stop reason: HOSPADM

## 2023-08-25 RX ORDER — ONDANSETRON 2 MG/ML
4 INJECTION INTRAMUSCULAR; INTRAVENOUS EVERY 4 HOURS PRN
Status: DISCONTINUED | OUTPATIENT
Start: 2023-08-25 | End: 2023-08-25 | Stop reason: HOSPADM

## 2023-08-25 RX ORDER — SODIUM CHLORIDE, SODIUM LACTATE, POTASSIUM CHLORIDE, CALCIUM CHLORIDE 600; 310; 30; 20 MG/100ML; MG/100ML; MG/100ML; MG/100ML
100 INJECTION, SOLUTION INTRAVENOUS CONTINUOUS
Status: DISCONTINUED | OUTPATIENT
Start: 2023-08-25 | End: 2023-08-26

## 2023-08-25 RX ORDER — PROPOFOL 10 MG/ML
INJECTION, EMULSION INTRAVENOUS AS NEEDED
Status: DISCONTINUED | OUTPATIENT
Start: 2023-08-25 | End: 2023-08-25

## 2023-08-25 RX ADMIN — Medication 20 MG: at 10:13

## 2023-08-25 RX ADMIN — EPHEDRINE SULFATE 10 MG: 50 INJECTION INTRAVENOUS at 11:00

## 2023-08-25 RX ADMIN — FENTANYL CITRATE 25 MCG: 50 INJECTION INTRAMUSCULAR; INTRAVENOUS at 13:58

## 2023-08-25 RX ADMIN — GLYCOPYRROLATE 0.2 MG: 0.2 INJECTION, SOLUTION INTRAMUSCULAR; INTRAVENOUS at 09:33

## 2023-08-25 RX ADMIN — GLYCOPYRROLATE 0.2 MG: 0.2 INJECTION, SOLUTION INTRAMUSCULAR; INTRAVENOUS at 09:32

## 2023-08-25 RX ADMIN — ATROPINE SULFATE 0.5 MG: 1 INJECTION, SOLUTION INTRAVENOUS at 09:35

## 2023-08-25 RX ADMIN — Medication 200 MCG: at 10:01

## 2023-08-25 RX ADMIN — CEFAZOLIN SODIUM 2000 MG: 2 SOLUTION INTRAVENOUS at 09:12

## 2023-08-25 RX ADMIN — PHENYLEPHRINE HYDROCHLORIDE 50 MCG/MIN: 10 INJECTION INTRAVENOUS at 10:01

## 2023-08-25 RX ADMIN — LIDOCAINE HYDROCHLORIDE 50 MG: 10 INJECTION, SOLUTION EPIDURAL; INFILTRATION; INTRACAUDAL; PERINEURAL at 09:23

## 2023-08-25 RX ADMIN — OXCARBAZEPINE 600 MG: 150 TABLET, FILM COATED ORAL at 20:24

## 2023-08-25 RX ADMIN — SODIUM CHLORIDE: 0.9 INJECTION, SOLUTION INTRAVENOUS at 10:43

## 2023-08-25 RX ADMIN — FENTANYL CITRATE 100 MCG: 50 INJECTION INTRAMUSCULAR; INTRAVENOUS at 09:23

## 2023-08-25 RX ADMIN — POTASSIUM CHLORIDE: 14.9 INJECTION, SOLUTION INTRAVENOUS at 10:51

## 2023-08-25 RX ADMIN — FENTANYL CITRATE 25 MCG: 50 INJECTION INTRAMUSCULAR; INTRAVENOUS at 15:29

## 2023-08-25 RX ADMIN — ROCURONIUM BROMIDE 50 MG: 10 SOLUTION INTRAVENOUS at 09:23

## 2023-08-25 RX ADMIN — ROCURONIUM BROMIDE 20 MG: 10 SOLUTION INTRAVENOUS at 10:42

## 2023-08-25 RX ADMIN — EPHEDRINE SULFATE 5 MG: 50 INJECTION INTRAVENOUS at 10:05

## 2023-08-25 RX ADMIN — SODIUM CHLORIDE: 0.9 INJECTION, SOLUTION INTRAVENOUS at 10:07

## 2023-08-25 RX ADMIN — SUGAMMADEX 200 MG: 100 INJECTION, SOLUTION INTRAVENOUS at 11:25

## 2023-08-25 RX ADMIN — ROCURONIUM BROMIDE 20 MG: 10 SOLUTION INTRAVENOUS at 10:12

## 2023-08-25 RX ADMIN — DEXAMETHASONE SODIUM PHOSPHATE 10 MG: 10 INJECTION, SOLUTION INTRAMUSCULAR; INTRAVENOUS at 09:26

## 2023-08-25 RX ADMIN — ONDANSETRON 4 MG: 2 INJECTION INTRAMUSCULAR; INTRAVENOUS at 11:20

## 2023-08-25 RX ADMIN — EPHEDRINE SULFATE 10 MG: 50 INJECTION INTRAVENOUS at 10:35

## 2023-08-25 RX ADMIN — MIDAZOLAM 2 MG: 1 INJECTION INTRAMUSCULAR; INTRAVENOUS at 09:12

## 2023-08-25 RX ADMIN — SODIUM CHLORIDE, SODIUM LACTATE, POTASSIUM CHLORIDE, AND CALCIUM CHLORIDE: .6; .31; .03; .02 INJECTION, SOLUTION INTRAVENOUS at 10:03

## 2023-08-25 RX ADMIN — ALBUMIN (HUMAN): 12.5 INJECTION, SOLUTION INTRAVENOUS at 09:51

## 2023-08-25 RX ADMIN — LEVETIRACETAM 500 MG: 500 TABLET, FILM COATED ORAL at 20:24

## 2023-08-25 RX ADMIN — Medication 200 MCG: at 10:03

## 2023-08-25 RX ADMIN — MAGNESIUM SULFATE HEPTAHYDRATE 2 G: 40 INJECTION, SOLUTION INTRAVENOUS at 20:24

## 2023-08-25 RX ADMIN — FENTANYL CITRATE 25 MCG: 50 INJECTION INTRAMUSCULAR; INTRAVENOUS at 14:12

## 2023-08-25 RX ADMIN — PROPOFOL 150 MG: 10 INJECTION, EMULSION INTRAVENOUS at 09:23

## 2023-08-25 RX ADMIN — Medication 200 MCG: at 09:30

## 2023-08-25 RX ADMIN — Medication 10 MG: at 10:45

## 2023-08-25 RX ADMIN — SODIUM CHLORIDE, SODIUM LACTATE, POTASSIUM CHLORIDE, AND CALCIUM CHLORIDE: .6; .31; .03; .02 INJECTION, SOLUTION INTRAVENOUS at 08:20

## 2023-08-25 RX ADMIN — ALBUMIN (HUMAN): 12.5 INJECTION, SOLUTION INTRAVENOUS at 10:24

## 2023-08-25 RX ADMIN — Medication 20 MG: at 10:47

## 2023-08-25 NOTE — CONSULTS
[facility]  Consult  Name: Torsten Link 59 y.o. male I MRN: [id number]  Unit/Bed#: ICU 03 I Date of Admission: 8/25/2023   Date of Service: 8/25/2023 I Hospital Day: 0    Inpatient consult to Surgical Critical Care  Consult performed by: SIDDHARTHA Tello  Consult ordered by: Kellie Beltran PA-C          Assessment/Plan   * Gallbladder polyp  Assessment & Plan  · Pt follows with Dr. Maral Ferraro as o/p for polypoid masses in the [address] [place] [address] [address] seen on MRI in March 2023 and CT abd in July 2023  · Pt is now POD 0 s/p lap cholecystectomy that had to be converted to open intra-op. Noted to have extensive adhesions of omentum to GB with a large amount of oozing intra-op. ESBL 550 ml  · GB sent for pathology   · If malignant will be referred to oncology  · Post opt noted to have Hgb decreased to 7.7, so he was given 2 units PRBC  · Trend Hgb  · Admit to SD2 for close monitoring  · Dilaudid and oxy IR PRN for pain control  · Started on CLD  · Close monitoring of RUQ drainage output. 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  Will give PRN if needed  · Starting routine neuro checks         History of Present Illness     HPI: Torsten Link is a 59 y.o. who presents after an elective lap cholecystectomy that had to be converted to open intra-op due to extensive adhesions of the omentum to the GB and large amount of oozing during surgery. ESBL 550 ml. In PACU, repeat labs showing hgb decreased to 7.7. He was given 2 units PRBC in PACU and admitted to [address] [place] [address] for closer monitoring. Hx: pt recently moved from [facility] because his caregiver passed away and he moved in with his brother here. PMHx: mentally challenged, cirrhosis of unknown etiology, splenectomy at age 11, GERD, anxiety, seizure, HTN. History obtained from chart review and the patient. Review of Systems - Negative except with some mild pain in RUQ and tired. Historical Information   Past Medical History:  No date: Anxiety  No date: GERD (gastroesophageal reflux disease)  No date: Hypertension  02/11/2022: Hypoxia  No date: Mental developmental delay      Comment:  mumps as a child, developed rheumatic fever  02/09/2022: MRSA bacteremia  No date: Seizures ([address]) Past Surgical History:  No date: COLONOSCOPY  No date: SPLENECTOMY  No date: UPPER GASTROINTESTINAL ENDOSCOPY   Current Outpatient Medications   Medication Instructions   • diazepam (VALIUM) 10 mg tablet TAKE 1 TABLET BY MOUTH EVERY 12 HOURS. • folic acid (FOLVITE) 1 mg tablet TAKE 1 TABLET (1 MG TOTAL) BY MOUTH IN THE MORNING. NOT COVERED   • furosemide (LASIX) 20 mg, Oral, 2 times daily   • iron polysaccharides (FERREX) 150 mg capsule TAKE 1 CAPSULE (150 MG TOTAL) BY MOUTH IN THE MORNING.  NOT COVERED   • levETIRAcetam (KEPPRA) 500 mg, Oral, Every 12 hours   • LORazepam (ATIVAN) 0.5 mg, Oral, Once as needed   • nadolol (CORGARD) 40 mg, Oral, Daily   • OXcarbazepine (TRILEPTAL) 600 mg tablet TAKE 1 TABLET BY MOUTH EVERY 12 HOURS   • oxyCODONE (ROXICODONE) 5 mg, Oral, Every 4 hours PRN   • pantoprazole (PROTONIX) 40 mg tablet TAKE 1 TABLET BY MOUTH EVERY DAY IN THE MORNING AND IN THE EVENING BEFORE MEALS   • sucralfate (CARAFATE) 1 g tablet TAKE 1 TABLET BY MOUTH EVERY 6 HOURS   • triamcinolone (KENALOG) 0.1 % ointment APPLY SPARINGLY TO AFFECTED AREAS OF LEGS 2-4 TIMES A DAY FOR UP TO 2 WEEKS. No Known Allergies   Social History     Tobacco Use   • Smoking status: Never   • Smokeless tobacco: Never   Vaping Use   • Vaping Use: Never used   Substance Use Topics   • Alcohol use: Never   • Drug use: Never    Family History   Problem Relation Age of Onset   • Depression Mother    • Heart disease Father         Objective                            Vitals I/O      Most Recent Min/Max in 24hrs   Temp (!) 97.1 °F (36.2 °C) Temp  Min: 96.7 °F (35.9 °C)  Max: 98 °F (36.7 °C)   Pulse (!) 54 Pulse  Min: 52  Max: 68   Resp 18 Resp  Min: 12  Max: 24   /58 BP  Min: 73/48  Max: 107/65   O2 Sat 99 % SpO2  Min: 93 %  Max: 100 %      Intake/Output Summary (Last 24 hours) at 8/25/2023 1704  Last data filed at 8/25/2023 1545  Gross per 24 hour   Intake 4087.5 ml   Output 700 ml   Net 3387.5 ml         Diet Clear Liquid     Invasive Monitoring Physical exam    Physical Exam  Eyes:      Conjunctiva/sclera: Conjunctivae normal.   Skin:     General: Skin is warm and dry. HENT:      Head: Normocephalic and atraumatic. Cardiovascular:      Rate and Rhythm: Normal rate and regular rhythm. Pulses: Normal pulses. Musculoskeletal:         General: Normal range of motion. Abdominal:      General: Bowel sounds are normal.      Palpations: Abdomen is soft. Tenderness: There is abdominal tenderness. Comments: TTP in RUQ, RUQ drain with minimal bloody drainage     Constitutional:       Appearance: He is well-developed and well-nourished. He is not toxic-appearing. Interventions: He is not sedated. Pulmonary:      Effort: Pulmonary effort is normal. No respiratory distress. Breath sounds: Normal breath sounds. Comments: On RA with SPO2 95%. LS CTA B/l  Neurological:      General: No focal deficit present. Mental Status: He is alert.  Mental status is at baseline. He is calm, disoriented to person, disoriented to place and disoriented to situation. Motor: Strength full and intact in all extremities. Diagnostic Studies    EKG: NSR, HR 64 bpm  Imaging:  I have personally reviewed pertinent reports.    and I have personally reviewed pertinent films in PACS     Medications:  Scheduled PRN   levETIRAcetam, 500 mg, Q12H [address]  OXcarbazepine, 600 mg, Q12H JODIE  pantoprazole, 40 mg, BID AC      HYDROmorphone, 0.5 mg, Q1H PRN  ondansetron, 4 mg, Q4H PRN  oxyCODONE, 5 mg, Q4H PRN       Continuous    lactated ringers, 100 mL/hr         Labs:    CBC    Recent Labs     08/25/23  1118 08/25/23  1155   WBC  --  9.64   HGB 7.5* 7.7*   HCT 22* 26.7*   PLT  --  147*     BMP    Recent Labs     08/25/23  1021 08/25/23  1118   CO2 30 31       Coags    Recent Labs     08/25/23  1155   INR 1.30*        Additional Electrolytes  Recent Labs     08/25/23  1021 08/25/23  1118   CAIONIZED 1.20 1.18          Blood Gas    No recent results  No recent results LFTs  No recent results    Infectious  No recent results  Glucose  No recent results            SIDDHARTHA Mcnamara

## 2023-08-25 NOTE — INTERVAL H&P NOTE
H&P reviewed. After examining the patient I find no changes in the patients condition since the H&P had been written.     Vitals:    08/25/23 0821   BP: 101/60   Pulse: 68   Resp: 20   Temp: (!) 97 °F (36.1 °C)   SpO2: 99%

## 2023-08-25 NOTE — DISCHARGE INSTR - AVS FIRST PAGE
Laparoscopic Cholecystectomy    WHAT YOU SHOULD KNOW:    Cholecystitis is inflammation of your gallbladder. Your gallbladder stores bile, which helps break down the fat that you eat. It also helps remove certain chemicals from your body. You may have a sudden, severe attack (acute cholecystitis) or several mild attacks (chronic cholecystitis). Laparoscopic cholecystectomy is surgery to remove your gallbladder. During this surgery, small incisions are made in your abdomen. A small scope and special tools are inserted through these incisions. Your gallbladder is removed from it normal location and taken out of your abdomen. The incisions are closed with sutures and a small amount of glue is applied over top incisions to help reinforce the incisions to optimize healing. AFTER YOU LEAVE:  Following discharge from the hospital, you may have some questions about your procedure, your activities or your general condition. These instructions may answer some of your questions and help you adjust during the first few days following your operation. You can expect to be sore and tender mostly around the incisions. This pain should last approximally 5 days and gradually improve daily. Incisions: Your doctor may have chosen to use a type of adhesive glue, to close your incision. The glue is used to cover the incision, assist in closure, and prevent contamination so to optimize healing. This adhesive glue will darken and may appear as a purple film over the incision. Do not pick at the glue, it will peel away on its own within one to two weeks. You may apply ice to the incisions to help with pain. Avoid heat as this my make the glue tacky   It is normal to have some bruising, swelling or mild discoloration around the incision. If increasing redness or pain develops, call our office immediately. You may wash the incision gently with soap and water then pat dry. Do not apply any creams or ointments. Dressings: You do not usually need to keep incisions covered with a dressing. A dressing is only required if you had a drain following your surgery and the drain was removed prior to discharge. If a dressing is required the doctor will discuss the dressing with prior to leaving. You may remove the dressing for showering, but reapply when dry. Bathing: You may shower daily with soap and water the day after the procedure. It is OK to GENTLY wash the incision with soap and water then pat dry. Do NOT soak incision in a tub, pool, or hot tub for 2 weeks. Diet: Eat low-fat foods for 4 to 6 weeks while your body learns to digest fat without a gallbladder. Slowly increase the amount of fat that you eat. We recommend you slowly advance your diet. Try to start with softer bland foods and gradually advance as tolerated. Be sure to consume plenty of water. Avoid alcohol. Activity/Restrictions: The evening following the procedure you should rest as much as possible, sitting, lying or reclining. you should be sure someone remains with you until the next morning. Gradually increase your activity daily. Walking 3-4 daily is good and  stairs are ok. Listen to your body. If you start to get tired or sore then rest.   No strenuous activity or exercise for 3-4 weeks. No driving for 5 days or while taking narcotics for pain. Return or work: You may return to work or other activities as soon as your pain is controlled and you feel comfortable. For many people, this is 5 to 7 days after surgery. If your job requires heavy lifting you will need to be on light duty for 2-3 weeks. Follow up appointment: following discharge from the hospital call the office in 1-2 days to set up a post operative appointment to be seen in 2-3 weeks. The phone number and address should be provided in your discharge paper work. Medication: Please take all medications as prescribed.  Call your healthcare provider if you think your medicine is not helping or if you have side effects. If you were given a prescription for Percocet, Norco, or Vicodin for pain be sure to eat prior to taking as these medications as they may cause nausea and vomiting on an empty stomach. DO NOT take Tylenol with these medication for a fever or for further pain control as these medications already contain Tylenol in them. Contact your healthcare provider if:   You have a fever over 101°F (38°C) or chills. You have pain or nausea that is not relieved by medicine. You have redness and swelling around your incisions, or blood or pus is leaking from your incisions. You are constipated or have diarrhea. Your skin or eyes are yellow, or your bowel movements are pale. You have questions or concerns about your surgery, condition, or care. Seek care immediately or call 911 if:   You cannot stop vomiting. Your bowel movements are black or bloody. You have pain in your abdomen and it is swollen or hard. Your arm or leg feels warm, tender, and painful. It may look swollen and red. You feel lightheaded, short of breath, and have chest pain. You cough up blood.

## 2023-08-25 NOTE — ANESTHESIA PREPROCEDURE EVALUATION
Procedure:  CHOLECYSTECTOMY LAPAROSCOPIC (Abdomen)    Relevant Problems   CARDIO   (+) Esophageal varices (HCC) (Last bending ~2 mo ago, no recent bleeds)   (+) Primary hypertension      GI/HEPATIC   (+) Gastroesophageal reflux disease without esophagitis   (+) Other cirrhosis of liver (HCC) (No recent ascites)      HEMATOLOGY   (+) Anemia      NEURO/PSYCH   (+) Legally blind in right eye, as defined in [place]   (+) Nonintractable epilepsy without status epilepticus ([address]) (Well controlled, no seizures in >7 yrs)      Nervous and Auditory   (+) Bilateral hearing loss        Physical Exam    Airway    Mallampati score: I  TM Distance: >3 FB  Neck ROM: full     Dental   upper dentures and lower dentures,     Cardiovascular      Pulmonary      Other Findings        Anesthesia Plan  ASA Score- 3     Anesthesia Type- general with ASA Monitors. Additional Monitors:   Airway Plan: ETT. Comment: Discussed possible postoperative truncal blocks for analgesia if conversion to open procedure given prior abdominal trauma/sx. Plan Factors-Exercise tolerance (METS): >4 METS. Chart reviewed. Existing labs reviewed. Patient summary reviewed. Induction- intravenous. Postoperative Plan- Plan for postoperative opioid use. Planned trial extubation    Informed Consent- Anesthetic plan and risks discussed with patient. I personally reviewed this patient with the CRNA. Discussed and agreed on the Anesthesia Plan with the CRNA. Josef Mueller

## 2023-08-25 NOTE — ASSESSMENT & PLAN NOTE
· Continue home Keppra and trileptal  · Holding home valium for now.  Will give PRN if needed  · Starting routine neuro checks

## 2023-08-25 NOTE — QUICK NOTE
Checked on [place] in PACU. Repeat hemoglobin was 7.7. Due to the oozing nature of the case, he was given 2 units of blood. Heart rates in the mid 50s blood pressure is 102/64. INR is 1.3  Incisional pain as expected. Drain is put out sanguinous appearing fluid but without clots. We will continue to monitor in stepdown 2. I discussed the operative case with his brother immediately after surgery. Discussed with surgical coverage for the weekend.   Repeat hemoglobin at 6 PM tonight

## 2023-08-25 NOTE — ASSESSMENT & PLAN NOTE
· Pt follows with Dr. Jazmine Seay as o/p for polypoid masses in the GB seen on MRI in March 2023 and CT abd in July 2023  · Pt is now POD 0 s/p lap cholecystectomy that had to be converted to open intra-op. Noted to have extensive adhesions of omentum to GB with a large amount of oozing intra-op. ESBL 550 ml  · GB sent for pathology   · If malignant will be referred to oncology  · Post opt noted to have Hgb decreased to 7.7, so he was given 2 units PRBC  · Trend Hgb  · Admit to SD2 for close monitoring  · Dilaudid and oxy IR PRN for pain control  · Started on CLD  · Close monitoring of RUQ drainage output.

## 2023-08-25 NOTE — ANESTHESIA POSTPROCEDURE EVALUATION
Post-Op Assessment Note    CV Status:  Stable    Pain management: adequate     Mental Status:  Arousable   Hydration Status:  Stable   PONV Controlled:  None   Airway Patency:  Patent      Post Op Vitals Reviewed: Yes      Staff: CRNA         No notable events documented.     BP   104/61   Temp     Pulse  68   Resp   17   SpO2   100% on 6LFM   Postop VS in PACU noted above, SV non-obstructed

## 2023-08-25 NOTE — OP NOTE
OPERATIVE REPORT  PATIENT NAME: Nadia Qiu    :  1965  MRN: [id number]  Pt Location: AN OR ROOM 04    SURGERY DATE: 2023    Surgeon(s) and Role:     * Daisha Thomas DO - Primary  Soumya SCHULTE assisted. No qualified resident was available. Her assistance was required for exposure and retraction throughout the case    Preop Diagnosis:  Mass of gallbladder K82.8    Post-Op Diagnosis Codes:     * Mass of gallbladder [K82.8]    Procedure(s):  CHOLECYSTECTOMY OPEN, diagnostic laparoscopy  Control of liver hemorrhage    Specimen(s):  ID Type Source Tests Collected by Time Destination   1 :  Tissue Gallbladder TISSUE EXAM Kimberlee Joe Fletcher DO 2023 [id number]        Estimated Blood Loss:   550 mL    Drains:  * No LDAs found *    Anesthesia Type:   General/local  Exparel as well    Operative Indications: Mass of gallbladder K82.8      Operative Findings:  Extensive adhesions to the omentum and the gallbladder. All raw surfaces were rather oozy prompting an open procedure. Patient had reported history of cirrhosis however liver did not appear nodular. Did not appear to have varices in this region. Bleeding of the lateral liver edge as well as some of the omentum was controlled with [place] hemostatic agent  ASA 3. Wound class II  Height 72 inches weight 84 kg / 185 pounds. BMI 25    Complications:   None    Procedure and Technique:  Patient was brought the operative suite and identified by visualization, conversation, by armband. Sequential compression pumps were placed. He was given Ancef perioperatively. Once under general anesthesia abdomen was prepped and draped in a sterile fashion. Timeout was performed and was assured that the prep was dry. Local was instilled about the umbilicus. Small vertical skin incision was made at the infraumbilical fold.   Underlying subcutaneous tissue was bluntly dissected Tan clamps down to the fascia fascia was lifted up and carefully divided the midline with a scalpel blade. I was able to poke to the underlying peritoneum gaining access into the abdominal cavity. An 11 mm trocar was placed under direct visualization. CO2 was attached crating a pneumoperitoneum were able to find a window along the right side of the abdomen. He did have some omental adhesions to the upper aspect of the midline from his previous laparotomy. Very large distended gallbladder was noted. 3 other 5 mm bladeless trocars were then placed in the right upper quadrant. The gallbladder was then lifted cephalad. The omentum was quite adherent to the gallbladder. Using the harmonic scalpel I attempted to take the omentum off of the gallbladder. However a lot of the raw surfaces were rather oozy. Gallbladder wall was somewhat thin. I did get into the gallbladder. However some of the cut edges continue to bleed. Given that I really had made no headway with taking the omentum off the gallbladder and with some of the hemorrhage, I converted to an open procedure. The right subcostal margin incision was made. Hot cautery was used to go through the subcutaneous tissue. Carefully went to the anterior rectus fascia. Carefully divided the rectus muscle continued to divide the posterior rectus sheath into the some of the oblique muscles laterally. Fair amount of clot was noted and this was evacuated. Several laparotomy pads were utilized to pack around the gallbladder. Liver capsule was smooth and not nodular as anticipated. Hole in the gallbladder was oversewn with 0 Vicryl suture to help with control. Normal seen there was blood emanating out of the gallbladder. A 4 retractor was placed for exposure. Continued to take the gallbladder off of the liver in a dome down fashion. There is a lot of oozing in the medial side we could certainly find a proper plane laterally. As I get down in the neck I was able to get other Ema's and my hand around it.   Careful dissection was then carried out towards the cystic duct and cystic artery. This was done with a right angle clamp. Cystic artery was triply clipped and divided. Once the cystic duct was noted this was clamped with a right angle clamp and then divided. The gallbladder was then handed off the field. This was oversewn with a 2-0 silk suture. Clips were also placed. He still had a fair amount of oozing from raw liver surface especially in the lateral side. Initially attempted Surgicel with compression. Ultimately I did place an [place] hemostatic agent with laparotomy pads. There is also a fair amount of raw surface on the omentum below the gallbladder fossa. I did end up placing another piece of [place] on this. Pressure was held for at least 5 minutes. Then went back and appeared to be good hemostasis. I did place a 23 [place] Valerio drain in the gallbladder fossa. This exited the lateralmost trocar site. Irrigation was carried out. I did place some other omentum between the [place] and the bladder fossa. Drain was anchored with 3-0 nylon. Posterior rectus sheath and peritoneum were closed using #1 Vicryl. Anterior rectus sheath was closed using #1 PDS. Irrigation was carried out. Exparel lidocaine was instilled into the muscles. 0 Vicryl was used to reapproximate the subcutaneous tissues. Skin was closed using skin staples. Fascia at the umbilical site was closing 0 Vicryl in a figure-of-eight fashion. That skin incision was closed using 4-0 Monocryl. He was awakened in the operating room returned to the recovery area in stable condition having tolerated the procedure well. I was present for the entire procedure.     Patient Disposition:  PACU         SIGNATURE: Emory Barr DO  DATE: August 25, 2023  TIME: 11:39 AM

## 2023-08-25 NOTE — ASSESSMENT & PLAN NOTE
· Pt with ABLA post opt  · Given 2 units PRBC in PACU for Hgb 7.7  · Holding home antiHTNs and lasix for now  · Consider resuming home meds tomorrow if HDS

## 2023-08-25 NOTE — ASSESSMENT & PLAN NOTE
· A/C Anemia in the setting of ABLA  · Given 2 units PRBC in PACU for Hgb 7.7  · Recheck CBC at 1800 and transfuse as needed  · Close monitoring of RUQ drainage output  · Hold on pharmaceutical DVT PPx for now

## 2023-08-25 NOTE — QUICK NOTE
Post op Check      S: Doing well. Pain well controlled, incisional pain. No acute complaints. O: /58   Pulse (!) 54   Temp (!) 97.1 °F (36.2 °C)   Resp 18   Ht 6' (1.829 m)   Wt 84 kg (185 lb 3 oz)   SpO2 99%   BMI 25.12 kg/m²     Physical Exam:  General: AAOx3, no acute distress  Heart: regular rate  Lungs: normal work of breathing, no acute respiratory distress  Abdomen: soft. Appropriately tender. Incisions CDI, ASHLY with minimal sanguinous output in bulb. Ice pack to incision.    Skin: warm, well perfused      Assessment:  59 y.o. male POD#0 s/p Procedure(s) (LRB):  CHOLECYSTECTOMY OPEN (N/A)  AVSS  S/p 2 units pRBC        Plan:  - CLD  - f/u 6 pm hgb  - Monitor AHSLY drain output  - prn pain control, antiemetic regimen  - encourage oob, ambulation  - encourage deep breathing, IS  - Appreciate APS and SCC recs      [employer] Chan  8/25/2023 4:51 PM

## 2023-08-26 PROBLEM — E83.42 HYPOMAGNESEMIA: Status: ACTIVE | Noted: 2023-08-26

## 2023-08-26 LAB
ABO GROUP BLD BPU: NORMAL
ABO GROUP BLD BPU: NORMAL
ANION GAP SERPL CALCULATED.3IONS-SCNC: 6 MMOL/L
BPU ID: NORMAL
BPU ID: NORMAL
BUN SERPL-MCNC: 15 MG/DL (ref 5–25)
CALCIUM SERPL-MCNC: 7.8 MG/DL (ref 8.4–10.2)
CHLORIDE SERPL-SCNC: 105 MMOL/L (ref 96–108)
CO2 SERPL-SCNC: 27 MMOL/L (ref 21–32)
CREAT SERPL-MCNC: 0.68 MG/DL (ref 0.6–1.3)
CROSSMATCH: NORMAL
CROSSMATCH: NORMAL
ERYTHROCYTE [DISTWIDTH] IN BLOOD BY AUTOMATED COUNT: 15.4 % (ref 11.6–15.1)
GFR SERPL CREATININE-BSD FRML MDRD: 104 ML/MIN/1.73SQ M
GLUCOSE SERPL-MCNC: 121 MG/DL (ref 65–140)
HCT VFR BLD AUTO: 27.6 % (ref 36.5–49.3)
HCT VFR BLD AUTO: 28.1 % (ref 36.5–49.3)
HGB BLD-MCNC: 8.6 G/DL (ref 12–17)
HGB BLD-MCNC: 8.8 G/DL (ref 12–17)
INR PPP: 1.33 (ref 0.84–1.19)
MAGNESIUM SERPL-MCNC: 1.8 MG/DL (ref 1.9–2.7)
MCH RBC QN AUTO: 27 PG (ref 26.8–34.3)
MCHC RBC AUTO-ENTMCNC: 31.3 G/DL (ref 31.4–37.4)
MCV RBC AUTO: 86 FL (ref 82–98)
PHOSPHATE SERPL-MCNC: 2.8 MG/DL (ref 2.7–4.5)
PLATELET # BLD AUTO: 117 THOUSANDS/UL (ref 149–390)
PMV BLD AUTO: 9.5 FL (ref 8.9–12.7)
POTASSIUM SERPL-SCNC: 3.8 MMOL/L (ref 3.5–5.3)
PROTHROMBIN TIME: 17.2 SECONDS (ref 11.6–14.5)
RBC # BLD AUTO: 3.26 MILLION/UL (ref 3.88–5.62)
SODIUM SERPL-SCNC: 138 MMOL/L (ref 135–147)
UNIT DISPENSE STATUS: NORMAL
UNIT DISPENSE STATUS: NORMAL
UNIT PRODUCT CODE: NORMAL
UNIT PRODUCT CODE: NORMAL
UNIT PRODUCT VOLUME: 350 ML
UNIT PRODUCT VOLUME: 350 ML
UNIT RH: NORMAL
UNIT RH: NORMAL
WBC # BLD AUTO: 14.4 THOUSAND/UL (ref 4.31–10.16)

## 2023-08-26 PROCEDURE — 85014 HEMATOCRIT: CPT

## 2023-08-26 PROCEDURE — 80048 BASIC METABOLIC PNL TOTAL CA: CPT | Performed by: SURGERY

## 2023-08-26 PROCEDURE — 85610 PROTHROMBIN TIME: CPT | Performed by: SURGERY

## 2023-08-26 PROCEDURE — 85018 HEMOGLOBIN: CPT

## 2023-08-26 PROCEDURE — 84100 ASSAY OF PHOSPHORUS: CPT | Performed by: NURSE PRACTITIONER

## 2023-08-26 PROCEDURE — 99232 SBSQ HOSP IP/OBS MODERATE 35: CPT | Performed by: STUDENT IN AN ORGANIZED HEALTH CARE EDUCATION/TRAINING PROGRAM

## 2023-08-26 PROCEDURE — 85027 COMPLETE CBC AUTOMATED: CPT | Performed by: SURGERY

## 2023-08-26 PROCEDURE — NC001 PR NO CHARGE: Performed by: STUDENT IN AN ORGANIZED HEALTH CARE EDUCATION/TRAINING PROGRAM

## 2023-08-26 PROCEDURE — 83735 ASSAY OF MAGNESIUM: CPT | Performed by: NURSE PRACTITIONER

## 2023-08-26 RX ORDER — ENOXAPARIN SODIUM 100 MG/ML
40 INJECTION SUBCUTANEOUS
Status: DISCONTINUED | OUTPATIENT
Start: 2023-08-26 | End: 2023-08-29 | Stop reason: HOSPADM

## 2023-08-26 RX ORDER — ENOXAPARIN SODIUM 100 MG/ML
30 INJECTION SUBCUTANEOUS EVERY 12 HOURS SCHEDULED
Status: DISCONTINUED | OUTPATIENT
Start: 2023-08-26 | End: 2023-08-26

## 2023-08-26 RX ORDER — MAGNESIUM SULFATE HEPTAHYDRATE 40 MG/ML
2 INJECTION, SOLUTION INTRAVENOUS ONCE
Status: COMPLETED | OUTPATIENT
Start: 2023-08-26 | End: 2023-08-27

## 2023-08-26 RX ORDER — POTASSIUM CHLORIDE 14.9 MG/ML
20 INJECTION INTRAVENOUS ONCE
Status: COMPLETED | OUTPATIENT
Start: 2023-08-26 | End: 2023-08-27

## 2023-08-26 RX ADMIN — ENOXAPARIN SODIUM 40 MG: 40 INJECTION SUBCUTANEOUS at 13:58

## 2023-08-26 RX ADMIN — OXCARBAZEPINE 600 MG: 150 TABLET, FILM COATED ORAL at 21:19

## 2023-08-26 RX ADMIN — PANTOPRAZOLE SODIUM 40 MG: 40 TABLET, DELAYED RELEASE ORAL at 16:39

## 2023-08-26 RX ADMIN — OXCARBAZEPINE 600 MG: 150 TABLET, FILM COATED ORAL at 08:48

## 2023-08-26 RX ADMIN — MAGNESIUM SULFATE HEPTAHYDRATE 2 G: 40 INJECTION, SOLUTION INTRAVENOUS at 08:29

## 2023-08-26 RX ADMIN — PANTOPRAZOLE SODIUM 40 MG: 40 TABLET, DELAYED RELEASE ORAL at 06:00

## 2023-08-26 RX ADMIN — POTASSIUM CHLORIDE 20 MEQ: 14.9 INJECTION, SOLUTION INTRAVENOUS at 08:29

## 2023-08-26 RX ADMIN — LEVETIRACETAM 500 MG: 500 TABLET, FILM COATED ORAL at 21:19

## 2023-08-26 RX ADMIN — LEVETIRACETAM 500 MG: 500 TABLET, FILM COATED ORAL at 08:29

## 2023-08-26 NOTE — ASSESSMENT & PLAN NOTE
· A/C Anemia in the setting of ABLA. EBL 550ml intraop  · Hgb stable s/p 2 U PRBC  · HD stable.  F/u Hgb noon

## 2023-08-26 NOTE — PROGRESS NOTES
[facility]  Progress Note  Name: Damaris Delgado I  MRN: [id number]  Unit/Bed#: ICU 03 I Date of Admission: 8/25/2023   Date of Service: 8/26/2023 I Hospital Day: 1    Assessment/Plan   * Gallbladder polyp  Assessment & Plan  · Pt follows with Dr. Nick Rushing as o/p for polypoid masses in the GB seen on MRI in March 2023 and CT abd in July 2023  · POD 1 s/p lap cholecystectomy that had to be converted to open intra-op. Noted to have extensive adhesions of omentum to GB with a large amount of oozing intra-op. ESBL 550 ml  · GB sent for pathology   · If malignant will be referred to oncology  · Post opt noted to have Hgb decreased to 7.7, so he was given 2 units PRBC  · Dilaudid and oxy IR PRN for pain control    Plan:   · Currently on CLD, advance diet as tolerated per surgery  · F/u noon Hgb  · F/u drain output    Anemia  Assessment & Plan  · A/C Anemia in the setting of ABLA. EBL 550ml intraop  · Hgb stable s/p 2 U PRBC  · HD stable. 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 Will give PRN if needed  · Starting routine neuro checks             ICU Core Measures     A: Assess, Prevent, and Manage Pain · Has pain been assessed? Yes  · Need for changes to pain regimen? No   B: Both SAT/SAT  · N/A   C: Choice of Sedation · RASS Goal: 0 Alert and Calm  · Need for changes to sedation or analgesia regimen? No   D: Delirium · CAM-ICU: Negative   E: Early Mobility  · Plan for early mobility?  No F: Family Engagement · Plan for family engagement today? Yes         Prophylaxis:  VTE Contraindicated secondary to: will start   Stress Ulcer  covered bypantoprazole (PROTONIX) EC tablet 40 mg [[id number]]       Subjective   HPI/24hr events: HD stable. Hgb stable    Infusions:  LR @ 100.hr    I/O (24): 5.3/1.2 +4.1  ASHLY drain 250ml      Review of Systems   Respiratory: Negative for shortness of breath. Cardiovascular: Negative for chest pain and palpitations. Gastrointestinal: Positive for abdominal pain. Negative for diarrhea, nausea and vomiting. All other systems reviewed and are negative. Objective                            Vitals I/O      Most Recent Min/Max in 24hrs   Temp 99.3 °F (37.4 °C) Temp  Min: 96.7 °F (35.9 °C)  Max: 99.3 °F (37.4 °C)   Pulse 84 Pulse  Min: 51  Max: 85   Resp (!) 36 Resp  Min: 12  Max: 46   /67 BP  Min: 73/48  Max: 116/67   O2 Sat 93 % SpO2  Min: 90 %  Max: 100 %      Intake/Output Summary (Last 24 hours) at 8/26/2023 0826  Last data filed at 8/26/2023 0800  Gross per 24 hour   Intake 5529.16 ml   Output 1290 ml   Net 4239.16 ml         Diet Clear Liquid     Invasive Monitoring Physical exam   Arterial Line  [place] BP    No data recorded   MAP    No data recorded    Physical Exam  Eyes:      Pupils: Pupils are equal, round, and reactive to light. Skin:     General: Skin is warm and dry. Capillary Refill: Capillary refill takes less than 2 seconds. HENT:      Mouth/Throat:      Mouth: Mucous membranes are moist.   Cardiovascular:      Rate and Rhythm: Normal rate and regular rhythm. Pulses: Normal pulses. Abdominal:      General: Bowel sounds are normal.      Palpations: Abdomen is soft. Tenderness: There is generalized abdominal tenderness. Comments: ASHLY drain   Constitutional:       Appearance: He is well-developed. Pulmonary:      Effort: Pulmonary effort is normal.      Breath sounds: Normal breath sounds.    Neurological:      General: No focal deficit present. Mental Status: He is alert and oriented to person, place, and time. GCS: GCS eye subscore is 3. GCS verbal subscore is 5. GCS motor subscore is 6. Vitals reviewed. Diagnostic Studies      EKG: NSR  Imaging: no imaging I have personally reviewed pertinent reports.        Medications:  Scheduled PRN   levETIRAcetam, 500 mg, Q12H JODIE  magnesium sulfate, 2 g, Once  OXcarbazepine, 600 mg, Q12H JODIE  pantoprazole, 40 mg, BID AC  potassium chloride, 20 mEq, Once      HYDROmorphone, 0.5 mg, Q1H PRN  ondansetron, 4 mg, Q4H PRN  oxyCODONE, 5 mg, Q4H PRN       Continuous    lactated ringers, 100 mL/hr, Last Rate: 100 mL/hr (08/25/23 1735)         Labs:    CBC    Recent Labs     08/25/23 1825 08/26/23  0500   WBC 14.10* 14.40*   HGB 10.3* 8.8*   HCT 33.5* 28.1*   * 117*     BMP    Recent Labs     08/25/23 1825 08/26/23  0500   SODIUM 140 138   K 4.7 3.8    105   CO2 31 27   AGAP 2 6   BUN 14 15   CREATININE 0.57* 0.68   CALCIUM 8.0* 7.8*       Coags    Recent Labs     08/25/23  1155 08/26/23  0500   INR 1.30* 1.33*        Additional Electrolytes  Recent Labs     08/25/23  1021 08/25/23  1118 08/25/23  1825 08/26/23  0500   MG  --   --  1.6* 1.8*   PHOS  --   --   --  2.8   CAIONIZED 1.20 1.18  --   --           Blood Gas    No recent results  No recent results LFTs  No recent results    Infectious  No recent results  Glucose  Recent Labs     08/25/23 1825 08/26/23  0500   GLUC 153* 121               See attending attestation for critical care billing    [facility]

## 2023-08-26 NOTE — UTILIZATION REVIEW
Initial Clinical Review  [address] INPATIENT ONLY procedure   Elective outpatient procedure, converted to inpatient admission due to converted to open choley, due to intraop bleeding. Age/Sex: 59 y.o. male  Surgery Date: 8/25/2023  Procedure: CHOLECYSTECTOMY OPEN, diagnostic laparoscopy  Control of liver hemorrhage  Anesthesia: general /local; Exparel as well  Operative Findings:   Extensive adhesions to the omentum and the gallbladder. All raw surfaces were rather oozy prompting an open procedure. Patient had reported history of cirrhosis however liver did not appear nodular. Did not appear to have varices in this region. Bleeding of the lateral liver edge as well as some of the omentum was controlled with Parsons hemostatic agent  ASA 3. Wound class II  Height 72 inches weight 84 kg / 185 pounds. BMI 25  POD#1 Progress Note:   Immediate Post OP: S/P 2 units blood cont in SD ICU post OP; HR was mid 50s & /64; Gen Surgery   s/p laparoscopic converted to open cholecystectomy   Systolics transiently dipped into the high 80s but responded, afebrile, 2L NC  WBC 14.4 from 14.1  Hemoglobin 8.8 from 10.3 from 7.7 after 2 unit PRBCs yesterday. PT/INR 17.2/1.33   ASHLY 250 dark serosanguineous.  recorded. Soft, ND, tender in the right upper quadrant around incision, ASHLY drain dark serosanguineous. Tolerating CL Liq no flatus or BM   Critical Care   Cont CLD, advance as tolerated & IVF until tolerating adequate p.o.  ASHLY drain, monitor output and character. Lovenox   pain control, Encourage ambulation & -Incentive spirometry    CRITICAL CARE  GB sent for pathology. If malignant will be referred to oncology. HD stable follow up noon HGB. Replete electrolytes.  Cont hold home antiHTN meds, cont home Keppra & Triileptal, holding home Valium PRN if need; cont Neuro checks    Admission Orders: Date/Time/Statement:   Admission Orders (From admission, onward)     Ordered        08/25/23 1306  Inpatient Admission  Once 08/25/23 1136  Place in Observation  Once,   Status:  Canceled                      Orders Placed This Encounter   Procedures   • Inpatient Admission     Standing Status:   Standing     Number of Occurrences:   1     Order Specific Question:   Level of Care     Answer:   Med Surg [16]     Order Specific Question:   Estimated length of stay     Answer:   More than 2 Midnights     Order Specific Question:   Certification     Answer:   I certify that inpatient services are medically necessary for this patient for a duration of greater than two midnights. See H&P and MD Progress Notes for additional information about the patient's course of treatment.      Vital Signs: /67 (BP Location: Left arm)   Pulse 84   Temp 99.3 °F (37.4 °C) (Axillary)   Resp (!) 36   Ht 6' (1.829 m)   Wt 95.6 kg (210 lb 12.2 oz)   SpO2 93%   BMI 28.58 kg/m²     Pertinent Labs/Diagnostic Test Results:   No orders to display         Results from last 7 days   Lab Units 08/26/23  1137 08/26/23  0500 08/25/23  1825 08/25/23  1155 08/25/23  1118   WBC Thousand/uL  --  14.40* 14.10* 9.64  --    HEMOGLOBIN g/dL 8.6* 8.8* 10.3* 7.7*  --    I STAT HEMOGLOBIN g/dl  --   --   --   --  7.5*   HEMATOCRIT % 27.6* 28.1* 33.5* 26.7*  --    HEMATOCRIT, ISTAT %  --   --   --   --  22*   PLATELETS Thousands/uL  --  117* 146* 147*  --          Results from last 7 days   Lab Units 08/26/23  0500 08/25/23  1825 08/25/23  1118 08/25/23  1021   SODIUM mmol/L 138 140  --   --    POTASSIUM mmol/L 3.8 4.7  --   --    CHLORIDE mmol/L 105 107  --   --    CO2 mmol/L 27 31  --   --    CO2, I-STAT mmol/L  --   --  31 30   ANION GAP mmol/L 6 2  --   --    BUN mg/dL 15 14  --   --    CREATININE mg/dL 0.68 0.57*  --   --    EGFR ml/min/1.73sq m 104 112  --   --    CALCIUM mg/dL 7.8* 8.0*  --   --    CALCIUM, IONIZED, ISTAT mmol/L  --   --  1.18 1.20   MAGNESIUM mg/dL 1.8* 1.6*  --   --    PHOSPHORUS mg/dL 2.8  --   --   --              Results from last 7 days   Lab Units 08/26/23  0500 08/25/23  1825   GLUCOSE RANDOM mg/dL 121 153*             No results found for: "BETA-HYDROXYBUTYRATE"           Results from last 7 days   Lab Units 08/25/23  1118 08/25/23  1021   PH, KERVIN I-STAT  7.335 7.373   PCO2, KERVIN ISTAT mm HG 54.2* 49.7   PO2, KERVIN ISTAT mm HG 38.0 54.0*   HCO3, KERVIN ISTAT mmol/L 29.0 28.9   I STAT BASE EXC mmol/L 3 3   I STAT O2 SAT % 68 86*                 Results from last 7 days   Lab Units 08/26/23  0500 08/25/23  1155   PROTIME seconds 17.2* 16.9*   INR  1.33* 1.30*                                     Results from last 7 days   Lab Units 08/26/23  0547   UNIT PRODUCT CODE  [id number]  [id number]   UNIT NUMBER  [id number]  [id number]   UNITABO  A  A   UNITRH  POS  POS   CROSSMATCH  Compatible  Compatible   UNIT DISPENSE STATUS  Presumed Trans  Presumed Trans   UNIT PRODUCT VOL ml 350  350          Continuous cardiopulmonary & pulse oximetry  Neuro checks    Diet: Cl Liq diet  Mobility:  Ambulate patient  DVT Prophylaxis: SCD    Medications/Pain Control:   Scheduled Medications:  levETIRAcetam, 500 mg, Oral, Q12H JODIE  OXcarbazepine, 600 mg, Oral, Q12H JODIE  pantoprazole, 40 mg, Oral, BID AC      Continuous IV Infusions:  lactated ringers, 100 mL/hr, Intravenous, Continuous      PRN Meds:  HYDROmorphone, 0.5 mg, Intravenous, Q1H PRN  ondansetron, 4 mg, Intravenous, Q4H PRN  oxyCODONE, 5 mg, Oral, Q4H PRN    Network Utilization Review Department  ATTENTION: Please call with any questions or concerns to [phone] and carefully listen to the prompts so that you are directed to the right person. All voicemails are confidential.  Ellen Hughes all requests for admission clinical reviews, approved or denied determinations and any other requests to dedicated fax number below belonging to the campus where the patient is receiving treatment.  List of dedicated fax numbers for the Facilities:  FACILITY NAME UR FAX NUMBER   ADMISSION DENIALS (Administrative/Medical Necessity) [phone] [address] (Maternity/NICU/Pediatrics) [address] [facility] [phone] [address] [address] [facility] [address] [phone] Detail Level: Zone Initiate Treatment: [facility] Initiate Treatment: Put urea cream on at night, file in the morning and put on ciclopirox (to be left on all day)  Continue for 3 months or until nail plate is gone to treat Left great and 2nd toenail only

## 2023-08-26 NOTE — PROGRESS NOTES
Progress Note - General Surgery   Jose Chavez 59 y.o. male MRN: [id number]  Unit/Bed#: ICU 03 Encounter: [id number]    Assessment:  54yoM w GB mass now s/p laparoscopic converted to open cholecystectomy on 6/44    Systolics transiently dipped into the high 80s but responded, afebrile, 2L NC  WBC 14.4 from 14.1  Hemoglobin 8.8 from 10.3 from 7.7 after 2 unit PRBCs yesterday  PT/INR 17.2/1.33    ASHLY 250 dark serosanguineous   recorded    Plan:  -CLD, advance as tolerated  -IVF until tolerating adequate p.o.  -ASHLY drain, monitor output and character  -Lovenox   -pain control  -Encourage ambulation  -Incentive spirometry  -Stable for downgrade out of the ICU    Subjective/Objective   Subjective:   Patient doing well this morning, reports minimal abdominal pain mostly at the site of ASHLY insertion site, tolerating clears without nausea or emesis, not yet passing flatus or having bowel movements, voiding without issues. Objective:     Blood pressure 100/52, pulse 66, temperature 98.9 °F (37.2 °C), temperature source Axillary, resp. rate (!) 27, height 6' (1.829 m), weight 95.6 kg (210 lb 12.2 oz), SpO2 92 %. ,Body mass index is 28.58 kg/m². Intake/Output Summary (Last 24 hours) at 8/26/2023 0707  Last data filed at 8/26/2023 0601  Gross per 24 hour   Intake 5330.83 ml   Output 1200 ml   Net 4130.83 ml       Invasive Devices     Peripheral Intravenous Line  Duration           Peripheral IV 08/25/23 Dorsal (posterior); Right Hand <1 day    Peripheral IV 08/25/23 Left Hand <1 day          Drain  Duration           Closed/Suction Drain Right Abdomen Bulb <1 day                Physical Exam:   General: NAD  Skin: Warm, dry, anicteric  HEENT: Normocephalic, atraumatic  CV: RRR, no m/r/g  Pulm: CTA b/l, no inc WOB, 2L NC  Abd: Soft, ND, tender in the right upper quadrant around incision, ASHLY drain dark serosanguineous  MSK: Symmetric, no edema, no tenderness, no deformity  Neuro: AOx3, GCS 15    Lab, Imaging and other studies:  I have personally reviewed pertinent lab results.   , CBC:   Lab Results   Component Value Date    WBC 14.40 (H) 08/26/2023    HGB 8.8 (L) 08/26/2023    HCT 28.1 (L) 08/26/2023    MCV 86 08/26/2023     (L) 08/26/2023    RBC 3.26 (L) 08/26/2023    MCH 27.0 08/26/2023    MCHC 31.3 (L) 08/26/2023    RDW 15.4 (H) 08/26/2023    MPV 9.5 08/26/2023   , CMP:   Lab Results   Component Value Date    SODIUM 138 08/26/2023    K 3.8 08/26/2023     08/26/2023    CO2 27 08/26/2023    CO2 31 08/25/2023    BUN 15 08/26/2023    CREATININE 0.68 08/26/2023    GLUCOSE 132 08/25/2023    CALCIUM 7.8 (L) 08/26/2023    EGFR 104 08/26/2023     VTE Pharmacologic Prophylaxis: Sequential compression device (Venodyne)  and Enoxaparin (Lovenox)  VTE Mechanical Prophylaxis: sequential compression device

## 2023-08-26 NOTE — PLAN OF CARE
Problem: MOBILITY - ADULT  Goal: Maintain or return to baseline ADL function  Description: INTERVENTIONS:  -  Assess patient's ability to carry out ADLs; assess patient's baseline for ADL function and identify physical deficits which impact ability to perform ADLs (bathing, care of mouth/teeth, toileting, grooming, dressing, etc.)  - Assess/evaluate cause of self-care deficits   - Assess range of motion  - Assess patient's mobility; develop plan if impaired  - Assess patient's need for assistive devices and provide as appropriate  - Encourage maximum independence but intervene and supervise when necessary  - Involve family in performance of ADLs  - Assess for home care needs following discharge   - Consider OT consult to assist with ADL evaluation and planning for discharge  - Provide patient education as appropriate  Outcome: Progressing  Goal: Maintains/Returns to pre admission functional level  Description: INTERVENTIONS:  - Perform BMAT or MOVE assessment daily.   - Set and communicate daily mobility goal to care team and patient/family/caregiver. - Collaborate with rehabilitation services on mobility goals if consulted  - Perform Range of Motion  times a day. - Reposition patient every  hours.   - Dangle patient  times a day  - Stand patient  times a day  - Ambulate patient  times a day  - Out of bed to chair  times a day   - Out of bed for meals  times a day  - Out of bed for toileting  - Record patient progress and toleration of activity level   Outcome: Progressing     Problem: PAIN - ADULT  Goal: Verbalizes/displays adequate comfort level or baseline comfort level  Description: Interventions:  - Encourage patient to monitor pain and request assistance  - Assess pain using appropriate pain scale  - Administer analgesics based on type and severity of pain and evaluate response  - Implement non-pharmacological measures as appropriate and evaluate response  - Consider cultural and social influences on pain and pain management  - Notify physician/advanced practitioner if interventions unsuccessful or patient reports new pain  Outcome: Progressing     Problem: INFECTION - ADULT  Goal: Absence or prevention of progression during hospitalization  Description: INTERVENTIONS:  - Assess and monitor for signs and symptoms of infection  - Monitor lab/diagnostic results  - Monitor all insertion sites, i.e. indwelling lines, tubes, and drains  - Monitor endotracheal if appropriate and nasal secretions for changes in amount and color  - [place] appropriate cooling/warming therapies per order  - Administer medications as ordered  - Instruct and encourage patient and family to use good hand hygiene technique  - Identify and instruct in appropriate isolation precautions for identified infection/condition  Outcome: Progressing  Goal: Absence of fever/infection during neutropenic period  Description: INTERVENTIONS:  - Monitor WBC    Outcome: Progressing     Problem: SAFETY ADULT  Goal: Maintain or return to baseline ADL function  Description: INTERVENTIONS:  -  Assess patient's ability to carry out ADLs; assess patient's baseline for ADL function and identify physical deficits which impact ability to perform ADLs (bathing, care of mouth/teeth, toileting, grooming, dressing, etc.)  - Assess/evaluate cause of self-care deficits   - Assess range of motion  - Assess patient's mobility; develop plan if impaired  - Assess patient's need for assistive devices and provide as appropriate  - Encourage maximum independence but intervene and supervise when necessary  - Involve family in performance of ADLs  - Assess for home care needs following discharge   - Consider OT consult to assist with ADL evaluation and planning for discharge  - Provide patient education as appropriate  Outcome: Progressing  Goal: Maintains/Returns to pre admission functional level  Description: INTERVENTIONS:  - Perform BMAT or MOVE assessment daily.   - Set and communicate daily mobility goal to care team and patient/family/caregiver. - Collaborate with rehabilitation services on mobility goals if consulted  - Perform Range of Motion  times a day. - Reposition patient every  hours.   - Dangle patient  times a day  - Stand patient  times a day  - Ambulate patient  times a day  - Out of bed to chair  times a day   - Out of bed for meals  times a day  - Out of bed for toileting  - Record patient progress and toleration of activity level   Outcome: Progressing  Goal: Patient will remain free of falls  Description: INTERVENTIONS:  - Educate patient/family on patient safety including physical limitations  - Instruct patient to call for assistance with activity   - Consult OT/PT to assist with strengthening/mobility   - Keep Call bell within reach  - Keep bed low and locked with side rails adjusted as appropriate  - Keep care items and personal belongings within reach  - Initiate and maintain comfort rounds  - Make Fall Risk Sign visible to staff  - Offer Toileting every  Hours, in advance of need  - Initiate/Maintain alarm  - Obtain necessary fall risk management equipment  - Apply yellow socks and bracelet for high fall risk patients  - Consider moving patient to room near nurses station  Outcome: Progressing     Problem: DISCHARGE PLANNING  Goal: Discharge to home or other facility with appropriate resources  Description: INTERVENTIONS:  - Identify barriers to discharge w/patient and caregiver  - Arrange for needed discharge resources and transportation as appropriate  - Identify discharge learning needs (meds, wound care, etc.)  - Arrange for interpretive services to assist at discharge as needed  - Refer to Case Management Department for coordinating discharge planning if the patient needs post-hospital services based on physician/advanced practitioner order or complex needs related to functional status, cognitive ability, or social support system  Outcome: Progressing Problem: Knowledge Deficit  Goal: Patient/family/caregiver demonstrates understanding of disease process, treatment plan, medications, and discharge instructions  Description: Complete learning assessment and assess knowledge base.   Interventions:  - Provide teaching at level of understanding  - Provide teaching via preferred learning methods  Outcome: Progressing

## 2023-08-26 NOTE — ASSESSMENT & PLAN NOTE
· Pt with ABLA post opt  · Given 2 units PRBC in PACU for Hgb 7.7  · Continue holding home antihypertensives

## 2023-08-26 NOTE — PROGRESS NOTES
Progress Note -general surgery  : General surgery Resident on [id number] [facility] 59 y.o. male MRN: [id number]  Unit/Bed#: ICU 03 Encounter: [id number]    Assessment:  59 y.o. male with gallbladder mass status post open cholecystectomy on 8/25, recovering well postoperatively    Tachypneic overnight, other vital signs within normal limits and stable     Urine output 400 cc documented from second shift yesterday  Drain 435cc serosanguineous    WBC 10.83 from 14.4  Hemoglobin 8.6 from 8.6 from 8.8  Creatinine 0.51 from 0.68  Magnesium 2.1 from 1.8  Phosphorus 2.0 from 2.8    Plan:  Downgrade patient out of ICU  Continue to trend hemoglobin  Maintain ASHLY drain, monitor output  Pain control as needed  Antiemetic as needed  Encourage ambulation/out of bed  Encourage incentive spirometry use  PT OT evaluation  Feeding Plan -continue low-fat diet as tolerated  Anticoagulation Plan-Lovenox  Infection Plan -none  Disposition Plan -pending clinical improvement    Subjective/Objective     Subjective: No acute events overnight. Patient expressed no acute concerns 15 this morning. Patient denies nausea and vomiting at this point. He is still experiencing pain over his right sided drain site. He denies bowel movements and pass and gas. He is using the incentive spirometer. Objective:     Physical Exam:  GEN: No acute distress  HEENT: MMM  CV: Well-perfused regular rate  Lung: Normal effort  Ab soft nondistended right upper quadrant and right flank tenderness  Extrem: No lower extremity edema bilaterally  Neuro: Alert and oriented x3    Vitals: Temp:  [97.1 °F (36.2 °C)-99.3 °F (37.4 °C)] 99 °F (37.2 °C)  HR:  [51-85] 75  Resp:  [12-46] 27  BP: ()/(52-67) 120/67  Body mass index is 28.58 kg/m².     I/O       08/24 0701 08/25 0700 08/25 0701 08/26 0700 08/26 0701 08/27 0700    I.V. (mL/kg)  3543.3 (37.1) 598.3 (6.3)    Blood  1237.5     IV Piggyback  550 150    Total Intake(mL/kg) 5330.8 (55.8) 748.3 (7.8)    Urine (mL/kg/hr)  400     Drains  250 320    Blood  550     Total Output  1200 320    Net  +4130.8 +428.3                   Lab, Imaging and other studies: I have personally reviewed pertinent reports.   , CBC with diff:   Lab Results   Component Value Date    WBC 14.40 (H) 08/26/2023    HGB 8.6 (L) 08/26/2023    HCT 27.6 (L) 08/26/2023    MCV 86 08/26/2023     (L) 08/26/2023    RBC 3.26 (L) 08/26/2023    MCH 27.0 08/26/2023    MCHC 31.3 (L) 08/26/2023    RDW 15.4 (H) 08/26/2023    MPV 9.5 08/26/2023   , BMP/CMP:   Lab Results   Component Value Date    SODIUM 138 08/26/2023    K 3.8 08/26/2023     08/26/2023    CO2 27 08/26/2023    BUN 15 08/26/2023    CREATININE 0.68 08/26/2023    CALCIUM 7.8 (L) 08/26/2023    EGFR 104 08/26/2023     VTE Pharmacologic Prophylaxis: Enoxaparin (Lovenox)  VTE Mechanical Prophylaxis: sequential compression device    UpdateTime [id number]    Chase Rascon MD  8/26/2023 3:25 PM

## 2023-08-26 NOTE — ASSESSMENT & PLAN NOTE
· Pt follows with Dr. Ellie Mandel as o/p for polypoid masses in the GB seen on MRI in March 2023 and CT abd in July 2023  · POD 1 s/p lap cholecystectomy that had to be converted to open intra-op. Noted to have extensive adhesions of omentum to GB with a large amount of oozing intra-op.  ESBL 550 ml  · GB sent for pathology   · If malignant will be referred to oncology  · Post opt noted to have Hgb decreased to 7.7, so he was given 2 units PRBC  · Dilaudid and oxy IR PRN for pain control    Plan:   · Currently on CLD, advance diet as tolerated per surgery  · F/u noon Hgb  · F/u drain output

## 2023-08-26 NOTE — PLAN OF CARE
Problem: MOBILITY - ADULT  Goal: Maintain or return to baseline ADL function  Description: INTERVENTIONS:  -  Assess patient's ability to carry out ADLs; assess patient's baseline for ADL function and identify physical deficits which impact ability to perform ADLs (bathing, care of mouth/teeth, toileting, grooming, dressing, etc.)  - Assess/evaluate cause of self-care deficits   - Assess range of motion  - Assess patient's mobility; develop plan if impaired  - Assess patient's need for assistive devices and provide as appropriate  - Encourage maximum independence but intervene and supervise when necessary  - Involve family in performance of ADLs  - Assess for home care needs following discharge   - Consider OT consult to assist with ADL evaluation and planning for discharge  - Provide patient education as appropriate  8/26/2023 1630 by Reg Funes RN  Outcome: Progressing  8/26/2023 1607 by Reg Funes RN  Outcome: Progressing  Goal: Maintains/Returns to pre admission functional level  Description: INTERVENTIONS:  - Perform BMAT or MOVE assessment daily.   - Set and communicate daily mobility goal to care team and patient/family/caregiver. - Collaborate with rehabilitation services on mobility goals if consulted  - Perform Range of Motion 3 times a day. - Reposition patient every 2 hours.   - Dangle patient 4 times a day  - Stand patient n/a times a day  - Ambulate patient n/a times a day  - Out of bed to chair n/a times a day   - Out of bed for meals n/a times a day  - Out of bed for toileting  - Record patient progress and toleration of activity level   8/26/2023 1630 by Reg Funes RN  Outcome: Progressing  8/26/2023 1607 by Reg Funes RN  Outcome: Progressing     Problem: PAIN - ADULT  Goal: Verbalizes/displays adequate comfort level or baseline comfort level  Description: Interventions:  - Encourage patient to monitor pain and request assistance  - Assess pain using appropriate pain scale  - Administer analgesics based on type and severity of pain and evaluate response  - Implement non-pharmacological measures as appropriate and evaluate response  - Consider cultural and social influences on pain and pain management  - Notify physician/advanced practitioner if interventions unsuccessful or patient reports new pain  8/26/2023 1630 by Shashi Soriano RN  Outcome: Progressing  8/26/2023 1607 by Shashi Soriano RN  Outcome: Progressing     Problem: INFECTION - ADULT  Goal: Absence or prevention of progression during hospitalization  Description: INTERVENTIONS:  - Assess and monitor for signs and symptoms of infection  - Monitor lab/diagnostic results  - Monitor all insertion sites, i.e. indwelling lines, tubes, and drains  - Monitor endotracheal if appropriate and nasal secretions for changes in amount and color  - [place] appropriate cooling/warming therapies per order  - Administer medications as ordered  - Instruct and encourage patient and family to use good hand hygiene technique  - Identify and instruct in appropriate isolation precautions for identified infection/condition  8/26/2023 1630 by Shashi Soriano RN  Outcome: Progressing  8/26/2023 1607 by Shashi Soriano RN  Outcome: Progressing  Goal: Absence of fever/infection during neutropenic period  Description: INTERVENTIONS:  - Monitor WBC    8/26/2023 1630 by Shashi Soriano RN  Outcome: Progressing  8/26/2023 1607 by Shashi Soriano RN  Outcome: Progressing     Problem: SAFETY ADULT  Goal: Maintain or return to baseline ADL function  Description: INTERVENTIONS:  -  Assess patient's ability to carry out ADLs; assess patient's baseline for ADL function and identify physical deficits which impact ability to perform ADLs (bathing, care of mouth/teeth, toileting, grooming, dressing, etc.)  - Assess/evaluate cause of self-care deficits   - Assess range of motion  - Assess patient's mobility; develop plan if impaired  - Assess patient's need for assistive devices and provide as appropriate  - Encourage maximum independence but intervene and supervise when necessary  - Involve family in performance of ADLs  - Assess for home care needs following discharge   - Consider OT consult to assist with ADL evaluation and planning for discharge  - Provide patient education as appropriate  8/26/2023 1630 by Pranay Gentile RN  Outcome: Progressing  8/26/2023 1607 by Pranay Gentile RN  Outcome: Progressing  Goal: Maintains/Returns to pre admission functional level  Description: INTERVENTIONS:  - Perform BMAT or MOVE assessment daily.   - Set and communicate daily mobility goal to care team and patient/family/caregiver. - Collaborate with rehabilitation services on mobility goals if consulted  - Perform Range of Motion 4 times a day. - Reposition patient every 2 hours.   - Dangle patient 3 times a day  - Stand patient n/a times a day  - Ambulate patient n/a times a day  - Out of bed to chair n/a times a day   - Out of bed for meals n/a times a day  - Out of bed for toileting  - Record patient progress and toleration of activity level   8/26/2023 1630 by Pranay Gentile RN  Outcome: Progressing  8/26/2023 1607 by Pranay Gentile RN  Outcome: Progressing  Goal: Patient will remain free of falls  Description: INTERVENTIONS:  - Educate patient/family on patient safety including physical limitations  - Instruct patient to call for assistance with activity   - Consult OT/PT to assist with strengthening/mobility   - Keep Call bell within reach  - Keep bed low and locked with side rails adjusted as appropriate  - Keep care items and personal belongings within reach  - Initiate and maintain comfort rounds  - Make Fall Risk Sign visible to staff  - Offer Toileting every 2 Hours, in advance of need  - Initiate/Maintain bed alarm  - Apply yellow socks and bracelet for high fall risk patients  - Consider moving patient to room near nurses station  8/26/2023 1630 by Margarita Diaz Hoover RN  Outcome: Progressing  8/26/2023 1607 by Cathy Aguirre RN  Outcome: Progressing     Problem: DISCHARGE PLANNING  Goal: Discharge to home or other facility with appropriate resources  Description: INTERVENTIONS:  - Identify barriers to discharge w/patient and caregiver  - Arrange for needed discharge resources and transportation as appropriate  - Identify discharge learning needs (meds, wound care, etc.)  - Arrange for interpretive services to assist at discharge as needed  - Refer to Case Management Department for coordinating discharge planning if the patient needs post-hospital services based on physician/advanced practitioner order or complex needs related to functional status, cognitive ability, or social support system  8/26/2023 1630 by Cathy Aguirre RN  Outcome: Progressing  8/26/2023 1607 by Cathy Aguirre RN  Outcome: Progressing     Problem: Knowledge Deficit  Goal: Patient/family/caregiver demonstrates understanding of disease process, treatment plan, medications, and discharge instructions  Description: Complete learning assessment and assess knowledge base.   Interventions:  - Provide teaching at level of understanding  - Provide teaching via preferred learning methods  8/26/2023 1630 by Cathy Aguirre RN  Outcome: Progressing  8/26/2023 1607 by Cathy Aguirre RN  Outcome: Progressing     Problem: Prexisting or High Potential for Compromised Skin Integrity  Goal: Skin integrity is maintained or improved  Description: INTERVENTIONS:  - Identify patients at risk for skin breakdown  - Assess and monitor skin integrity  - Assess and monitor nutrition and hydration status  - Monitor labs   - Assess for incontinence   - Turn and reposition patient  - Assist with mobility/ambulation  - Relieve pressure over bony prominences  - Avoid friction and shearing  - Provide appropriate hygiene as needed including keeping skin clean and dry  - Evaluate need for skin moisturizer/barrier cream  - Collaborate with interdisciplinary team   - Patient/family teaching  - Consider wound care consult   8/26/2023 1630 by Kranthi Lee RN  Outcome: Progressing  8/26/2023 1607 by Kranthi Lee RN  Outcome: Progressing

## 2023-08-27 ENCOUNTER — HOME HEALTH ADMISSION (OUTPATIENT)
Dept: HOME HEALTH SERVICES | Facility: HOME HEALTHCARE | Age: 58
End: 2023-08-27
Payer: MEDICARE

## 2023-08-27 VITALS
HEART RATE: 77 BPM | TEMPERATURE: 99.5 F | RESPIRATION RATE: 20 BRPM | BODY MASS INDEX: 28.55 KG/M2 | SYSTOLIC BLOOD PRESSURE: 122 MMHG | HEIGHT: 72 IN | WEIGHT: 210.76 LBS | OXYGEN SATURATION: 93 % | DIASTOLIC BLOOD PRESSURE: 67 MMHG

## 2023-08-27 LAB
ANION GAP SERPL CALCULATED.3IONS-SCNC: 2 MMOL/L
BASOPHILS # BLD AUTO: 0.04 THOUSANDS/ÂΜL (ref 0–0.1)
BASOPHILS NFR BLD AUTO: 0 % (ref 0–1)
BUN SERPL-MCNC: 12 MG/DL (ref 5–25)
CALCIUM SERPL-MCNC: 7.5 MG/DL (ref 8.4–10.2)
CHLORIDE SERPL-SCNC: 104 MMOL/L (ref 96–108)
CO2 SERPL-SCNC: 30 MMOL/L (ref 21–32)
CREAT SERPL-MCNC: 0.51 MG/DL (ref 0.6–1.3)
EOSINOPHIL # BLD AUTO: 0.04 THOUSAND/ÂΜL (ref 0–0.61)
EOSINOPHIL NFR BLD AUTO: 0 % (ref 0–6)
ERYTHROCYTE [DISTWIDTH] IN BLOOD BY AUTOMATED COUNT: 16.1 % (ref 11.6–15.1)
GFR SERPL CREATININE-BSD FRML MDRD: 117 ML/MIN/1.73SQ M
GLUCOSE SERPL-MCNC: 105 MG/DL (ref 65–140)
HCT VFR BLD AUTO: 27.8 % (ref 36.5–49.3)
HGB BLD-MCNC: 8.6 G/DL (ref 12–17)
IMM GRANULOCYTES # BLD AUTO: 0.12 THOUSAND/UL (ref 0–0.2)
IMM GRANULOCYTES NFR BLD AUTO: 1 % (ref 0–2)
LYMPHOCYTES # BLD AUTO: 1.36 THOUSANDS/ÂΜL (ref 0.6–4.47)
LYMPHOCYTES NFR BLD AUTO: 13 % (ref 14–44)
MAGNESIUM SERPL-MCNC: 2.1 MG/DL (ref 1.9–2.7)
MCH RBC QN AUTO: 26.7 PG (ref 26.8–34.3)
MCHC RBC AUTO-ENTMCNC: 30.9 G/DL (ref 31.4–37.4)
MCV RBC AUTO: 86 FL (ref 82–98)
MONOCYTES # BLD AUTO: 0.83 THOUSAND/ÂΜL (ref 0.17–1.22)
MONOCYTES NFR BLD AUTO: 8 % (ref 4–12)
NEUTROPHILS # BLD AUTO: 8.44 THOUSANDS/ÂΜL (ref 1.85–7.62)
NEUTS SEG NFR BLD AUTO: 78 % (ref 43–75)
NRBC BLD AUTO-RTO: 0 /100 WBCS
PHOSPHATE SERPL-MCNC: 2 MG/DL (ref 2.7–4.5)
PLATELET # BLD AUTO: 119 THOUSANDS/UL (ref 149–390)
PMV BLD AUTO: 9.6 FL (ref 8.9–12.7)
POTASSIUM SERPL-SCNC: 4.3 MMOL/L (ref 3.5–5.3)
RBC # BLD AUTO: 3.22 MILLION/UL (ref 3.88–5.62)
SODIUM SERPL-SCNC: 136 MMOL/L (ref 135–147)
WBC # BLD AUTO: 10.83 THOUSAND/UL (ref 4.31–10.16)

## 2023-08-27 PROCEDURE — 83735 ASSAY OF MAGNESIUM: CPT | Performed by: PHYSICIAN ASSISTANT

## 2023-08-27 PROCEDURE — 80048 BASIC METABOLIC PNL TOTAL CA: CPT | Performed by: PHYSICIAN ASSISTANT

## 2023-08-27 PROCEDURE — 84100 ASSAY OF PHOSPHORUS: CPT | Performed by: PHYSICIAN ASSISTANT

## 2023-08-27 PROCEDURE — 85025 COMPLETE CBC W/AUTO DIFF WBC: CPT | Performed by: PHYSICIAN ASSISTANT

## 2023-08-27 PROCEDURE — 99232 SBSQ HOSP IP/OBS MODERATE 35: CPT | Performed by: SURGERY

## 2023-08-27 RX ORDER — NADOLOL 40 MG/1
40 TABLET ORAL DAILY
Status: DISCONTINUED | OUTPATIENT
Start: 2023-08-27 | End: 2023-08-28

## 2023-08-27 RX ORDER — CALCIUM GLUCONATE 20 MG/ML
2 INJECTION, SOLUTION INTRAVENOUS ONCE
Status: COMPLETED | OUTPATIENT
Start: 2023-08-27 | End: 2023-08-27

## 2023-08-27 RX ORDER — DIAZEPAM 5 MG/1
10 TABLET ORAL EVERY 12 HOURS
Status: DISCONTINUED | OUTPATIENT
Start: 2023-08-27 | End: 2023-08-29 | Stop reason: HOSPADM

## 2023-08-27 RX ORDER — FOLIC ACID 1 MG/1
1 TABLET ORAL DAILY
Status: DISCONTINUED | OUTPATIENT
Start: 2023-08-28 | End: 2023-08-29 | Stop reason: HOSPADM

## 2023-08-27 RX ADMIN — OXCARBAZEPINE 600 MG: 150 TABLET, FILM COATED ORAL at 20:28

## 2023-08-27 RX ADMIN — DIAZEPAM 10 MG: 5 TABLET ORAL at 20:28

## 2023-08-27 RX ADMIN — PANTOPRAZOLE SODIUM 40 MG: 40 TABLET, DELAYED RELEASE ORAL at 06:52

## 2023-08-27 RX ADMIN — CALCIUM GLUCONATE 2 G: 20 INJECTION, SOLUTION INTRAVENOUS at 08:06

## 2023-08-27 RX ADMIN — ENOXAPARIN SODIUM 40 MG: 40 INJECTION SUBCUTANEOUS at 08:06

## 2023-08-27 RX ADMIN — HYDROMORPHONE HYDROCHLORIDE 0.5 MG: 1 INJECTION, SOLUTION INTRAMUSCULAR; INTRAVENOUS; SUBCUTANEOUS at 04:46

## 2023-08-27 RX ADMIN — PANTOPRAZOLE SODIUM 40 MG: 40 TABLET, DELAYED RELEASE ORAL at 18:05

## 2023-08-27 RX ADMIN — LEVETIRACETAM 500 MG: 500 TABLET, FILM COATED ORAL at 08:06

## 2023-08-27 RX ADMIN — LEVETIRACETAM 500 MG: 500 TABLET, FILM COATED ORAL at 20:28

## 2023-08-27 RX ADMIN — NADOLOL 40 MG: 40 TABLET ORAL at 11:46

## 2023-08-27 RX ADMIN — HYDROMORPHONE HYDROCHLORIDE 0.5 MG: 1 INJECTION, SOLUTION INTRAMUSCULAR; INTRAVENOUS; SUBCUTANEOUS at 18:02

## 2023-08-27 RX ADMIN — SODIUM PHOSPHATE, MONOBASIC, MONOHYDRATE AND SODIUM PHOSPHATE, DIBASIC, ANHYDROUS 30 MMOL: 276; 142 INJECTION, SOLUTION INTRAVENOUS at 08:06

## 2023-08-27 RX ADMIN — OXCARBAZEPINE 600 MG: 150 TABLET, FILM COATED ORAL at 08:13

## 2023-08-27 NOTE — UTILIZATION REVIEW
Date: 8/27/2023    Day 3: Has surpassed a 2nd midnight with active treatments and services, which include IV Medications, ongoing assessments, vitals, labs, consult .

## 2023-08-27 NOTE — CASE MANAGEMENT
Case Management Assessment & Discharge Planning Note    Patient name Delores Hernandez  Location ICU 03/ICU 33 MRN [id number]  : 1965 Date 2023       Current Admission Date: 2023  Current Admission Diagnosis:Gallbladder polyp   Patient Active Problem List    Diagnosis Date Noted   • Hypomagnesemia 2023   • Gallbladder polyp 2023   • History of prediabetes 2022   • Other cirrhosis of liver ([address]) 2022   • Legally blind in right eye, as defined in [place] 2022   • Bilateral hearing loss 2022   • Angiodysplasia 2022   • History of TB (tuberculosis) 2022   • Esophageal varices ([address]) 2022   • Anemia 02/10/2022   • Status post splenectomy 02/10/2022   • Cellulitis of left lower extremity 2022   • Ambulatory dysfunction 2022   • Nonintractable epilepsy without status epilepticus ([address]) 2022   • Primary hypertension 2022   • Gastroesophageal reflux disease without esophagitis 2022   • Venous stasis dermatitis of both lower extremities 2022      LOS (days): 2  Geometric Mean LOS (GMLOS) (days):   Days to GMLOS:     OBJECTIVE:    Risk of Unplanned Readmission Score: 14.05         Current admission status: Inpatient       Preferred Pharmacy:   [address] [place] [address], [address] [place] [id number]  Phone: [phone] Fax: [phone]    Primary Care Provider: Gretchen Núñez MD    Primary Insurance: [id number]  Secondary Insurance:     ASSESSMENT:  [address] - Brother   Primary Phone: [phone] (Mobile)  Home Phone: [phone]                         Readmission Root Cause  30 Day Readmission: No    Patient Information  Admitted from[de-identified] Home  Mental Status: Alert  During Assessment patient was accompanied by: Zeke Badillo  Assessment information provided by[de-identified] Brother  Primary Caregiver: Self  Support Systems: Family members  [place] of Residence: [facility] [address] [facility] do you live in?: [place] entry access options.  Select all that apply.: Stairs  Number of steps to enter home.: 5  Do the steps have railings?: Yes  Type of Current Residence: [address] [place] home  Upon entering residence, is there a bedroom on the main floor (no further steps)?: No  A bedroom is located on the following floor levels of residence (select all that apply):: 2nd Floor, Basement  Upon entering residence, is there a bathroom on the main floor (no further steps)?: No  Indicate which floors of current residence have a bathroom (select all the apply):: 2nd Floor, Basement  Number of steps to basement from main floor: One Flight  Number of steps to 2nd floor from main floor: One Flight  In the last 12 months, was there a time when you were not able to pay the mortgage or rent on time?: No  In the last 12 months, how many places have you lived?: 1  In the last 12 months, was there a time when you did not have a steady place to sleep or slept in a shelter (including now)?: No  Homeless/housing insecurity resource given?: N/A  Living Arrangements: Lives w/ Extended Family  Is patient a ?: No    Activities of Daily Living Prior to Admission  Functional Status: Independent  Completes ADLs independently?: Yes  Ambulates independently?: Yes  Does patient use assisted devices?: No  Does patient currently own DME?: No  Does patient have a history of Outpatient Therapy (PT/OT)?: No  Does the patient have a history of Short-Term Rehab?: No  Does patient have a history of HHC?: No  Does patient currently have [address] [facility]?: No         Patient Information Continued  Income Source: SSI/SSD  Does patient have prescription coverage?: Yes  Within the past 12 months, you worried that your food would run out before you got the money to buy more.: Never true  Within the past 12 months, the food you bought just didn't last and you didn't have money to get more.: Never true  Food insecurity resource given?: N/A  Does patient receive dialysis treatments?: No  Does patient have a history of substance abuse?: No  Does patient have a history of Mental Health Diagnosis?: No    PHQ 2/9 Screening   Reviewed PHQ 2/9 Depression Screening Score?: No    Means of Transportation  Means of Transport to Appts[de-identified] Family transport  In the past 12 months, has lack of transportation kept you from medical appointments or from getting medications?: No  In the past 12 months, has lack of transportation kept you from meetings, work, or from getting things needed for daily living?: No  Was application for public transport provided?: N/A        DISCHARGE DETAILS:    Discharge planning discussed with[de-identified] Patient & Valerie Ball of Choice: Yes  Comments - Freedom of Choice: Patient and Molly Garcia reported being agreeable to the recommendation of [facility] services for ASHLY drain care. CM contacted family/caregiver?: Yes  Were Treatment Team discharge recommendations reviewed with patient/caregiver?: Yes  Contacts  Patient Contacts: Molly Garcia  Relationship to Patient[de-identified] Family  Contact Method: In Person  Reason/Outcome: Discharge [address]         Is the patient interested in [facility] at discharge?: Yes  [facility] requested[de-identified] [facility] Name[de-identified] Other  [address] [facility] Provider[de-identified] PCP  Home Health Services Needed[de-identified] Other (comment)  Homebound Criteria Met[de-identified] Requires the Assistance of Another Person for Safe Ambulation or to Leave the Home  Supporting Clincal Findings[de-identified] Limited Endurance    DME Referral Provided  Referral made for DME?: No    Other Referral/Resources/Interventions Provided:  Interventions: HHC (TBD)  Referral Comments: [facility] referral made for RN services for ASHLY drains.     Discharge Destination Plan[de-identified] Home with [email or web address]. at Discharge : Family

## 2023-08-27 NOTE — CASE MANAGEMENT
Case Management Progress Note    Patient name Michael Martinez  Location ICU 03/ICU 03 MRN [id number]  : 1965 Date 2023       LOS (days): 2  Geometric Mean LOS (GMLOS) (days):   Days to GMLOS:        OBJECTIVE:        Current admission status: Inpatient  Preferred Pharmacy:   [address] [place] [address] [address] [id number]  Phone: [phone] Fax: [phone]    Primary Care Provider: Venita Silva MD    Primary Insurance: [facility]  Secondary Insurance:     PROGRESS NOTE:  Pt has accepted the offered [address] [place] [address] [address] for an RN for ASHLY drain care.

## 2023-08-27 NOTE — NURSING NOTE
Nursing made multiple attempts today to get pt OOB to chair, or sit EOB. Emphasized that he will need to be getting up if dc home is the goal, also to help prevent any post op PNA. Increased pts o2 to 4, then 6L for sats in the low 80s. Lowered back to 4L when sats reached mid 90s. When pts family arrived, he was more agreeable to sitting EOB after his dinner. He was given pain medication before dinner during his dsg change. Will pass on to next RN that he is willing/needs to sit OOB tonight.

## 2023-08-28 LAB
ANION GAP SERPL CALCULATED.3IONS-SCNC: 2 MMOL/L
BUN SERPL-MCNC: 10 MG/DL (ref 5–25)
CALCIUM SERPL-MCNC: 7.8 MG/DL (ref 8.4–10.2)
CHLORIDE SERPL-SCNC: 102 MMOL/L (ref 96–108)
CO2 SERPL-SCNC: 31 MMOL/L (ref 21–32)
CREAT SERPL-MCNC: 0.49 MG/DL (ref 0.6–1.3)
ERYTHROCYTE [DISTWIDTH] IN BLOOD BY AUTOMATED COUNT: 16.3 % (ref 11.6–15.1)
GFR SERPL CREATININE-BSD FRML MDRD: 119 ML/MIN/1.73SQ M
GLUCOSE SERPL-MCNC: 99 MG/DL (ref 65–140)
HCT VFR BLD AUTO: 26.4 % (ref 36.5–49.3)
HGB BLD-MCNC: 8 G/DL (ref 12–17)
MCH RBC QN AUTO: 26.7 PG (ref 26.8–34.3)
MCHC RBC AUTO-ENTMCNC: 30.3 G/DL (ref 31.4–37.4)
MCV RBC AUTO: 88 FL (ref 82–98)
PHOSPHATE SERPL-MCNC: 2.1 MG/DL (ref 2.7–4.5)
PLATELET # BLD AUTO: 128 THOUSANDS/UL (ref 149–390)
PMV BLD AUTO: 10.3 FL (ref 8.9–12.7)
POTASSIUM SERPL-SCNC: 3.9 MMOL/L (ref 3.5–5.3)
RBC # BLD AUTO: 3 MILLION/UL (ref 3.88–5.62)
SODIUM SERPL-SCNC: 135 MMOL/L (ref 135–147)
WBC # BLD AUTO: 7.91 THOUSAND/UL (ref 4.31–10.16)

## 2023-08-28 PROCEDURE — 84100 ASSAY OF PHOSPHORUS: CPT

## 2023-08-28 PROCEDURE — 80048 BASIC METABOLIC PNL TOTAL CA: CPT

## 2023-08-28 PROCEDURE — 85027 COMPLETE CBC AUTOMATED: CPT

## 2023-08-28 PROCEDURE — 99024 POSTOP FOLLOW-UP VISIT: CPT | Performed by: SURGERY

## 2023-08-28 RX ORDER — NADOLOL 40 MG/1
40 TABLET ORAL DAILY
Status: DISCONTINUED | OUTPATIENT
Start: 2023-08-28 | End: 2023-08-29 | Stop reason: HOSPADM

## 2023-08-28 RX ADMIN — FOLIC ACID 1 MG: 1 TABLET ORAL at 08:01

## 2023-08-28 RX ADMIN — HYDROMORPHONE HYDROCHLORIDE 0.5 MG: 1 INJECTION, SOLUTION INTRAMUSCULAR; INTRAVENOUS; SUBCUTANEOUS at 05:07

## 2023-08-28 RX ADMIN — LEVETIRACETAM 500 MG: 500 TABLET, FILM COATED ORAL at 21:08

## 2023-08-28 RX ADMIN — DIAZEPAM 10 MG: 5 TABLET ORAL at 21:08

## 2023-08-28 RX ADMIN — SODIUM CHLORIDE, POTASSIUM CHLORIDE, SODIUM LACTATE AND CALCIUM CHLORIDE 500 ML: 600; 310; 30; 20 INJECTION, SOLUTION INTRAVENOUS at 08:11

## 2023-08-28 RX ADMIN — SODIUM PHOSPHATE, MONOBASIC, MONOHYDRATE AND SODIUM PHOSPHATE, DIBASIC, ANHYDROUS 21 MMOL: 142; 276 INJECTION, SOLUTION INTRAVENOUS at 10:36

## 2023-08-28 RX ADMIN — PANTOPRAZOLE SODIUM 40 MG: 40 TABLET, DELAYED RELEASE ORAL at 05:40

## 2023-08-28 RX ADMIN — OXCARBAZEPINE 600 MG: 150 TABLET, FILM COATED ORAL at 21:08

## 2023-08-28 RX ADMIN — MAGNESIUM HYDROXIDE 30 ML: 400 SUSPENSION ORAL at 10:36

## 2023-08-28 RX ADMIN — LEVETIRACETAM 500 MG: 500 TABLET, FILM COATED ORAL at 08:01

## 2023-08-28 RX ADMIN — ENOXAPARIN SODIUM 40 MG: 40 INJECTION SUBCUTANEOUS at 08:01

## 2023-08-28 RX ADMIN — OXCARBAZEPINE 600 MG: 150 TABLET, FILM COATED ORAL at 08:01

## 2023-08-28 RX ADMIN — PANTOPRAZOLE SODIUM 40 MG: 40 TABLET, DELAYED RELEASE ORAL at 16:52

## 2023-08-28 RX ADMIN — DIAZEPAM 10 MG: 5 TABLET ORAL at 08:01

## 2023-08-28 NOTE — PLAN OF CARE
Problem: MOBILITY - ADULT  Goal: Maintain or return to baseline ADL function  Description: INTERVENTIONS:  -  Assess patient's ability to carry out ADLs; assess patient's baseline for ADL function and identify physical deficits which impact ability to perform ADLs (bathing, care of mouth/teeth, toileting, grooming, dressing, etc.)  - Assess/evaluate cause of self-care deficits   - Assess range of motion  - Assess patient's mobility; develop plan if impaired  - Assess patient's need for assistive devices and provide as appropriate  - Encourage maximum independence but intervene and supervise when necessary  - Involve family in performance of ADLs  - Assess for home care needs following discharge   - Consider OT consult to assist with ADL evaluation and planning for discharge  - Provide patient education as appropriate  Outcome: Progressing  Goal: Maintains/Returns to pre admission functional level  Description: INTERVENTIONS:  - Perform BMAT or MOVE assessment daily.   - Set and communicate daily mobility goal to care team and patient/family/caregiver. - Collaborate with rehabilitation services on mobility goals if consulted  - Perform Range of Motion 2 times a day. - Reposition patient every 2 hours.   - Dangle patient 2 times a day  - Stand patient 2 times a day  - Ambulate patient 2 times a day  - Out of bed to chair 2 times a day   - Out of bed for meals 2 times a day  - Out of bed for toileting  - Record patient progress and toleration of activity level   Outcome: Progressing     Problem: PAIN - ADULT  Goal: Verbalizes/displays adequate comfort level or baseline comfort level  Description: Interventions:  - Encourage patient to monitor pain and request assistance  - Assess pain using appropriate pain scale  - Administer analgesics based on type and severity of pain and evaluate response  - Implement non-pharmacological measures as appropriate and evaluate response  - Consider cultural and social influences on pain and pain management  - Notify physician/advanced practitioner if interventions unsuccessful or patient reports new pain  Outcome: Progressing     Problem: INFECTION - ADULT  Goal: Absence or prevention of progression during hospitalization  Description: INTERVENTIONS:  - Assess and monitor for signs and symptoms of infection  - Monitor lab/diagnostic results  - Monitor all insertion sites, i.e. indwelling lines, tubes, and drains  - Monitor endotracheal if appropriate and nasal secretions for changes in amount and color  - [place] appropriate cooling/warming therapies per order  - Administer medications as ordered  - Instruct and encourage patient and family to use good hand hygiene technique  - Identify and instruct in appropriate isolation precautions for identified infection/condition  Outcome: Progressing  Goal: Absence of fever/infection during neutropenic period  Description: INTERVENTIONS:  - Monitor WBC    Outcome: Progressing     Problem: SAFETY ADULT  Goal: Maintain or return to baseline ADL function  Description: INTERVENTIONS:  -  Assess patient's ability to carry out ADLs; assess patient's baseline for ADL function and identify physical deficits which impact ability to perform ADLs (bathing, care of mouth/teeth, toileting, grooming, dressing, etc.)  - Assess/evaluate cause of self-care deficits   - Assess range of motion  - Assess patient's mobility; develop plan if impaired  - Assess patient's need for assistive devices and provide as appropriate  - Encourage maximum independence but intervene and supervise when necessary  - Involve family in performance of ADLs  - Assess for home care needs following discharge   - Consider OT consult to assist with ADL evaluation and planning for discharge  - Provide patient education as appropriate  Outcome: Progressing  Goal: Maintains/Returns to pre admission functional level  Description: INTERVENTIONS:  - Perform BMAT or MOVE assessment daily.   - Set and communicate daily mobility goal to care team and patient/family/caregiver. - Collaborate with rehabilitation services on mobility goals if consulted  - Perform Range of Motion 2 times a day. - Reposition patient every 2 hours.   - Dangle patient 2 times a day  - Stand patient 2 times a day  - Ambulate patient 2 times a day  - Out of bed to chair 2 times a day   - Out of bed for meals 2 times a day  - Out of bed for toileting  - Record patient progress and toleration of activity level   Outcome: Progressing  Goal: Patient will remain free of falls  Description: INTERVENTIONS:  - Educate patient/family on patient safety including physical limitations  - Instruct patient to call for assistance with activity   - Consult OT/PT to assist with strengthening/mobility   - Keep Call bell within reach  - Keep bed low and locked with side rails adjusted as appropriate  - Keep care items and personal belongings within reach  - Initiate and maintain comfort rounds  - Make Fall Risk Sign visible to staff  - Offer Toileting every 2 Hours, in advance of need  - Initiate/Maintain alarm  - Obtain necessary fall risk management equipment:   - Apply yellow socks and bracelet for high fall risk patients  - Consider moving patient to room near nurses station  Outcome: Progressing     Problem: DISCHARGE PLANNING  Goal: Discharge to home or other facility with appropriate resources  Description: INTERVENTIONS:  - Identify barriers to discharge w/patient and caregiver  - Arrange for needed discharge resources and transportation as appropriate  - Identify discharge learning needs (meds, wound care, etc.)  - Arrange for interpretive services to assist at discharge as needed  - Refer to Case Management Department for coordinating discharge planning if the patient needs post-hospital services based on physician/advanced practitioner order or complex needs related to functional status, cognitive ability, or social support system  Outcome: Progressing     Problem: Knowledge Deficit  Goal: Patient/family/caregiver demonstrates understanding of disease process, treatment plan, medications, and discharge instructions  Description: Complete learning assessment and assess knowledge base.   Interventions:  - Provide teaching at level of understanding  - Provide teaching via preferred learning methods  Outcome: Progressing     Problem: Prexisting or High Potential for Compromised Skin Integrity  Goal: Skin integrity is maintained or improved  Description: INTERVENTIONS:  - Identify patients at risk for skin breakdown  - Assess and monitor skin integrity  - Assess and monitor nutrition and hydration status  - Monitor labs   - Assess for incontinence   - Turn and reposition patient  - Assist with mobility/ambulation  - Relieve pressure over bony prominences  - Avoid friction and shearing  - Provide appropriate hygiene as needed including keeping skin clean and dry  - Evaluate need for skin moisturizer/barrier cream  - Collaborate with interdisciplinary team   - Patient/family teaching  - Consider wound care consult   Outcome: Progressing

## 2023-08-28 NOTE — PROGRESS NOTES
Progress Note - General Surgery   Jose Frausto 59 y.o. male MRN: [id number]  Unit/Bed#: S -01 Encounter: [id number]    Assessment:  54yoM w GB mass now s/p laparoscopic converted to open cholecystectomy on 8/25    Vitals stable, afebrile  WBC 7.91 from 10.3  Hb 8 from 8.6  Creatinine 0.49      ASHLY 245 serosanguineous    Plan:  -Soft diet  -ASHLY drain, monitor output and character  -Lovenox   -pain control  -Encourage ambulation  -Incentive spirometry  -PT eval, dispo planning    Subjective/Objective   Subjective:   Patient doing well, denies significant abdominal pain, tolerating diet without nausea or emesis, having bowel function, voiding without issues, limited out of bed. Objective:     Blood pressure 122/67, pulse 77, temperature 99.5 °F (37.5 °C), temperature source Oral, resp. rate 20, height 6' (1.829 m), weight 95.6 kg (210 lb 12.2 oz), SpO2 93 %. ,Body mass index is 28.58 kg/m². Intake/Output Summary (Last 24 hours) at 8/28/2023 0729  Last data filed at 8/28/2023 0501  Gross per 24 hour   Intake 490 ml   Output 845 ml   Net -355 ml       Invasive Devices     Peripheral Intravenous Line  Duration           Peripheral IV 08/25/23 Dorsal (posterior); Right Hand 2 days    Peripheral IV 08/25/23 Left Hand 2 days          Drain  Duration           Closed/Suction Drain Right Abdomen Bulb 2 days                Physical Exam:   General: NAD  Skin: Warm, dry, anicteric  HEENT: Normocephalic, atraumatic  CV: RRR, no m/r/g  Pulm: CTA b/l, no inc WOB  Abd: Soft, ND/NT, ASHLY serosanguineous, incision clean dry and intact  MSK: Symmetric, no edema, no tenderness, no deformity    Lab, Imaging and other studies:  I have personally reviewed pertinent lab results.   , CBC:   Lab Results   Component Value Date    WBC 7.91 08/28/2023    HGB 8.0 (L) 08/28/2023    HCT 26.4 (L) 08/28/2023    MCV 88 08/28/2023     (L) 08/28/2023    RBC 3.00 (L) 08/28/2023    MCH 26.7 (L) 08/28/2023    MCHC 30.3 (L) 08/28/2023    RDW 16.3 (H) 08/28/2023    MPV 10.3 08/28/2023   , CMP:   Lab Results   Component Value Date    SODIUM 135 08/28/2023    K 3.9 08/28/2023     08/28/2023    CO2 31 08/28/2023    BUN 10 08/28/2023    CREATININE 0.49 (L) 08/28/2023    CALCIUM 7.8 (L) 08/28/2023    EGFR 119 08/28/2023     VTE Pharmacologic Prophylaxis: Sequential compression device (Venodyne)  and Enoxaparin (Lovenox)  VTE Mechanical Prophylaxis: sequential compression device

## 2023-08-28 NOTE — PLAN OF CARE
Problem: MOBILITY - ADULT  Goal: Maintain or return to baseline ADL function  Description: INTERVENTIONS:  -  Assess patient's ability to carry out ADLs; assess patient's baseline for ADL function and identify physical deficits which impact ability to perform ADLs (bathing, care of mouth/teeth, toileting, grooming, dressing, etc.)  - Assess/evaluate cause of self-care deficits   - Assess range of motion  - Assess patient's mobility; develop plan if impaired  - Assess patient's need for assistive devices and provide as appropriate  - Encourage maximum independence but intervene and supervise when necessary  - Involve family in performance of ADLs  - Assess for home care needs following discharge   - Consider OT consult to assist with ADL evaluation and planning for discharge  - Provide patient education as appropriate  Outcome: Progressing  Goal: Maintains/Returns to pre admission functional level  Description: INTERVENTIONS:  - Perform BMAT or MOVE assessment daily.   - Set and communicate daily mobility goal to care team and patient/family/caregiver. - Collaborate with rehabilitation services on mobility goals if consulted  - Perform Range of Motion  times a day. - Reposition patient every  hours.   - Dangle patient  times a day  - Stand patient  times a day  - Ambulate patient  times a day  - Out of bed to chair  times a day   - Out of bed for meals  times a day  - Out of bed for toileting  - Record patient progress and toleration of activity level   Outcome: Progressing     Problem: PAIN - ADULT  Goal: Verbalizes/displays adequate comfort level or baseline comfort level  Description: Interventions:  - Encourage patient to monitor pain and request assistance  - Assess pain using appropriate pain scale  - Administer analgesics based on type and severity of pain and evaluate response  - Implement non-pharmacological measures as appropriate and evaluate response  - Consider cultural and social influences on pain and pain management  - Notify physician/advanced practitioner if interventions unsuccessful or patient reports new pain  Outcome: Progressing     Problem: INFECTION - ADULT  Goal: Absence or prevention of progression during hospitalization  Description: INTERVENTIONS:  - Assess and monitor for signs and symptoms of infection  - Monitor lab/diagnostic results  - Monitor all insertion sites, i.e. indwelling lines, tubes, and drains  - Monitor endotracheal if appropriate and nasal secretions for changes in amount and color  - [place] appropriate cooling/warming therapies per order  - Administer medications as ordered  - Instruct and encourage patient and family to use good hand hygiene technique  - Identify and instruct in appropriate isolation precautions for identified infection/condition  Outcome: Progressing  Goal: Absence of fever/infection during neutropenic period  Description: INTERVENTIONS:  - Monitor WBC    Outcome: Progressing     Problem: SAFETY ADULT  Goal: Maintain or return to baseline ADL function  Description: INTERVENTIONS:  -  Assess patient's ability to carry out ADLs; assess patient's baseline for ADL function and identify physical deficits which impact ability to perform ADLs (bathing, care of mouth/teeth, toileting, grooming, dressing, etc.)  - Assess/evaluate cause of self-care deficits   - Assess range of motion  - Assess patient's mobility; develop plan if impaired  - Assess patient's need for assistive devices and provide as appropriate  - Encourage maximum independence but intervene and supervise when necessary  - Involve family in performance of ADLs  - Assess for home care needs following discharge   - Consider OT consult to assist with ADL evaluation and planning for discharge  - Provide patient education as appropriate  Outcome: Progressing  Goal: Maintains/Returns to pre admission functional level  Description: INTERVENTIONS:  - Perform BMAT or MOVE assessment daily.   - Set and communicate daily mobility goal to care team and patient/family/caregiver. - Collaborate with rehabilitation services on mobility goals if consulted  - Perform Range of Motion  times a day. - Reposition patient every  hours.   - Dangle patient  times a day  - Stand patient  times a day  - Ambulate patient  times a day  - Out of bed to chair  times a day   - Out of bed for meals  times a day  - Out of bed for toileting  - Record patient progress and toleration of activity level   Outcome: Progressing  Goal: Patient will remain free of falls  Description: INTERVENTIONS:  - Educate patient/family on patient safety including physical limitations  - Instruct patient to call for assistance with activity   - Consult OT/PT to assist with strengthening/mobility   - Keep Call bell within reach  - Keep bed low and locked with side rails adjusted as appropriate  - Keep care items and personal belongings within reach  - Initiate and maintain comfort rounds  - Make Fall Risk Sign visible to staff  - Offer Toileting every  Hours, in advance of need  - Initiate/Maintain alarm  - Obtain necessary fall risk management equipment:   - Apply yellow socks and bracelet for high fall risk patients  - Consider moving patient to room near nurses station  Outcome: Progressing     Problem: DISCHARGE PLANNING  Goal: Discharge to home or other facility with appropriate resources  Description: INTERVENTIONS:  - Identify barriers to discharge w/patient and caregiver  - Arrange for needed discharge resources and transportation as appropriate  - Identify discharge learning needs (meds, wound care, etc.)  - Arrange for interpretive services to assist at discharge as needed  - Refer to Case Management Department for coordinating discharge planning if the patient needs post-hospital services based on physician/advanced practitioner order or complex needs related to functional status, cognitive ability, or social support system  Outcome: Progressing Problem: Knowledge Deficit  Goal: Patient/family/caregiver demonstrates understanding of disease process, treatment plan, medications, and discharge instructions  Description: Complete learning assessment and assess knowledge base.   Interventions:  - Provide teaching at level of understanding  - Provide teaching via preferred learning methods  Outcome: Progressing     Problem: Prexisting or High Potential for Compromised Skin Integrity  Goal: Skin integrity is maintained or improved  Description: INTERVENTIONS:  - Identify patients at risk for skin breakdown  - Assess and monitor skin integrity  - Assess and monitor nutrition and hydration status  - Monitor labs   - Assess for incontinence   - Turn and reposition patient  - Assist with mobility/ambulation  - Relieve pressure over bony prominences  - Avoid friction and shearing  - Provide appropriate hygiene as needed including keeping skin clean and dry  - Evaluate need for skin moisturizer/barrier cream  - Collaborate with interdisciplinary team   - Patient/family teaching  - Consider wound care consult   Outcome: Progressing

## 2023-08-28 NOTE — UTILIZATION REVIEW
NOTIFICATION OF INPATIENT ADMISSION   AUTHORIZATION REQUEST   SERVICING FACILITY:   [address] [place] [address]  Tax ID: [id number]  NPI: [id number]   ATTENDING PROVIDER:  Attending Name and NPI#: Rita Romero Mode Orellana [[id number]]  Address: [address] [place] [address]  Phone: [phone]     ADMISSION INFORMATION:  Place of Service: Inpatient [address] [facility]  Place of Service Code: 21  Inpatient Admission Date/Time: 8/25/23  1:06 PM  Discharge Date/Time: No discharge date for patient encounter. Admitting Diagnosis Code/Description:  Mass of gallbladder [K82.8]     UTILIZATION REVIEW CONTACT:  Ted High, Utilization   Network Utilization Review Department  Phone: [phone]  Fax: [phone]  Email: [email or web address]  Contact for approvals/pending authorizations, clinical reviews, and discharge. PHYSICIAN ADVISORY SERVICES:  Medical Necessity Denial & Hepa-pr-Nofc Review  Phone: [phone]  Fax: [phone]  Email: [email or web address]

## 2023-08-29 PROCEDURE — 97163 PT EVAL HIGH COMPLEX 45 MIN: CPT

## 2023-08-29 PROCEDURE — 99024 POSTOP FOLLOW-UP VISIT: CPT | Performed by: SURGERY

## 2023-08-29 PROCEDURE — 97116 GAIT TRAINING THERAPY: CPT

## 2023-08-29 RX ORDER — ACETAMINOPHEN 325 MG/1
650 TABLET ORAL EVERY 6 HOURS PRN
Status: DISCONTINUED | OUTPATIENT
Start: 2023-08-29 | End: 2023-08-29 | Stop reason: HOSPADM

## 2023-08-29 RX ORDER — ACETAMINOPHEN 325 MG/1
650 TABLET ORAL EVERY 6 HOURS PRN
Refills: 0 | COMMUNITY
Start: 2023-08-29

## 2023-08-29 RX ADMIN — PANTOPRAZOLE SODIUM 40 MG: 40 TABLET, DELAYED RELEASE ORAL at 06:25

## 2023-08-29 RX ADMIN — FOLIC ACID 1 MG: 1 TABLET ORAL at 08:45

## 2023-08-29 RX ADMIN — LEVETIRACETAM 500 MG: 500 TABLET, FILM COATED ORAL at 08:45

## 2023-08-29 RX ADMIN — OXCARBAZEPINE 600 MG: 150 TABLET, FILM COATED ORAL at 08:45

## 2023-08-29 RX ADMIN — ACETAMINOPHEN 650 MG: 325 TABLET, FILM COATED ORAL at 12:09

## 2023-08-29 RX ADMIN — ENOXAPARIN SODIUM 40 MG: 40 INJECTION SUBCUTANEOUS at 08:45

## 2023-08-29 RX ADMIN — DIAZEPAM 10 MG: 5 TABLET ORAL at 08:45

## 2023-08-29 NOTE — DISCHARGE SUMMARY
Discharge Summary   Jose Quintana 59 y.o. male MRN: [id number]  Unit/Bed#: S -01 Encounter: [id number]    Admission Date: 8/25/2023     Discharge Date:08/29/23    Attending:Ran Milian DO     Consultants: [facility]    Admitting Diagnosis: Mass of gallbladder [K82.8]    Principle/ Secondary Diagnosis:  Past Medical History:   Diagnosis Date   • Anxiety    • GERD (gastroesophageal reflux disease)    • Hypertension    • Hypoxia 02/11/2022   • Mental developmental delay     mumps as a child, developed rheumatic fever   • MRSA bacteremia 02/09/2022   • Seizures ([address])      Past Surgical History:   Procedure Laterality Date   • COLONOSCOPY     • TN LAPAROSCOPY SURG CHOLECYSTECTOMY N/A 8/25/2023    Procedure: CHOLECYSTECTOMY OPEN;  Surgeon: Paris Fletcher DO;  Location: [facility];  Service: General   • SPLENECTOMY     • UPPER GASTROINTESTINAL ENDOSCOPY         Procedures Performed: No orders of the defined types were placed in this encounter. TN LAPAROSCOPY SURG CHOLECYSTECTOMY [[id number]] (CHOLECYSTECTOMY LAPAROSCOPIC)  CHOLECYSTECTOMY OPEN (Abdomen)  Procedure(s):  CHOLECYSTECTOMY OPEN    Laboratory data at discharge:   Results from last 7 days   Lab Units 08/28/23  0516 08/27/23  0438 08/26/23  1137 08/26/23  0500   WBC Thousand/uL 7.91 10.83*  --  14.40*   HEMOGLOBIN g/dL 8.0* 8.6* 8.6* 8.8*   HEMATOCRIT % 26.4* 27.8* 27.6* 28.1*   PLATELETS Thousands/uL 128* 119*  --  117*     Results from last 7 days   Lab Units 08/28/23  0516 08/27/23  0438 08/26/23  0500 08/25/23  1825 08/25/23  1118   POTASSIUM mmol/L 3.9 4.3 3.8   < >  --    CHLORIDE mmol/L 102 104 105   < >  --    CO2 mmol/L 31 30 27   < >  --    CO2, I-STAT mmol/L  --   --   --   --  31   BUN mg/dL 10 12 15   < >  --    CREATININE mg/dL 0.49* 0.51* 0.68   < >  --    GLUCOSE, ISTAT mg/dl  --   --   --   --  132   CALCIUM mg/dL 7.8* 7.5* 7.8*   < >  --     < > = values in this interval not displayed.      Results from last 7 days   Lab Units 08/26/23  0500 08/25/23  1155   INR  1.33* 1.30*           Discharge instructions/Information to patient and family:   See after visit summary for information provided to patient and family. Discharge Medications:  See after visit summary for reconciled discharge medications provided to patient and family. Hospital Course:   Patient presented on 8/25/23 for elective cholecystectomy, intra-operatively due to extensive adhesions and bleeding procedure was converted to an open cholecystectomy. In the PACU the patient received 2 units of pRBC. Post-operatively the patient was transferred to the ICU for step-down level 2 monitoring. POD #1 The patient was tolerating a clear liquid diet and he had adequate pain control. He was down-graded to med-surg level of care. Hemoglobin remained stable. Terrence output with thin serosanguinous fluid. On POD#3 the patient was deemed appropriate for discharge, PT/OT evaluated the patient and recommended home rehab. POD#4 the patient was discharged home with his brother and home health care. Prior to discharge, the patient was given instructions for outpatient care and follow-up. The patient has been instructed to call w/ any questions, changes, or concerns for the medical condition. For further details of the hospitalization, please refer to the medical record. Condition at Discharge: good     Provisions for Follow-Up Care:  See after visit summary for information related to follow-up care and any pertinent home health orders. Disposition: Home with [address]    Planned Readmission: No    Discharge Statement   I spent 30 minutes discharging the patient. This time was spent on the day of discharge. I had direct contact with the patient on the day of discharge. Additional documentation is required if more than 30 minutes were spent on discharge. Dai Hinojosa PA-C  8/29/2023      This text is generated with voice recognition software.  There may be translation, syntax,  or grammatical errors. If you have any questions, please contact the dictating provider.

## 2023-08-29 NOTE — CASE MANAGEMENT
Case Management Discharge Planning Note    Patient name Eddie Casarez  Location S /S -30 MRN [id number]  : 1965 Date 2023       Current Admission Date: 2023  Current Admission Diagnosis:Gallbladder polyp   Patient Active Problem List    Diagnosis Date Noted   • Hypomagnesemia 2023   • Gallbladder polyp 2023   • History of prediabetes 2022   • Other cirrhosis of liver ([address]) 2022   • Legally blind in right eye, as defined in [place] 2022   • Bilateral hearing loss 2022   • Angiodysplasia 2022   • History of TB (tuberculosis) 2022   • Esophageal varices ([address]) 2022   • Anemia 02/10/2022   • Status post splenectomy 02/10/2022   • Cellulitis of left lower extremity 2022   • Ambulatory dysfunction 2022   • Nonintractable epilepsy without status epilepticus ([address]) 2022   • Primary hypertension 2022   • Gastroesophageal reflux disease without esophagitis 2022   • Venous stasis dermatitis of both lower extremities 2022      LOS (days): 4  Geometric Mean LOS (GMLOS) (days):   Days to GMLOS:     OBJECTIVE:  Risk of Unplanned Readmission Score: 12.96         Current admission status: Inpatient   Preferred Pharmacy:   [address] [place] [address]  Phone: [phone] Fax: [phone]    Primary Care Provider: Nhi Haider MD    Primary Insurance: [address] [facility]  Secondary Insurance:     DISCHARGE DETAILS:                                                                                     IMM reviewed with patient and caregiver, patient and caregiver agrees with discharge determination.   IMM Given (Date):: 23  IMM Given to[de-identified] Family  Family notified[de-identified] Patient and patient's brother Adriana Orville

## 2023-08-29 NOTE — PROGRESS NOTES
Progress Note -General surgery  Jose Quintana 59 y.o. male MRN: [id number]  Unit/Bed#: S -01 Encounter: [id number]    Assessment:  59 y.o. male with a gallbladder mass, is now 4 Days Post-Op s/p Procedure(s) (LRB):  CHOLECYSTECTOMY OPEN (N/A). Plan:  - Diet Surgical; Surgical Soft/Lite Meal; Lo Fat  - Pain and Nausea control PRN  - Incentive spirometry  - OOB, ambulate  -PT/OT  - Plan for discharge today pending PT/OT recommendations  - Keep drain at discharge and follow-up as outpatient    Subjective/Objective     Subjective: Denies any abdominal pain. States he is having flatus and bowel movements. .      Objective:   Vitals: Blood pressure 104/64, pulse 84, temperature 99.5 °F (37.5 °C), temperature source Oral, resp. rate 18, height 6' (1.829 m), weight 95.6 kg (210 lb 12.2 oz), SpO2 94 %. ,Body mass index is 28.58 kg/m². I/O       08/27 0701  08/28 0700 08/28 0701  08/29 0700    P. O. 240     IV Piggyback 250     Total Intake(mL/kg) 490 (5.1)     Urine (mL/kg/hr) 600 (0.3) 250 (0.1)    Drains 245     Total Output 845 250    Net -355 -250                Physical Exam:  Gen: NAD, Aox3, Comfortable in bed  Chest: Normal work of breathing, no respiratory distress  Abd: Soft, ND, NT.  RUQ surgical site incision clean, dry, intact with staples in place. ASHLY with dark serosanguineous output. 50 cc measured over the last 24 hours. Ext: No Edema  Skin: Warm, Dry, Intact      Lab, Imaging and other studies: I have personally reviewed pertinent reports.     VTE Pharmacologic Prophylaxis: Enoxaparin (Lovenox)  VTE Mechanical Prophylaxis: sequential compression device        Daxa Armstrong MD  8/29/2023  5:49 AM

## 2023-08-29 NOTE — PLAN OF CARE
Problem: PHYSICAL THERAPY ADULT  Goal: Performs mobility at highest level of function for planned discharge setting. See evaluation for individualized goals. Description: Treatment/Interventions: Functional transfer training, LE strengthening/ROM, Elevations, Endurance training, Patient/family training, Gait training, Spoke to MD, Spoke to nursing, Spoke to advanced practitioner    Equipment Recommended: Leobardo Farmer    See flowsheet documentation for full assessment, interventions and recommendations. Outcome: Progressing  Note: Prognosis: Good  Problem List: Decreased endurance, Impaired balance, Decreased mobility, Decreased safety awareness, Pain, Decreased strength, Decreased cognition, Impaired judgement, Decreased skin integrity  Assessment: Pt presents to hospital following GB laparoscopic converted to open cholecystectomy for removal of GB polyp. PMH significant for cirrhosis, HTN, GERD, MRSA, seizures, and anxiety. Pt is a good candidate for PT services given his level of I prior to admission and home support, but with limiting factors of PMH, pain limiting mobility, home setup, copious amounts of drainage from abdomen, and generalized deconditioning post-op. Pt was able to perform bed mobility with ALEXANDRA for increased time. Pt required S x 1 to stand from EOB, and MINAX1 (CGA) to return to seated position. Pt required MINAx1 with use of RW to ambulate distance of 150 ft.  Verbal and tactile cues required during ambulation trial.  Gait speed measured during this trial at 0.7 m/s, indicating pt as a limited community ambulator. At this time, pt should be ambulating at all times with RW due to present gait deviations, decreased endurance and ambulatory balance, and lack of safety awareness, which differs from baseline. Pt complains of consistent pain on R-side of abdomen near incision site t/o session. Pt required frequent redirections to stay on task t/o session.   Pt will benefit from continued PT services during admission to address functional and mobility deficits. PT is recommending d/c to home health PT services and increased family supports to address functional and mobility deficits. Barriers to Discharge: None (provided pt's brother is home at all times as pt reports)     PT Discharge Recommendation: Home with home health rehabilitation (provided pt's brother is at home to provide support, as pt reports)    See flowsheet documentation for full assessment.

## 2023-08-29 NOTE — CASE MANAGEMENT
Case Management Discharge Planning Note    Patient name Rosalinda Gonzalez  Location S /S -12 MRN [id number]  : 1965 Date 2023       Current Admission Date: 2023  Current Admission Diagnosis:Gallbladder polyp   Patient Active Problem List    Diagnosis Date Noted   • Hypomagnesemia 2023   • Gallbladder polyp 2023   • History of prediabetes 2022   • Other cirrhosis of liver ([address]) 2022   • Legally blind in right eye, as defined in [place] 2022   • Bilateral hearing loss 2022   • Angiodysplasia 2022   • History of TB (tuberculosis) 2022   • Esophageal varices ([address]) 2022   • Anemia 02/10/2022   • Status post splenectomy 02/10/2022   • Cellulitis of left lower extremity 2022   • Ambulatory dysfunction 2022   • Nonintractable epilepsy without status epilepticus ([address]) 2022   • Primary hypertension 2022   • Gastroesophageal reflux disease without esophagitis 2022   • Venous stasis dermatitis of both lower extremities 2022      LOS (days): 4  Geometric Mean LOS (GMLOS) (days):   Days to GMLOS:     OBJECTIVE:  Risk of Unplanned Readmission Score: 12.96         Current admission status: Inpatient   Preferred Pharmacy:   [facility], [address] [place] [address]  Phone: [phone] Fax: [phone]    Primary Care Provider: Nena Glasgow MD    Primary Insurance: [address] [facility]  Secondary Insurance:     DISCHARGE DETAILS:             CM contacted family/caregiver?: Yes           Contacts  Patient Contacts: Starling Sheerer  Relationship to Patient[de-identified] Family  Contact Method:  In Person  Reason/Outcome: Discharge Planning, Referral, Continuity of Care, Emergency [facility]         Is the patient interested in [address] at discharge?: Yes  [facility] requested[de-identified] Nursing, Occupational Therapy, Physical Therapy  Home Health Agency Name[de-identified] [facility] Provider[de-identified] PCP  Home Health Services Needed[de-identified] Gait/ADL Training, Evaluate Functional Status and Safety, Strengthening/Theraputic Exercises to Improve Function  Homebound Criteria Met[de-identified] Requires the Assistance of Another Person for Safe Ambulation or to Leave the Home, Uses an Assist Device (i.e. cane, walker, etc)  Supporting Clincal Findings[de-identified] Limited Endurance, Fatigues Easliy in [employer]         Other Referral/Resources/Interventions Provided:  Interventions: [facility]  Referral Comments: PT/OT notified this CM of need for home PT/OT. CM notified [facility] via aidin of PT/OT needed in addition to SN care. CM notified surgery PA of same.          Treatment Team Recommendation: Home with [address]  Discharge Destination Plan[de-identified] Home with [address] [facility] at Discharge : Family

## 2023-08-29 NOTE — PHYSICAL THERAPY NOTE
PHYSICAL THERAPY EVALUATION AND TREATMENT  DATE: 08/29/23  TIME: 0045-2427    NAME:  Antonia Parks  AGE:   59 y.o.  Mrn:   [id number]  Length Of Stay: 4    ADMIT DX:  Mass of gallbladder [K82.8]    Past Medical History:   Diagnosis Date    Anxiety     GERD (gastroesophageal reflux disease)     Hypertension     Hypoxia 02/11/2022    Mental developmental delay     mumps as a child, developed rheumatic fever    MRSA bacteremia 02/09/2022    Seizures ([address])      Past Surgical History:   Procedure Laterality Date    COLONOSCOPY      NM LAPAROSCOPY SURG CHOLECYSTECTOMY N/A 8/25/2023    Procedure: CHOLECYSTECTOMY OPEN;  Surgeon: Hector Fletcher DO;  [facility];  Service: General    SPLENECTOMY      UPPER GASTROINTESTINAL ENDOSCOPY         Performed at least 2 patient identifiers during session: Name, ID bracelet, and Epic photo     08/29/23 1114   PT Last Visit   PT Visit Date 08/29/23   Note Type   Note type Evaluation  (and treatment)   Pain Assessment   Pain Assessment Tool FLACC   Pain Location/Orientation Location: Abdomen;Orientation: Right  (at incision site)   Effect of Pain on Daily Activities Pain affecting mobilty and functional acitivites t/o session   Hospital Pain Intervention(s) Ambulation/increased activity; Emotional support; Other (Comment)  (repositioned pt OOB)   Pain Rating: FLACC (Rest) - Face 1   Pain Rating: FLACC (Rest) - Legs 0   Pain Rating: FLACC (Rest) - Activity 0   Pain Rating: FLACC (Rest) - Cry 1   Pain Rating: FLACC (Rest) - Consolability 1   Score: FLACC (Rest) 3   Pain Rating: FLACC (Activity) - Face 2   Pain Rating: FLACC (Activity) - Legs 1   Pain Rating: FLACC (Activity) - Activity 1   Pain Rating: FLACC (Activity) - Cry 1   Pain Rating: FLACC (Activity) - Consolability 1   Score: FLACC (Activity) 6   Restrictions/Precautions   Weight Bearing Precautions Per Order No   Other Precautions Cognitive; Chair Alarm; Bed Alarm; Impulsive; Fall Risk;Pain   Home Living   Type of Home House   Home Layout Two level;1/2 bath on main level;Bed/bath upstairs;Stairs to enter with rails  (pt lives in basement of home; full flight to get to basement, half bath on first level, full bath second level;  5 GIUSEPPE)   Bathroom Shower/Tub Tub/shower unit   Home Equipment Other (Comment)  (pt reports having walker but questionable historian)   Additional Comments Pt reports that his brother is at home w/ him at all times   Prior Function   Level of [place] Independent with ADLs; Independent with functional mobility; Needs assistance with IADLS   Lives With Family  (brother and sister-in-law)   Receives Help From Family   IADLs Family/Friend/Other provides transportation; Family/Friend/Other provides meals; Family/Friend/Other provides medication management   Falls in the last 6 months 0   Vocational Unemployed   Comments Pt reports being fully I with ADLs and functional mobility prior to admission though pt is a questionable historian   General   Additional Pertinent History Pt admitted on 8/25/2023 for GB laparoscopic converted to open cholecystectomy for removal of GB polyp.   PMH significant for cirrhosis, HTN, GERD, MRSA, seizures, and anxiety   Family/Caregiver Present No   Cognition   Overall Cognitive Status Impaired   Arousal/Participation Arousable   Orientation Level Oriented to person;Oriented to situation;Oriented to place   Memory Decreased short term memory;Decreased recall of precautions   Following Commands Follows one step commands inconsistently   Comments Difficulty redirecting pt t/o session, pt is verbose and tangential; pt is impulsive and displays decreased safety awareness   Subjective   Subjective "I have a lot of pain in my right stomach"   RUE Assessment   RUE Assessment WFL   LUE Assessment   LUE Assessment WFL   RLE Assessment   RLE Assessment X  (testing R LE strength limited 2/2 pain in abdomen)   LLE Assessment   LLE Assessment WFL   Vision-Basic Assessment   Current Vision No visual deficits   Coordination   Movements are Fluid and Coordinated 1   Light Touch   RLE Light Touch Grossly intact   LLE Light Touch Grossly intact   Proprioception   RLE Proprioception Grossly intact   LLE Proprioception Grossly Intact   Self Selected Walking Speed   SSWS Trial 1 (Seconds) 8.36 Seconds   SSWS Trial 2 (Seconds) 7.76 Seconds   SSWS Trial 3 (Seconds) 7.34 Seconds   SSWS Average Time (Seconds) 7.8 seconds   SSWS Average Score (m/sec) 0.7 m/sec   Bed Mobility   Supine to Sit 6  Modified independent   Additional items Increased time required; Bedrails   Transfers   Sit to Stand 5  Supervision   Additional items Assist x 1; Increased time required;Verbal cues   Stand to Sit 4  Minimal assistance  (CGA)   Additional items Assist x 1; Increased time required;Verbal cues   Ambulation/Elevation   Gait pattern Step through pattern;Excessively slow  (pt had difficulty navigating obstacles in hallway, required cues for safety)   Gait Assistance 4  Minimal assist  (CGA)   Additional items Assist x 1;Verbal cues; Tactile cues   Assistive Device Rolling walker   Distance 150 ft   Balance   Static Sitting Good   Dynamic Sitting Fair   Static Standing Fair -  (w RW)   Dynamic Standing Poor +  (w RW)   Ambulatory Poor +  (w RW)   Endurance Deficit   Endurance Deficit Yes   Endurance Deficit Description pt reports fatigue after amb trial   Activity Tolerance   Activity Tolerance Patient limited by fatigue;Patient limited by pain   Medical Staff Made Aware Spoke with Rupal Gómez, surgical AP   Nurse Made Aware Spoke with ALEXANDER Bond   Assessment   Prognosis Good   Problem List Decreased endurance; Impaired balance;Decreased mobility; Decreased safety awareness;Pain;Decreased strength;Decreased cognition; Impaired judgement;Decreased skin integrity   Assessment Pt presents to hospital following GB laparoscopic converted to open cholecystectomy for removal of GB polyp.   PMH significant for cirrhosis, HTN, GERD, MRSA, seizures, and anxiety. Pt is a good candidate for PT services given his level of I prior to admission and home support, but with limiting factors of PMH, pain limiting mobility, home setup, copious amounts of drainage from abdomen, and generalized deconditioning post-op. Pt was able to perform bed mobility with ALEXANDRA for increased time. Pt required S x 1 to stand from EOB, and MINAX1 (CGA) to return to seated position. Pt required MINAx1 with use of RW to ambulate distance of 150 ft.  Verbal and tactile cues required during ambulation trial.  Gait speed measured during this trial at 0.7 m/s, indicating pt as a limited community ambulator. At this time, pt should be ambulating at all times with RW due to present gait deviations, decreased endurance and ambulatory balance, and lack of safety awareness, which differs from baseline. Pt complains of consistent pain on R-side of abdomen near incision site t/o session. Pt required frequent redirections to stay on task t/o session. Pt will benefit from continued PT services during admission to address functional and mobility deficits. PT is recommending d/c to home health PT services and increased family supports to address functional and mobility deficits. Barriers to Discharge None  (provided pt's brother is home at all times as pt reports)   Goals   Patient Goals "have my pain go away"   STG Expiration Date 09/08/23   Short Term Goal #1 Pt will be able to consistently perform all transfers with S in order to decrease burden of care and increase mobility. Pt will be able to ambulate 250' with S and use of RW in order to regain endurance and promote independence. Pt will improve gait speed to 1.0 m/s in order to return to community ambulation with safety. Pt will be able to consistently and safely navigate 5 stairs with S in order to be able to safely enter and exit the home and promote pt independence.   Pt will improve AM-PAC score to 23/24 in order to promote functional independence. Pt will improve Barthel score to 70/100 in order to increase independence with ADLs. PT Treatment Day 0   Plan   Treatment/Interventions Functional transfer training;LE strengthening/ROM; Elevations; Endurance training;Patient/family training;Gait training;Spoke to MD;Spoke to nursing;Spoke to advanced practitioner   PT Frequency 2-3x/wk   Recommendation   PT Discharge Recommendation Home with home health rehabilitation  (provided pt's brother is at home to provide support, as pt reports)   Equipment Recommended [address] walker   Change/add to ITeam? No   AM-PAC Basic Mobility Inpatient   Turning in Flat Bed Without Bedrails 3   Lying on Back to Sitting on Edge of Flat Bed Without Bedrails 3   Moving Bed to Chair 3   Standing Up From Chair Using Arms 3   Walk in Room 3   Climb 3-5 Stairs With Railing 3   Basic Mobility Inpatient Raw Score 18   Basic Mobility Standardized Score 41.05   Highest Level Of Mobility   JH-HLM Goal 6: Walk 10 steps or more   JH-HLM Achieved 7: Walk 25 feet or more   Barthel Index   Feeding 10   Bathing 0   Grooming Score 5   Dressing Score 5   Bladder Score 5   Bowels Score 5   Toilet Use Score 5   Transfers (Bed/Chair) Score 10   Mobility (Level Surface) Score 10   Stairs Score 5   Barthel Index Score 60   Additional Treatment Session   Start Time 1144   End Time 1156   Treatment Assessment During tx session, pt was transported to [facility] via transport WC due to generalized fatigue, SOB, pain, and difficulty redirecting in hallway. Pt completed transfer from sit to stand with S.  Pt required MINAx1 to ascend and descend 5 steps with frequent verbal and tactile cueing. Pt used L handrail on ascent and descent of stairs. Ascending and descending stairs occurred with nonreciprocal pattern; pt used side-step pattern to ascend the stairs and forward pattern to descend. Pt returned to sitting with MINAx1 (CGA). Following elevation training, pt was transported back to room due to same subjective complaints. While in room, pt ambulated a distance of 20 ft with MINAx1 (CGA) with RW. STS transfers in this session required S to stand and MINAx1 (CGA) to sit, as seen in the evaluation. Pt reports feeling fatigued with mild SOB following the session. Session ended with pt in [facility] chair; chair alarm activated. Next session, PT should focus on continued transfer, gait, and elevation training, and high level balance activities as tolerated. At this time, PT continues to recommend d/c to home health PT to address functional and mobility deficits. Equipment Use RW, transport WC   Additional Treatment Day 1   End of Consult   Patient Position at End of Consult Bedside chair;Bed/Chair alarm activated; All needs within reach       The patient's AM-PAC Basic Mobility Inpatient Short Form Raw Score is 18. A Raw score of greater than 16 suggests the patient may benefit from discharge to home. Please also refer to the recommendation of the Physical Therapist for safe discharge planning.       Gait Speed Interpretation:  Gain of 0.1 m/s is a predictor of well-being in those w/ abnormal walking speed compared to age matched peers  <0.7m/s is at an increased risk for falls  Household ambulator: <0.4 m/s  Limited community ambulator: 0.4-0.8 m/s  [employer] ambulator: 0.8-1.2 m/s  Able to safely cross streets: >1.2 m/s    [employer], SRINIVAS  08/29/23

## 2023-08-30 ENCOUNTER — HOME CARE VISIT (OUTPATIENT)
Dept: HOME HEALTH SERVICES | Facility: HOME HEALTHCARE | Age: 58
End: 2023-08-30
Payer: MEDICARE

## 2023-08-30 ENCOUNTER — TELEPHONE (OUTPATIENT)
Dept: HEMATOLOGY ONCOLOGY | Facility: CLINIC | Age: 58
End: 2023-08-30

## 2023-08-30 VITALS
TEMPERATURE: 98.5 F | HEART RATE: 60 BPM | SYSTOLIC BLOOD PRESSURE: 100 MMHG | OXYGEN SATURATION: 94 % | RESPIRATION RATE: 16 BRPM | DIASTOLIC BLOOD PRESSURE: 60 MMHG

## 2023-08-30 PROCEDURE — G0299 HHS/HOSPICE OF RN EA 15 MIN: HCPCS

## 2023-08-30 NOTE — UTILIZATION REVIEW
NOTIFICATION OF ADMISSION DISCHARGE   This is a Notification of Discharge from [facility]. Please be advised that this patient has been discharge from our facility. Below you will find the admission and discharge date and time including the patient’s disposition. UTILIZATION REVIEW CONTACT:  Shirin Jacques  Utilization   Network Utilization Review Department  Phone: [phone] x carefully listen to the prompts. All voicemails are confidential.  Email: [email or web address]     ADMISSION INFORMATION  PRESENTATION DATE: 8/25/2023  7:56 AM  OBERVATION ADMISSION DATE:   INPATIENT ADMISSION DATE: 8/25/23  1:06 PM   DISCHARGE DATE: 8/29/2023  3:43 PM   DISPOSITION:Home with [address] INFORMATION:  Send all requests for admission clinical reviews, approved or denied determinations and any other requests to dedicated fax number below belonging to the campus where the patient is receiving treatment.  List of dedicated fax numbers:  [place] DENIALS (Administrative/Medical Necessity) [phone] [address] [facility] (Maternity/NICU/Pediatrics) [phone]   [facility] [phone]   [facility] [phone] [facility] [phone]   [address] [place] [address] [phone]   [facility] [phone]   [address] [place] [address] [address] [facility] [phone]   [address] [facility] [phone] [address] [facility] [phone]   [address] [facility] [address] [facility] [phone]

## 2023-08-30 NOTE — TELEPHONE ENCOUNTER
Appointment Change  Cancel, Reschedule, Change to Virtual      Who are you speaking with? Sister in law   If it is not the patient, are they listed on an active communication consent form? yes   Which provider is the appointment scheduled with? Susanna Beard   When was the original appointment scheduled? Please list date and time 9/5   At which location is the appointment scheduled to take place? [facility]   Was the appointment rescheduled? Was the appointment changed from an in person visit to a virtual visit? If so, please list the details of the change. Yes, 10/26 2:20pm   What is the reason for the appointment change? Recovering from surgery   Was [facility] transport scheduled for this visit? no   Does [facility]AR transport need to be scheduled for the new visit (if applicable) no   Does the patient need an infusion appointment rescheduled? no   Does the patient have an infusion appointment scheduled? If so, when? no   Is the patient undergoing chemotherapy? no   Was the no-show policy reviewed for appointments being changed with less then 24 hours of notice?  yes

## 2023-08-31 ENCOUNTER — HOME CARE VISIT (OUTPATIENT)
Dept: HOME HEALTH SERVICES | Facility: HOME HEALTHCARE | Age: 58
End: 2023-08-31
Payer: MEDICARE

## 2023-08-31 VITALS
TEMPERATURE: 98.7 F | OXYGEN SATURATION: 91 % | WEIGHT: 210.76 LBS | RESPIRATION RATE: 18 BRPM | HEART RATE: 85 BPM | BODY MASS INDEX: 28.55 KG/M2 | DIASTOLIC BLOOD PRESSURE: 75 MMHG | HEIGHT: 72 IN | SYSTOLIC BLOOD PRESSURE: 109 MMHG

## 2023-08-31 PROCEDURE — 88341 IMHCHEM/IMCYTCHM EA ADD ANTB: CPT | Performed by: PATHOLOGY

## 2023-08-31 PROCEDURE — 88342 IMHCHEM/IMCYTCHM 1ST ANTB: CPT | Performed by: PATHOLOGY

## 2023-08-31 PROCEDURE — 88304 TISSUE EXAM BY PATHOLOGIST: CPT | Performed by: PATHOLOGY

## 2023-09-01 ENCOUNTER — HOME CARE VISIT (OUTPATIENT)
Dept: HOME HEALTH SERVICES | Facility: HOME HEALTHCARE | Age: 58
End: 2023-09-01
Payer: MEDICARE

## 2023-09-01 VITALS — SYSTOLIC BLOOD PRESSURE: 110 MMHG | OXYGEN SATURATION: 97 % | HEART RATE: 85 BPM | DIASTOLIC BLOOD PRESSURE: 80 MMHG

## 2023-09-01 PROCEDURE — G0152 HHCP-SERV OF OT,EA 15 MIN: HCPCS

## 2023-09-05 ENCOUNTER — HOME CARE VISIT (OUTPATIENT)
Dept: HOME HEALTH SERVICES | Facility: HOME HEALTHCARE | Age: 58
End: 2023-09-05
Payer: MEDICARE

## 2023-09-05 VITALS
SYSTOLIC BLOOD PRESSURE: 128 MMHG | OXYGEN SATURATION: 98 % | TEMPERATURE: 98 F | HEART RATE: 80 BPM | RESPIRATION RATE: 16 BRPM | DIASTOLIC BLOOD PRESSURE: 66 MMHG

## 2023-09-05 PROCEDURE — G0299 HHS/HOSPICE OF RN EA 15 MIN: HCPCS

## 2023-09-06 ENCOUNTER — HOME CARE VISIT (OUTPATIENT)
Dept: HOME HEALTH SERVICES | Facility: HOME HEALTHCARE | Age: 58
End: 2023-09-06
Payer: MEDICARE

## 2023-09-06 VITALS
SYSTOLIC BLOOD PRESSURE: 124 MMHG | HEART RATE: 86 BPM | DIASTOLIC BLOOD PRESSURE: 76 MMHG | OXYGEN SATURATION: 92 % | RESPIRATION RATE: 28 BRPM

## 2023-09-06 PROCEDURE — G0151 HHCP-SERV OF PT,EA 15 MIN: HCPCS

## 2023-09-07 ENCOUNTER — OFFICE VISIT (OUTPATIENT)
Dept: SURGERY | Facility: CLINIC | Age: 58
End: 2023-09-07

## 2023-09-07 ENCOUNTER — HOME CARE VISIT (OUTPATIENT)
Dept: HOME HEALTH SERVICES | Facility: HOME HEALTHCARE | Age: 58
End: 2023-09-07
Payer: MEDICARE

## 2023-09-07 DIAGNOSIS — I87.2 VENOUS STASIS DERMATITIS OF BOTH LOWER EXTREMITIES: ICD-10-CM

## 2023-09-07 DIAGNOSIS — K80.10 CCC (CHRONIC CALCULOUS CHOLECYSTITIS): ICD-10-CM

## 2023-09-07 DIAGNOSIS — G40.909 NONINTRACTABLE EPILEPSY WITHOUT STATUS EPILEPTICUS, UNSPECIFIED EPILEPSY TYPE (HCC): ICD-10-CM

## 2023-09-07 DIAGNOSIS — K82.8 GALLBLADDER MASS: Primary | ICD-10-CM

## 2023-09-07 PROCEDURE — 99024 POSTOP FOLLOW-UP VISIT: CPT | Performed by: SURGERY

## 2023-09-07 NOTE — PROGRESS NOTES
Assessment/Plan:    Diagnoses and all orders for this visit:    Gallbladder mass    CCC (chronic calculous cholecystitis)    Status post open cholecystectomy. Drain removed. Pathology revealed benign pyloric gland adenoma. No signs of malignancy. Pathology reviewed with patient and his brother. Clips removed in the office today as well. Resume diet. Would hold off on heavy lifting for another 3 to 4 weeks. Return as needed    Pathology: Reviewed with patient, all questions answered. Postoperative restrictions reviewed. All questions answered. ______________________________________________________  HPI: Patient presents post operatively. Open cholecystectomy 8/25/2023   Addendum  This addendum is issued to add immunohistochemistry results. The final diagnosis stays the same. The lesional tissue is positive for MUC-6 and patchy positive for CK7, supporting final diagnosis. Addendum electronically signed by Mendoza Keen MD on 9/1/2023 at  2:23 PM  Final DiagnosisA. 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 - Chronic cholecystitis and cholelithiasis. - Resection margin is unremarkable. - Negative for malignancy.     Note: Grossly identifiable lesions were entirely submitted for microscopic examination, no malignancy identified. Intradepartmental consultation is in agreement. Initial laparoscopic cholecystectomy was attempted however due to fair amount of bleeding this was converted to an open procedure. Liver looked grossly normal.  Fortunately his pathology was benign. He is starting to eat better according to his brother. Only scant amount coming out his drain.              ROS:  General ROS: negative for - chills, fatigue, fever or night sweats, weight loss  Respiratory ROS: no cough, shortness of breath, or wheezing  Cardiovascular ROS: no chest pain or dyspnea on exertion  Genito-Urinary ROS: no dysuria, trouble voiding, or hematuria  Musculoskeletal ROS: negative for - gait disturbance, joint pain or muscle pain  Neurological ROS: no TIA or stroke symptoms  GI ROS: see HPI  Skin ROS: no new rashes or lesions   Lymphatic ROS: no new adenopathy noted by pt. GYN ROS: see HPI, no new GYN history or bleeding noted  Psy ROS: no new mental or behavioral disturbances         Patient Active Problem List   Diagnosis   • Cellulitis of left lower extremity   • Ambulatory dysfunction   • Nonintractable epilepsy without status epilepticus ([address])   • Primary hypertension   • Gastroesophageal reflux disease without esophagitis   • Venous stasis dermatitis of both lower extremities   • Anemia   • Status post splenectomy   • Esophageal varices (HCC)   • Legally blind in right eye, as defined in [place]   • Bilateral hearing loss   • Angiodysplasia   • History of TB (tuberculosis)   • History of prediabetes   • Other cirrhosis of liver (HCC)   • Gallbladder mass   • Hypomagnesemia   • CCC (chronic calculous cholecystitis)       Allergies:  Patient has no known allergies. Current Outpatient Medications:   •  acetaminophen (TYLENOL) 325 mg tablet, Take 2 tablets (650 mg total) by mouth every 6 (six) hours as needed for mild pain, Disp: , Rfl: 0  •  diazepam (VALIUM) 10 mg tablet, TAKE 1 TABLET BY MOUTH EVERY 12 HOURS., Disp: 60 tablet, Rfl: 2  •  folic acid (FOLVITE) 1 mg tablet, TAKE 1 TABLET (1 MG TOTAL) BY MOUTH IN THE MORNING. NOT COVERED, Disp: 90 tablet, Rfl: 1  •  furosemide (LASIX) 20 mg tablet, TAKE 1 TABLET (20 MG TOTAL) BY MOUTH IN THE MORNING AND 1 TABLET (20 MG TOTAL) IN THE EVENING., Disp: 180 tablet, Rfl: 1  •  iron polysaccharides (FERREX) 150 mg capsule, TAKE 1 CAPSULE (150 MG TOTAL) BY MOUTH IN THE MORNING.  NOT COVERED, Disp: 90 capsule, Rfl: 1  •  levETIRAcetam (KEPPRA) 500 mg tablet, Take 1 tablet (500 mg total) by mouth every 12 (twelve) hours, Disp: 180 tablet, Rfl: 1  •  nadolol (CORGARD) 40 mg tablet, TAKE 1 TABLET (40 MG TOTAL) BY MOUTH IN THE MORNING., Disp: 90 tablet, Rfl: 1  •  OXcarbazepine (TRILEPTAL) 600 mg tablet, TAKE 1 TABLET BY MOUTH EVERY 12 HOURS, Disp: 180 tablet, Rfl: 1  •  pantoprazole (PROTONIX) 40 mg tablet, TAKE 1 TABLET BY MOUTH EVERY DAY IN THE MORNING AND IN THE EVENING BEFORE MEALS, Disp: 180 tablet, Rfl: 1  •  sucralfate (CARAFATE) 1 g tablet, TAKE 1 TABLET BY MOUTH EVERY 6 HOURS, Disp: 360 tablet, Rfl: 1  •  triamcinolone (KENALOG) 0.1 % ointment, APPLY SPARINGLY TO AFFECTED AREAS OF LEGS 2-4 TIMES A DAY FOR UP TO 2 WEEKS., Disp: 596 g, Rfl: 0    Past Medical History:   Diagnosis Date   • Anxiety    • GERD (gastroesophageal reflux disease)    • Hypertension    • Hypoxia 02/11/2022   • Mental developmental delay     mumps as a child, developed rheumatic fever   • MRSA bacteremia 02/09/2022   • Seizures ([address])        Past Surgical History:   Procedure Laterality Date   • COLONOSCOPY     • [place] LAPAROSCOPY SURG CHOLECYSTECTOMY N/A 8/25/2023    Procedure: CHOLECYSTECTOMY OPEN;  Surgeon: [place] Severe Conron, DO;  Location: [facility];  Service: General   • SPLENECTOMY     • UPPER GASTROINTESTINAL ENDOSCOPY         Family History   Problem Relation Age of Onset   • Depression Mother    • Heart disease Father         reports that he has never smoked. He has never used smokeless tobacco. He reports that he does not drink alcohol and does not use drugs. PHYSICAL EXAM    There were no vitals taken for this visit. General: normal, cooperative, no distress  Abdominal: soft, nondistended or nontender  Incision: clean, dry, and intact and healing well. Well-healed right costal margin scar. Clips were removed. Drain was also removed.       [place] Severe Conron, DO    Date: 9/7/2023 Time: 4:06 PM

## 2023-09-07 NOTE — LETTER
September 7, 2023     Holland Samayoa MD  [address] [place] [address]    Patient: Peggy Noble   YOB: 1965   Date of Visit: 9/7/2023       Dear Dr. Abby Saunders: Thank you for referring Octavio Killian to me for evaluation. Below are my notes for this consultation. If you have questions, please do not hesitate to call me. I look forward to following your patient along with you. Sincerely,        Gauri Groves,         CC: MD Shannon Fajardo DO  9/7/2023  4:10 PM  Sign when Signing Visit  Assessment/Plan:    Diagnoses and all orders for this visit:    Gallbladder mass    CCC (chronic calculous cholecystitis)    Status post open cholecystectomy. Drain removed. Pathology revealed benign pyloric gland adenoma. No signs of malignancy. Pathology reviewed with patient and his brother. Clips removed in the office today as well. Resume diet. Would hold off on heavy lifting for another 3 to 4 weeks. Return as needed    Pathology: Reviewed with patient, all questions answered. Postoperative restrictions reviewed. All questions answered. ______________________________________________________  HPI: Patient presents post operatively. Open cholecystectomy 8/25/2023   Addendum  This addendum is issued to add immunohistochemistry results. The final diagnosis stays the same. The lesional tissue is positive for MUC-6 and patchy positive for CK7, supporting final diagnosis. Addendum electronically signed by Joann Pop MD on 9/1/2023 at  2:23 PM  Final DiagnosisA. 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 - Chronic cholecystitis and cholelithiasis. - Resection margin is unremarkable. - Negative for malignancy.      Note: Grossly identifiable lesions were entirely submitted for microscopic examination, no malignancy identified. Intradepartmental consultation is in agreement. Initial laparoscopic cholecystectomy was attempted however due to fair amount of bleeding this was converted to an open procedure. Liver looked grossly normal.  Fortunately his pathology was benign. He is starting to eat better according to his brother. Only scant amount coming out his drain. ROS:  General ROS: negative for - chills, fatigue, fever or night sweats, weight loss  Respiratory ROS: no cough, shortness of breath, or wheezing  Cardiovascular ROS: no chest pain or dyspnea on exertion  Genito-Urinary ROS: no dysuria, trouble voiding, or hematuria  Musculoskeletal ROS: negative for - gait disturbance, joint pain or muscle pain  Neurological ROS: no TIA or stroke symptoms  GI ROS: see HPI  Skin ROS: no new rashes or lesions   Lymphatic ROS: no new adenopathy noted by pt. GYN ROS: see HPI, no new GYN history or bleeding noted  Psy ROS: no new mental or behavioral disturbances         Patient Active Problem List   Diagnosis   • Cellulitis of left lower extremity   • Ambulatory dysfunction   • Nonintractable epilepsy without status epilepticus ([address])   • Primary hypertension   • Gastroesophageal reflux disease without esophagitis   • Venous stasis dermatitis of both lower extremities   • Anemia   • Status post splenectomy   • Esophageal varices (HCC)   • Legally blind in right eye, as defined in [place]   • Bilateral hearing loss   • Angiodysplasia   • History of TB (tuberculosis)   • History of prediabetes   • Other cirrhosis of liver (HCC)   • Gallbladder mass   • Hypomagnesemia   • CCC (chronic calculous cholecystitis)       Allergies:  Patient has no known allergies.       Current Outpatient Medications:   •  acetaminophen (TYLENOL) 325 mg tablet, Take 2 tablets (650 mg total) by mouth every 6 (six) hours as needed for mild pain, Disp: , Rfl: 0  •  diazepam (VALIUM) 10 mg tablet, TAKE 1 TABLET BY MOUTH EVERY 12 HOURS., Disp: 60 tablet, Rfl: 2  •  folic acid (FOLVITE) 1 mg tablet, TAKE 1 TABLET (1 MG TOTAL) BY MOUTH IN THE MORNING. NOT COVERED, Disp: 90 tablet, Rfl: 1  •  furosemide (LASIX) 20 mg tablet, TAKE 1 TABLET (20 MG TOTAL) BY MOUTH IN THE MORNING AND 1 TABLET (20 MG TOTAL) IN THE EVENING., Disp: 180 tablet, Rfl: 1  •  iron polysaccharides (FERREX) 150 mg capsule, TAKE 1 CAPSULE (150 MG TOTAL) BY MOUTH IN THE MORNING. NOT COVERED, Disp: 90 capsule, Rfl: 1  •  levETIRAcetam (KEPPRA) 500 mg tablet, Take 1 tablet (500 mg total) by mouth every 12 (twelve) hours, Disp: 180 tablet, Rfl: 1  •  nadolol (CORGARD) 40 mg tablet, TAKE 1 TABLET (40 MG TOTAL) BY MOUTH IN THE MORNING., Disp: 90 tablet, Rfl: 1  •  OXcarbazepine (TRILEPTAL) 600 mg tablet, TAKE 1 TABLET BY MOUTH EVERY 12 HOURS, Disp: 180 tablet, Rfl: 1  •  pantoprazole (PROTONIX) 40 mg tablet, TAKE 1 TABLET BY MOUTH EVERY DAY IN THE MORNING AND IN THE EVENING BEFORE MEALS, Disp: 180 tablet, Rfl: 1  •  sucralfate (CARAFATE) 1 g tablet, TAKE 1 TABLET BY MOUTH EVERY 6 HOURS, Disp: 360 tablet, Rfl: 1  •  triamcinolone (KENALOG) 0.1 % ointment, APPLY SPARINGLY TO AFFECTED AREAS OF LEGS 2-4 TIMES A DAY FOR UP TO 2 WEEKS., Disp: 033 g, Rfl: 0    Past Medical History:   Diagnosis Date   • Anxiety    • GERD (gastroesophageal reflux disease)    • Hypertension    • Hypoxia 02/11/2022   • Mental developmental delay     mumps as a child, developed rheumatic fever   • MRSA bacteremia 02/09/2022   • Seizures (HCC)        Past Surgical History:   Procedure Laterality Date   • COLONOSCOPY     • [place] LAPAROSCOPY SURG CHOLECYSTECTOMY N/A 8/25/2023    Procedure: CHOLECYSTECTOMY OPEN;  Surgeon: Nico Fletcher DO;  Location: [facility];  Service: General   • SPLENECTOMY     • UPPER GASTROINTESTINAL ENDOSCOPY         Family History   Problem Relation Age of Onset   • Depression Mother    • Heart disease Father         reports that he has never smoked.  He has never used smokeless tobacco. He reports that he does not drink alcohol and does not use drugs. PHYSICAL EXAM    There were no vitals taken for this visit. General: normal, cooperative, no distress  Abdominal: soft, nondistended or nontender  Incision: clean, dry, and intact and healing well. Well-healed right costal margin scar. Clips were removed. Drain was also removed.       Yaneli Fletcher DO    Date: 9/7/2023 Time: 4:06 PM

## 2023-09-08 ENCOUNTER — HOME CARE VISIT (OUTPATIENT)
Dept: HOME HEALTH SERVICES | Facility: HOME HEALTHCARE | Age: 58
End: 2023-09-08
Payer: MEDICARE

## 2023-09-08 VITALS
TEMPERATURE: 98 F | OXYGEN SATURATION: 98 % | DIASTOLIC BLOOD PRESSURE: 62 MMHG | SYSTOLIC BLOOD PRESSURE: 122 MMHG | RESPIRATION RATE: 16 BRPM | HEART RATE: 80 BPM

## 2023-09-08 PROBLEM — Z90.49 STATUS POST CHOLECYSTECTOMY: Status: ACTIVE | Noted: 2023-09-08

## 2023-09-08 PROCEDURE — G0299 HHS/HOSPICE OF RN EA 15 MIN: HCPCS

## 2023-09-08 RX ORDER — OXCARBAZEPINE 600 MG/1
TABLET, FILM COATED ORAL
Qty: 180 TABLET | Refills: 1 | Status: SHIPPED | OUTPATIENT
Start: 2023-09-08

## 2023-09-10 ENCOUNTER — HOME CARE VISIT (OUTPATIENT)
Dept: HOME HEALTH SERVICES | Facility: HOME HEALTHCARE | Age: 58
End: 2023-09-10
Payer: MEDICARE

## 2023-09-10 NOTE — CASE COMMUNICATION
[id number]. Jenny Lott Patients caregiver, Kaylen Qiu called into [facility] office regarding wound site. Reports that patient has an ABD wound that has a small opening that they just noticied today. As per Kaylen Qiu, wound is free from redness, swelling, increased pain. She does report some yellow drainage. Patient afebrile and feeling well. Encouraged to cleanse area, pat dry and cover with dry dressing. SN visit scheduled for tomorrow for assessment.

## 2023-09-11 ENCOUNTER — HOME CARE VISIT (OUTPATIENT)
Dept: HOME HEALTH SERVICES | Facility: HOME HEALTHCARE | Age: 58
End: 2023-09-11
Payer: MEDICARE

## 2023-09-11 VITALS
TEMPERATURE: 98 F | SYSTOLIC BLOOD PRESSURE: 124 MMHG | HEART RATE: 76 BPM | RESPIRATION RATE: 16 BRPM | OXYGEN SATURATION: 98 % | DIASTOLIC BLOOD PRESSURE: 66 MMHG

## 2023-09-11 VITALS
OXYGEN SATURATION: 94 % | HEART RATE: 70 BPM | DIASTOLIC BLOOD PRESSURE: 60 MMHG | SYSTOLIC BLOOD PRESSURE: 92 MMHG | RESPIRATION RATE: 26 BRPM

## 2023-09-11 PROCEDURE — G0151 HHCP-SERV OF PT,EA 15 MIN: HCPCS

## 2023-09-11 PROCEDURE — G0299 HHS/HOSPICE OF RN EA 15 MIN: HCPCS

## 2023-09-11 NOTE — CASE COMMUNICATION
[facility] i plan to see him 9/13 so i will teach family that for now until he can see [facility]   Thank you    ----- Message -----  From: Delonte Henderson MD  Sent: 9/11/2023   4:43 PM EDT  To: Cody Kirkland RN; Clarence Li RN      I would just usually pack with plain 1/4 or 1/2 inch gauze  ----- Message -----  From: Cody Kirkland RN  Sent: 9/11/2023   4:31 PM EDT  To: Delonte Henderson MD    Patient with surgical wounds on abdomen. family is ask ing for you to check on 9/12 during visit. he has wound care appt coming up on 9/20 now but if you can give some guidance on wound care prior to visit that would be appreciated. theres a connecting tunnel of the two open areas i feel like amd packing or iodoform wound be good options.  If you can help give some direction i would appreciate it  Thank you

## 2023-09-11 NOTE — CASE COMMUNICATION
Patient with surgical wounds on abdomen. family is asking for you to check on 9/12 during visit. he has wound care appt coming up on 9/20 now but if you can give some guidance on wound care prior to visit that would be appreciated. theres a connecting tunnel of the two open areas i feel like amd packing or iodoform wound be good options.  If you can help give some direction i would appreciate it  Thank you

## 2023-09-12 ENCOUNTER — OFFICE VISIT (OUTPATIENT)
Dept: SURGICAL ONCOLOGY | Facility: CLINIC | Age: 58
End: 2023-09-12
Payer: MEDICARE

## 2023-09-12 VITALS
BODY MASS INDEX: 24.52 KG/M2 | DIASTOLIC BLOOD PRESSURE: 70 MMHG | HEIGHT: 72 IN | WEIGHT: 181 LBS | OXYGEN SATURATION: 96 % | HEART RATE: 78 BPM | SYSTOLIC BLOOD PRESSURE: 118 MMHG | RESPIRATION RATE: 16 BRPM | TEMPERATURE: 97.1 F

## 2023-09-12 DIAGNOSIS — K82.8 GALLBLADDER MASS: Primary | ICD-10-CM

## 2023-09-12 DIAGNOSIS — Z90.49 STATUS POST CHOLECYSTECTOMY: ICD-10-CM

## 2023-09-12 PROCEDURE — 99213 OFFICE O/P EST LOW 20 MIN: CPT | Performed by: SURGERY

## 2023-09-12 NOTE — PROGRESS NOTES
Surgical Oncology Follow Up       [address] [facility]  [facility]  [address] [place] [address]    Jose Haider Pock  1965  [id number]  [address] [facility]  [facility]  [address] [place] [address]    Diagnoses and all orders for this visit:    Gallbladder mass    Status post cholecystectomy        No chief complaint on file. No follow-ups on file. Oncology History    No history exists. History of Present Illness: Returns in follow-up of his cholecystectomy. This was converted to an open cholecystectomy. Pathology was benign. His most recent CT showed splenosis, cavernous transformation of the portal vein as well as varices. He is doing well. Appetite is improving. No fevers or chills. Review of Systems  Complete ROS Surg Onc:   Complete ROS Surg Onc:   Constitutional: The patient denies new or recent history of general fatigue, no recent weight loss, no change in appetite. Eyes: No complaints of visual problems, no scleral icterus. ENT: no complaints of ear pain, no hoarseness, no difficulty swallowing,  no tinnitus and no new masses in head, oral cavity, or neck. Cardiovascular: No complaints of chest pain, no palpitations, no ankle edema. Respiratory: No complaints of shortness of breath, no cough. Gastrointestinal: No complaints of jaundice, no bloody stools, no pale stools. Genitourinary: No complaints of dysuria, no hematuria, no nocturia, no frequent urination, no urethral discharge. Musculoskeletal: No complaints of weakness, paralysis, joint stiffness or arthralgias. Integumentary: No complaints of rash, no new lesions. Neurological: No complaints of convulsions, no seizures, no dizziness. Hematologic/Lymphatic: No complaints of easy bruising. Endocrine:  No hot or cold intolerance.   No polydipsia, polyphagia, or polyuria. Allergy/immunology:  No environmental allergies. No food allergies. Not immunocompromised. Skin:  No pallor or rash. No wound.         Patient Active Problem List   Diagnosis   • Cellulitis of left lower extremity   • Ambulatory dysfunction   • Nonintractable epilepsy without status epilepticus ([address])   • Primary hypertension   • Gastroesophageal reflux disease without esophagitis   • Venous stasis dermatitis of both lower extremities   • Anemia   • Status post splenectomy   • Esophageal varices (HCC)   • Legally blind in right eye, as defined in [place]   • Bilateral hearing loss   • Angiodysplasia   • History of TB (tuberculosis)   • History of prediabetes   • Other cirrhosis of liver (HCC)   • Gallbladder mass   • Hypomagnesemia   • CCC (chronic calculous cholecystitis)   • Status post cholecystectomy     Past Medical History:   Diagnosis Date   • Anxiety    • GERD (gastroesophageal reflux disease)    • Hypertension    • Hypoxia 02/11/2022   • Mental developmental delay     mumps as a child, developed rheumatic fever   • MRSA bacteremia 02/09/2022   • Seizures ([address])      Past Surgical History:   Procedure Laterality Date   • COLONOSCOPY     • [place] LAPAROSCOPY SURG CHOLECYSTECTOMY N/A 8/25/2023    Procedure: CHOLECYSTECTOMY OPEN;  Surgeon: Jeaneth Fletcher DO;  Location[facility];  Service: General   • SPLENECTOMY     • UPPER GASTROINTESTINAL ENDOSCOPY       Family History   Problem Relation Age of Onset   • Depression Mother    • Heart disease Father      Social History     Socioeconomic History   • Marital status: Single     Spouse name: Not on file   • Number of children: Not on file   • Years of education: Not on file   • Highest education level: Not on file   Occupational History   • Not on file   Tobacco Use   • Smoking status: Never   • Smokeless tobacco: Never   Vaping Use   • Vaping Use: Never used   Substance and Sexual Activity   • Alcohol use: Never   • Drug use: Never   • Sexual activity: Not Currently   Other Topics Concern   • Not on file   Social History Narrative   • Not on file     Social Determinants of Health     Financial Resource Strain: Not on file   Food Insecurity: No Food Insecurity (8/27/2023)    Hunger Vital Sign    • Worried About Running Out of Food in the Last Year: Never true    • Ran Out of Food in the Last Year: Never true   Transportation Needs: No Transportation Needs (8/27/2023)    PRAPARE - Transportation    • Lack of Transportation (Medical): No    • Lack of Transportation (Non-Medical): No   Physical Activity: Not on file   Stress: Not on file   Social Connections: Not on file   Intimate Partner Violence: Not on file   Housing Stability: Low Risk  (8/27/2023)    Housing Stability Vital Sign    • Unable to Pay for Housing in the Last Year: No    • Number of Places Lived in the Last Year: 1    • Unstable Housing in the Last Year: No       Current Outpatient Medications:   •  acetaminophen (TYLENOL) 325 mg tablet, Take 2 tablets (650 mg total) by mouth every 6 (six) hours as needed for mild pain, Disp: , Rfl: 0  •  diazepam (VALIUM) 10 mg tablet, TAKE 1 TABLET BY MOUTH EVERY 12 HOURS., Disp: 60 tablet, Rfl: 2  •  folic acid (FOLVITE) 1 mg tablet, TAKE 1 TABLET (1 MG TOTAL) BY MOUTH IN THE MORNING. NOT COVERED, Disp: 90 tablet, Rfl: 1  •  furosemide (LASIX) 20 mg tablet, TAKE 1 TABLET (20 MG TOTAL) BY MOUTH IN THE MORNING AND 1 TABLET (20 MG TOTAL) IN THE EVENING., Disp: 180 tablet, Rfl: 1  •  iron polysaccharides (FERREX) 150 mg capsule, TAKE 1 CAPSULE (150 MG TOTAL) BY MOUTH IN THE MORNING.  NOT COVERED, Disp: 90 capsule, Rfl: 1  •  levETIRAcetam (KEPPRA) 500 mg tablet, Take 1 tablet (500 mg total) by mouth every 12 (twelve) hours, Disp: 180 tablet, Rfl: 1  •  nadolol (CORGARD) 40 mg tablet, TAKE 1 TABLET (40 MG TOTAL) BY MOUTH IN THE MORNING., Disp: 90 tablet, Rfl: 1  •  OXcarbazepine (TRILEPTAL) 600 mg tablet, TAKE 1 TABLET BY MOUTH EVERY 12 HOURS, Disp: 180 tablet, Rfl: 1  •  pantoprazole (PROTONIX) 40 mg tablet, TAKE 1 TABLET BY MOUTH EVERY DAY IN THE MORNING AND IN THE EVENING BEFORE MEALS, Disp: 180 tablet, Rfl: 1  •  sucralfate (CARAFATE) 1 g tablet, TAKE 1 TABLET BY MOUTH EVERY 6 HOURS, Disp: 360 tablet, Rfl: 1  •  triamcinolone (KENALOG) 0.1 % ointment, APPLY SPARINGLY TO AFFECTED AREAS OF LEGS 2-4 TIMES A DAY FOR UP TO 2 WEEKS., Disp: 454 g, Rfl: 0  No Known Allergies  There were no vitals filed for this visit. Physical Exam  Constitutional: General appearance: The Patient is well-developed and well-nourished who appears the stated age in no acute distress. Patient is pleasant and talkative. HEENT:  Normocephalic. Sclerae are anicteric. Mucous membranes are moist.    Abdomen: Abdomen is soft, non-tender, non-distended and without masses. Incision has opened at either end. There is good granulation tissue at the base. Extremities: There is no clubbing or cyanosis. There is no edema. Symmetric. Neuro: Grossly nonfocal. Gait is normal.     Lymphatic: No evidence of cervical adenopathy bilaterally. No evidence of axillary adenopathy bilaterally. No evidence of inguinal adenopathy bilaterally. Skin: Warm, anicteric. Psych:  Patient is pleasant and talkative. Breasts:        Pathology:  Addendum   This addendum is issued to add immunohistochemistry results. The final diagnosis stays the same. The lesional tissue is positive for MUC-6 and patchy positive for CK7, supporting final diagnosis. Addendum electronically signed by Nakita Bejarano MD on 9/1/2023 at  2:23 PM   Final Diagnosis   A. 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 - Chronic cholecystitis and cholelithiasis. - Resection margin is unremarkable.   - Negative for malignancy.     Note: Grossly identifiable lesions were entirely submitted for microscopic examination, no malignancy identified. Intradepartmental consultation is in agreement. Electronically signed by Génesis Romero MD on 8/31/2023 at  8:55 AM         Labs:      Imaging  No results found. I personally reviewed and interpreted the above laboratory and imaging data. Discussion/Summary: 78-year-old male status post open cholecystectomy. His pathology was benign. I also discussed he has splenosis with varices. I suspect there is underlying liver dysfunction as well. He is already scheduled to follow-up with hepatology. From my standpoint he is doing well. I have packed the wound here in the office. I have told him to follow-up with Dr. aNhomi Barrientos regarding the wound packing or the wound center with which he is already scheduled. I will see him again in the future should the need arise. He and his family are agreeable to this. All their questions were answered.

## 2023-09-12 NOTE — LETTER
September 12, 2023     Mag Kinsey MD  [address] [place] [address]    Patient: Senait Waggoner   YOB: 1965   Date of Visit: 9/12/2023       Dear Dr. Yamel Killian: Thank you for referring Kim Oates to me for evaluation. Below are my notes for this consultation. If you have questions, please do not hesitate to call me. I look forward to following your patient along with you. Sincerely,        Delonte Henderson MD        CC: Rosalea Lesch, MD Daisy Cid, MD [employer] Seeds Conron, DO Jacob Bread, MD  9/12/2023 11:15 AM  Incomplete               Surgical Oncology Follow Up       [address] [facility]  [facility] [place]  [address] [facility]  1965  [id number]  [address] [facility]  [facility] [place]  [address] [facility] [id number]    Diagnoses and all orders for this visit:    Gallbladder mass    Status post cholecystectomy        No chief complaint on file. No follow-ups on file. Oncology History    No history exists. History of Present Illness: Returns in follow-up of his cholecystectomy. This was converted to an open cholecystectomy. Pathology was benign. His most recent CT showed splenosis, cavernous transformation of the portal vein as well as varices. He is doing well. Appetite is improving. No fevers or chills. Review of Systems  Complete ROS Surg Onc:   Complete ROS Surg Onc:   Constitutional: The patient denies new or recent history of general fatigue, no recent weight loss, no change in appetite. Eyes: No complaints of visual problems, no scleral icterus. ENT: no complaints of ear pain, no hoarseness, no difficulty swallowing,  no tinnitus and no new masses in head, oral cavity, or neck. Cardiovascular: No complaints of chest pain, no palpitations, no ankle edema.    Respiratory: No complaints of shortness of breath, no cough. Gastrointestinal: No complaints of jaundice, no bloody stools, no pale stools. Genitourinary: No complaints of dysuria, no hematuria, no nocturia, no frequent urination, no urethral discharge. Musculoskeletal: No complaints of weakness, paralysis, joint stiffness or arthralgias. Integumentary: No complaints of rash, no new lesions. Neurological: No complaints of convulsions, no seizures, no dizziness. Hematologic/Lymphatic: No complaints of easy bruising. Endocrine:  No hot or cold intolerance. No polydipsia, polyphagia, or polyuria. Allergy/immunology:  No environmental allergies. No food allergies. Not immunocompromised. Skin:  No pallor or rash. No wound.         Patient Active Problem List   Diagnosis   • Cellulitis of left lower extremity   • Ambulatory dysfunction   • Nonintractable epilepsy without status epilepticus ([address])   • Primary hypertension   • Gastroesophageal reflux disease without esophagitis   • Venous stasis dermatitis of both lower extremities   • Anemia   • Status post splenectomy   • Esophageal varices (HCC)   • Legally blind in right eye, as defined in [place]   • Bilateral hearing loss   • Angiodysplasia   • History of TB (tuberculosis)   • History of prediabetes   • Other cirrhosis of liver (HCC)   • Gallbladder mass   • Hypomagnesemia   • CCC (chronic calculous cholecystitis)   • Status post cholecystectomy     Past Medical History:   Diagnosis Date   • Anxiety    • GERD (gastroesophageal reflux disease)    • Hypertension    • Hypoxia 02/11/2022   • Mental developmental delay     mumps as a child, developed rheumatic fever   • MRSA bacteremia 02/09/2022   • Seizures ([address])      Past Surgical History:   Procedure Laterality Date   • COLONOSCOPY     • AK LAPAROSCOPY SURG CHOLECYSTECTOMY N/A 8/25/2023    Procedure: CHOLECYSTECTOMY OPEN;  Surgeon: Pranay Fletcher DO;  Location: [facility];  Service: General   • SPLENECTOMY     • UPPER GASTROINTESTINAL ENDOSCOPY       Family History   Problem Relation Age of Onset   • Depression Mother    • Heart disease Father      Social History     Socioeconomic History   • Marital status: Single     Spouse name: Not on file   • Number of children: Not on file   • Years of education: Not on file   • Highest education level: Not on file   Occupational History   • Not on file   Tobacco Use   • Smoking status: Never   • Smokeless tobacco: Never   Vaping Use   • Vaping Use: Never used   Substance and Sexual Activity   • Alcohol use: Never   • Drug use: Never   • Sexual activity: Not Currently   Other Topics Concern   • Not on file   Social History Narrative   • Not on file     Social Determinants of Health     Financial Resource Strain: Not on file   Food Insecurity: No Food Insecurity (8/27/2023)    Hunger Vital Sign    • Worried About Running Out of Food in the Last Year: Never true    • Ran Out of Food in the Last Year: Never true   Transportation Needs: No Transportation Needs (8/27/2023)    PRAPARE - Transportation    • Lack of Transportation (Medical): No    • Lack of Transportation (Non-Medical): No   Physical Activity: Not on file   Stress: Not on file   Social Connections: Not on file   Intimate Partner Violence: Not on file   Housing Stability: Low Risk  (8/27/2023)    Housing Stability Vital Sign    • Unable to Pay for Housing in the Last Year: No    • Number of Places Lived in the Last Year: 1    • Unstable Housing in the Last Year: No       Current Outpatient Medications:   •  acetaminophen (TYLENOL) 325 mg tablet, Take 2 tablets (650 mg total) by mouth every 6 (six) hours as needed for mild pain, Disp: , Rfl: 0  •  diazepam (VALIUM) 10 mg tablet, TAKE 1 TABLET BY MOUTH EVERY 12 HOURS., Disp: 60 tablet, Rfl: 2  •  folic acid (FOLVITE) 1 mg tablet, TAKE 1 TABLET (1 MG TOTAL) BY MOUTH IN THE MORNING.  NOT COVERED, Disp: 90 tablet, Rfl: 1  •  furosemide (LASIX) 20 mg tablet, TAKE 1 TABLET (20 MG TOTAL) BY MOUTH IN THE MORNING AND 1 TABLET (20 MG TOTAL) IN THE EVENING., Disp: 180 tablet, Rfl: 1  •  iron polysaccharides (FERREX) 150 mg capsule, TAKE 1 CAPSULE (150 MG TOTAL) BY MOUTH IN THE MORNING. NOT COVERED, Disp: 90 capsule, Rfl: 1  •  levETIRAcetam (KEPPRA) 500 mg tablet, Take 1 tablet (500 mg total) by mouth every 12 (twelve) hours, Disp: 180 tablet, Rfl: 1  •  nadolol (CORGARD) 40 mg tablet, TAKE 1 TABLET (40 MG TOTAL) BY MOUTH IN THE MORNING., Disp: 90 tablet, Rfl: 1  •  OXcarbazepine (TRILEPTAL) 600 mg tablet, TAKE 1 TABLET BY MOUTH EVERY 12 HOURS, Disp: 180 tablet, Rfl: 1  •  pantoprazole (PROTONIX) 40 mg tablet, TAKE 1 TABLET BY MOUTH EVERY DAY IN THE MORNING AND IN THE EVENING BEFORE MEALS, Disp: 180 tablet, Rfl: 1  •  sucralfate (CARAFATE) 1 g tablet, TAKE 1 TABLET BY MOUTH EVERY 6 HOURS, Disp: 360 tablet, Rfl: 1  •  triamcinolone (KENALOG) 0.1 % ointment, APPLY SPARINGLY TO AFFECTED AREAS OF LEGS 2-4 TIMES A DAY FOR UP TO 2 WEEKS., Disp: 454 g, Rfl: 0  No Known Allergies  There were no vitals filed for this visit. Physical Exam  Constitutional: General appearance: The Patient is well-developed and well-nourished who appears the stated age in no acute distress. Patient is pleasant and talkative. HEENT:  Normocephalic. Sclerae are anicteric. Mucous membranes are moist.    Abdomen: Abdomen is soft, non-tender, non-distended and without masses. Incision has opened at either end. There is good granulation tissue at the base. Extremities: There is no clubbing or cyanosis. There is no edema. Symmetric. Neuro: Grossly nonfocal. Gait is normal.     Lymphatic: No evidence of cervical adenopathy bilaterally. No evidence of axillary adenopathy bilaterally. No evidence of inguinal adenopathy bilaterally. Skin: Warm, anicteric. Psych:  Patient is pleasant and talkative. Breasts:        Pathology:  Addendum   This addendum is issued to add immunohistochemistry results.  The final diagnosis stays the same. The lesional tissue is positive for MUC-6 and patchy positive for CK7, supporting final diagnosis. Addendum electronically signed by Larry Huerta MD on 9/1/2023 at  2:23 PM   Final Diagnosis   A. 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 - Chronic cholecystitis and cholelithiasis. - Resection margin is unremarkable. - Negative for malignancy. Note: Grossly identifiable lesions were entirely submitted for microscopic examination, no malignancy identified. Intradepartmental consultation is in agreement. Electronically signed by Larry Huerta MD on 8/31/2023 at  8:55 AM         Labs:      Imaging  No results found. I personally reviewed and interpreted the above laboratory and imaging data. Discussion/Summary: 53-year-old male status post open cholecystectomy. His pathology was benign. I also discussed he has splenosis with varices. I suspect there is underlying liver dysfunction as well. He is already scheduled to follow-up with hepatology. From my standpoint he is doing well. I have packed the wound here in the office. I have told him to follow-up with Dr. Ken Rodriguez regarding the wound packing or the wound center with which he is already scheduled. I will see him again in the future should the need arise. He and his family are agreeable to this. All their questions were answered.

## 2023-09-13 ENCOUNTER — HOME CARE VISIT (OUTPATIENT)
Dept: HOME HEALTH SERVICES | Facility: HOME HEALTHCARE | Age: 58
End: 2023-09-13
Payer: MEDICARE

## 2023-09-13 ENCOUNTER — TELEPHONE (OUTPATIENT)
Dept: SURGERY | Facility: CLINIC | Age: 58
End: 2023-09-13

## 2023-09-13 VITALS
HEART RATE: 58 BPM | OXYGEN SATURATION: 97 % | DIASTOLIC BLOOD PRESSURE: 60 MMHG | SYSTOLIC BLOOD PRESSURE: 102 MMHG | RESPIRATION RATE: 20 BRPM

## 2023-09-13 VITALS
HEART RATE: 88 BPM | RESPIRATION RATE: 16 BRPM | OXYGEN SATURATION: 98 % | TEMPERATURE: 98 F | SYSTOLIC BLOOD PRESSURE: 122 MMHG | DIASTOLIC BLOOD PRESSURE: 66 MMHG

## 2023-09-13 PROCEDURE — G0151 HHCP-SERV OF PT,EA 15 MIN: HCPCS

## 2023-09-13 PROCEDURE — G0299 HHS/HOSPICE OF RN EA 15 MIN: HCPCS

## 2023-09-13 NOTE — CASE COMMUNICATION
Ship to  .  [facility]    Wound 1 abdomen upper      Full   Wound 2 abdomen lower   Full    Cleansers  Saline [place]  NOT STOCKED  [id number]  1    Dry Dressings   Gauze 4x4 N/S 200 4x4s per unit  [id number] 1   ABD 5x9 [id number]  30    Tape  Transpore white  2in [id number]  2    Packing   Pkg Pl ½ in [id number]  2    Miscellaneous Wound Products  Adapt skin barrier no sting wipes 50 per box  [id number]  1

## 2023-09-13 NOTE — TELEPHONE ENCOUNTER
Received a copy of note from Dr. Melinda Holliday. Molina Adkins was seen yesterday in his office. Appears that there were 2 areas of his incision that opened up. Recommendations were for packing. I called his brother Yannick Ferrell. I left a voicemail. Would agree with packing. He can certainly call our office for some follow-up if need be.

## 2023-09-20 ENCOUNTER — OFFICE VISIT (OUTPATIENT)
Dept: WOUND CARE | Facility: HOSPITAL | Age: 58
End: 2023-09-20
Payer: MEDICARE

## 2023-09-20 VITALS
TEMPERATURE: 96.8 F | SYSTOLIC BLOOD PRESSURE: 94 MMHG | HEART RATE: 69 BPM | DIASTOLIC BLOOD PRESSURE: 55 MMHG | RESPIRATION RATE: 12 BRPM

## 2023-09-20 DIAGNOSIS — T81.89XA NON-HEALING SURGICAL WOUND, INITIAL ENCOUNTER: Primary | ICD-10-CM

## 2023-09-20 PROCEDURE — 99213 OFFICE O/P EST LOW 20 MIN: CPT | Performed by: NURSE PRACTITIONER

## 2023-09-20 PROCEDURE — 99203 OFFICE O/P NEW LOW 30 MIN: CPT | Performed by: NURSE PRACTITIONER

## 2023-09-20 NOTE — PROGRESS NOTES
Patient ID: Brian Crawford is a 59 y.o. male Date of Birth 1965     Chief Complaint  Chief Complaint   Patient presents with   • New Patient Visit     Wound; Abdomen       Allergies  Patient has no known allergies. Assessment:     Diagnoses and all orders for this visit:    Non-healing surgical wound, initial encounter  -     Wound cleansing and dressings; Future  -     Wound miscellaneous orders; Future  -     Wound home care; Future           Plan:  1. Initial visit. Wound cleansed with saline and gauze. Patient has a full-thickness surgical wound that communicates between the 2 openings with an 11 cm skin bridge in between. Wound not showing any signs or symptoms of infection. 2. Large skin bridge will most likely impede healing recommend opening skin bridge to facilitate healing by secondary intention. 3.  Would recommend negative pressure wound therapy for healing. Will have patient return to Dr. Natalie Dasilva for assessment and agreement of plan of care. Was able to schedule patient an appointment with Dr. Natalie Dasilva tomorrow. 4. Wound management center will gladly manage wound if Dr. Natalie Dasilva prefers  5. Continue packing both openings in meantime until assessment by Dr. Natalie Dasilva       Wound 09/11/23  Abdomen (Active)   Wound Image Images linked 09/20/23 1030   Wound Description Granulation tissue (suture) 09/20/23 1030   Marisela-wound Assessment Intact 09/20/23 1030   Wound Length (cm) 1.2 cm 09/20/23 1030   Wound Width (cm) 14.8 cm 09/20/23 1030   Wound Depth (cm) 1.3 cm 09/20/23 1030   Wound Surface Area (cm^2) 17.76 cm^2 09/20/23 1030   Wound Volume (cm^3) 23. 088 cm^3 09/20/23 1030   Calculated Wound Volume (cm^3) 23.09 cm^3 09/20/23 1030   Change in Wound Size % -1037.44 09/20/23 1030   Undermining 2.9 09/20/23 1030   Undermining is depth extending from 10-2 o'clock 09/20/23 1030   Drainage Amount Large 09/20/23 1030   Drainage Description Serosanguineous 09/20/23 1030   Non-staged Wound Description Full thickness 09/20/23 1030   Patient Tolerance Tolerated well 09/20/23 1030   Dressing Status Intact 09/20/23 1030       Wound 08/25/23 Abdomen Mid;Upper (Active)   Date First Assessed/Time First Assessed: 08/25/23 1141   Location: Abdomen  Wound Location Orientation: Mid;Upper  Incision's 1st Dressing: DRESSING SURGIPAD 5 X 9 IN STRL       Wound 09/11/23  Abdomen (Active)   Date First Assessed: 09/11/23   Primary Wound Type: (c)   Location: Abdomen       [REMOVED] Wound 02/08/22 Cellulitis Knee Left;Medial (Removed)   Resolved Date/Resolved Time: 08/30/23 1400  Date First Assessed/Time First Assessed: 02/08/22 2200   Traumatic Wound Type: Cellulitis  Location: Knee  Wound Location Orientation: Left;Medial  Wound Outcome: Healed       Subjective:      . Patient is a 57-year-old male who presents to the [facility] wound center as a new patient for a nonhealing surgical wound of his abdomen. Patient is s/p open cholecystectomy on 8/25 by Dr. Neville Recinos. His surgical incision was discovered to have dehisced at both distal aspects when he was seen by Dr. Hakan Manning on 9/12. Patient's brother Tariq Naqvi who is patient's POA reports he was instructed to pack both wounds with plain packing which he has been doing as recommended. Patient also has visiting nurses who have been coming 3 times a week to help with dressing changes. Patient's brother reports that patient has a very poor diet. He has been trying to encourage his brother to eat foods high in protein. His wound drains a small to moderate amount of drainage. He denies any pain, fevers, or chills. The following portions of the patient's history were reviewed and updated as appropriate:   He  has a past medical history of Anxiety, GERD (gastroesophageal reflux disease), Hypertension, Hypoxia (02/11/2022), Mental developmental delay, MRSA bacteremia (02/09/2022), and Seizures ([address]).   He   Patient Active Problem List    Diagnosis Date Noted   • Status post cholecystectomy 09/08/2023   • 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   • Hypomagnesemia 08/26/2023   • Gallbladder mass 05/01/2023   • History of prediabetes 06/09/2022   • Other cirrhosis of liver ([address]) 06/09/2022   • Legally blind in right eye, as defined in [place] 05/11/2022   • Bilateral hearing loss 05/11/2022   • Angiodysplasia 05/11/2022   • History of TB (tuberculosis) 05/11/2022   • Esophageal varices ([address]) 02/12/2022   • Anemia 02/10/2022   • Status post splenectomy 02/10/2022   • Cellulitis of left lower extremity 02/08/2022   • Ambulatory dysfunction 02/08/2022   • Nonintractable epilepsy without status epilepticus ([address]) 02/08/2022   • Primary hypertension 02/08/2022   • Gastroesophageal reflux disease without esophagitis 02/08/2022   • Venous stasis dermatitis of both lower extremities 02/08/2022     He  has a past surgical history that includes Splenectomy; Colonoscopy; Upper gastrointestinal endoscopy; and pr laparoscopy surg cholecystectomy (N/A, 8/25/2023). His family history includes Depression in his mother; Heart disease in his father. He  reports that he has never smoked. He has never used smokeless tobacco. He reports that he does not drink alcohol and does not use drugs. Current Outpatient Medications   Medication Sig Dispense Refill   • acetaminophen (TYLENOL) 325 mg tablet Take 2 tablets (650 mg total) by mouth every 6 (six) hours as needed for mild pain  0   • diazepam (VALIUM) 10 mg tablet TAKE 1 TABLET BY MOUTH EVERY 12 HOURS. 60 tablet 2   • folic acid (FOLVITE) 1 mg tablet TAKE 1 TABLET (1 MG TOTAL) BY MOUTH IN THE MORNING. NOT COVERED 90 tablet 1   • furosemide (LASIX) 20 mg tablet TAKE 1 TABLET (20 MG TOTAL) BY MOUTH IN THE MORNING AND 1 TABLET (20 MG TOTAL) IN THE EVENING. 180 tablet 1   • iron polysaccharides (FERREX) 150 mg capsule TAKE 1 CAPSULE (150 MG TOTAL) BY MOUTH IN THE MORNING.  NOT COVERED 90 capsule 1   • levETIRAcetam (KEPPRA) 500 mg tablet Take 1 tablet (500 mg total) by mouth every 12 (twelve) hours 180 tablet 1   • nadolol (CORGARD) 40 mg tablet TAKE 1 TABLET (40 MG TOTAL) BY MOUTH IN THE MORNING. 90 tablet 1   • OXcarbazepine (TRILEPTAL) 600 mg tablet TAKE 1 TABLET BY MOUTH EVERY 12 HOURS 180 tablet 1   • pantoprazole (PROTONIX) 40 mg tablet TAKE 1 TABLET BY MOUTH EVERY DAY IN THE MORNING AND IN THE EVENING BEFORE MEALS 180 tablet 1   • sucralfate (CARAFATE) 1 g tablet TAKE 1 TABLET BY MOUTH EVERY 6 HOURS 360 tablet 1   • triamcinolone (KENALOG) 0.1 % ointment APPLY SPARINGLY TO AFFECTED AREAS OF LEGS 2-4 TIMES A DAY FOR UP TO 2 WEEKS. 454 g 0     No current facility-administered medications for this visit. He has No Known Allergies. .    Review of Systems   Constitutional: Negative. HENT: Negative for ear pain and hearing loss. Eyes: Negative for pain. Respiratory: Negative for chest tightness and shortness of breath. Cardiovascular: Negative for chest pain, palpitations and leg swelling. Gastrointestinal: Negative for diarrhea, nausea and vomiting. Genitourinary: Negative for dysuria. Musculoskeletal: Negative for gait problem. Skin: Positive for wound. Neurological: Negative for tremors and weakness. Psychiatric/Behavioral: Negative for behavioral problems, confusion and suicidal ideas. Objective:       Wound 09/11/23  Abdomen (Active)   Wound Image Images linked 09/20/23 1030   Wound Description Granulation tissue (suture) 09/20/23 1030   Marisela-wound Assessment Intact 09/20/23 1030   Wound Length (cm) 1.2 cm 09/20/23 1030   Wound Width (cm) 14.8 cm 09/20/23 1030   Wound Depth (cm) 1.3 cm 09/20/23 1030   Wound Surface Area (cm^2) 17.76 cm^2 09/20/23 1030   Wound Volume (cm^3) 23. 088 cm^3 09/20/23 1030   Calculated Wound Volume (cm^3) 23.09 cm^3 09/20/23 1030   Change in Wound Size % -1037.44 09/20/23 1030   Undermining 2.9 09/20/23 1030   Undermining is depth extending from 10-2 o'clock 09/20/23 1030   Drainage Amount Large 09/20/23 1030 Drainage Description Serosanguineous 09/20/23 1030   Non-staged Wound Description Full thickness 09/20/23 1030   Patient Tolerance Tolerated well 09/20/23 1030   Dressing Status Intact 09/20/23 1030       BP 94/55   Pulse 69   Temp (!) 96.8 °F (36 °C)   Resp 12     Physical Exam  Vitals and nursing note reviewed. Constitutional:       General: He is not in acute distress. Appearance: Normal appearance. He is normal weight. HENT:      Head: Normocephalic and atraumatic. Cardiovascular:      Pulses: Normal pulses. Heart sounds: No murmur heard. Pulmonary:      Effort: Pulmonary effort is normal. No respiratory distress. Abdominal:      General: Abdomen is flat. There is no distension. Tenderness: There is no abdominal tenderness. Musculoskeletal:         General: Normal range of motion. Cervical back: Normal range of motion and neck supple. No rigidity. Right lower leg: No edema. Left lower leg: No edema. Skin:     General: Skin is warm and dry. Findings: Wound present. No erythema. Neurological:      General: No focal deficit present. Mental Status: He is alert and oriented to person, place, and time. Mental status is at baseline. Psychiatric:         Mood and Affect: Mood normal.         Behavior: Behavior normal.         Thought Content: Thought content normal.         Judgment: Judgment normal.                       Wound Instructions:  Orders Placed This Encounter   Procedures   • Wound cleansing and dressings     Wound location: Abdomen   Change dressing 3 x week  Sponge bathe only, do not shower at this time. Cleanse the wound with NSS, pat dry. Gently pack with Plain packing strip into wound  Cover with Abd pad  Secure with tape    This was applied today at the [facility].      Standing Status:   Future     Standing Expiration Date:   9/20/2024   • Wound miscellaneous orders     Consume 3-4 servings of protein daily     Standing Status:   Future Standing Expiration Date:   9/20/2024   • Wound home care     [facility]     Standing Status:   Future     Standing Expiration Date:   9/20/2024        Diagnosis ICD-10-CM Associated Orders   1.  Non-healing surgical wound, initial encounter  T81.89XA Wound cleansing and dressings     Wound miscellaneous orders     Wound home care

## 2023-09-20 NOTE — PATIENT INSTRUCTIONS
Orders Placed This Encounter   Procedures    Wound cleansing and dressings     Wound location: Abdomen   Change dressing 3 x week  Sponge bathe only, do not shower at this time. Cleanse the wound with NSS, pat dry. Gently pack with Plain packing strip into wound  Cover with Abd pad  Secure with tape    This was applied today at the [facility].      Standing Status:   Future     Standing Expiration Date:   9/20/2024    Wound miscellaneous orders     Consume 3-4 servings of protein daily     Standing Status:   Future     Standing Expiration Date:   9/20/2024    Wound home care     [facility]     Standing Status:   Future     Standing Expiration Date:   9/20/2024

## 2023-09-21 ENCOUNTER — HOME CARE VISIT (OUTPATIENT)
Dept: HOME HEALTH SERVICES | Facility: HOME HEALTHCARE | Age: 58
End: 2023-09-21
Payer: MEDICARE

## 2023-09-21 ENCOUNTER — OFFICE VISIT (OUTPATIENT)
Dept: SURGERY | Facility: CLINIC | Age: 58
End: 2023-09-21

## 2023-09-21 DIAGNOSIS — T81.89XA DELAYED SURGICAL WOUND HEALING, INITIAL ENCOUNTER: Primary | ICD-10-CM

## 2023-09-21 PROCEDURE — 99024 POSTOP FOLLOW-UP VISIT: CPT | Performed by: SURGERY

## 2023-09-21 PROCEDURE — G0299 HHS/HOSPICE OF RN EA 15 MIN: HCPCS

## 2023-09-21 NOTE — LETTER
September 21, 2023     Rai Coker MD  [address] [facility]    Patient: Estella Peña   YOB: 1965   Date of Visit: 9/21/2023       Dear Dr. Selam Burger: Thank you for referring Bailey Grant to me for evaluation. Below are my notes for this consultation. If you have questions, please do not hesitate to call me. I look forward to following your patient along with you. Sincerely,        Erin Fletcher DO        CC: No Recipients    Erin freitas DO  9/21/2023  1:52 PM  Sign when Signing Visit  Assessment/Plan:    Diagnoses and all orders for this visit:    Delayed surgical wound healing, initial encounter    Small wounds on each end of his right subcostal margin scar. We will do some simple packing as well as coverage. Asked with patient and his brother. They will continue with visiting nurses 3 times a week. This point he would like to hold off on proceeding with a VAC dressing through wound care. See him back in 2 weeks. Postoperative restrictions reviewed. All questions answered. ______________________________________________________  HPI: Patient presents for follow up. S/P open cholecystectomy 8/25/2023. Saw wound care on 9/20/2023. After his last postoperative visit apparently 2 small wounds opened up of his right subcostal scar. He had some follow-up with Dr. Chun Grijalva and eventually sent to the wound care center. I was made aware of this and asked him to come in review the wounds. And drained some serous fluid. Brother states he is not quite eating high-protein but otherwise is doing quite well.              ROS:  General ROS: negative for - chills, fatigue, fever or night sweats, weight loss  Respiratory ROS: no cough, shortness of breath, or wheezing  Cardiovascular ROS: no chest pain or dyspnea on exertion  Genito-Urinary ROS: no dysuria, trouble voiding, or hematuria  Musculoskeletal ROS: negative for - gait disturbance, joint pain or muscle pain  Neurological ROS: no TIA or stroke symptoms  GI ROS: see HPI  Skin ROS: no new rashes or lesions   Lymphatic ROS: no new adenopathy noted by pt. GYN ROS: see HPI, no new GYN history or bleeding noted  Psy ROS: no new mental or behavioral disturbances         Patient Active Problem List   Diagnosis   • Cellulitis of left lower extremity   • Ambulatory dysfunction   • Nonintractable epilepsy without status epilepticus ([address])   • Primary hypertension   • Gastroesophageal reflux disease without esophagitis   • Venous stasis dermatitis of both lower extremities   • Anemia   • Status post splenectomy   • Esophageal varices (HCC)   • Legally blind in right eye, as defined in [place]   • Bilateral hearing loss   • Angiodysplasia   • History of TB (tuberculosis)   • History of prediabetes   • Other cirrhosis of liver (HCC)   • Gallbladder mass   • Hypomagnesemia   • CCC (chronic calculous cholecystitis)   • Status post cholecystectomy       Allergies:  Patient has no known allergies. Current Outpatient Medications:   •  acetaminophen (TYLENOL) 325 mg tablet, Take 2 tablets (650 mg total) by mouth every 6 (six) hours as needed for mild pain, Disp: , Rfl: 0  •  diazepam (VALIUM) 10 mg tablet, TAKE 1 TABLET BY MOUTH EVERY 12 HOURS., Disp: 60 tablet, Rfl: 2  •  folic acid (FOLVITE) 1 mg tablet, TAKE 1 TABLET (1 MG TOTAL) BY MOUTH IN THE MORNING. NOT COVERED, Disp: 90 tablet, Rfl: 1  •  furosemide (LASIX) 20 mg tablet, TAKE 1 TABLET (20 MG TOTAL) BY MOUTH IN THE MORNING AND 1 TABLET (20 MG TOTAL) IN THE EVENING., Disp: 180 tablet, Rfl: 1  •  iron polysaccharides (FERREX) 150 mg capsule, TAKE 1 CAPSULE (150 MG TOTAL) BY MOUTH IN THE MORNING.  NOT COVERED, Disp: 90 capsule, Rfl: 1  •  levETIRAcetam (KEPPRA) 500 mg tablet, Take 1 tablet (500 mg total) by mouth every 12 (twelve) hours, Disp: 180 tablet, Rfl: 1  •  nadolol (CORGARD) 40 mg tablet, TAKE 1 TABLET (40 MG TOTAL) BY MOUTH IN THE MORNING., Disp: 90 tablet, Rfl: 1  •  OXcarbazepine (TRILEPTAL) 600 mg tablet, TAKE 1 TABLET BY MOUTH EVERY 12 HOURS, Disp: 180 tablet, Rfl: 1  •  pantoprazole (PROTONIX) 40 mg tablet, TAKE 1 TABLET BY MOUTH EVERY DAY IN THE MORNING AND IN THE EVENING BEFORE MEALS, Disp: 180 tablet, Rfl: 1  •  sucralfate (CARAFATE) 1 g tablet, TAKE 1 TABLET BY MOUTH EVERY 6 HOURS, Disp: 360 tablet, Rfl: 1  •  triamcinolone (KENALOG) 0.1 % ointment, APPLY SPARINGLY TO AFFECTED AREAS OF LEGS 2-4 TIMES A DAY FOR UP TO 2 WEEKS., Disp: 804 g, Rfl: 0    Past Medical History:   Diagnosis Date   • Anxiety    • GERD (gastroesophageal reflux disease)    • Hypertension    • Hypoxia 02/11/2022   • Mental developmental delay     mumps as a child, developed rheumatic fever   • MRSA bacteremia 02/09/2022   • Seizures ([address])        Past Surgical History:   Procedure Laterality Date   • COLONOSCOPY     • [place] LAPAROSCOPY SURG CHOLECYSTECTOMY N/A 8/25/2023    Procedure: CHOLECYSTECTOMY OPEN;  Surgeon: Erin Fletcher DO;  [facility];  Service: General   • SPLENECTOMY     • UPPER GASTROINTESTINAL ENDOSCOPY         Family History   Problem Relation Age of Onset   • Depression Mother    • Heart disease Father         reports that he has never smoked. He has never used smokeless tobacco. He reports that he does not drink alcohol and does not use drugs. PHYSICAL EXAM    There were no vitals taken for this visit. General: normal, cooperative, no distress  Abdominal: soft, nondistended or nontender  Incision: clean, dry, he has 2 openings. Each 1 x 1 cm with clean granular tissue. There are each end of his subcostal margin scar. There is no drainage noted on today's examination. Half-inch packing was placed each focal opening. I did not tunnel it under the entire incision. Dressings were placed. Viewed the dressing changes with his brother.       Erin Fletcher DO    Date: 9/21/2023 Time: 1:50 PM

## 2023-09-21 NOTE — PROGRESS NOTES
Assessment/Plan:    Diagnoses and all orders for this visit:    Delayed surgical wound healing, initial encounter    Small wounds on each end of his right subcostal margin scar. We will do some simple packing as well as coverage. Asked with patient and his brother. They will continue with visiting nurses 3 times a week. This point he would like to hold off on proceeding with a VAC dressing through wound care. See him back in 2 weeks. Postoperative restrictions reviewed. All questions answered. ______________________________________________________  HPI: Patient presents for follow up. S/P open cholecystectomy 8/25/2023. Saw wound care on 9/20/2023. After his last postoperative visit apparently 2 small wounds opened up of his right subcostal scar. He had some follow-up with Dr. Meredith Diaz and eventually sent to the wound care center. I was made aware of this and asked him to come in review the wounds. And drained some serous fluid. Brother states he is not quite eating high-protein but otherwise is doing quite well. ROS:  General ROS: negative for - chills, fatigue, fever or night sweats, weight loss  Respiratory ROS: no cough, shortness of breath, or wheezing  Cardiovascular ROS: no chest pain or dyspnea on exertion  Genito-Urinary ROS: no dysuria, trouble voiding, or hematuria  Musculoskeletal ROS: negative for - gait disturbance, joint pain or muscle pain  Neurological ROS: no TIA or stroke symptoms  GI ROS: see HPI  Skin ROS: no new rashes or lesions   Lymphatic ROS: no new adenopathy noted by pt.    GYN ROS: see HPI, no new GYN history or bleeding noted  Psy ROS: no new mental or behavioral disturbances         Patient Active Problem List   Diagnosis   • Cellulitis of left lower extremity   • Ambulatory dysfunction   • Nonintractable epilepsy without status epilepticus ([address])   • Primary hypertension   • Gastroesophageal reflux disease without esophagitis   • Venous stasis dermatitis of both lower extremities   • Anemia   • Status post splenectomy   • Esophageal varices (HCC)   • Legally blind in right eye, as defined in [place]   • Bilateral hearing loss   • Angiodysplasia   • History of TB (tuberculosis)   • History of prediabetes   • Other cirrhosis of liver (HCC)   • Gallbladder mass   • Hypomagnesemia   • CCC (chronic calculous cholecystitis)   • Status post cholecystectomy       Allergies:  Patient has no known allergies. Current Outpatient Medications:   •  acetaminophen (TYLENOL) 325 mg tablet, Take 2 tablets (650 mg total) by mouth every 6 (six) hours as needed for mild pain, Disp: , Rfl: 0  •  diazepam (VALIUM) 10 mg tablet, TAKE 1 TABLET BY MOUTH EVERY 12 HOURS., Disp: 60 tablet, Rfl: 2  •  folic acid (FOLVITE) 1 mg tablet, TAKE 1 TABLET (1 MG TOTAL) BY MOUTH IN THE MORNING. NOT COVERED, Disp: 90 tablet, Rfl: 1  •  furosemide (LASIX) 20 mg tablet, TAKE 1 TABLET (20 MG TOTAL) BY MOUTH IN THE MORNING AND 1 TABLET (20 MG TOTAL) IN THE EVENING., Disp: 180 tablet, Rfl: 1  •  iron polysaccharides (FERREX) 150 mg capsule, TAKE 1 CAPSULE (150 MG TOTAL) BY MOUTH IN THE MORNING.  NOT COVERED, Disp: 90 capsule, Rfl: 1  •  levETIRAcetam (KEPPRA) 500 mg tablet, Take 1 tablet (500 mg total) by mouth every 12 (twelve) hours, Disp: 180 tablet, Rfl: 1  •  nadolol (CORGARD) 40 mg tablet, TAKE 1 TABLET (40 MG TOTAL) BY MOUTH IN THE MORNING., Disp: 90 tablet, Rfl: 1  •  OXcarbazepine (TRILEPTAL) 600 mg tablet, TAKE 1 TABLET BY MOUTH EVERY 12 HOURS, Disp: 180 tablet, Rfl: 1  •  pantoprazole (PROTONIX) 40 mg tablet, TAKE 1 TABLET BY MOUTH EVERY DAY IN THE MORNING AND IN THE EVENING BEFORE MEALS, Disp: 180 tablet, Rfl: 1  •  sucralfate (CARAFATE) 1 g tablet, TAKE 1 TABLET BY MOUTH EVERY 6 HOURS, Disp: 360 tablet, Rfl: 1  •  triamcinolone (KENALOG) 0.1 % ointment, APPLY SPARINGLY TO AFFECTED AREAS OF LEGS 2-4 TIMES A DAY FOR UP TO 2 WEEKS., Disp: 454 g, Rfl: 0    Past Medical History:   Diagnosis Date   • Anxiety    • GERD (gastroesophageal reflux disease)    • Hypertension    • Hypoxia 02/11/2022   • Mental developmental delay     mumps as a child, developed rheumatic fever   • MRSA bacteremia 02/09/2022   • Seizures ([address])        Past Surgical History:   Procedure Laterality Date   • COLONOSCOPY     • [place] LAPAROSCOPY SURG CHOLECYSTECTOMY N/A 8/25/2023    Procedure: CHOLECYSTECTOMY OPEN;  Surgeon: Maritza Fletcher DO;  Location[facility];  Service: General   • SPLENECTOMY     • UPPER GASTROINTESTINAL ENDOSCOPY         Family History   Problem Relation Age of Onset   • Depression Mother    • Heart disease Father         reports that he has never smoked. He has never used smokeless tobacco. He reports that he does not drink alcohol and does not use drugs. PHYSICAL EXAM    There were no vitals taken for this visit. General: normal, cooperative, no distress  Abdominal: soft, nondistended or nontender  Incision: clean, dry, he has 2 openings. Each 1 x 1 cm with clean granular tissue. There are each end of his subcostal margin scar. There is no drainage noted on today's examination. Half-inch packing was placed each focal opening. I did not tunnel it under the entire incision. Dressings were placed. Viewed the dressing changes with his brother.       Maritza Fletcher DO    Date: 9/21/2023 Time: 1:50 PM

## 2023-09-22 VITALS
SYSTOLIC BLOOD PRESSURE: 122 MMHG | TEMPERATURE: 98 F | HEART RATE: 80 BPM | OXYGEN SATURATION: 98 % | DIASTOLIC BLOOD PRESSURE: 66 MMHG | RESPIRATION RATE: 16 BRPM

## 2023-09-26 ENCOUNTER — OFFICE VISIT (OUTPATIENT)
Dept: GASTROENTEROLOGY | Facility: CLINIC | Age: 58
End: 2023-09-26
Payer: MEDICARE

## 2023-09-26 VITALS
WEIGHT: 178.6 LBS | SYSTOLIC BLOOD PRESSURE: 110 MMHG | HEIGHT: 72 IN | BODY MASS INDEX: 24.19 KG/M2 | DIASTOLIC BLOOD PRESSURE: 78 MMHG | TEMPERATURE: 96.7 F

## 2023-09-26 DIAGNOSIS — I85.11 ESOPHAGEAL VARICES WITH BLEEDING IN DISEASES CLASSIFIED ELSEWHERE (HCC): ICD-10-CM

## 2023-09-26 DIAGNOSIS — K82.8 GALLBLADDER MASS: ICD-10-CM

## 2023-09-26 DIAGNOSIS — K74.69 CRYPTOGENIC CIRRHOSIS (HCC): Primary | ICD-10-CM

## 2023-09-26 PROCEDURE — 99214 OFFICE O/P EST MOD 30 MIN: CPT | Performed by: STUDENT IN AN ORGANIZED HEALTH CARE EDUCATION/TRAINING PROGRAM

## 2023-09-26 NOTE — PROGRESS NOTES
[facility] - Outpatient Consultation  Jose Miller 59 y.o. male MRN: [id number]  Encounter: [id number]    PCP:  Holland Samayoa MD, [phone]  Referring Provider:  John Simmons MD, [phone]    Patient: Peggy Noble, 1965  Reason for Referral: decompensated cirrhosis     ASSESSMENT/PLAN:  59 y.o. male with history of HTN, GERD, seizures and history of traumatic splenectomy who presents for initial evaluation for cirrhosis. He has had longstanding history of liver disease but was diagnosed with cirrhosis several years ago when he was residing in [facility]. He had his first liver decompensating event in February 2022 when he was admitted for variceal bleed s/p EVBL. Etiology was felt to be cryptogenic given negative serologic work-up. He also denies history of excessive EtOH consumption and does not have significant metabolic risk factors. Last EGD was in March 2023 showed several columns of varices s/p banding (3/2023). In addition, he was noted to have cavernous transformation on recent CT with intraabdominal varices with ?dilated L hepatic duct with narrowing at the confluence. He recently underwent open CCY given c/f gallbladder malignancy but was interestingly NOT noted to have nodular liver morphology intraoperatively. 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 Would recommend ultrasound elastography to confirm the diagnosis of cirrhosis when he has fully recovered from his surgery. It is possible that he may have non-cirrhotic portal hypertension due to chronic PVT given his radiographic and intraoperative findings. Otherwise, we discussed the natural history, prognosis, and complications of cirrhosis, including decompensation in the form of ascites, variceal bleeding, and hepatic encephalopathy as well as risk for development of HCC. His liver function is stable with updated MELD (3.0) 8.       He is otherwise up-to-date with his cirrhosis health maintenance and will follow up in 3 months. 1. Decompensated cirrhosis: MELD (3.0) 8, CTP A6   - Etiology: cryptogenic     2. Varices  - His last EGD showed varices s/p banding   - Currently on nadolol but would increase to 60 mg daily if not at goal HR 55-60   -  Discuss EGD at next visit     3. [address] surveillance  - Last MRI/MRCP 3/2023 without any lesions  - Repeat next visit with AFP levels      4. Healthcare maintenance for patients with cirrhosis  -He was instructed to take no more than 2 grams of tylenol in 24 hours and no products containing NSAIDs, benzodiazapines, and narcotics. -He was also instructed to avoid raw shellfish   -He should participate in daily exercise as to prevent loss of muscle mass  -He should abstain from all alcohol intake and was counseled on this.    -He is at risk for vitamin deficiencies and metabolic bone disease. -HAV and HBV immunity will be checked and he should be vaccinated through his primary care provider if he is not immune.  -Additionally, he should receive a yearly flu shot and the pneumonia vaccine through his primary care provider. Geno Reilly MD  Division of Gastroenterology and Hepatology  [address]    ============================================================================  CC/HPI: 59 y.o. male with history of HTN, GERD, seizures and history of traumatic splenectomy who presents for initial evaluation for cirrhosis. He is accompanied by his brother and sister-in-law who are providing details of his history. He has had longstanding history of liver disease but was diagnosed with cirrhosis several years ago when he was residing in [facility]. He had his first liver decompensating event in February 2022 when he was admitted for variceal bleed s/p EVBL. Etiology was felt to be cryptogenic given negative serologic work-up.  He also denies history of excessive EtOH consumption and does not have significant metabolic risk factors. It is unknown if he has had a liver biopsy before. He had an US abdomen which showed nodular liver morphology with limited imaging of the main portal vein suggestive of cavernous transformation. He had a recent CT which could not identify a main portal vein or intrahepatic portal vein branches with small tortuous vessels in the julisa hepatitis suggestive of cavernous transformation. Last EGD showed several columns of varices s/p banding (3/2023)  . He was noted to have dilated L hepatic duct with narrowing at the level of the confluence on MRI/MRCP. He was also noted to have a distended gallbladder with two diffusion-restricting masses in the lumen measuring 3.1 cm and 1.8 cm, which were concerning for malignancy. There were also additional nodular lesions in the R paracolic gutter c/f splenosis and nodular metastatic implants as well as gastric varices seen. He underwent open CCY with Dr. Clark Postal which showed extensive adhesions to the omentum and gallbladder. His liver did not appear nodular intraoperatively. Pathology showed which showed gallbladder with pyloric gland adenoma and chronic cholecystitis/cholelithiasis. His family states that he is dependent on ADLs but has not recovered from his recent surgery with delayed wound healing. Denies abdominal distension, recent bleeding as well as reversal in sleep wake cycle. ROS: Complete review of systems otherwise negative.      PAST MEDICAL/SURGICAL HISTORY:  Past Medical History:   Diagnosis Date   • Anxiety    • GERD (gastroesophageal reflux disease)    • Hypertension    • Hypoxia 02/11/2022   • Mental developmental delay     mumps as a child, developed rheumatic fever   • MRSA bacteremia 02/09/2022   • Seizures ([address])         Past Surgical History:   Procedure Laterality Date   • COLONOSCOPY     • [place] LAPAROSCOPY SURG CHOLECYSTECTOMY N/A 8/25/2023    Procedure: CHOLECYSTECTOMY OPEN;  Surgeon: Kunal Valladares Navneet Denney DO;  [facility];  Service: General   • SPLENECTOMY     • UPPER GASTROINTESTINAL ENDOSCOPY         FAMILY/SOCIAL HISTORY:  Family History   Problem Relation Age of Onset   • Depression Mother    • Heart disease Father        Social History     Tobacco Use   • Smoking status: Never   • Smokeless tobacco: Never   Vaping Use   • Vaping Use: Never used   Substance Use Topics   • Alcohol use: Never   • Drug use: Never       MEDICATIONS:  Current Outpatient Medications on File Prior to Visit   Medication Sig Dispense Refill   • acetaminophen (TYLENOL) 325 mg tablet Take 2 tablets (650 mg total) by mouth every 6 (six) hours as needed for mild pain  0   • diazepam (VALIUM) 10 mg tablet TAKE 1 TABLET BY MOUTH EVERY 12 HOURS. 60 tablet 2   • folic acid (FOLVITE) 1 mg tablet TAKE 1 TABLET (1 MG TOTAL) BY MOUTH IN THE MORNING. NOT COVERED 90 tablet 1   • furosemide (LASIX) 20 mg tablet TAKE 1 TABLET (20 MG TOTAL) BY MOUTH IN THE MORNING AND 1 TABLET (20 MG TOTAL) IN THE EVENING. 180 tablet 1   • iron polysaccharides (FERREX) 150 mg capsule TAKE 1 CAPSULE (150 MG TOTAL) BY MOUTH IN THE MORNING. NOT COVERED 90 capsule 1   • levETIRAcetam (KEPPRA) 500 mg tablet Take 1 tablet (500 mg total) by mouth every 12 (twelve) hours 180 tablet 1   • nadolol (CORGARD) 40 mg tablet TAKE 1 TABLET (40 MG TOTAL) BY MOUTH IN THE MORNING. 90 tablet 1   • OXcarbazepine (TRILEPTAL) 600 mg tablet TAKE 1 TABLET BY MOUTH EVERY 12 HOURS 180 tablet 1   • pantoprazole (PROTONIX) 40 mg tablet TAKE 1 TABLET BY MOUTH EVERY DAY IN THE MORNING AND IN THE EVENING BEFORE MEALS 180 tablet 1   • sucralfate (CARAFATE) 1 g tablet TAKE 1 TABLET BY MOUTH EVERY 6 HOURS 360 tablet 1   • triamcinolone (KENALOG) 0.1 % ointment APPLY SPARINGLY TO AFFECTED AREAS OF LEGS 2-4 TIMES A DAY FOR UP TO 2 WEEKS. 454 g 0     No current facility-administered medications on file prior to visit.      No Known Allergies    PHYSICAL EXAM:  /78 (BP Location: Right arm, Patient Position: Sitting, Cuff Size: Standard)   Temp (!) 96.7 °F (35.9 °C) (Tympanic)   Ht 6' (1.829 m)   Wt 81 kg (178 lb 9.6 oz)   BMI 24.22 kg/m²   GENERAL: NAD, AAO  HEENT: anicteric, OP clear, MMM  ABDOMEN: S/ND/NT, normoactive BS, no hepatomegaly, spleen not palpable  EXTREMITIES: no edema  SKIN: no rashes, no palmar erythema, no spider angiomata   NEURO: normal gait, no tremor, no asterixis     LABS/RADIOLOGY/ENDOSCOPY:  Lab Results   Component Value Date    WBC 7.91 08/28/2023    HGB 8.0 (L) 08/28/2023    HCT 26.4 (L) 08/28/2023     (L) 08/28/2023    GLUF 93 08/15/2023    BUN 10 08/28/2023    CREATININE 0.49 (L) 08/28/2023    K 3.9 08/28/2023     08/28/2023    CO2 31 08/28/2023    BILIDIR 0.09 08/31/2022    ALKPHOS 87 08/15/2023    ALT 16 08/15/2023    AST 21 08/15/2023    CALCIUM 7.8 (L) 08/28/2023    EGFR 119 08/28/2023    TRIG 55 06/01/2022    HDL 47 06/01/2022    INR 1.33 (H) 08/26/2023    PTT 37 02/08/2022     MRI abdomen with MRCP (3/31/2023)  There are 2 nodular enhancing masses within the gallbladder corresponding to the abnormality on the ultrasound, worrisome for metastatic disease   There are additional nodular lesions within the right paracolic gutter inferior to the gallbladder. These may be related to splenosis or nodular metastatic implants     Multiple T2 hyperintensities seen within the mesentery due to small lymph node or cystic lesions.     Dilation of the left hepatic ducts with narrowing at the confluence of the right and left hepatic duct, due to stricture     No evidence of choledocholithiasis    Pathology (8/25/2023)  A. 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 - Chronic cholecystitis and cholelithiasis. - Resection margin is unremarkable.   - Negative for malignancy.     Note: Grossly identifiable lesions were entirely submitted for microscopic examination, no malignancy identified. Intradepartmental consultation is in agreement. CT A/P IV contrast (7/13/2023)  Polypoid masses in the gallbladder unchanged in size from prior MRI on 3/31/2023.     Generalized intrahepatic biliary dilatation. Etiology not apparent on this study.     Cavernous transformation of the portal vein     Status post splenectomy.     Several nodules in the right mid abdomen, lymphadenopathy versus splenosis.     Varices along the lesser curvature of the stomach.     MELD 3.0: 8 at 8/15/2023  9:08 AM  MELD-Na: 7 at 8/15/2023  9:08 AM  Calculated from:  Serum Creatinine: 0.69 mg/dL (Using min of 1 mg/dL) at 8/15/2023  9:08 AM  Serum Sodium: 140 mmol/L (Using max of 137 mmol/L) at 8/15/2023  9:08 AM  Total Bilirubin: 0.40 mg/dL (Using min of 1 mg/dL) at 8/15/2023  9:08 AM  Serum Albumin: 2.9 g/dL at 8/15/2023  9:08 AM  INR(ratio): 1.13 at 8/15/2023  9:08 AM  Age at listing (hypothetical): 59 years  Sex: Male at 8/15/2023  9:08 AM

## 2023-09-27 ENCOUNTER — TRANSITIONAL CARE MANAGEMENT (OUTPATIENT)
Dept: FAMILY MEDICINE CLINIC | Facility: CLINIC | Age: 58
End: 2023-09-27

## 2023-09-27 ENCOUNTER — HOME CARE VISIT (OUTPATIENT)
Dept: HOME HEALTH SERVICES | Facility: HOME HEALTHCARE | Age: 58
End: 2023-09-27
Payer: MEDICARE

## 2023-09-27 PROCEDURE — G0299 HHS/HOSPICE OF RN EA 15 MIN: HCPCS

## 2023-09-28 VITALS
TEMPERATURE: 98 F | HEART RATE: 70 BPM | SYSTOLIC BLOOD PRESSURE: 122 MMHG | RESPIRATION RATE: 16 BRPM | OXYGEN SATURATION: 98 % | DIASTOLIC BLOOD PRESSURE: 66 MMHG

## 2023-09-28 NOTE — CASE COMMUNICATION
per brother he feels no more need for skilled nursing feels he can properly manage wound and care for patient. DC from [facility] today.   TY

## 2023-10-05 ENCOUNTER — OFFICE VISIT (OUTPATIENT)
Dept: SURGERY | Facility: CLINIC | Age: 58
End: 2023-10-05

## 2023-10-05 DIAGNOSIS — T81.89XA: Primary | ICD-10-CM

## 2023-10-05 PROCEDURE — 99024 POSTOP FOLLOW-UP VISIT: CPT | Performed by: SURGERY

## 2023-10-05 NOTE — PROGRESS NOTES
Assessment/Plan:    Diagnoses and all orders for this visit:    Surgical wound granuloma    2 spots of his right subcostal margin wound are healing quite well. No further packing required. Clean with soap and water and cover for drainage. We will see back in 2 weeks for repeat evaluation        Postoperative restrictions reviewed. All questions answered. ______________________________________________________  HPI: Patient presents for wound check. S/P open cholecystectomy 8/25/2023. Family state wounds appear to be healing quite well. Only scant drainage from the lateral wound. ROS:  General ROS: negative for - chills, fatigue, fever or night sweats, weight loss  Respiratory ROS: no cough, shortness of breath, or wheezing  Cardiovascular ROS: no chest pain or dyspnea on exertion  Genito-Urinary ROS: no dysuria, trouble voiding, or hematuria  Musculoskeletal ROS: negative for - gait disturbance, joint pain or muscle pain  Neurological ROS: no TIA or stroke symptoms  GI ROS: see HPI  Skin ROS: no new rashes or lesions   Lymphatic ROS: no new adenopathy noted by pt.    GYN ROS: see HPI, no new GYN history or bleeding noted  Psy ROS: no new mental or behavioral disturbances         Patient Active Problem List   Diagnosis   • Cellulitis of left lower extremity   • Ambulatory dysfunction   • Nonintractable epilepsy without status epilepticus ([address])   • Primary hypertension   • Gastroesophageal reflux disease without esophagitis   • Venous stasis dermatitis of both lower extremities   • Anemia   • Status post splenectomy   • Esophageal varices (HCC)   • Legally blind in right eye, as defined in [place]   • Bilateral hearing loss   • Angiodysplasia   • History of TB (tuberculosis)   • History of prediabetes   • Other cirrhosis of liver (HCC)   • Gallbladder mass   • Hypomagnesemia   • CCC (chronic calculous cholecystitis)   • Status post cholecystectomy       Allergies:  Patient has no known allergies. Current Outpatient Medications:   •  acetaminophen (TYLENOL) 325 mg tablet, Take 2 tablets (650 mg total) by mouth every 6 (six) hours as needed for mild pain, Disp: , Rfl: 0  •  diazepam (VALIUM) 10 mg tablet, TAKE 1 TABLET BY MOUTH EVERY 12 HOURS., Disp: 60 tablet, Rfl: 2  •  folic acid (FOLVITE) 1 mg tablet, TAKE 1 TABLET (1 MG TOTAL) BY MOUTH IN THE MORNING. NOT COVERED, Disp: 90 tablet, Rfl: 1  •  furosemide (LASIX) 20 mg tablet, TAKE 1 TABLET (20 MG TOTAL) BY MOUTH IN THE MORNING AND 1 TABLET (20 MG TOTAL) IN THE EVENING., Disp: 180 tablet, Rfl: 1  •  iron polysaccharides (FERREX) 150 mg capsule, TAKE 1 CAPSULE (150 MG TOTAL) BY MOUTH IN THE MORNING.  NOT COVERED, Disp: 90 capsule, Rfl: 1  •  levETIRAcetam (KEPPRA) 500 mg tablet, Take 1 tablet (500 mg total) by mouth every 12 (twelve) hours, Disp: 180 tablet, Rfl: 1  •  nadolol (CORGARD) 40 mg tablet, TAKE 1 TABLET (40 MG TOTAL) BY MOUTH IN THE MORNING., Disp: 90 tablet, Rfl: 1  •  OXcarbazepine (TRILEPTAL) 600 mg tablet, TAKE 1 TABLET BY MOUTH EVERY 12 HOURS, Disp: 180 tablet, Rfl: 1  •  pantoprazole (PROTONIX) 40 mg tablet, TAKE 1 TABLET BY MOUTH EVERY DAY IN THE MORNING AND IN THE EVENING BEFORE MEALS, Disp: 180 tablet, Rfl: 1  •  sucralfate (CARAFATE) 1 g tablet, TAKE 1 TABLET BY MOUTH EVERY 6 HOURS, Disp: 360 tablet, Rfl: 1  •  triamcinolone (KENALOG) 0.1 % ointment, APPLY SPARINGLY TO AFFECTED AREAS OF LEGS 2-4 TIMES A DAY FOR UP TO 2 WEEKS., Disp: 780 g, Rfl: 0    Past Medical History:   Diagnosis Date   • Anxiety    • GERD (gastroesophageal reflux disease)    • Hypertension    • Hypoxia 02/11/2022   • Mental developmental delay     mumps as a child, developed rheumatic fever   • MRSA bacteremia 02/09/2022   • Seizures ([address])        Past Surgical History:   Procedure Laterality Date   • COLONOSCOPY     • [place] LAPAROSCOPY SURG CHOLECYSTECTOMY N/A 8/25/2023    Procedure: CHOLECYSTECTOMY OPEN;  Surgeon: Omari Fletcher DO;  [facility];  Service: General   • SPLENECTOMY     • UPPER GASTROINTESTINAL ENDOSCOPY         Family History   Problem Relation Age of Onset   • Depression Mother    • Heart disease Father         reports that he has never smoked. He has never used smokeless tobacco. He reports that he does not drink alcohol and does not use drugs. PHYSICAL EXAM    There were no vitals taken for this visit. General: normal, cooperative, no distress  Abdominal: soft, nondistended or nontender  Incision: Right subcostal incision healing well. 2 small open areas. One is 6 x 6 mm the other is 8 x 8 mm. Both are shallow with clean granular tissue. Silver nitrate applied with a dressing.       Hector Fletcher DO    Date: 10/5/2023 Time: 2:00 PM

## 2023-10-05 NOTE — LETTER
October 5, 2023     Olive Aguilera MD  [address] [place] [address]    Patient: Elis Denis   YOB: 1965   Date of Visit: 10/5/2023       Dear Dr. Jennifer Rodriguez: Thank you for referring Evette Jones to me for evaluation. Below are my notes for this consultation. If you have questions, please do not hesitate to call me. I look forward to following your patient along with you. Sincerely,        Isabel Fletcher DO        CC: No Recipients    Isabel freitas DO  10/5/2023  2:00 PM  Sign when Signing Visit  Assessment/Plan:    Diagnoses and all orders for this visit:    Surgical wound granuloma    2 spots of his right subcostal margin wound are healing quite well. No further packing required. Clean with soap and water and cover for drainage. We will see back in 2 weeks for repeat evaluation        Postoperative restrictions reviewed. All questions answered. ______________________________________________________  HPI: Patient presents for wound check. S/P open cholecystectomy 8/25/2023. Family state wounds appear to be healing quite well. Only scant drainage from the lateral wound. ROS:  General ROS: negative for - chills, fatigue, fever or night sweats, weight loss  Respiratory ROS: no cough, shortness of breath, or wheezing  Cardiovascular ROS: no chest pain or dyspnea on exertion  Genito-Urinary ROS: no dysuria, trouble voiding, or hematuria  Musculoskeletal ROS: negative for - gait disturbance, joint pain or muscle pain  Neurological ROS: no TIA or stroke symptoms  GI ROS: see HPI  Skin ROS: no new rashes or lesions   Lymphatic ROS: no new adenopathy noted by pt.    GYN ROS: see HPI, no new GYN history or bleeding noted  Psy ROS: no new mental or behavioral disturbances         Patient Active Problem List   Diagnosis   • Cellulitis of left lower extremity   • Ambulatory dysfunction   • Nonintractable epilepsy without status epilepticus ([address])   • Primary hypertension   • Gastroesophageal reflux disease without esophagitis   • Venous stasis dermatitis of both lower extremities   • Anemia   • Status post splenectomy   • Esophageal varices (HCC)   • Legally blind in right eye, as defined in [place]   • Bilateral hearing loss   • Angiodysplasia   • History of TB (tuberculosis)   • History of prediabetes   • Other cirrhosis of liver (HCC)   • Gallbladder mass   • Hypomagnesemia   • CCC (chronic calculous cholecystitis)   • Status post cholecystectomy       Allergies:  Patient has no known allergies. Current Outpatient Medications:   •  acetaminophen (TYLENOL) 325 mg tablet, Take 2 tablets (650 mg total) by mouth every 6 (six) hours as needed for mild pain, Disp: , Rfl: 0  •  diazepam (VALIUM) 10 mg tablet, TAKE 1 TABLET BY MOUTH EVERY 12 HOURS., Disp: 60 tablet, Rfl: 2  •  folic acid (FOLVITE) 1 mg tablet, TAKE 1 TABLET (1 MG TOTAL) BY MOUTH IN THE MORNING. NOT COVERED, Disp: 90 tablet, Rfl: 1  •  furosemide (LASIX) 20 mg tablet, TAKE 1 TABLET (20 MG TOTAL) BY MOUTH IN THE MORNING AND 1 TABLET (20 MG TOTAL) IN THE EVENING., Disp: 180 tablet, Rfl: 1  •  iron polysaccharides (FERREX) 150 mg capsule, TAKE 1 CAPSULE (150 MG TOTAL) BY MOUTH IN THE MORNING.  NOT COVERED, Disp: 90 capsule, Rfl: 1  •  levETIRAcetam (KEPPRA) 500 mg tablet, Take 1 tablet (500 mg total) by mouth every 12 (twelve) hours, Disp: 180 tablet, Rfl: 1  •  nadolol (CORGARD) 40 mg tablet, TAKE 1 TABLET (40 MG TOTAL) BY MOUTH IN THE MORNING., Disp: 90 tablet, Rfl: 1  •  OXcarbazepine (TRILEPTAL) 600 mg tablet, TAKE 1 TABLET BY MOUTH EVERY 12 HOURS, Disp: 180 tablet, Rfl: 1  •  pantoprazole (PROTONIX) 40 mg tablet, TAKE 1 TABLET BY MOUTH EVERY DAY IN THE MORNING AND IN THE EVENING BEFORE MEALS, Disp: 180 tablet, Rfl: 1  •  sucralfate (CARAFATE) 1 g tablet, TAKE 1 TABLET BY MOUTH EVERY 6 HOURS, Disp: 360 tablet, Rfl: 1  •  triamcinolone (KENALOG) 0.1 % ointment, APPLY SPARINGLY TO AFFECTED AREAS OF LEGS 2-4 TIMES A DAY FOR UP TO 2 WEEKS., Disp: 454 g, Rfl: 0    Past Medical History:   Diagnosis Date   • Anxiety    • GERD (gastroesophageal reflux disease)    • Hypertension    • Hypoxia 02/11/2022   • Mental developmental delay     mumps as a child, developed rheumatic fever   • MRSA bacteremia 02/09/2022   • Seizures ([address])        Past Surgical History:   Procedure Laterality Date   • COLONOSCOPY     • [place] LAPAROSCOPY SURG CHOLECYSTECTOMY N/A 8/25/2023    Procedure: CHOLECYSTECTOMY OPEN;  Surgeon: Jenny Fletcher DO;  [facility];  Service: General   • SPLENECTOMY     • UPPER GASTROINTESTINAL ENDOSCOPY         Family History   Problem Relation Age of Onset   • Depression Mother    • Heart disease Father         reports that he has never smoked. He has never used smokeless tobacco. He reports that he does not drink alcohol and does not use drugs. PHYSICAL EXAM    There were no vitals taken for this visit. General: normal, cooperative, no distress  Abdominal: soft, nondistended or nontender  Incision: Right subcostal incision healing well. 2 small open areas. One is 6 x 6 mm the other is 8 x 8 mm. Both are shallow with clean granular tissue. Silver nitrate applied with a dressing.       Jenny Fletcher DO    Date: 10/5/2023 Time: 2:00 PM

## 2023-10-07 DIAGNOSIS — I10 PRIMARY HYPERTENSION: ICD-10-CM

## 2023-10-07 DIAGNOSIS — I85.00 ESOPHAGEAL VARICES WITHOUT BLEEDING, UNSPECIFIED ESOPHAGEAL VARICES TYPE (HCC): ICD-10-CM

## 2023-10-10 RX ORDER — FUROSEMIDE 20 MG/1
TABLET ORAL
Qty: 180 TABLET | Refills: 1 | Status: SHIPPED | OUTPATIENT
Start: 2023-10-10

## 2023-10-10 RX ORDER — NADOLOL 40 MG/1
40 TABLET ORAL DAILY
Qty: 90 TABLET | Refills: 1 | Status: SHIPPED | OUTPATIENT
Start: 2023-10-10

## 2023-10-10 NOTE — ASSESSMENT & PLAN NOTE
· Controlled  · Discontinue propranolol in favor of nadolol  · Will hold Lasix in view of borderline low blood pressure Hydroxychloroquine Pregnancy And Lactation Text: This medication has been shown to cause fetal harm but it isn't assigned a Pregnancy Risk Category. There are small amounts excreted in breast milk.

## 2023-10-11 ENCOUNTER — OFFICE VISIT (OUTPATIENT)
Dept: INTERNAL MEDICINE CLINIC | Facility: CLINIC | Age: 58
End: 2023-10-11
Payer: MEDICARE

## 2023-10-11 VITALS
HEIGHT: 70 IN | TEMPERATURE: 97.7 F | DIASTOLIC BLOOD PRESSURE: 70 MMHG | OXYGEN SATURATION: 98 % | SYSTOLIC BLOOD PRESSURE: 102 MMHG | WEIGHT: 178 LBS | BODY MASS INDEX: 25.48 KG/M2 | RESPIRATION RATE: 20 BRPM | HEART RATE: 78 BPM

## 2023-10-11 DIAGNOSIS — L60.2 THICKENED NAILS: ICD-10-CM

## 2023-10-11 DIAGNOSIS — D50.0 IRON DEFICIENCY ANEMIA DUE TO CHRONIC BLOOD LOSS: ICD-10-CM

## 2023-10-11 DIAGNOSIS — K82.8 GALLBLADDER MASS: Primary | ICD-10-CM

## 2023-10-11 DIAGNOSIS — Z23 ENCOUNTER FOR IMMUNIZATION: ICD-10-CM

## 2023-10-11 DIAGNOSIS — D69.6 PLATELETS DECREASED (HCC): ICD-10-CM

## 2023-10-11 DIAGNOSIS — I10 PRIMARY HYPERTENSION: ICD-10-CM

## 2023-10-11 DIAGNOSIS — K74.69 OTHER CIRRHOSIS OF LIVER (HCC): ICD-10-CM

## 2023-10-11 DIAGNOSIS — G40.909 NONINTRACTABLE EPILEPSY WITHOUT STATUS EPILEPTICUS, UNSPECIFIED EPILEPSY TYPE (HCC): ICD-10-CM

## 2023-10-11 PROBLEM — L03.116 CELLULITIS OF LEFT LOWER EXTREMITY: Status: RESOLVED | Noted: 2022-02-08 | Resolved: 2023-10-11

## 2023-10-11 PROBLEM — K80.10 CCC (CHRONIC CALCULOUS CHOLECYSTITIS): Status: RESOLVED | Noted: 2023-09-07 | Resolved: 2023-10-11

## 2023-10-11 PROCEDURE — 90746 HEPB VACCINE 3 DOSE ADULT IM: CPT

## 2023-10-11 PROCEDURE — 90632 HEPA VACCINE ADULT IM: CPT

## 2023-10-11 PROCEDURE — 90677 PCV20 VACCINE IM: CPT

## 2023-10-11 PROCEDURE — G0010 ADMIN HEPATITIS B VACCINE: HCPCS

## 2023-10-11 PROCEDURE — G0009 ADMIN PNEUMOCOCCAL VACCINE: HCPCS

## 2023-10-11 PROCEDURE — 90686 IIV4 VACC NO PRSV 0.5 ML IM: CPT

## 2023-10-11 PROCEDURE — 90471 IMMUNIZATION ADMIN: CPT

## 2023-10-11 PROCEDURE — G0008 ADMIN INFLUENZA VIRUS VAC: HCPCS

## 2023-10-11 PROCEDURE — 99214 OFFICE O/P EST MOD 30 MIN: CPT | Performed by: INTERNAL MEDICINE

## 2023-10-11 NOTE — PROGRESS NOTES
Assessment/Plan:    Gallbladder mass  S/p cholecystectomy pathology benign. Had dehiscence of wound but now slowing healing. Other cirrhosis of liver (HCC)  Likely cryptogenic. He was on diazepam in the past for seizures but unsure now. Oriented family to confirm this, if on diazepam will taper off. Not immune to Hep A or B. Recommended vaccinations today, as well as Flu and Pneumonia vaccine as per hepatology. Continue to follow with specialist also for surveillance for [address]. Primary hypertension  Only on nadolol for esophageal varices. Nonintractable epilepsy without status epilepticus ([address])  Unsure if still on valium, oriented to confirm this. Continue keppra and Trileptal.     Anemia  Recheck CBC and iron panel. Platelets decreased ([address])  Recheck CBC. F/U in 4 months     Diagnoses and all orders for this visit:    Gallbladder mass    Other cirrhosis of liver ([address])    Primary hypertension    Nonintractable epilepsy without status epilepticus, unspecified epilepsy type ([address])    Iron deficiency anemia due to chronic blood loss  -     CBC and Platelet; Future  -     Iron Panel (Includes Ferritin, Iron Sat%, Iron, and TIBC); Future    Platelets decreased ([address])    Thickened nails  -     Ambulatory Referral to Podiatry; Future    Encounter for immunization  -     influenza vaccine, quadrivalent, 0.5 mL, preservative-free, for adult and pediatric patients 6 mos+ (AFLURIA, FLUARIX, FLULAVAL, FLUZONE)  -     Pneumococcal Conjugate Vaccine 20-valent (Pcv20)  -     HEPATITIS A VACCINE ADULT IM  -     HEPATITIS B VACCINE ADULT IM          Subjective:      Patient ID: Senait Waggoner is a 59 y.o. male. Presents in the office for a f/u. Last visit on a [facility] liver for survaillance of [address] found suspicious imaging within gallbladder sent to general surgeon and oncology recommended cholecystectomy. Pathology was benign. He had dehiscence of surgical wound was seen by wound care and now healing well.  He states he feels well. No acute complaints. Slowly getting back to his normal activities. Past hospital admission, consultations and blood work reviewed in details with patient and family. Discussed importance of vaccination due to underlying liver disease. To continue follow up with hepatologist.       The following portions of the patient's history were reviewed and updated as appropriate: allergies, current medications, past family history, past medical history, past social history, past surgical history, and problem list.    Review of Systems   Constitutional:  Negative for appetite change and fatigue. Eyes:  Negative for visual disturbance. Respiratory:  Negative for cough, chest tightness, shortness of breath and wheezing. Cardiovascular:  Negative for chest pain, palpitations and leg swelling. Gastrointestinal:  Negative for abdominal pain, nausea and vomiting. Genitourinary:  Negative for difficulty urinating and frequency. Musculoskeletal:  Negative for arthralgias and joint swelling. Skin:  Positive for wound. Negative for rash. Neurological:  Negative for dizziness and headaches. Psychiatric/Behavioral:  Negative for confusion and sleep disturbance. Objective:      /70 (BP Location: Left arm, Patient Position: Sitting, Cuff Size: Adult)   Pulse 78   Temp 97.7 °F (36.5 °C) (Tympanic)   Resp 20   Ht 5' 10" (1.778 m)   Wt 80.7 kg (178 lb)   SpO2 98%   BMI 25.54 kg/m²          Physical Exam  Vitals and nursing note reviewed. Constitutional:       Appearance: He is well-developed. HENT:      Head: Normocephalic and atraumatic. Eyes:      Conjunctiva/sclera: Conjunctivae normal.      Pupils: Pupils are equal, round, and reactive to light. Neck:      Thyroid: No thyromegaly. Cardiovascular:      Rate and Rhythm: Normal rate and regular rhythm. Heart sounds: Normal heart sounds. Pulmonary:      Effort: No respiratory distress.       Breath sounds: Normal breath sounds. No wheezing. Abdominal:      General: Bowel sounds are normal. There is no distension. Palpations: Abdomen is soft. Tenderness: There is no abdominal tenderness. Musculoskeletal:         General: Normal range of motion. Cervical back: Normal range of motion and neck supple. Right lower leg: Edema present. Left lower leg: Edema present. Skin:     General: Skin is warm and dry. Capillary Refill: Capillary refill takes less than 2 seconds. Coloration: Skin is jaundiced and pale. Comments: surgical scar with 2 small wounds covered with gauze, no surrouding erythema or edema   Neurological:      General: No focal deficit present. Mental Status: He is alert and oriented to person, place, and time. Sensory: No sensory deficit. Motor: No weakness or abnormal muscle tone. Psychiatric:         Thought Content:  Thought content normal.         Judgment: Judgment normal.

## 2023-10-19 ENCOUNTER — OFFICE VISIT (OUTPATIENT)
Dept: SURGERY | Facility: CLINIC | Age: 58
End: 2023-10-19

## 2023-10-19 DIAGNOSIS — K82.8 GALLBLADDER MASS: Primary | ICD-10-CM

## 2023-10-19 DIAGNOSIS — T81.89XA: ICD-10-CM

## 2023-10-19 PROCEDURE — 99024 POSTOP FOLLOW-UP VISIT: CPT | Performed by: SURGERY

## 2023-10-19 NOTE — LETTER
October 19, 2023     Mag Edward MD  [address] [facility] [address]    Patient: Ammy Pinto   YOB: 1965   Date of Visit: 10/19/2023       Dear Dr. Michael Gandhi: Thank you for referring Sariah Salter to me for evaluation. Below are my notes for this consultation. If you have questions, please do not hesitate to call me. I look forward to following your patient along with you. Sincerely,        Franchester Severe Conron, DO        CC: No Recipients    Franchester Severe [place], DO  10/19/2023  1:42 PM  Sign when Signing Visit  Assessment/Plan:    Diagnoses and all orders for this visit:    Gallbladder mass    Surgical wound granuloma    Wounds have essentially healed up. Dry eschar noted over both open spots. Each are only about 7 to 8 mm in size. At this point may leave open to air. Return or call as needed      Postoperative restrictions reviewed. All questions answered. ______________________________________________________  HPI: Patient presents for two week wound check. S/P open cholecystectomy 8/25/2023. Wounds healing quite well. No particular troubles. ROS:  General ROS: negative for - chills, fatigue, fever or night sweats, weight loss  Respiratory ROS: no cough, shortness of breath, or wheezing  Cardiovascular ROS: no chest pain or dyspnea on exertion  Genito-Urinary ROS: no dysuria, trouble voiding, or hematuria  Musculoskeletal ROS: negative for - gait disturbance, joint pain or muscle pain  Neurological ROS: no TIA or stroke symptoms  GI ROS: see HPI  Skin ROS: no new rashes or lesions   Lymphatic ROS: no new adenopathy noted by pt.    GYN ROS: see HPI, no new GYN history or bleeding noted  Psy ROS: no new mental or behavioral disturbances         Patient Active Problem List   Diagnosis   • Ambulatory dysfunction   • Nonintractable epilepsy without status epilepticus ([address])   • Primary hypertension   • Gastroesophageal reflux disease without esophagitis   • Venous stasis dermatitis of both lower extremities   • Anemia   • Status post splenectomy   • Esophageal varices (HCC)   • Legally blind in right eye, as defined in [place]   • Bilateral hearing loss   • Angiodysplasia   • History of TB (tuberculosis)   • History of prediabetes   • Other cirrhosis of liver (HCC)   • Gallbladder mass   • Hypomagnesemia   • Status post cholecystectomy   • Platelets decreased ([address])       Allergies:  Patient has no known allergies. Current Outpatient Medications:   •  acetaminophen (TYLENOL) 325 mg tablet, Take 2 tablets (650 mg total) by mouth every 6 (six) hours as needed for mild pain (Patient not taking: Reported on 10/11/2023), Disp: , Rfl: 0  •  folic acid (FOLVITE) 1 mg tablet, TAKE 1 TABLET (1 MG TOTAL) BY MOUTH IN THE MORNING. NOT COVERED, Disp: 90 tablet, Rfl: 1  •  furosemide (LASIX) 20 mg tablet, TAKE 1 TABLET (20 MG TOTAL) BY MOUTH IN THE MORNING AND IN THE EVENING, Disp: 180 tablet, Rfl: 1  •  iron polysaccharides (FERREX) 150 mg capsule, TAKE 1 CAPSULE (150 MG TOTAL) BY MOUTH IN THE MORNING.  NOT COVERED, Disp: 90 capsule, Rfl: 1  •  levETIRAcetam (KEPPRA) 500 mg tablet, Take 1 tablet (500 mg total) by mouth every 12 (twelve) hours, Disp: 180 tablet, Rfl: 1  •  nadolol (CORGARD) 40 mg tablet, TAKE 1 TABLET (40 MG TOTAL) BY MOUTH IN THE MORNING, Disp: 90 tablet, Rfl: 1  •  OXcarbazepine (TRILEPTAL) 600 mg tablet, TAKE 1 TABLET BY MOUTH EVERY 12 HOURS, Disp: 180 tablet, Rfl: 1  •  pantoprazole (PROTONIX) 40 mg tablet, TAKE 1 TABLET BY MOUTH EVERY DAY IN THE MORNING AND IN THE EVENING BEFORE MEALS, Disp: 180 tablet, Rfl: 1  •  sucralfate (CARAFATE) 1 g tablet, TAKE 1 TABLET BY MOUTH EVERY 6 HOURS, Disp: 360 tablet, Rfl: 1  •  triamcinolone (KENALOG) 0.1 % ointment, APPLY SPARINGLY TO AFFECTED AREAS OF LEGS 2-4 TIMES A DAY FOR UP TO 2 WEEKS., Disp: 454 g, Rfl: 0    Past Medical History:   Diagnosis Date   • Anxiety    • 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   • Cellulitis of left lower extremity 02/08/2022   • GERD (gastroesophageal reflux disease)    • Hypertension    • Hypoxia 02/11/2022   • Mental developmental delay     mumps as a child, developed rheumatic fever   • MRSA bacteremia 02/09/2022   • Seizures ([address])        Past Surgical History:   Procedure Laterality Date   • COLONOSCOPY     • [place] LAPAROSCOPY SURG CHOLECYSTECTOMY N/A 8/25/2023    Procedure: CHOLECYSTECTOMY OPEN;  Surgeon: Nico Fletcher DO;  [facility];  Service: General   • SPLENECTOMY     • UPPER GASTROINTESTINAL ENDOSCOPY         Family History   Problem Relation Age of Onset   • Depression Mother    • Heart disease Father         reports that he has never smoked. He has never used smokeless tobacco. He reports that he does not drink alcohol and does not use drugs. PHYSICAL EXAM    There were no vitals taken for this visit. General: normal, cooperative, no distress  Abdominal: soft, nondistended, or nontender  Incision: clean, dry, and intact and healing well. Eschar over the 2 previous spots.   No signs of infection      Nimco French DO    Date: 10/19/2023 Time: 1:41 PM

## 2023-10-24 ENCOUNTER — TELEPHONE (OUTPATIENT)
Age: 58
End: 2023-10-24

## 2023-10-24 NOTE — TELEPHONE ENCOUNTER
Patients GI provider:  Dr. Ahsan Goldstein    Number to return call: [phone]    Reason for call: Pt's sister in law, Javid Arcos, calling stating pt does not seem to be getting better. She is concerned as pt is experiencing kody loss and bloating. Please reach out to Javid Arcos in regards to this matter, she can be reached at above number.     Scheduled procedure/appointment date if applicable: Appt: [id number]/44/0774

## 2023-10-24 NOTE — TELEPHONE ENCOUNTER
Hx cryptogenic decompensated cirrhosis, gallbladder mass s/p 8/25/23 open cholecystectomy, pyloric gland adenoma. Patient's brother Gonzalez Yancey called to discuss patient symptoms since 8/25/23 surgery. Fatigue/ increased weakness, decreased appetite with 6-7 lb weight loss x 4 weeks. Tolerating fluids and solids/ nutritional supplements. Denies n/v, fever or pain. Abdomen distended and small amount lower extremity edema noted. Taking lasix 20 mg daily. No recent labs or imaging. Recommended follow up with surgery- Dr. Clarissa Liu and discuss further at 10/26/23 Hem / Onc appt. ED evaluation if patient symptoms increase and/or he is unable to tolerate po fluids to maintain hydration.       2/1/24 office visit Dr. Briana Myrick

## 2023-10-25 ENCOUNTER — TELEPHONE (OUTPATIENT)
Dept: HEMATOLOGY ONCOLOGY | Facility: MEDICAL CENTER | Age: 58
End: 2023-10-25

## 2023-10-25 ENCOUNTER — TELEPHONE (OUTPATIENT)
Dept: HEMATOLOGY ONCOLOGY | Facility: CLINIC | Age: 58
End: 2023-10-25

## 2023-10-25 ENCOUNTER — APPOINTMENT (OUTPATIENT)
Dept: LAB | Facility: AMBULARY SURGERY CENTER | Age: 58
End: 2023-10-25
Payer: MEDICARE

## 2023-10-25 DIAGNOSIS — K74.69 OTHER CIRRHOSIS OF LIVER (HCC): ICD-10-CM

## 2023-10-25 DIAGNOSIS — K74.69 OTHER CIRRHOSIS OF LIVER (HCC): Primary | ICD-10-CM

## 2023-10-25 DIAGNOSIS — D50.0 IRON DEFICIENCY ANEMIA DUE TO CHRONIC BLOOD LOSS: ICD-10-CM

## 2023-10-25 LAB
ALBUMIN SERPL BCP-MCNC: 2.6 G/DL (ref 3.5–5)
ALP SERPL-CCNC: 66 U/L (ref 34–104)
ALT SERPL W P-5'-P-CCNC: 6 U/L (ref 7–52)
ANION GAP SERPL CALCULATED.3IONS-SCNC: 8 MMOL/L
AST SERPL W P-5'-P-CCNC: 16 U/L (ref 13–39)
BASOPHILS # BLD AUTO: 0.03 THOUSANDS/ÂΜL (ref 0–0.1)
BASOPHILS NFR BLD AUTO: 0 % (ref 0–1)
BILIRUB SERPL-MCNC: 0.45 MG/DL (ref 0.2–1)
BUN SERPL-MCNC: 8 MG/DL (ref 5–25)
CALCIUM ALBUM COR SERPL-MCNC: 8.9 MG/DL (ref 8.3–10.1)
CALCIUM SERPL-MCNC: 7.8 MG/DL (ref 8.4–10.2)
CHLORIDE SERPL-SCNC: 99 MMOL/L (ref 96–108)
CO2 SERPL-SCNC: 32 MMOL/L (ref 21–32)
CREAT SERPL-MCNC: 0.54 MG/DL (ref 0.6–1.3)
EOSINOPHIL # BLD AUTO: 0.08 THOUSAND/ÂΜL (ref 0–0.61)
EOSINOPHIL NFR BLD AUTO: 1 % (ref 0–6)
ERYTHROCYTE [DISTWIDTH] IN BLOOD BY AUTOMATED COUNT: 16.6 % (ref 11.6–15.1)
FERRITIN SERPL-MCNC: 19 NG/ML (ref 24–336)
GFR SERPL CREATININE-BSD FRML MDRD: 114 ML/MIN/1.73SQ M
GLUCOSE SERPL-MCNC: 82 MG/DL (ref 65–140)
HCT VFR BLD AUTO: 29.1 % (ref 36.5–49.3)
HGB BLD-MCNC: 8.4 G/DL (ref 12–17)
IMM GRANULOCYTES # BLD AUTO: 0.03 THOUSAND/UL (ref 0–0.2)
IMM GRANULOCYTES NFR BLD AUTO: 0 % (ref 0–2)
IRON SATN MFR SERPL: 8 % (ref 15–50)
IRON SERPL-MCNC: 14 UG/DL (ref 50–212)
LYMPHOCYTES # BLD AUTO: 0.86 THOUSANDS/ÂΜL (ref 0.6–4.47)
LYMPHOCYTES NFR BLD AUTO: 13 % (ref 14–44)
MCH RBC QN AUTO: 24.7 PG (ref 26.8–34.3)
MCHC RBC AUTO-ENTMCNC: 28.9 G/DL (ref 31.4–37.4)
MCV RBC AUTO: 86 FL (ref 82–98)
MONOCYTES # BLD AUTO: 0.57 THOUSAND/ÂΜL (ref 0.17–1.22)
MONOCYTES NFR BLD AUTO: 8 % (ref 4–12)
NEUTROPHILS # BLD AUTO: 5.26 THOUSANDS/ÂΜL (ref 1.85–7.62)
NEUTS SEG NFR BLD AUTO: 78 % (ref 43–75)
NRBC BLD AUTO-RTO: 0 /100 WBCS
PLATELET # BLD AUTO: 193 THOUSANDS/UL (ref 149–390)
PMV BLD AUTO: 9.8 FL (ref 8.9–12.7)
POTASSIUM SERPL-SCNC: 2.9 MMOL/L (ref 3.5–5.3)
PROT SERPL-MCNC: 7.3 G/DL (ref 6.4–8.4)
RBC # BLD AUTO: 3.4 MILLION/UL (ref 3.88–5.62)
SODIUM SERPL-SCNC: 139 MMOL/L (ref 135–147)
TIBC SERPL-MCNC: 167 UG/DL (ref 250–450)
UIBC SERPL-MCNC: 153 UG/DL (ref 155–355)
WBC # BLD AUTO: 6.83 THOUSAND/UL (ref 4.31–10.16)

## 2023-10-25 PROCEDURE — 80053 COMPREHEN METABOLIC PANEL: CPT

## 2023-10-25 PROCEDURE — 85025 COMPLETE CBC W/AUTO DIFF WBC: CPT

## 2023-10-25 PROCEDURE — 83550 IRON BINDING TEST: CPT

## 2023-10-25 PROCEDURE — 36415 COLL VENOUS BLD VENIPUNCTURE: CPT

## 2023-10-25 PROCEDURE — 82728 ASSAY OF FERRITIN: CPT

## 2023-10-25 PROCEDURE — 83540 ASSAY OF IRON: CPT

## 2023-10-25 NOTE — TELEPHONE ENCOUNTER
Spoke with patient's sister-in-law Jacqueline Zuñigacal regarding labs needed to be drawn prior to appointment. Indicated the scripts are in the system, they are non-fasting and patient can go to any [facility] facility to have the labs drawn. Jacqueline Mcpherson verbalized understanding.

## 2023-10-26 ENCOUNTER — OFFICE VISIT (OUTPATIENT)
Dept: HEMATOLOGY ONCOLOGY | Facility: CLINIC | Age: 58
End: 2023-10-26
Payer: MEDICARE

## 2023-10-26 VITALS
RESPIRATION RATE: 17 BRPM | HEART RATE: 67 BPM | DIASTOLIC BLOOD PRESSURE: 58 MMHG | HEIGHT: 70 IN | WEIGHT: 181 LBS | OXYGEN SATURATION: 97 % | TEMPERATURE: 96.8 F | SYSTOLIC BLOOD PRESSURE: 110 MMHG | BODY MASS INDEX: 25.91 KG/M2

## 2023-10-26 DIAGNOSIS — K74.69 OTHER CIRRHOSIS OF LIVER (HCC): ICD-10-CM

## 2023-10-26 DIAGNOSIS — I85.11 SECONDARY ESOPHAGEAL VARICES WITH BLEEDING (HCC): Primary | ICD-10-CM

## 2023-10-26 DIAGNOSIS — D50.0 IRON DEFICIENCY ANEMIA DUE TO CHRONIC BLOOD LOSS: ICD-10-CM

## 2023-10-26 DIAGNOSIS — G40.909 NONINTRACTABLE EPILEPSY WITHOUT STATUS EPILEPTICUS, UNSPECIFIED EPILEPSY TYPE (HCC): ICD-10-CM

## 2023-10-26 PROCEDURE — 99215 OFFICE O/P EST HI 40 MIN: CPT | Performed by: INTERNAL MEDICINE

## 2023-10-26 RX ORDER — LEVETIRACETAM 500 MG/1
500 TABLET ORAL EVERY 12 HOURS
Qty: 180 TABLET | Refills: 1 | Status: SHIPPED | OUTPATIENT
Start: 2023-10-26

## 2023-10-26 NOTE — PROGRESS NOTES
Jose Stewart  1965  [facility] [place]  [address]  [place] [id number]  HEMATOLOGY/ONCOLOGY CONSULTATION REPORT    DISCUSSION/SUMMARY:    [id number] male with complicated recent GI history. Issues:    Gallbladder findings. Ultrasound and MRI results are listed below. Patient has steatosis (query cause for the cirrhosis). The left hepatic duct is dilated, right looks normal.  Gallbladder is distended; there are 2 lesions (3.1 cm, 1.8 cm) that are concerning for malignancy. There wer number of other lesions in the anterior abdomen and omentum that are also concerning for malignancy. Patient underwent cholecystectomy approximately 2 months ago. There was no evidence for any malignancy. Patient will follow-up with surgery as directed. Anemia. Etiology unclear. Patient has a slight yellow number but the LFTs and total bilirubin level are within normal limits. Albumin is quite low. Recent iron studies not consistent with iron deficiency anemia but anemia of chronic disease. That being said, patient is to start a multivitamin with iron. Brother is monitoring the patient's p.o. intake. If this continues to be an issue, patient will need to be seen by nutrition. Brother is monitoring the patient's weight. Part of the anemia may also be from the patient's prior surgery but this was 2 months ago. The plan is to do blood work every 3 months. I discussed with the brother what to monitor for as far as progressive fatigue, respiratory issues, bleeding, decreased activities. If the hemoglobin level does not slowly improve, additional testing will be necessary. Patient/family know to call the hematology/oncology office if there are any other questions or concerns. Carefully review your medication list and verify that the list is accurate and up-to-date.  Please call the hematology/oncology office if there are medications missing from the list, medications on the list that you are not currently taking or if there is a dosage or instruction that is different from how you're taking that medication. Patient goals and areas of care: Monitor CBC parameters  Barriers to care: none  Patient is not able to self-care  ______________________________________________________________________________________    Chief Complaint   Patient presents with    Follow-up    Abnormal gallbladder findings    Anemia of unclear etiology     History of Present Illness: 55-year-old male with history of intellectual disability, epilepsy but no recent seizures. Patient also with a history of cirrhosis, esophageal varices requiring banding and bilateral lower extremity venous stasis dermatitis - more recently under control. Patient returns for follow-up and is accompanied by his brother. Mr. Javi Bailey is able to only answer the simplest of questions; family members filled in the information. Because of the cirrhosis patient was sent for an ultrasound. Abnormalities were seen within the gallbladder and patient was then sent for an MRI. The abnormalities were once again seen. Patient was seen by a number of specialists. Patient went to [facility] for the summer. When he returned, Mr. Javi Bailey underwent a cholecystectomy. Mr. Javi Bailey has needed more time to recover from the surgery. Brother feels that the patient's mental status is not as good as before. Appetite is still +/- -, weight is stable. No nausea or vomiting. No obvious GI bleeding above or below. Activities are limited but the same as before. No evidence of pain. No fevers or signs of infection. Review of Systems   Constitutional:  Positive for appetite change and fatigue. HENT: Negative. Eyes: Negative. Respiratory: Negative. Cardiovascular: Negative. Gastrointestinal: Negative. Endocrine: Negative. Genitourinary: Negative. Musculoskeletal: Negative. Skin: Negative. Allergic/Immunologic: Negative. Neurological: Negative. Hematological: Negative. Psychiatric/Behavioral: Negative. All other systems reviewed and are negative.     Patient Active Problem List   Diagnosis    Ambulatory dysfunction    Nonintractable epilepsy without status epilepticus ([address])    Primary hypertension    Gastroesophageal reflux disease without esophagitis    Venous stasis dermatitis of both lower extremities    Anemia    Status post splenectomy    Esophageal varices (HCC)    Legally blind in right eye, as defined in [place]    Bilateral hearing loss    Angiodysplasia    History of TB (tuberculosis)    History of prediabetes    Other cirrhosis of liver (HCC)    Gallbladder mass    Hypomagnesemia    Status post cholecystectomy    Platelets decreased ([address])     Past Medical History:   Diagnosis Date    Anxiety     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    Cellulitis of left lower extremity 02/08/2022    GERD (gastroesophageal reflux disease)     Hypertension     Hypoxia 02/11/2022    Mental developmental delay     mumps as a child, developed rheumatic fever    MRSA bacteremia 02/09/2022    Seizures ([address])      Past Surgical History:   Procedure Laterality Date    COLONOSCOPY      [facility] LAPAROSCOPY SURG CHOLECYSTECTOMY N/A 8/25/2023    Procedure: CHOLECYSTECTOMY OPEN;  Surgeon: Jose D Stovall DO;  [facility];  Service: General    SPLENECTOMY      UPPER GASTROINTESTINAL ENDOSCOPY       Family History   Problem Relation Age of Onset    Depression Mother     Heart disease Father      Social History     Socioeconomic History    Marital status: Single     Spouse name: Not on file    Number of children: Not on file    Years of education: Not on file    Highest education level: Not on file   Occupational History    Not on file   Tobacco Use    Smoking status: Never    Smokeless tobacco: Never   Vaping Use    Vaping Use: Never used   Substance and Sexual Activity Alcohol use: Never    Drug use: Never    Sexual activity: Not Currently   Other Topics Concern    Not on file   Social History Narrative    Not on file     Social Determinants of Health     Financial Resource Strain: Not on file   Food Insecurity: No Food Insecurity (8/27/2023)    Hunger Vital Sign     Worried About Running Out of Food in the Last Year: Never true     Ran Out of Food in the Last Year: Never true   Transportation Needs: No Transportation Needs (8/27/2023)    PRAPARE - Transportation     Lack of Transportation (Medical): No     Lack of Transportation (Non-Medical): No   Physical Activity: Not on file   Stress: Not on file   Social Connections: Not on file   Intimate Partner Violence: Not on file   Housing Stability: Low Risk  (8/27/2023)    Housing Stability Vital Sign     Unable to Pay for Housing in the Last Year: No     Number of Places Lived in the Last Year: 1     Unstable Housing in the Last Year: No   Social history: No tobacco, alcohol or drug abuse, no toxic exposure    Current Outpatient Medications:     folic acid (FOLVITE) 1 mg tablet, TAKE 1 TABLET (1 MG TOTAL) BY MOUTH IN THE MORNING. NOT COVERED, Disp: 90 tablet, Rfl: 1    furosemide (LASIX) 20 mg tablet, TAKE 1 TABLET (20 MG TOTAL) BY MOUTH IN THE MORNING AND IN THE EVENING, Disp: 180 tablet, Rfl: 1    iron polysaccharides (FERREX) 150 mg capsule, TAKE 1 CAPSULE (150 MG TOTAL) BY MOUTH IN THE MORNING.  NOT COVERED, Disp: 90 capsule, Rfl: 1    nadolol (CORGARD) 40 mg tablet, TAKE 1 TABLET (40 MG TOTAL) BY MOUTH IN THE MORNING, Disp: 90 tablet, Rfl: 1    OXcarbazepine (TRILEPTAL) 600 mg tablet, TAKE 1 TABLET BY MOUTH EVERY 12 HOURS, Disp: 180 tablet, Rfl: 1    pantoprazole (PROTONIX) 40 mg tablet, TAKE 1 TABLET BY MOUTH EVERY DAY IN THE MORNING AND IN THE EVENING BEFORE MEALS, Disp: 180 tablet, Rfl: 1    sucralfate (CARAFATE) 1 g tablet, TAKE 1 TABLET BY MOUTH EVERY 6 HOURS, Disp: 360 tablet, Rfl: 1    triamcinolone (KENALOG) 0.1 % ointment, APPLY SPARINGLY TO AFFECTED AREAS OF LEGS 2-4 TIMES A DAY FOR UP TO 2 WEEKS., Disp: 454 g, Rfl: 0    acetaminophen (TYLENOL) 325 mg tablet, Take 2 tablets (650 mg total) by mouth every 6 (six) hours as needed for mild pain (Patient not taking: Reported on 10/11/2023), Disp: , Rfl: 0    levETIRAcetam (KEPPRA) 500 mg tablet, TAKE 1 TABLET BY MOUTH EVERY 12 HOURS, Disp: 180 tablet, Rfl: 1    No Known Allergies    Vitals:    10/26/23 1422   BP: 110/58   Pulse: 67   Resp: 17   Temp: (!) 96.8 °F (36 °C)   SpO2: 97%     Physical Exam  Constitutional:       Appearance: He is well-developed. Comments: Pale appearing male, no respiratory distress, no signs of pain   HENT:      Head: Normocephalic and atraumatic. Right Ear: External ear normal.      Left Ear: External ear normal.   Eyes:      Conjunctiva/sclera: Conjunctivae normal.      Pupils: Pupils are equal, round, and reactive to light. Cardiovascular:      Rate and Rhythm: Normal rate and regular rhythm. Heart sounds: Normal heart sounds. Pulmonary:      Effort: Pulmonary effort is normal.      Breath sounds: Normal breath sounds. Comments: Clear bilaterally  Abdominal:      General: Bowel sounds are normal.      Palpations: Abdomen is soft. Comments: + Bowel sounds, minimal diffuse tenderness, no guarding or rigidity, well-healed laparoscopic scars, slightly distended   Musculoskeletal:         General: Normal range of motion. Cervical back: Normal range of motion and neck supple. Skin:     General: Skin is warm. Comments: Pale, sour/yellow color, no petechiae or ecchymoses, no purpura   Neurological:      Mental Status: He is alert and oriented to person, place, and time. Deep Tendon Reflexes: Reflexes are normal and symmetric. Psychiatric:         Behavior: Behavior normal.         Thought Content:  Thought content normal.         Judgment: Judgment normal.     Extremities: 1-2+ bilateral lower extremity edema, chronic venous color changes, pulses are decreased, no obvious cords  Lymphatics: No adenopathy in the neck, supraclavicular region, axilla and groin    Labs    10/25/2023 WBC = 6.83 hemoglobin = 8.4 hematocrit = 29.1 MCV = 86 RDW = 16.6 platelet = 192 neutrophil = 78% iron = 14 iron saturation = 8 TIBC = 167 ferritin = 19 BUN = 8 creatinine = 0.54 calcium = 7.8 LFTs WNL albumin = 2.6    5/26/2023 CA 19-9 = 6  3/14/2023 WBC = 3.68 hemoglobin = 9.7 hematocrit = 34 MCV = 84 platelet = 520 neutrophil = 39% lymphocyte = 36% monocyte = 7% eosinophil = 17% basophil = 1% BUN = 11 creatinine = 0.55 calcium = 9.1 AST = 29 ALT = 22 alkaline phosphatase = 84 total protein = 7.2 total bilirubin = 0.33  3/14/2023 iron = 28 iron saturation = 7% TIBC = 377 ferritin = 14  3/14/2023 AFP = 2.1 hepatitis B surface antibody <3.1 hepatitis A total antibody = nonreactive    Imaging    3/31/2023 MRI abdomen with and without contrast and MRCP    LIVER:   Hepatic steatosis seen   There is no diffusion restricting lesion within the liver parenchyma   The hepatic veins and portal veins are patent. BILE DUCTS: Dilation of the left hepatic duct seen. The right hepatic duct is of normal caliber. The common bile duct is of normal caliber narrowing of the left hepatic duct at the level of the confluence of the right and left hepatic duct is seen. GALLBLADDER:  Gallbladder is distended corresponding to the abnormality on the ultrasound there are 2 diffusion restricting masses within the gallbladder lumen. The larger mass measures about 3.1 cm and the smaller mass measures about 1.8 cm. These demonstrate contrast enhancement. .  There are adjacent T2 hyperintense enhancing lesions in the adjacent to anterior abdomen in the omentum measuring 2.2 cm and 1 cm, located anterior to the colon  Multiple gallstones are seen    PANCREAS: Pancreas appear unremarkable  No pancreatic ductal dilation seen  Evaluation limited due to motion IMPRESSION:    There are 2 nodular enhancing masses within the gallbladder corresponding to the abnormality on the ultrasound, worrisome for metastatic disease. There are additional nodular lesions within the right paracolic gutter inferior to the gallbladder. These may be related to splenosis or nodular metastatic implants     Multiple T2 hyperintensities seen within the mesentery due to small lymph node or cystic lesions. Dilation of the left hepatic ducts with narrowing at the confluence of the right and left hepatic duct, due to stricture     No evidence of choledocholithiasis  CT oral and IV contrast suggested for further evaluation    2/21/2023 ultrasound right upper quadrant    IMPRESSION:     1.  Echogenic lesion concerning for mass within the gallbladder lumen with vascularity measuring 2.9 x 1.8 x 2.8 cm. Concern for neoplastic process. Recommend further assessment with contrast-enhanced MRI. Consider surgical consultation. 2.  Morphological features of hepatic cirrhosis. 3.  Cholelithiasis without features of acute cholecystitis. Pathology    Case Report   Surgical Pathology Report                         Case: [id number]                                   Authorizing Provider:  Nimco French DO   Collected:           08/25/2023 3039               Ordering Location:     [facility]        Received:            08/25/2023 [address] [facility] Operating Room                                                       Pathologist:           Nakita Bejarano MD                                                                 Specimen:    Gallbladder                                                                                Addendum   This addendum is issued to add immunohistochemistry results. The final diagnosis stays the same. The lesional tissue is positive for MUC-6 and patchy positive for CK7, supporting final diagnosis.    Addendum electronically signed by Earnest Pace MD on 9/1/2023 at  2:23 PM   Final Diagnosis   A. 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 - Chronic cholecystitis and cholelithiasis. - Resection margin is unremarkable. - Negative for malignancy. Note: Grossly identifiable lesions were entirely submitted for microscopic examination, no malignancy identified. Intradepartmental consultation is in agreement.    Electronically signed by aErnest Pace MD on 8/31/2023 at  8:55 AM

## 2023-10-31 NOTE — ASSESSMENT & PLAN NOTE
Hb 8 9  Iron panel- iron 25, ferritin 10, TIBC 300s  Oral iron reduced from 150mg BID to daily last visit however sister in law states she was just able to get the rx 2 days ago  Patient with no fatigue, shortness of breath, chest pain, headaches  Plan:  · Increase iron to BID  · Repeat CBC and iron panel before next visit  · Monitor symptoms toe pain

## 2023-11-01 ENCOUNTER — OFFICE VISIT (OUTPATIENT)
Dept: PODIATRY | Facility: CLINIC | Age: 58
End: 2023-11-01
Payer: MEDICARE

## 2023-11-01 VITALS
HEART RATE: 69 BPM | BODY MASS INDEX: 26.34 KG/M2 | WEIGHT: 184 LBS | HEIGHT: 70 IN | SYSTOLIC BLOOD PRESSURE: 116 MMHG | OXYGEN SATURATION: 98 % | DIASTOLIC BLOOD PRESSURE: 72 MMHG

## 2023-11-01 DIAGNOSIS — I73.9 PVD (PERIPHERAL VASCULAR DISEASE) (HCC): Primary | ICD-10-CM

## 2023-11-01 DIAGNOSIS — L60.2 THICKENED NAILS: ICD-10-CM

## 2023-11-01 PROCEDURE — 99202 OFFICE O/P NEW SF 15 MIN: CPT | Performed by: PODIATRIST

## 2023-11-01 RX ORDER — DIAZEPAM 10 MG/1
10 TABLET ORAL EVERY 12 HOURS
COMMUNITY
Start: 2023-10-26

## 2023-11-01 NOTE — PROGRESS NOTES
Assessment/Plan:     The patient's clinical examination today is significant for thickened and dystrophic pedal nail plates with discoloration, subungual debris, brittleness consistent with onychomycosis x10. The interdigital spaces are clear and without maceration. There are some very small superficial healing stasis ulcerations to his bilateral lower extremities. There are no signs of infection noted to either lower extremity. There is +2 pitting edema with hemosiderin deposition of the lower extremities consistent with chronic venous insufficiency. The patient's mycotic nails were sharply debrided with a sterile nail clipper Z37 without complication. The nails with a mechanically reduced in thickness and girth utilize a rotary bur. The patient does have a chronic history of stasis ulcerations that are typically managed with local wound care. His brother is his caretaker and states that he does wear compression stockings. There were no other acute pedal issues. Recommend follow-up on as-needed basis for continued foot care     Diagnoses and all orders for this visit:    PVD (peripheral vascular disease) ([address])    Thickened nails  -     Ambulatory Referral to Podiatry    Other orders  -     diazepam (VALIUM) 10 mg tablet; Take 10 mg by mouth every 12 (twelve) hours          Subjective:     Patient ID: Damaris Delgado is a 59 y.o. male. The patient presents today for his initial consultation with [facility] podiatry group with a chief complaint of thickened and elongated pedal nail plates that are causing discomfort in his close toed shoes. The patient does have a history of hearing loss and is blind in 1 eye. He has a history of chronic venous insufficiency and swelling in his legs which often resulted superficial wounds. These wounds typically heal with local wound care. His brother is present with him in the exam room and is his caretaker.   The patient is not very verbal and his brother is providing his medical history. The patient's medical chart was reviewed. PAST MEDICAL HISTORY:  Past Medical History:   Diagnosis Date    Anxiety     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    Cellulitis of left lower extremity 02/08/2022    GERD (gastroesophageal reflux disease)     Hypertension     Hypoxia 02/11/2022    Mental developmental delay     mumps as a child, developed rheumatic fever    MRSA bacteremia 02/09/2022    Seizures ([address])        PAST SURGICAL HISTORY:  Past Surgical History:   Procedure Laterality Date    COLONOSCOPY      [place] LAPAROSCOPY SURG CHOLECYSTECTOMY N/A 8/25/2023    Procedure: CHOLECYSTECTOMY OPEN;  Surgeon: Choco Fletcher DO;  [facility];  Service: General    SPLENECTOMY      UPPER GASTROINTESTINAL ENDOSCOPY          ALLERGIES:  Patient has no known allergies. MEDICATIONS:  Current Outpatient Medications   Medication Sig Dispense Refill    diazepam (VALIUM) 10 mg tablet Take 10 mg by mouth every 12 (twelve) hours      folic acid (FOLVITE) 1 mg tablet TAKE 1 TABLET (1 MG TOTAL) BY MOUTH IN THE MORNING. NOT COVERED 90 tablet 1    furosemide (LASIX) 20 mg tablet TAKE 1 TABLET (20 MG TOTAL) BY MOUTH IN THE MORNING AND IN THE EVENING 180 tablet 1    iron polysaccharides (FERREX) 150 mg capsule TAKE 1 CAPSULE (150 MG TOTAL) BY MOUTH IN THE MORNING.  NOT COVERED 90 capsule 1    levETIRAcetam (KEPPRA) 500 mg tablet TAKE 1 TABLET BY MOUTH EVERY 12 HOURS 180 tablet 1    nadolol (CORGARD) 40 mg tablet TAKE 1 TABLET (40 MG TOTAL) BY MOUTH IN THE MORNING 90 tablet 1    OXcarbazepine (TRILEPTAL) 600 mg tablet TAKE 1 TABLET BY MOUTH EVERY 12 HOURS 180 tablet 1    pantoprazole (PROTONIX) 40 mg tablet TAKE 1 TABLET BY MOUTH EVERY DAY IN THE MORNING AND IN THE EVENING BEFORE MEALS 180 tablet 1    sucralfate (CARAFATE) 1 g tablet TAKE 1 TABLET BY MOUTH EVERY 6 HOURS 360 tablet 1    triamcinolone (KENALOG) 0.1 % ointment APPLY SPARINGLY TO AFFECTED AREAS OF LEGS 2-4 TIMES A DAY FOR UP TO 2 WEEKS. 454 g 0    acetaminophen (TYLENOL) 325 mg tablet Take 2 tablets (650 mg total) by mouth every 6 (six) hours as needed for mild pain (Patient not taking: Reported on 10/11/2023)  0     No current facility-administered medications for this visit. SOCIAL HISTORY:  Social History     Socioeconomic History    Marital status: Single     Spouse name: None    Number of children: None    Years of education: None    Highest education level: None   Occupational History    None   Tobacco Use    Smoking status: Never    Smokeless tobacco: Never   Vaping Use    Vaping Use: Never used   Substance and Sexual Activity    Alcohol use: Never    Drug use: Never    Sexual activity: Not Currently   Other Topics Concern    None   Social History Narrative    None     Social Determinants of Health     Financial Resource Strain: Not on file   Food Insecurity: No Food Insecurity (8/27/2023)    Hunger Vital Sign     Worried About Running Out of Food in the Last Year: Never true     Ran Out of Food in the Last Year: Never true   Transportation Needs: No Transportation Needs (8/27/2023)    PRAPARE - Transportation     Lack of Transportation (Medical): No     Lack of Transportation (Non-Medical): No   Physical Activity: Not on file   Stress: Not on file   Social Connections: Not on file   Intimate Partner Violence: Not on file   Housing Stability: Low Risk  (8/27/2023)    Housing Stability Vital Sign     Unable to Pay for Housing in the Last Year: No     Number of Places Lived in the Last Year: 1     Unstable Housing in the Last Year: No        Review of Systems   Unable to perform ROS: Patient nonverbal         Objective:     Physical Exam  Vitals reviewed. Constitutional:       Appearance: Normal appearance. HENT:      Head: Normocephalic and atraumatic. Nose: Nose normal.   Eyes:      Conjunctiva/sclera: Conjunctivae normal.      Pupils: Pupils are equal, round, and reactive to light. Cardiovascular:      Pulses:           Dorsalis pedis pulses are 1+ on the right side and 1+ on the left side. Posterior tibial pulses are 1+ on the right side and 1+ on the left side. Pulmonary:      Effort: Pulmonary effort is normal.   Feet:      Right foot:      Toenail Condition: Right toenails are abnormally thick and long. Fungal disease present. Left foot:      Toenail Condition: Left toenails are abnormally thick and long. Fungal disease present. Comments: The patient's clinical examination today is significant for thickened and dystrophic pedal nail plates with discoloration, subungual debris, brittleness consistent with onychomycosis x10. The interdigital spaces are clear and without maceration. There are some very small superficial healing stasis ulcerations to his bilateral lower extremities. There are no signs of infection noted to either lower extremity. There is +2 pitting edema with hemosiderin deposition of the lower extremities consistent with chronic venous insufficiency. Skin:     General: Skin is warm. Capillary Refill: Capillary refill takes less than 2 seconds. Neurological:      General: No focal deficit present. Mental Status: He is alert and oriented to person, place, and time. Psychiatric:         Mood and Affect: Mood normal.         Behavior: Behavior normal.         Thought Content:  Thought content normal.

## 2023-11-04 DIAGNOSIS — K21.9 GASTROESOPHAGEAL REFLUX DISEASE WITHOUT ESOPHAGITIS: ICD-10-CM

## 2023-11-06 ENCOUNTER — OFFICE VISIT (OUTPATIENT)
Dept: SURGERY | Facility: CLINIC | Age: 58
End: 2023-11-06

## 2023-11-06 DIAGNOSIS — K74.69 OTHER CIRRHOSIS OF LIVER (HCC): Primary | ICD-10-CM

## 2023-11-06 PROCEDURE — 99024 POSTOP FOLLOW-UP VISIT: CPT | Performed by: SURGERY

## 2023-11-06 RX ORDER — SUCRALFATE 1 G/1
TABLET ORAL
Qty: 360 TABLET | Refills: 1 | Status: SHIPPED | OUTPATIENT
Start: 2023-11-06

## 2023-11-06 NOTE — PROGRESS NOTES
Assessment/Plan: Patient has a history for open cholecystectomy in August 2023. He did have wound healing difficulties thereafter. This may be partly due to his history of cirrhosis. His family states that he had a punctate clear liquid drainage for the median aspect of the upper incision. Drainage was significant. Examination reveals no evidence for cellulitis or infection. I can barely probe the opening in the upper epigastric region. I explained I believe that this is ascitic fluid that she is draining through the incision. Conservative measures would be recommended. They do have an appointment to see the hepatologist.  If the drainage persists, a ostomy wound bag could be placed to obviate the need for dressings. All questions answered. There are no diagnoses linked to this encounter. Pathology: Reviewed with patient, all questions answered. Postoperative restrictions reviewed. All questions answered. ______________________________________________________  HPI: Patient presents for wound check. S/P open cholecystectomy 8/25/2023. Wound Check               ROS:  General ROS: negative for - chills, fatigue, fever or night sweats, weight loss  Respiratory ROS: no cough, shortness of breath, or wheezing  Cardiovascular ROS: no chest pain or dyspnea on exertion  Genito-Urinary ROS: no dysuria, trouble voiding, or hematuria  Musculoskeletal ROS: negative for - gait disturbance, joint pain or muscle pain  Neurological ROS: no TIA or stroke symptoms  GI ROS: see HPI  Skin ROS: no new rashes or lesions   Lymphatic ROS: no new adenopathy noted by pt.    GYN ROS: see HPI, no new GYN history or bleeding noted  Psy ROS: no new mental or behavioral disturbances         Patient Active Problem List   Diagnosis    Ambulatory dysfunction    Nonintractable epilepsy without status epilepticus ([address])    Primary hypertension    Gastroesophageal reflux disease without esophagitis    Venous stasis dermatitis of both lower extremities    Anemia    Status post splenectomy    Esophageal varices (HCC)    Legally blind in right eye, as defined in [place]    Bilateral hearing loss    Angiodysplasia    History of TB (tuberculosis)    History of prediabetes    Other cirrhosis of liver (HCC)    Gallbladder mass    Hypomagnesemia    Status post cholecystectomy    Platelets decreased (HCC)       Allergies:  Patient has no known allergies. Current Outpatient Medications:     acetaminophen (TYLENOL) 325 mg tablet, Take 2 tablets (650 mg total) by mouth every 6 (six) hours as needed for mild pain (Patient not taking: Reported on 10/11/2023), Disp: , Rfl: 0    diazepam (VALIUM) 10 mg tablet, Take 10 mg by mouth every 12 (twelve) hours, Disp: , Rfl:     folic acid (FOLVITE) 1 mg tablet, TAKE 1 TABLET (1 MG TOTAL) BY MOUTH IN THE MORNING. NOT COVERED, Disp: 90 tablet, Rfl: 1    furosemide (LASIX) 20 mg tablet, TAKE 1 TABLET (20 MG TOTAL) BY MOUTH IN THE MORNING AND IN THE EVENING, Disp: 180 tablet, Rfl: 1    iron polysaccharides (FERREX) 150 mg capsule, TAKE 1 CAPSULE (150 MG TOTAL) BY MOUTH IN THE MORNING.  NOT COVERED, Disp: 90 capsule, Rfl: 1    levETIRAcetam (KEPPRA) 500 mg tablet, TAKE 1 TABLET BY MOUTH EVERY 12 HOURS, Disp: 180 tablet, Rfl: 1    nadolol (CORGARD) 40 mg tablet, TAKE 1 TABLET (40 MG TOTAL) BY MOUTH IN THE MORNING, Disp: 90 tablet, Rfl: 1    OXcarbazepine (TRILEPTAL) 600 mg tablet, TAKE 1 TABLET BY MOUTH EVERY 12 HOURS, Disp: 180 tablet, Rfl: 1    pantoprazole (PROTONIX) 40 mg tablet, TAKE 1 TABLET BY MOUTH EVERY DAY IN THE MORNING AND IN THE EVENING BEFORE MEALS, Disp: 180 tablet, Rfl: 1    sucralfate (CARAFATE) 1 g tablet, TAKE 1 TABLET BY MOUTH EVERY 6 HOURS, Disp: 360 tablet, Rfl: 1    triamcinolone (KENALOG) 0.1 % ointment, APPLY SPARINGLY TO AFFECTED AREAS OF LEGS 2-4 TIMES A DAY FOR UP TO 2 WEEKS., Disp: 454 g, Rfl: 0    Past Medical History:   Diagnosis Date    Anxiety     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    Cellulitis of left lower extremity 02/08/2022    GERD (gastroesophageal reflux disease)     Hypertension     Hypoxia 02/11/2022    Mental developmental delay     mumps as a child, developed rheumatic fever    MRSA bacteremia 02/09/2022    Seizures ([address])        Past Surgical History:   Procedure Laterality Date    COLONOSCOPY      [place] LAPAROSCOPY SURG CHOLECYSTECTOMY N/A 8/25/2023    Procedure: CHOLECYSTECTOMY OPEN;  Surgeon: Jeaneth Fletcher DO;  Location: [facility];  Service: General    SPLENECTOMY      UPPER GASTROINTESTINAL ENDOSCOPY         Family History   Problem Relation Age of Onset    Depression Mother     Heart disease Father         reports that he has never smoked. He has never used smokeless tobacco. He reports that he does not drink alcohol and does not use drugs. PHYSICAL EXAM    There were no vitals taken for this visit. General: normal, cooperative, no distress  Abdominal: soft, nondistended, or nontender  Incision: clean , intact and healing well. Ascitic liquid leaking from the upper midline port.   Opening in the skin 1 mm      Romana Junior MD    Date: 11/6/2023 Time: 10:42 AM

## 2023-11-13 ENCOUNTER — OFFICE VISIT (OUTPATIENT)
Dept: GASTROENTEROLOGY | Facility: CLINIC | Age: 58
End: 2023-11-13
Payer: MEDICARE

## 2023-11-13 ENCOUNTER — APPOINTMENT (OUTPATIENT)
Dept: LAB | Facility: AMBULARY SURGERY CENTER | Age: 58
End: 2023-11-13
Payer: MEDICARE

## 2023-11-13 ENCOUNTER — TELEPHONE (OUTPATIENT)
Dept: GASTROENTEROLOGY | Facility: CLINIC | Age: 58
End: 2023-11-13

## 2023-11-13 VITALS
DIASTOLIC BLOOD PRESSURE: 74 MMHG | WEIGHT: 186 LBS | HEART RATE: 92 BPM | HEIGHT: 70 IN | BODY MASS INDEX: 26.63 KG/M2 | SYSTOLIC BLOOD PRESSURE: 118 MMHG | TEMPERATURE: 97.2 F

## 2023-11-13 DIAGNOSIS — K74.69 OTHER CIRRHOSIS OF LIVER (HCC): ICD-10-CM

## 2023-11-13 DIAGNOSIS — R14.0 ABDOMINAL DISTENSION: Primary | ICD-10-CM

## 2023-11-13 DIAGNOSIS — Z90.49 STATUS POST CHOLECYSTECTOMY: ICD-10-CM

## 2023-11-13 LAB
ALBUMIN SERPL BCP-MCNC: 2.3 G/DL (ref 3.5–5)
ALP SERPL-CCNC: 107 U/L (ref 34–104)
ALT SERPL W P-5'-P-CCNC: 7 U/L (ref 7–52)
ANION GAP SERPL CALCULATED.3IONS-SCNC: 2 MMOL/L
AST SERPL W P-5'-P-CCNC: 14 U/L (ref 13–39)
BASOPHILS # BLD AUTO: 0.03 THOUSANDS/ÂΜL (ref 0–0.1)
BASOPHILS NFR BLD AUTO: 1 % (ref 0–1)
BILIRUB SERPL-MCNC: 0.51 MG/DL (ref 0.2–1)
BUN SERPL-MCNC: 8 MG/DL (ref 5–25)
CALCIUM ALBUM COR SERPL-MCNC: 8.9 MG/DL (ref 8.3–10.1)
CALCIUM SERPL-MCNC: 7.5 MG/DL (ref 8.4–10.2)
CHLORIDE SERPL-SCNC: 100 MMOL/L (ref 96–108)
CO2 SERPL-SCNC: 34 MMOL/L (ref 21–32)
CREAT SERPL-MCNC: 0.58 MG/DL (ref 0.6–1.3)
EOSINOPHIL # BLD AUTO: 0.14 THOUSAND/ÂΜL (ref 0–0.61)
EOSINOPHIL NFR BLD AUTO: 3 % (ref 0–6)
ERYTHROCYTE [DISTWIDTH] IN BLOOD BY AUTOMATED COUNT: 16.8 % (ref 11.6–15.1)
GFR SERPL CREATININE-BSD FRML MDRD: 110 ML/MIN/1.73SQ M
GLUCOSE P FAST SERPL-MCNC: 93 MG/DL (ref 65–99)
HCT VFR BLD AUTO: 26.4 % (ref 36.5–49.3)
HGB BLD-MCNC: 7.3 G/DL (ref 12–17)
IMM GRANULOCYTES # BLD AUTO: 0.02 THOUSAND/UL (ref 0–0.2)
IMM GRANULOCYTES NFR BLD AUTO: 0 % (ref 0–2)
INR PPP: 1.37 (ref 0.84–1.19)
LYMPHOCYTES # BLD AUTO: 0.91 THOUSANDS/ÂΜL (ref 0.6–4.47)
LYMPHOCYTES NFR BLD AUTO: 16 % (ref 14–44)
MCH RBC QN AUTO: 23.6 PG (ref 26.8–34.3)
MCHC RBC AUTO-ENTMCNC: 27.7 G/DL (ref 31.4–37.4)
MCV RBC AUTO: 85 FL (ref 82–98)
MONOCYTES # BLD AUTO: 0.4 THOUSAND/ÂΜL (ref 0.17–1.22)
MONOCYTES NFR BLD AUTO: 7 % (ref 4–12)
NEUTROPHILS # BLD AUTO: 4.18 THOUSANDS/ÂΜL (ref 1.85–7.62)
NEUTS SEG NFR BLD AUTO: 73 % (ref 43–75)
NRBC BLD AUTO-RTO: 0 /100 WBCS
PLATELET # BLD AUTO: 210 THOUSANDS/UL (ref 149–390)
PMV BLD AUTO: 9.4 FL (ref 8.9–12.7)
POTASSIUM SERPL-SCNC: 3.2 MMOL/L (ref 3.5–5.3)
PROT SERPL-MCNC: 7.6 G/DL (ref 6.4–8.4)
PROTHROMBIN TIME: 16.7 SECONDS (ref 11.6–14.5)
RBC # BLD AUTO: 3.09 MILLION/UL (ref 3.88–5.62)
SODIUM SERPL-SCNC: 136 MMOL/L (ref 135–147)
WBC # BLD AUTO: 5.68 THOUSAND/UL (ref 4.31–10.16)

## 2023-11-13 PROCEDURE — 36415 COLL VENOUS BLD VENIPUNCTURE: CPT

## 2023-11-13 PROCEDURE — 80053 COMPREHEN METABOLIC PANEL: CPT

## 2023-11-13 PROCEDURE — 85025 COMPLETE CBC W/AUTO DIFF WBC: CPT

## 2023-11-13 PROCEDURE — 85610 PROTHROMBIN TIME: CPT

## 2023-11-13 PROCEDURE — 99214 OFFICE O/P EST MOD 30 MIN: CPT | Performed by: INTERNAL MEDICINE

## 2023-11-13 NOTE — TELEPHONE ENCOUNTER
Pt. called and left VM to call and make an OV to discuss labs.   Spoke with Aura Pak @ [facility]. The labs entered did not have Dr. Dewey Martinez name on them, it was fellow Dr. Whit Shirley so lab was confused because patient mentioned Dr. Dewey Martinez name. Aura Pak understood and is drawing patient labs.

## 2023-11-13 NOTE — TELEPHONE ENCOUNTER
Patients GI provider:  Dr. Jerrica Sanabria    Number to return call: [phone]    Reason for call: Tracie Sharma from [address] [place] [address] specialty lab called stating pt showed up for labs that Dr. Jerrica Sanabria ordered but no orders are in pt's chart. Tracie Sharma is requesting a call back once orders are placed at number listed above.      Scheduled procedure/appointment date if applicable:

## 2023-11-13 NOTE — H&P (VIEW-ONLY)
[facility]  Outpatient Hepatology Follow Up  Encounter: [id number]    PATIENT INFO     Name: Freddie Mir  YOB: 1965   Age: 59 y.o. Sex: male   MRN: [id number]    ASSESSMENT & PLAN     Problems Address This Encounter:   1. Abdominal distension    2. Other cirrhosis of liver ([address])    3. Status post cholecystectomy      # Abdominal Distension: Patient with c/o increased abdominal distension and persistent clear drainage from his incision in sites in setting of delayed wound healing. On examination today, abdomen is distended but soft with no TTP. Suspect drainage likely from abdominal wall edema but abdominal US ordered to evaluate for possible ascites. Will also order US with dopplers to re-evaluate portal vasculature and rule out thrombosis given prior CT Imaging findings as mentioned below. If US does reveal significant ascites, would benefit from diagnostic/therapeutic paracentesis. Would recommend fluid studies be sent to confirm etiology of ascites if present. Given low potassium levels on prior CMP, will hold off on adjusting diuretics at this time until US and repeat blood work is completed. Pending results, patient may benefit from addition of Aldactone. Plan:  Obtained abdominal US with dopplers; orders placed   CMP, CBC, and INR ordered and to be completed prior to changing diuretics  Continue on Lasix 20 mg BID as previously prescribed  Continued follow up with general surgery   Continue to monitor surgical sites to ensure appropriate wound healing     # Cirrhosis of the Liver: Patient reportedly with longstanding history of chronic liver disease. Reportedly diagnosed with cirrhosis while living in [facility]. Previously decompensated by history of variceal bleed S/P EVBL 2/2022 and most recent EGD 3/2023 with EV S/P banding.  At this time, etiology of cirrhosis cryptogenic given lack of excessive alcohol use and metabolic risk factors and previously negative serological workup. However, diagnosis of cirrhosis also questionable given lack of nodular appearing liver during recent cholecystectomy. Prior CT imaging demonstrating cavernous transformation of the portal vein with dilation of the left hepatic ducts and narrowing of the confluence. This could suggest alternative etiology of varices and therefore plan for US Elastography to confirm underlying cirrhosis once healed from recent surgery. MELD 3.0: 12 at 11/13/2023  9:48 AM  MELD-Na: 10 at 11/13/2023  9:48 AM  Calculated from:  Serum Creatinine: 0.58 mg/dL (Using min of 1 mg/dL) at 11/13/2023  9:48 AM  Serum Sodium: 136 mmol/L at 11/13/2023  9:48 AM  Total Bilirubin: 0.51 mg/dL (Using min of 1 mg/dL) at 11/13/2023  9:48 AM  Serum Albumin: 2.3 g/dL at 11/13/2023  9:48 AM  INR(ratio): 1.37 at 11/13/2023  9:48 AM  Age at listing (hypothetical): 58 years  Sex: Male at 11/13/2023  9:48 AM    Plan:  Plan for US Elastography in future; MELD labs ordered and to be completed  EV: Continue on Nadolol 40 mg daily; re discuss EGD at next visit  Ascites: Continue Lasix 20 mg daily; Abdominal US ordered to evaluate for presence of ascites  HE: No prior history; Lactulose/Rifaximin not indicated   HCC: MRI/MRCP 3/2023 without lesions; abdominal US ordered     # Status post cholecystectomy: Performed on 8/25/2023 after imaging showed concern for gallbladder malignancy. 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 Pathology negative for malignancy. Patient with delayed wound healing and continues to recover. Continues to follow up with general surgery.      Orders Placed This Encounter   Procedures    US abdomen complete with doppler    Comprehensive metabolic panel    Protime-INR    CBC and differential       FOLLOW-UP: Next appointment scheduled for 2/1/2023 with Dr. Ara Loya MD    [address] [place] [address] Ella Briones is a 59 y.o. male who presents to GI office for follow up after recent cholecystectomy. Patient with PMHx of GERD. HTN, seizures, traumatic splenectomy, and concern for underlying cirrhosis (etiology - cryptogenic). Patient S/P open cholecystectomy on 8/25/23 after US/CT/MRI imaging revealed nodular enhancing masses within the gallbladders. 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 Negative for malignancy. During the surgery, patient was noted to have extensive adhesions and liver did not appear to be nodular. Patient accompanied by his brother who is his primary caregiver. According to the patient's brother, patient had been increasingly confused and weak following the surgery. Also noted to have increasing abdominal distension accompanied by drainage from surgical sites. Over the last 2 weeks, the patient's mental status and fatigue has improved and but his abdominal distension and fatigue had persisted. Patient seen and evaluated by general surgery on 11/6/2023 who recommended closer follow up with Hepatology prompting his appointment today. ENDOSCOPIC HISTORY     UPPER ENDOSCOPY: 3/27/23 - 2 columns of large EV one with red beryl. S/P x2 bands successfully placed with decompression. 2 other small column varices. Mild gastritis Normal duodenum. No evidence of gastric varices. COLONOSCOPY: 4/14/2023 - Normal screening colonoscopy. Repeat in 10 years.      REVIEW OF SYSTEMS     CONSTITUTIONAL: Denies any fever, chills, rigors, and weight loss  HEENT: No earache or tinnitus, denies hearing loss or visual disturbances  CARDIOVASCULAR: No chest pain or palpitations  RESPIRATORY: Denies any cough, hemoptysis, shortness of breath or dyspnea on exertion  GASTROINTESTINAL: As noted in the History of Present Illness  GENITOURINARY: No problems with urination, denies any hematuria or dysuria  NEUROLOGIC: No dizziness or vertigo, denies headaches   MUSCULOSKELETAL: Denies any muscle or joint pain   SKIN: Denies skin rashes or itching  ENDOCRINE: Denies excessive thirst, denies intolerance to heat or cold  PSYCHOSOCIAL: Denies depression or anxiety, denies any recent memory loss     Historical Information   Past Medical History:   Diagnosis Date    Anxiety     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    Cellulitis of left lower extremity 02/08/2022    GERD (gastroesophageal reflux disease)     Hypertension     Hypoxia 02/11/2022    Mental developmental delay     mumps as a child, developed rheumatic fever    MRSA bacteremia 02/09/2022    Seizures ([address])      Past Surgical History:   Procedure Laterality Date    COLONOSCOPY      [place] LAPAROSCOPY SURG CHOLECYSTECTOMY N/A 8/25/2023    Procedure: CHOLECYSTECTOMY OPEN;  Surgeon: Rita Fletcher DO;  Location[facility];  Service: General    SPLENECTOMY      UPPER GASTROINTESTINAL ENDOSCOPY       Social History   Social History     Substance and Sexual Activity   Alcohol Use Never     Social History     Substance and Sexual Activity   Drug Use Never     Social History     Tobacco Use   Smoking Status Never   Smokeless Tobacco Never     Family History   Problem Relation Age of Onset    Depression Mother     Heart disease Father          MEDICATIONS AND ALLERGIES     Current Outpatient Medications   Medication Instructions    acetaminophen (TYLENOL) 650 mg, Oral, Every 6 hours PRN    diazepam (VALIUM) 10 mg, Oral, Every 12 hours    folic acid (FOLVITE) 1 mg tablet TAKE 1 TABLET (1 MG TOTAL) BY MOUTH IN THE MORNING. NOT COVERED    furosemide (LASIX) 20 mg tablet TAKE 1 TABLET (20 MG TOTAL) BY MOUTH IN THE MORNING AND IN THE EVENING    iron polysaccharides (FERREX) 150 mg capsule TAKE 1 CAPSULE (150 MG TOTAL) BY MOUTH IN THE MORNING.  NOT COVERED    levETIRAcetam (KEPPRA) 500 mg, Oral, Every 12 hours    nadolol (CORGARD) 40 mg, Oral, Daily    OXcarbazepine (TRILEPTAL) 600 mg tablet TAKE 1 TABLET BY MOUTH EVERY 12 HOURS    pantoprazole (PROTONIX) 40 mg tablet TAKE 1 TABLET BY MOUTH EVERY DAY IN THE MORNING AND IN THE EVENING BEFORE MEALS    sucralfate (CARAFATE) 1 g tablet TAKE 1 TABLET BY MOUTH EVERY 6 HOURS    triamcinolone (KENALOG) 0.1 % ointment APPLY SPARINGLY TO AFFECTED AREAS OF LEGS 2-4 TIMES A DAY FOR UP TO 2 WEEKS. No Known Allergies    PHYSICAL EXAM      Objective   Blood pressure 118/74, pulse 92, temperature (!) 97.2 °F (36.2 °C), temperature source Tympanic, height 5' 10" (1.778 m), weight 84.4 kg (186 lb). Body mass index is 26.69 kg/m². General Appearance:   Alert, cooperative, no distress   HEENT:   Normocephalic, atraumatic, anicteric     Neck:   Supple, symmetrical, trachea midline   Lungs:   Equal chest rise, respirations unlabored    Heart:   Regular rate and rhythm   Abdomen:   Soft but distended abdomen. Non tender to palpation. Prior surgical sites bandaged - clean, dry, and intact. Rectal:   Deferred    Extremities:   No cyanosis, clubbing or edema    Neuro: Moves all 4 extremities    Skin:   No jaundice, rashes, or lesions      LABORATORY RESULTS     No visits with results within 1 Day(s) from this visit.    Latest known visit with results is:   Appointment on 10/25/2023   Component Date Value    WBC 10/25/2023 6.83     RBC 10/25/2023 3.40 (L)     Hemoglobin 10/25/2023 8.4 (L)     Hematocrit 10/25/2023 29.1 (L)     MCV 10/25/2023 86     MCH 10/25/2023 24.7 (L)     MCHC 10/25/2023 28.9 (L)     RDW 10/25/2023 16.6 (H)     MPV 10/25/2023 9.8     Platelets 40/71/8878 193     nRBC 10/25/2023 0     Neutrophils Relative 10/25/2023 78 (H)     Immat GRANS % 10/25/2023 0     Lymphocytes Relative 10/25/2023 13 (L)     Monocytes Relative 10/25/2023 8     Eosinophils Relative 10/25/2023 1     Basophils Relative 10/25/2023 0     Neutrophils Absolute 10/25/2023 5.26     Immature Grans Absolute 10/25/2023 0.03     Lymphocytes Absolute 10/25/2023 0.86     Monocytes Absolute 10/25/2023 0.57     Eosinophils Absolute 10/25/2023 0.08     Basophils Absolute 10/25/2023 0.03     Sodium 10/25/2023 139     Potassium 10/25/2023 2.9 (L)     Chloride 10/25/2023 99     CO2 10/25/2023 32     ANION GAP 10/25/2023 8     BUN 10/25/2023 8     Creatinine 10/25/2023 0.54 (L)     Glucose 10/25/2023 82     Calcium 10/25/2023 7.8 (L)     Corrected Calcium 10/25/2023 8.9     AST 10/25/2023 16     ALT 10/25/2023 6 (L)     Alkaline Phosphatase 10/25/2023 66     Total Protein 10/25/2023 7.3     Albumin 10/25/2023 2.6 (L)     Total Bilirubin 10/25/2023 0.45     eGFR 10/25/2023 114     Iron Saturation 10/25/2023 8 (L)     TIBC 10/25/2023 167 (L)     Iron 10/25/2023 14 (L)     UIBC 10/25/2023 153 (L)     Ferritin 10/25/2023 19 (L)      No results found. RADIOLOGY RESULTS: I have personally reviewed pertinent imaging studies. Madison Adams DO  Gastroenterology Fellow  [facility]  Division of Gastroenterology & Hepatology  Available on [employer]    ** Please Note: This note is constructed using a voice recognition dictation system.  **

## 2023-11-13 NOTE — PROGRESS NOTES
[facility]  Outpatient Hepatology Follow Up  Encounter: [id number]    PATIENT INFO     Name: Freddie Mir  YOB: 1965   Age: 59 y.o. Sex: male   MRN: [id number]    ASSESSMENT & PLAN     Problems Address This Encounter:   1. Abdominal distension    2. Other cirrhosis of liver ([address])    3. Status post cholecystectomy      # Abdominal Distension: Patient with c/o increased abdominal distension and persistent clear drainage from his incision in sites in setting of delayed wound healing. On examination today, abdomen is distended but soft with no TTP. Suspect drainage likely from abdominal wall edema but abdominal US ordered to evaluate for possible ascites. Will also order US with dopplers to re-evaluate portal vasculature and rule out thrombosis given prior CT Imaging findings as mentioned below. If US does reveal significant ascites, would benefit from diagnostic/therapeutic paracentesis. Would recommend fluid studies be sent to confirm etiology of ascites if present. Given low potassium levels on prior CMP, will hold off on adjusting diuretics at this time until US and repeat blood work is completed. Pending results, patient may benefit from addition of Aldactone. Plan:  Obtained abdominal US with dopplers; orders placed   CMP, CBC, and INR ordered and to be completed prior to changing diuretics  Continue on Lasix 20 mg BID as previously prescribed  Continued follow up with general surgery   Continue to monitor surgical sites to ensure appropriate wound healing     # Cirrhosis of the Liver: Patient reportedly with longstanding history of chronic liver disease. Reportedly diagnosed with cirrhosis while living in [facility]. Previously decompensated by history of variceal bleed S/P EVBL 2/2022 and most recent EGD 3/2023 with EV S/P banding.  At this time, etiology of cirrhosis cryptogenic given lack of excessive alcohol use and metabolic risk factors and previously negative serological workup. However, diagnosis of cirrhosis also questionable given lack of nodular appearing liver during recent cholecystectomy. Prior CT imaging demonstrating cavernous transformation of the portal vein with dilation of the left hepatic ducts and narrowing of the confluence. This could suggest alternative etiology of varices and therefore plan for US Elastography to confirm underlying cirrhosis once healed from recent surgery. MELD 3.0: 12 at 11/13/2023  9:48 AM  MELD-Na: 10 at 11/13/2023  9:48 AM  Calculated from:  Serum Creatinine: 0.58 mg/dL (Using min of 1 mg/dL) at 11/13/2023  9:48 AM  Serum Sodium: 136 mmol/L at 11/13/2023  9:48 AM  Total Bilirubin: 0.51 mg/dL (Using min of 1 mg/dL) at 11/13/2023  9:48 AM  Serum Albumin: 2.3 g/dL at 11/13/2023  9:48 AM  INR(ratio): 1.37 at 11/13/2023  9:48 AM  Age at listing (hypothetical): 58 years  Sex: Male at 11/13/2023  9:48 AM    Plan:  Plan for US Elastography in future; MELD labs ordered and to be completed  EV: Continue on Nadolol 40 mg daily; re discuss EGD at next visit  Ascites: Continue Lasix 20 mg daily; Abdominal US ordered to evaluate for presence of ascites  HE: No prior history; Lactulose/Rifaximin not indicated   HCC: MRI/MRCP 3/2023 without lesions; abdominal US ordered     # Status post cholecystectomy: Performed on 8/25/2023 after imaging showed concern for gallbladder malignancy. 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 Pathology negative for malignancy. Patient with delayed wound healing and continues to recover. Continues to follow up with general surgery.      Orders Placed This Encounter   Procedures    US abdomen complete with doppler    Comprehensive metabolic panel    Protime-INR    CBC and differential       FOLLOW-UP: Next appointment scheduled for 2/1/2023 with Dr. Basilio Díaz MD    [address] [place] [address] Jackson Patten is a 59 y.o. male who presents to GI office for follow up after recent cholecystectomy. Patient with PMHx of GERD. HTN, seizures, traumatic splenectomy, and concern for underlying cirrhosis (etiology - cryptogenic). Patient S/P open cholecystectomy on 8/25/23 after US/CT/MRI imaging revealed nodular enhancing masses within the gallbladders. 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 Negative for malignancy. During the surgery, patient was noted to have extensive adhesions and liver did not appear to be nodular. Patient accompanied by his brother who is his primary caregiver. According to the patient's brother, patient had been increasingly confused and weak following the surgery. Also noted to have increasing abdominal distension accompanied by drainage from surgical sites. Over the last 2 weeks, the patient's mental status and fatigue has improved and but his abdominal distension and fatigue had persisted. Patient seen and evaluated by general surgery on 11/6/2023 who recommended closer follow up with Hepatology prompting his appointment today. ENDOSCOPIC HISTORY     UPPER ENDOSCOPY: 3/27/23 - 2 columns of large EV one with red beryl. S/P x2 bands successfully placed with decompression. 2 other small column varices. Mild gastritis Normal duodenum. No evidence of gastric varices. COLONOSCOPY: 4/14/2023 - Normal screening colonoscopy. Repeat in 10 years.      REVIEW OF SYSTEMS     CONSTITUTIONAL: Denies any fever, chills, rigors, and weight loss  HEENT: No earache or tinnitus, denies hearing loss or visual disturbances  CARDIOVASCULAR: No chest pain or palpitations  RESPIRATORY: Denies any cough, hemoptysis, shortness of breath or dyspnea on exertion  GASTROINTESTINAL: As noted in the History of Present Illness  GENITOURINARY: No problems with urination, denies any hematuria or dysuria  NEUROLOGIC: No dizziness or vertigo, denies headaches   MUSCULOSKELETAL: Denies any muscle or joint pain   SKIN: Denies skin rashes or itching  ENDOCRINE: Denies excessive thirst, denies intolerance to heat or cold  PSYCHOSOCIAL: Denies depression or anxiety, denies any recent memory loss     Historical Information   Past Medical History:   Diagnosis Date    Anxiety     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    Cellulitis of left lower extremity 02/08/2022    GERD (gastroesophageal reflux disease)     Hypertension     Hypoxia 02/11/2022    Mental developmental delay     mumps as a child, developed rheumatic fever    MRSA bacteremia 02/09/2022    Seizures ([address])      Past Surgical History:   Procedure Laterality Date    COLONOSCOPY      DE LAPAROSCOPY SURG CHOLECYSTECTOMY N/A 8/25/2023    Procedure: CHOLECYSTECTOMY OPEN;  Surgeon: Nico Fletcher DO;  Location: [facility];  Service: General    SPLENECTOMY      UPPER GASTROINTESTINAL ENDOSCOPY       Social History   Social History     Substance and Sexual Activity   Alcohol Use Never     Social History     Substance and Sexual Activity   Drug Use Never     Social History     Tobacco Use   Smoking Status Never   Smokeless Tobacco Never     Family History   Problem Relation Age of Onset    Depression Mother     Heart disease Father          MEDICATIONS AND ALLERGIES     Current Outpatient Medications   Medication Instructions    acetaminophen (TYLENOL) 650 mg, Oral, Every 6 hours PRN    diazepam (VALIUM) 10 mg, Oral, Every 12 hours    folic acid (FOLVITE) 1 mg tablet TAKE 1 TABLET (1 MG TOTAL) BY MOUTH IN THE MORNING. NOT COVERED    furosemide (LASIX) 20 mg tablet TAKE 1 TABLET (20 MG TOTAL) BY MOUTH IN THE MORNING AND IN THE EVENING    iron polysaccharides (FERREX) 150 mg capsule TAKE 1 CAPSULE (150 MG TOTAL) BY MOUTH IN THE MORNING.  NOT COVERED    levETIRAcetam (KEPPRA) 500 mg, Oral, Every 12 hours    nadolol (CORGARD) 40 mg, Oral, Daily    OXcarbazepine (TRILEPTAL) 600 mg tablet TAKE 1 TABLET BY MOUTH EVERY 12 HOURS    pantoprazole (PROTONIX) 40 mg tablet TAKE 1 TABLET BY MOUTH EVERY DAY IN THE MORNING AND IN THE EVENING BEFORE MEALS    sucralfate (CARAFATE) 1 g tablet TAKE 1 TABLET BY MOUTH EVERY 6 HOURS    triamcinolone (KENALOG) 0.1 % ointment APPLY SPARINGLY TO AFFECTED AREAS OF LEGS 2-4 TIMES A DAY FOR UP TO 2 WEEKS. No Known Allergies    PHYSICAL EXAM      Objective   Blood pressure 118/74, pulse 92, temperature (!) 97.2 °F (36.2 °C), temperature source Tympanic, height 5' 10" (1.778 m), weight 84.4 kg (186 lb). Body mass index is 26.69 kg/m². General Appearance:   Alert, cooperative, no distress   HEENT:   Normocephalic, atraumatic, anicteric     Neck:   Supple, symmetrical, trachea midline   Lungs:   Equal chest rise, respirations unlabored    Heart:   Regular rate and rhythm   Abdomen:   Soft but distended abdomen. Non tender to palpation. Prior surgical sites bandaged - clean, dry, and intact. Rectal:   Deferred    Extremities:   No cyanosis, clubbing or edema    Neuro: Moves all 4 extremities    Skin:   No jaundice, rashes, or lesions      LABORATORY RESULTS     No visits with results within 1 Day(s) from this visit.    Latest known visit with results is:   Appointment on 10/25/2023   Component Date Value    WBC 10/25/2023 6.83     RBC 10/25/2023 3.40 (L)     Hemoglobin 10/25/2023 8.4 (L)     Hematocrit 10/25/2023 29.1 (L)     MCV 10/25/2023 86     MCH 10/25/2023 24.7 (L)     MCHC 10/25/2023 28.9 (L)     RDW 10/25/2023 16.6 (H)     MPV 10/25/2023 9.8     Platelets 40/94/2926 193     nRBC 10/25/2023 0     Neutrophils Relative 10/25/2023 78 (H)     Immat GRANS % 10/25/2023 0     Lymphocytes Relative 10/25/2023 13 (L)     Monocytes Relative 10/25/2023 8     Eosinophils Relative 10/25/2023 1     Basophils Relative 10/25/2023 0     Neutrophils Absolute 10/25/2023 5.26     Immature Grans Absolute 10/25/2023 0.03     Lymphocytes Absolute 10/25/2023 0.86     Monocytes Absolute 10/25/2023 0.57     Eosinophils Absolute 10/25/2023 0.08     Basophils Absolute 10/25/2023 0.03     Sodium 10/25/2023 139     Potassium 10/25/2023 2.9 (L)     Chloride 10/25/2023 99     CO2 10/25/2023 32     ANION GAP 10/25/2023 8     BUN 10/25/2023 8     Creatinine 10/25/2023 0.54 (L)     Glucose 10/25/2023 82     Calcium 10/25/2023 7.8 (L)     Corrected Calcium 10/25/2023 8.9     AST 10/25/2023 16     ALT 10/25/2023 6 (L)     Alkaline Phosphatase 10/25/2023 66     Total Protein 10/25/2023 7.3     Albumin 10/25/2023 2.6 (L)     Total Bilirubin 10/25/2023 0.45     eGFR 10/25/2023 114     Iron Saturation 10/25/2023 8 (L)     TIBC 10/25/2023 167 (L)     Iron 10/25/2023 14 (L)     UIBC 10/25/2023 153 (L)     Ferritin 10/25/2023 19 (L)      No results found. RADIOLOGY RESULTS: I have personally reviewed pertinent imaging studies. Skyla Reeves DO  Gastroenterology Fellow  [facility]  Division of Gastroenterology & Hepatology  Available on [employer]    ** Please Note: This note is constructed using a voice recognition dictation system.  **

## 2023-11-16 DIAGNOSIS — D50.0 IRON DEFICIENCY ANEMIA DUE TO CHRONIC BLOOD LOSS: ICD-10-CM

## 2023-11-16 RX ORDER — FOLIC ACID 1 MG/1
TABLET ORAL
Qty: 90 TABLET | Refills: 1 | Status: SHIPPED | OUTPATIENT
Start: 2023-11-16

## 2023-11-17 ENCOUNTER — HOSPITAL ENCOUNTER (OUTPATIENT)
Dept: ULTRASOUND IMAGING | Facility: HOSPITAL | Age: 58
Discharge: HOME | End: 2023-11-17
Payer: MEDICARE

## 2023-11-17 ENCOUNTER — PREP FOR PROCEDURE (OUTPATIENT)
Dept: GASTROENTEROLOGY | Facility: CLINIC | Age: 58
End: 2023-11-17

## 2023-11-17 ENCOUNTER — TELEPHONE (OUTPATIENT)
Age: 58
End: 2023-11-17

## 2023-11-17 DIAGNOSIS — K74.69 OTHER CIRRHOSIS OF LIVER (HCC): ICD-10-CM

## 2023-11-17 DIAGNOSIS — I85.11 SECONDARY ESOPHAGEAL VARICES WITH BLEEDING (HCC): Primary | ICD-10-CM

## 2023-11-17 PROCEDURE — 76700 US EXAM ABDOM COMPLETE: CPT

## 2023-11-17 NOTE — TELEPHONE ENCOUNTER
Patients GI provider:  Dr. Alexis [facility]    Number to return call: [phone]    Reason for call: Levy Finney from radiology called with significant findings in pt's US of the abdomen    Scheduled procedure/appointment date if applicable: Appt 9/8/81

## 2023-11-19 ENCOUNTER — TELEPHONE (OUTPATIENT)
Dept: SURGERY | Facility: CLINIC | Age: 58
End: 2023-11-19

## 2023-11-29 ENCOUNTER — TELEPHONE (OUTPATIENT)
Age: 58
End: 2023-11-29

## 2023-11-29 NOTE — TELEPHONE ENCOUNTER
Patients GI provider:  Dr. Gege Rushing    Number to return call: [phone]    Reason for call: Pt's sister in law Ximena Borrego calling in regards to abnormal US results and needing to follow up with an EGD. Per the notes on the recent US patient to schedule an EGD with banding in the next 1 to 2 weeks. Orders placed Per Maria Esther Braxton DO. Please contact Ximena Chowlupillo at [phone] to get this procedure on the schedule as soon as possible.      Scheduled procedure/appointment date if applicable: 92/48/7956 - Hepatology F/U Staci Lock

## 2023-11-29 NOTE — TELEPHONE ENCOUNTER
Procedure scheduled with Mrailee Nielsonite (sister in law). Scheduled date of EGD(as of today): 12/5/23  Physician performing EGD: Randee Little  Location of EGD:[facility]  Instructions reviewed with patient by: Joanna Brenner to [email or web address]  Clearances: N/A

## 2023-12-05 ENCOUNTER — TELEPHONE (OUTPATIENT)
Dept: GASTROENTEROLOGY | Facility: CLINIC | Age: 58
End: 2023-12-05

## 2023-12-05 ENCOUNTER — PREP FOR PROCEDURE (OUTPATIENT)
Dept: GASTROENTEROLOGY | Facility: CLINIC | Age: 58
End: 2023-12-05

## 2023-12-05 ENCOUNTER — ANESTHESIA (OUTPATIENT)
Dept: GASTROENTEROLOGY | Facility: HOSPITAL | Age: 58
End: 2023-12-05

## 2023-12-05 ENCOUNTER — ANESTHESIA EVENT (OUTPATIENT)
Dept: GASTROENTEROLOGY | Facility: HOSPITAL | Age: 58
End: 2023-12-05

## 2023-12-05 ENCOUNTER — HOSPITAL ENCOUNTER (OUTPATIENT)
Dept: GASTROENTEROLOGY | Facility: HOSPITAL | Age: 58
Setting detail: OUTPATIENT SURGERY
Discharge: HOME | End: 2023-12-05
Payer: MEDICARE

## 2023-12-05 VITALS
WEIGHT: 185.5 LBS | TEMPERATURE: 97.1 F | BODY MASS INDEX: 27.47 KG/M2 | RESPIRATION RATE: 14 BRPM | DIASTOLIC BLOOD PRESSURE: 66 MMHG | SYSTOLIC BLOOD PRESSURE: 107 MMHG | OXYGEN SATURATION: 96 % | HEART RATE: 64 BPM | HEIGHT: 69 IN

## 2023-12-05 DIAGNOSIS — I85.11 SECONDARY ESOPHAGEAL VARICES WITH BLEEDING (HCC): ICD-10-CM

## 2023-12-05 DIAGNOSIS — I85.11 SECONDARY ESOPHAGEAL VARICES WITH BLEEDING (HCC): Primary | ICD-10-CM

## 2023-12-05 RX ORDER — FENTANYL CITRATE 50 UG/ML
INJECTION, SOLUTION INTRAMUSCULAR; INTRAVENOUS AS NEEDED
Status: DISCONTINUED | OUTPATIENT
Start: 2023-12-05 | End: 2023-12-05

## 2023-12-05 RX ORDER — LIDOCAINE HYDROCHLORIDE 20 MG/ML
INJECTION, SOLUTION EPIDURAL; INFILTRATION; INTRACAUDAL; PERINEURAL AS NEEDED
Status: DISCONTINUED | OUTPATIENT
Start: 2023-12-05 | End: 2023-12-05

## 2023-12-05 RX ORDER — PROPOFOL 10 MG/ML
INJECTION, EMULSION INTRAVENOUS AS NEEDED
Status: DISCONTINUED | OUTPATIENT
Start: 2023-12-05 | End: 2023-12-05

## 2023-12-05 RX ORDER — SUCRALFATE ORAL 1 G/10ML
1 SUSPENSION ORAL
Qty: 280 ML | Refills: 0 | Status: SHIPPED | OUTPATIENT
Start: 2023-12-05 | End: 2023-12-12

## 2023-12-05 RX ORDER — SODIUM CHLORIDE, SODIUM LACTATE, POTASSIUM CHLORIDE, CALCIUM CHLORIDE 600; 310; 30; 20 MG/100ML; MG/100ML; MG/100ML; MG/100ML
INJECTION, SOLUTION INTRAVENOUS CONTINUOUS PRN
Status: DISCONTINUED | OUTPATIENT
Start: 2023-12-05 | End: 2023-12-05

## 2023-12-05 RX ADMIN — PROPOFOL 20 MG: 10 INJECTION, EMULSION INTRAVENOUS at 13:14

## 2023-12-05 RX ADMIN — LIDOCAINE HYDROCHLORIDE 100 MG: 20 INJECTION, SOLUTION EPIDURAL; INFILTRATION; INTRACAUDAL; PERINEURAL at 12:58

## 2023-12-05 RX ADMIN — FENTANYL CITRATE 50 MCG: 50 INJECTION, SOLUTION INTRAMUSCULAR; INTRAVENOUS at 12:56

## 2023-12-05 RX ADMIN — PROPOFOL 30 MG: 10 INJECTION, EMULSION INTRAVENOUS at 13:09

## 2023-12-05 RX ADMIN — PROPOFOL 100 MG: 10 INJECTION, EMULSION INTRAVENOUS at 12:58

## 2023-12-05 RX ADMIN — SODIUM CHLORIDE, SODIUM LACTATE, POTASSIUM CHLORIDE, AND CALCIUM CHLORIDE: .6; .31; .03; .02 INJECTION, SOLUTION INTRAVENOUS at 12:55

## 2023-12-05 NOTE — DISCHARGE INSTRUCTIONS
Soft diet for 48 hours and then advance as tolerated   You may use carafate every 6 hours as needed for pain after endoscopy  Schedule a repeat endoscopy in 4 weeks     Upper Endoscopy   WHAT YOU NEED TO KNOW:   An upper endoscopy is also called an upper gastrointestinal (GI) endoscopy, or an esophagogastroduodenoscopy (EGD). You may feel bloated, gassy, or have some abdominal discomfort after your procedure. Your throat may be sore for 24 to 36 hours. You may burp or pass gas from air that is still inside your body. DISCHARGE INSTRUCTIONS:      Seek care immediately if:   You feel dizzy or faint. You have sudden chest pain or trouble breathing. You have a large amount of bright red blood in your bowel movements. Your abdomen is hard and firm and you have severe pain. You vomit blood. Contact your healthcare provider if:   You feel full or bloated and cannot burp or pass gas. You have not had a bowel movement for 3 days after your procedure. You have neck pain. You have a fever or chills. You have nausea or are vomiting. You have a rash or hives. You have questions or concerns about your endoscopy. Relieve a sore throat:  Suck on throat lozenges or crushed ice. Gargle with a small amount of warm salt water. Mix 1 teaspoon of salt and 1 cup of warm water to make salt water. Relieve gas and discomfort from bloating:  Lie on your right side with a heating pad on your abdomen. Take short walks to help pass gas. Eat small meals until bloating is relieved. Rest after your procedure:  Do not drive or make important decisions until the day after your procedure. Return to your normal activity as directed. You can usually return to work the day after your procedure. Follow up with your healthcare provider as directed:  Write down your questions so you remember to ask them during your visits.

## 2023-12-05 NOTE — ANESTHESIA POSTPROCEDURE EVALUATION
Post-Op Assessment Note    CV Status:  Stable    Pain management: adequate       Mental Status:  Sleepy and alert   Hydration Status:  Euvolemic   PONV Controlled:  Controlled   Airway Patency:  Patent     Post Op Vitals Reviewed: Yes      Staff: CRNA               BP   111/67   Temp   97.1   Pulse  70   Resp   13   SpO2   96

## 2023-12-05 NOTE — INTERVAL H&P NOTE
H&P reviewed. After examining the patient I find no changes in the patients condition since the H&P had been written. Vitals:    12/05/23 1112   BP: 111/65   Pulse: 76   Resp: 16   Temp: 97.6 °F (36.4 °C)   SpO2: 98%     Informed consent was obtained for the procedure. Risks of infection, perforation and hemorrhage were discussed. The patient was agreeable to proceed with the procedure.

## 2023-12-05 NOTE — ANESTHESIA PREPROCEDURE EVALUATION
Procedure:  EGD    Relevant Problems   CARDIO   (+) Primary hypertension      GI/HEPATIC   (+) Gastroesophageal reflux disease without esophagitis   (+) Other cirrhosis of liver (HCC)      HEMATOLOGY   (+) Anemia      NEURO/PSYCH   (+) Legally blind in right eye, as defined in [place]   (+) Nonintractable epilepsy without status epilepticus ([address])        Physical Exam    Airway    Mallampati score: II  TM Distance: >3 FB  Neck ROM: full     Dental   No notable dental hx lower dentures and upper dentures    Cardiovascular  Cardiovascular exam normal    Pulmonary  Pulmonary exam normal     Other Findings        Anesthesia Plan  ASA Score- 3     Anesthesia Type- IV sedation with anesthesia with ASA Monitors. Additional Monitors:     Airway Plan:            Plan Factors-Exercise tolerance (METS): <4 METS. Chart reviewed. Existing labs reviewed. Patient summary reviewed. Patient is not a current smoker. Induction-     Postoperative Plan-     Informed Consent- Anesthetic plan and risks discussed with sibling. I personally reviewed this patient with the CRNA. Discussed and agreed on the Anesthesia Plan with the CRNA. Vanesa Salguero

## 2023-12-05 NOTE — TELEPHONE ENCOUNTER
----- Message from Reed Rosales MD sent at 12/5/2023  1:20 PM EST -----  Can we please schedule a repeat EGD in 4 weeks for banding?

## 2023-12-06 NOTE — TELEPHONE ENCOUNTER
Clemencia Rice (sister in law) confirmed procedure.     Scheduled date of EGD(as of today): 1/10/24  Physician performing EGD: Karma Garibay  Location of EGD: [address]  Instructions reviewed with patient by: mailed  Clearances:N/A

## 2023-12-07 NOTE — TELEPHONE ENCOUNTER
Patients GI provider:  Dr. Jonathan Prakash    Number to return call: [phone]  Reason for call: Pt's sister in law calling in with questions regarding pt's Egd. Timothy Evon states she would like to only speak with Anup Moise to ask questions regarding conversation Oral Moise had with patients brother to have a better understanding of what's going on with the  patient. Timothy Barnard can be reached at the number above, thank you. Called the office and was place on hold. Call dropped.     Scheduled procedure/appointment date if applicable: Apt/procedure 1/10/2024

## 2023-12-07 NOTE — TELEPHONE ENCOUNTER
Patient's sister in law Jacqueline Mcpherson called to discuss why repeat EGD/ banding was scheduled. I explained multiple large varices were seen in the esophagus. A maximum of 6 bands were placed during 12/5 EGD /banding resulting in partial eradication. Recommended is to repeat EGD for serial banding -scheduled 1/10/24.

## 2023-12-14 NOTE — ASSESSMENT & PLAN NOTE
[FreeTextEntry1] : The patient is doing well status post catheter ablation for atrial flutter.  No recurrence. Continue to monitor for  A. fib.  Continue on current medications include flecainide low-dose as well as anticoagulation.    Patient understands  that he may need a future AF ablation.  His blood pressure is well controlled.  Recommended he follow-up with his cardiologist Dr. Romero or Dr. Giron.  Recommend that he follow-up with GI as well as primary care physician.  Recommended electrophysiology follow-up in 5 to 6 months.  Remote monitoring of his ILR.  · Continue with betamethasone cream  · Leg elevation  · Wound care consulted Yes

## 2023-12-22 ENCOUNTER — TELEPHONE (OUTPATIENT)
Dept: INTERNAL MEDICINE CLINIC | Facility: CLINIC | Age: 58
End: 2023-12-22

## 2023-12-22 RX ORDER — DIAZEPAM 10 MG/1
10 TABLET ORAL EVERY 12 HOURS
Qty: 60 TABLET | OUTPATIENT
Start: 2023-12-22

## 2023-12-22 NOTE — TELEPHONE ENCOUNTER
Sister in law called wanting to know why his prescription for Diazepam was refused today. Patient was supposed to stop this medication in October with his last visit to office. Appointment was made today for next week to discuss the medication plus other issues.

## 2023-12-26 DIAGNOSIS — G40.909 NONINTRACTABLE EPILEPSY WITHOUT STATUS EPILEPTICUS, UNSPECIFIED EPILEPSY TYPE (HCC): Primary | ICD-10-CM

## 2023-12-26 RX ORDER — DIAZEPAM 10 MG/1
10 TABLET ORAL EVERY 12 HOURS
Qty: 60 TABLET | Refills: 0 | Status: SHIPPED | OUTPATIENT
Start: 2023-12-26

## 2023-12-26 NOTE — TELEPHONE ENCOUNTER
I called and left message for patient in regards to the Diazepam. Last time he took it and how he feels without it. Is he still needing it or not. Instructed them to call back at their convenience.

## 2023-12-26 NOTE — TELEPHONE ENCOUNTER
I have refilled the mediation, family was supposed to let me know if he was still taking it and how much, please call them and if he is still taking 10 mg every 12h he should start taking 1 tab in the morning and 1/2 tab a night for now and follow up with me within the next 30 days (before he runs out of mediation) so we can switch to a different drug. Thank you!

## 2024-01-06 DIAGNOSIS — K21.9 GASTROESOPHAGEAL REFLUX DISEASE WITHOUT ESOPHAGITIS: ICD-10-CM

## 2024-01-08 RX ORDER — PANTOPRAZOLE SODIUM 40 MG/1
TABLET, DELAYED RELEASE ORAL
Qty: 180 TABLET | Refills: 1 | Status: SHIPPED | OUTPATIENT
Start: 2024-01-08

## 2024-01-10 ENCOUNTER — TELEPHONE (OUTPATIENT)
Age: 59
End: 2024-01-10

## 2024-01-10 ENCOUNTER — TELEPHONE (OUTPATIENT)
Dept: GASTROENTEROLOGY | Facility: CLINIC | Age: 59
End: 2024-01-10

## 2024-01-10 ENCOUNTER — ANESTHESIA (OUTPATIENT)
Dept: GASTROENTEROLOGY | Facility: HOSPITAL | Age: 59
End: 2024-01-10

## 2024-01-10 ENCOUNTER — PREP FOR PROCEDURE (OUTPATIENT)
Dept: GASTROENTEROLOGY | Facility: CLINIC | Age: 59
End: 2024-01-10

## 2024-01-10 ENCOUNTER — HOSPITAL ENCOUNTER (OUTPATIENT)
Dept: GASTROENTEROLOGY | Facility: HOSPITAL | Age: 59
Setting detail: OUTPATIENT SURGERY
Discharge: HOME | End: 2024-01-10
Attending: STUDENT IN AN ORGANIZED HEALTH CARE EDUCATION/TRAINING PROGRAM
Payer: MEDICARE

## 2024-01-10 ENCOUNTER — ANESTHESIA EVENT (OUTPATIENT)
Dept: GASTROENTEROLOGY | Facility: HOSPITAL | Age: 59
End: 2024-01-10

## 2024-01-10 VITALS
TEMPERATURE: 97.6 F | DIASTOLIC BLOOD PRESSURE: 57 MMHG | WEIGHT: 179.7 LBS | HEIGHT: 69 IN | BODY MASS INDEX: 26.62 KG/M2 | HEART RATE: 56 BPM | OXYGEN SATURATION: 94 % | RESPIRATION RATE: 17 BRPM | SYSTOLIC BLOOD PRESSURE: 98 MMHG

## 2024-01-10 DIAGNOSIS — I85.11 SECONDARY ESOPHAGEAL VARICES WITH BLEEDING (HCC): ICD-10-CM

## 2024-01-10 DIAGNOSIS — I85.11 SECONDARY ESOPHAGEAL VARICES WITH BLEEDING (HCC): Primary | ICD-10-CM

## 2024-01-10 PROCEDURE — 43235 EGD DIAGNOSTIC BRUSH WASH: CPT | Performed by: STUDENT IN AN ORGANIZED HEALTH CARE EDUCATION/TRAINING PROGRAM

## 2024-01-10 RX ORDER — SODIUM CHLORIDE, SODIUM LACTATE, POTASSIUM CHLORIDE, CALCIUM CHLORIDE 600; 310; 30; 20 MG/100ML; MG/100ML; MG/100ML; MG/100ML
INJECTION, SOLUTION INTRAVENOUS CONTINUOUS PRN
Status: DISCONTINUED | OUTPATIENT
Start: 2024-01-10 | End: 2024-01-10

## 2024-01-10 RX ORDER — LIDOCAINE HYDROCHLORIDE 20 MG/ML
INJECTION, SOLUTION EPIDURAL; INFILTRATION; INTRACAUDAL; PERINEURAL AS NEEDED
Status: DISCONTINUED | OUTPATIENT
Start: 2024-01-10 | End: 2024-01-10

## 2024-01-10 RX ORDER — PROPOFOL 10 MG/ML
INJECTION, EMULSION INTRAVENOUS AS NEEDED
Status: DISCONTINUED | OUTPATIENT
Start: 2024-01-10 | End: 2024-01-10

## 2024-01-10 RX ORDER — GLYCOPYRROLATE 0.2 MG/ML
INJECTION INTRAMUSCULAR; INTRAVENOUS AS NEEDED
Status: DISCONTINUED | OUTPATIENT
Start: 2024-01-10 | End: 2024-01-10

## 2024-01-10 RX ADMIN — LIDOCAINE HYDROCHLORIDE 80 MG: 20 INJECTION, SOLUTION EPIDURAL; INFILTRATION; INTRACAUDAL; PERINEURAL at 08:54

## 2024-01-10 RX ADMIN — GLYCOPYRROLATE 0.1 MG: 0.2 INJECTION, SOLUTION INTRAMUSCULAR; INTRAVENOUS at 08:54

## 2024-01-10 RX ADMIN — PROPOFOL 100 MG: 10 INJECTION, EMULSION INTRAVENOUS at 08:55

## 2024-01-10 RX ADMIN — SODIUM CHLORIDE, SODIUM LACTATE, POTASSIUM CHLORIDE, AND CALCIUM CHLORIDE: .6; .31; .03; .02 INJECTION, SOLUTION INTRAVENOUS at 08:52

## 2024-01-10 NOTE — ANESTHESIA POSTPROCEDURE EVALUATION
Post-Op Assessment Note    CV Status:  Stable    Pain management: adequate       Mental Status:  Arousable   Hydration Status:  Euvolemic   PONV Controlled:  Controlled   Airway Patency:  Patent     Post Op Vitals Reviewed: Yes      Staff: CRNA               BP   106/53   Temp      Pulse  72   Resp   15   SpO2   96

## 2024-01-10 NOTE — ANESTHESIA PREPROCEDURE EVALUATION
Procedure:  EGD    Relevant Problems   CARDIO   (+) Primary hypertension      GI/HEPATIC   (+) Gastroesophageal reflux disease without esophagitis   (+) Other cirrhosis of liver (HCC)      HEMATOLOGY   (+) Anemia      NEURO/PSYCH   (+) Legally blind in right eye, as defined in [place]   (+) Nonintractable epilepsy without status epilepticus (HCC)        Physical Exam    Airway    Mallampati score: II  TM Distance: >3 FB  Neck ROM: full     Dental   No notable dental hx     Cardiovascular  Cardiovascular exam normal    Pulmonary  Pulmonary exam normal     Other Findings        Anesthesia Plan  ASA Score- 3     Anesthesia Type- IV sedation with anesthesia with ASA Monitors.         Additional Monitors:     Airway Plan:            Plan Factors-Exercise tolerance (METS): >4 METS.    Chart reviewed.   Existing labs reviewed. Patient summary reviewed.    Patient is not a current smoker.              Induction-     Postoperative Plan-     Informed Consent- Anesthetic plan and risks discussed with patient and sibling.  I personally reviewed this patient with the CRNA. Discussed and agreed on the Anesthesia Plan with the CRNA..

## 2024-01-10 NOTE — NURSING NOTE
Pts brother Marco here for pt. And very upset with pt. Arguing with pt. And seems verbally abusive at times. Checked on pt. And Dr. Plata at bedside talking with both. Dr. Plata discussed both procedure with both. Dr. Plata reasuured and comforted both and will F/U. MIKE Rico RN

## 2024-01-10 NOTE — DISCHARGE INSTRUCTIONS
Colonoscopy   WHAT YOU NEED TO KNOW:   A colonoscopy is a procedure to examine the inside of your colon (intestine) with a scope. Polyps or tissue growths may have been removed during your colonoscopy. It is normal to feel bloated and to have some abdominal discomfort. You should be passing gas. If you have hemorrhoids or you had polyps removed, you may have a small amount of bleeding.        DISCHARGE INSTRUCTIONS:   Seek care immediately if:   You have a large amount of bright red blood in your bowel movements.     Your abdomen is hard and firm and you have severe pain.     You have sudden trouble breathing or chest pain.    Contact your healthcare provider if:   You develop a rash or hives.     You have a fever within 24 hours of your procedure.      You have not had a bowel movement for 3 days after your procedure.     You have questions or concerns about your condition or care.    Activity:   Rest after your procedure. You have been given medicine to relax you. Do not drive or make important decisions until the day after your procedure. Return to your normal activity as directed.     Relieve gas and discomfort from bloating by lying on your right side with a heating pad on your abdomen. You may need to take short walks to help the gas move out. Eat small meals until bloating is relieved.    Follow up with your healthcare provider as directed: Write down your questions so you remember to ask them during your visits.

## 2024-01-10 NOTE — H&P (VIEW-ONLY)
History and Physical - [facility]  Jose Stewart 58 y.o. male MRN: [id number]      HPI: Jose Stewart is a 58 y.o. year-old male who presents for variceal surveillance.    REVIEW OF SYSTEMS: Per the HPI, and otherwise unremarkable.    PAST MEDICAL/SURGICAL HISTORY:  Past Medical History:   Diagnosis Date    Anxiety     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    Cellulitis of left lower extremity 02/08/2022    GERD (gastroesophageal reflux disease)     Hypertension     Hypoxia 02/11/2022    Mental developmental delay     mumps as a child, developed rheumatic fever    MRSA bacteremia 02/09/2022    Seizures (HCC)         Past Surgical History:   Procedure Laterality Date    COLONOSCOPY      OR LAPAROSCOPY SURG CHOLECYSTECTOMY N/A 8/25/2023    Procedure: CHOLECYSTECTOMY OPEN;  Surgeon: Ran Fletcher DO;  Location: [facility];  Service: General    SPLENECTOMY      UPPER GASTROINTESTINAL ENDOSCOPY         FAMILY/SOCIAL HISTORY:  Family History   Problem Relation Age of Onset    Depression Mother     Heart disease Father        Social History     Tobacco Use    Smoking status: Never    Smokeless tobacco: Never   Vaping Use    Vaping status: Never Used   Substance Use Topics    Alcohol use: Never    Drug use: Never       Meds/Allergies       Current Outpatient Medications:     diazepam (VALIUM) 10 mg tablet    levETIRAcetam (KEPPRA) 500 mg tablet    nadolol (CORGARD) 40 mg tablet    OXcarbazepine (TRILEPTAL) 600 mg tablet    pantoprazole (PROTONIX) 40 mg tablet    sucralfate (CARAFATE) 1 g/10 mL suspension    triamcinolone (KENALOG) 0.1 % ointment    acetaminophen (TYLENOL) 325 mg tablet    folic acid (FOLVITE) 1 mg tablet    furosemide (LASIX) 20 mg tablet    iron polysaccharides (FERREX) 150 mg capsule  No current facility-administered medications for this encounter.    Facility-Administered Medications Ordered in Other Encounters:     lactated ringers infusion, ,  "Intravenous, Continuous PRN, New Bag at 01/10/24 0842    No Known Allergies    Objective     /64   Pulse 58   Temp (!) 97.2 °F (36.2 °C) (Temporal)   Resp 16   Ht 5' 9\" (1.753 m)   Wt 81.5 kg (179 lb 11.2 oz)   SpO2 98%   BMI 26.54 kg/m²   PHYSICAL EXAM    GEN: NAD  CARDIO: RRR  PULM: CTA bilaterally  ABD: soft, non-tender, non-distended  EXT: no lower extremity edema  NEURO: AAOx3    ASSESSMENT/PLAN:  58 y.o. year old male here for EGD. he is stable and optimized for his procedure. Informed consent was obtained for the procedure.  Risks of infection, perforation and hemorrhage were discussed. The patient was agreeable to proceed with the procedure.         "

## 2024-01-10 NOTE — TELEPHONE ENCOUNTER
Patients GI provider:  Dr. Plata    Number to return call: [phone]    Reason for call: Pt's sister-in-law calling to sched f/u EGD. Pt was not able to complete EGD as sched today due to un-digested food. EGD was resched to 02/08/2024. Pt has a f/u OV w/Dr. Plata on 02/01/2024 from OV back in Nov 2023. However, pt has EGS sched for less than wk after that. Please check w/Dr. Plata if she would like for the OV to be resched  to after EGD is completed. Please call back sister-in-law to let her know.    Scheduled procedure/appointment date if applicable: Apt 02/01/2024

## 2024-01-10 NOTE — H&P
History and Physical - [facility]  Jose Stewart 58 y.o. male MRN: [id number]      HPI: Jose Stewart is a 58 y.o. year-old male who presents for variceal surveillance.    REVIEW OF SYSTEMS: Per the HPI, and otherwise unremarkable.    PAST MEDICAL/SURGICAL HISTORY:  Past Medical History:   Diagnosis Date    Anxiety     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    Cellulitis of left lower extremity 02/08/2022    GERD (gastroesophageal reflux disease)     Hypertension     Hypoxia 02/11/2022    Mental developmental delay     mumps as a child, developed rheumatic fever    MRSA bacteremia 02/09/2022    Seizures (HCC)         Past Surgical History:   Procedure Laterality Date    COLONOSCOPY      MA LAPAROSCOPY SURG CHOLECYSTECTOMY N/A 8/25/2023    Procedure: CHOLECYSTECTOMY OPEN;  Surgeon: Ran Fletcher DO;  Location: [facility];  Service: General    SPLENECTOMY      UPPER GASTROINTESTINAL ENDOSCOPY         FAMILY/SOCIAL HISTORY:  Family History   Problem Relation Age of Onset    Depression Mother     Heart disease Father        Social History     Tobacco Use    Smoking status: Never    Smokeless tobacco: Never   Vaping Use    Vaping status: Never Used   Substance Use Topics    Alcohol use: Never    Drug use: Never       Meds/Allergies       Current Outpatient Medications:     diazepam (VALIUM) 10 mg tablet    levETIRAcetam (KEPPRA) 500 mg tablet    nadolol (CORGARD) 40 mg tablet    OXcarbazepine (TRILEPTAL) 600 mg tablet    pantoprazole (PROTONIX) 40 mg tablet    sucralfate (CARAFATE) 1 g/10 mL suspension    triamcinolone (KENALOG) 0.1 % ointment    acetaminophen (TYLENOL) 325 mg tablet    folic acid (FOLVITE) 1 mg tablet    furosemide (LASIX) 20 mg tablet    iron polysaccharides (FERREX) 150 mg capsule  No current facility-administered medications for this encounter.    Facility-Administered Medications Ordered in Other Encounters:     lactated ringers infusion, ,  "Intravenous, Continuous PRN, New Bag at 01/10/24 0842    No Known Allergies    Objective     /64   Pulse 58   Temp (!) 97.2 °F (36.2 °C) (Temporal)   Resp 16   Ht 5' 9\" (1.753 m)   Wt 81.5 kg (179 lb 11.2 oz)   SpO2 98%   BMI 26.54 kg/m²   PHYSICAL EXAM    GEN: NAD  CARDIO: RRR  PULM: CTA bilaterally  ABD: soft, non-tender, non-distended  EXT: no lower extremity edema  NEURO: AAOx3    ASSESSMENT/PLAN:  58 y.o. year old male here for EGD. he is stable and optimized for his procedure. Informed consent was obtained for the procedure.  Risks of infection, perforation and hemorrhage were discussed. The patient was agreeable to proceed with the procedure.         "

## 2024-01-15 ENCOUNTER — TELEPHONE (OUTPATIENT)
Dept: INTERNAL MEDICINE CLINIC | Facility: CLINIC | Age: 59
End: 2024-01-15

## 2024-01-18 NOTE — TELEPHONE ENCOUNTER
I called today to touch base with patient as to message left 3 weeks ago, they have yet to call back regarding the Diazepam.

## 2024-01-18 NOTE — TELEPHONE ENCOUNTER
Spoke to vannessa sister in law, I told her to take 1 tablet morning and 1/2 nite, she is going to call me back to schedule appt as she needs to have his calendar in front of her.

## 2024-01-23 ENCOUNTER — TELEPHONE (OUTPATIENT)
Dept: GASTROENTEROLOGY | Facility: CLINIC | Age: 59
End: 2024-01-23

## 2024-02-04 DIAGNOSIS — D50.0 IRON DEFICIENCY ANEMIA DUE TO CHRONIC BLOOD LOSS: ICD-10-CM

## 2024-02-05 RX ORDER — IRON POLYSACCHARIDE COMPLEX 150 MG
150 CAPSULE ORAL DAILY
Qty: 90 CAPSULE | Refills: 1 | Status: SHIPPED | OUTPATIENT
Start: 2024-02-05

## 2024-02-08 ENCOUNTER — PREP FOR PROCEDURE (OUTPATIENT)
Dept: GASTROENTEROLOGY | Facility: AMBULARY SURGERY CENTER | Age: 59
End: 2024-02-08

## 2024-02-08 ENCOUNTER — HOSPITAL ENCOUNTER (OUTPATIENT)
Dept: GASTROENTEROLOGY | Facility: HOSPITAL | Age: 59
Setting detail: OUTPATIENT SURGERY
End: 2024-02-08
Attending: STUDENT IN AN ORGANIZED HEALTH CARE EDUCATION/TRAINING PROGRAM
Payer: MEDICARE

## 2024-02-08 ENCOUNTER — ANESTHESIA EVENT (OUTPATIENT)
Dept: GASTROENTEROLOGY | Facility: HOSPITAL | Age: 59
End: 2024-02-08

## 2024-02-08 ENCOUNTER — ANESTHESIA (OUTPATIENT)
Dept: GASTROENTEROLOGY | Facility: HOSPITAL | Age: 59
End: 2024-02-08

## 2024-02-08 VITALS
WEIGHT: 178.5 LBS | OXYGEN SATURATION: 96 % | RESPIRATION RATE: 12 BRPM | TEMPERATURE: 97 F | DIASTOLIC BLOOD PRESSURE: 77 MMHG | SYSTOLIC BLOOD PRESSURE: 128 MMHG | HEIGHT: 70 IN | HEART RATE: 58 BPM | BODY MASS INDEX: 25.56 KG/M2

## 2024-02-08 DIAGNOSIS — I85.11 SECONDARY ESOPHAGEAL VARICES WITH BLEEDING (HCC): ICD-10-CM

## 2024-02-08 DIAGNOSIS — I85.11 SECONDARY ESOPHAGEAL VARICES WITH BLEEDING (HCC): Primary | ICD-10-CM

## 2024-02-08 PROCEDURE — 88342 IMHCHEM/IMCYTCHM 1ST ANTB: CPT | Performed by: PATHOLOGY

## 2024-02-08 PROCEDURE — 43239 EGD BIOPSY SINGLE/MULTIPLE: CPT | Performed by: STUDENT IN AN ORGANIZED HEALTH CARE EDUCATION/TRAINING PROGRAM

## 2024-02-08 PROCEDURE — 88305 TISSUE EXAM BY PATHOLOGIST: CPT | Performed by: PATHOLOGY

## 2024-02-08 PROCEDURE — 43244 EGD VARICES LIGATION: CPT | Performed by: STUDENT IN AN ORGANIZED HEALTH CARE EDUCATION/TRAINING PROGRAM

## 2024-02-08 RX ORDER — SUCRALFATE ORAL 1 G/10ML
1 SUSPENSION ORAL
Qty: 280 ML | Refills: 0 | Status: SHIPPED | OUTPATIENT
Start: 2024-02-08 | End: 2024-02-15

## 2024-02-08 RX ORDER — SODIUM CHLORIDE, SODIUM LACTATE, POTASSIUM CHLORIDE, CALCIUM CHLORIDE 600; 310; 30; 20 MG/100ML; MG/100ML; MG/100ML; MG/100ML
INJECTION, SOLUTION INTRAVENOUS CONTINUOUS PRN
Status: DISCONTINUED | OUTPATIENT
Start: 2024-02-08 | End: 2024-02-08

## 2024-02-08 RX ORDER — LIDOCAINE HYDROCHLORIDE 10 MG/ML
INJECTION, SOLUTION EPIDURAL; INFILTRATION; INTRACAUDAL; PERINEURAL AS NEEDED
Status: DISCONTINUED | OUTPATIENT
Start: 2024-02-08 | End: 2024-02-08

## 2024-02-08 RX ORDER — PROPOFOL 10 MG/ML
INJECTION, EMULSION INTRAVENOUS AS NEEDED
Status: DISCONTINUED | OUTPATIENT
Start: 2024-02-08 | End: 2024-02-08

## 2024-02-08 RX ADMIN — PROPOFOL 100 MCG/KG/MIN: 10 INJECTION, EMULSION INTRAVENOUS at 11:45

## 2024-02-08 RX ADMIN — PROPOFOL 60 MG: 10 INJECTION, EMULSION INTRAVENOUS at 11:43

## 2024-02-08 RX ADMIN — SODIUM CHLORIDE, SODIUM LACTATE, POTASSIUM CHLORIDE, AND CALCIUM CHLORIDE: .6; .31; .03; .02 INJECTION, SOLUTION INTRAVENOUS at 11:30

## 2024-02-08 RX ADMIN — PROPOFOL 30 MG: 10 INJECTION, EMULSION INTRAVENOUS at 11:51

## 2024-02-08 RX ADMIN — PROPOFOL 20 MG: 10 INJECTION, EMULSION INTRAVENOUS at 11:44

## 2024-02-08 RX ADMIN — PROPOFOL 20 MG: 10 INJECTION, EMULSION INTRAVENOUS at 11:54

## 2024-02-08 RX ADMIN — LIDOCAINE HYDROCHLORIDE 50 MG: 10 INJECTION, SOLUTION EPIDURAL; INFILTRATION; INTRACAUDAL; PERINEURAL at 11:43

## 2024-02-08 NOTE — ANESTHESIA PREPROCEDURE EVALUATION
Procedure:  EGD    Relevant Problems   CARDIO   (+) Angiodysplasia   (+) Esophageal varices (HCC)   (+) Primary hypertension      GI/HEPATIC   (+) Gastroesophageal reflux disease without esophagitis   (+) Other cirrhosis of liver (HCC)      HEMATOLOGY   (+) Anemia      NEURO/PSYCH   (+) Legally blind in right eye, as defined in [place]   (+) Nonintractable epilepsy without status epilepticus (HCC)      Nervous and Auditory   (+) Bilateral hearing loss      Other   (+) Platelets decreased (HCC)        Physical Exam    Airway    Mallampati score: III  TM Distance: >3 FB  Neck ROM: limited     Dental        Cardiovascular      Pulmonary      Other Findings      Anesthesia Plan  ASA Score- 3     Anesthesia Type- IV sedation with anesthesia with ASA Monitors.         Additional Monitors:     Airway Plan:            Plan Factors-    Chart reviewed. EKG reviewed. Imaging results reviewed. Existing labs reviewed. Patient summary reviewed.    Patient is not a current smoker.  Patient did not smoke on day of surgery.            Induction- intravenous.    Postoperative Plan-     Informed Consent- Anesthetic plan and risks discussed with patient and sibling.  I personally reviewed this patient with the CRNA. Discussed and agreed on the Anesthesia Plan with the CRNA..            VITALS  There were no vitals taken for this visit.  BP Readings from Last 3 Encounters:   01/10/24 98/57   12/05/23 107/66   11/13/23 118/74     LABS  Results from Last 12 Months   Lab Units 11/13/23  0948 10/25/23  1350 08/28/23  0516 08/27/23  0438 08/26/23  0500   SODIUM mmol/L 136 139 135 136 138   POTASSIUM mmol/L 3.2* 2.9* 3.9 4.3 3.8   CHLORIDE mmol/L 100 99 102 104 105   CO2 mmol/L 34* 32 31 30 27   ANION GAP mmol/L 2 8 2 2 6   BUN mg/dL 8 8 10 12 15   CREATININE mg/dL 0.58* 0.54* 0.49* 0.51* 0.68   CALCIUM mg/dL 7.5* 7.8* 7.8* 7.5* 7.8*   GLUCOSE RANDOM mg/dL  --  82 99 105 121   PHOSPHORUS mg/dL  --   --  2.1* 2.0* 2.8   MAGNESIUM mg/dL  --   --    --  2.1 1.8*   AST U/L 14 16  --   --   --    ALT U/L 7 6*  --   --   --    ALK PHOS U/L 107* 66  --   --   --    TOTAL BILIRUBIN mg/dL 0.51 0.45  --   --   --    ALBUMIN g/dL 2.3* 2.6*  --   --   --      Results from Last 12 Months   Lab Units 11/13/23  0948 10/25/23  1350   WBC Thousand/uL 5.68 6.83   HEMOGLOBIN g/dL 7.3* 8.4*   HEMATOCRIT % 26.4* 29.1*   PLATELETS Thousands/uL 210 193     Results from Last 12 Months   Lab Units 11/13/23  0948 08/26/23  0500   INR  1.37* 1.33*       ECG  Date/Time: 2/8/2022 5:07 PM  Performed by: Rivas Branch MD  Authorized by: Rivas Branch MD    ECG reviewed by me, the ED Provider: yes    Patient location:  ED  Previous ECG:    Previous ECG:  Unavailable    Comparison to cardiac monitor: No    Interpretation:    Interpretation: abnormal    Rate:    ECG rate:  75    ECG rate assessment: normal    Rhythm:    Rhythm: sinus rhythm    Ectopy:    Ectopy: none    QRS:    QRS axis:  Normal    QRS intervals:  Normal  Conduction:    Conduction: abnormal      Abnormal conduction: 1st degree    ST segments:    ST segments:  Non-specific  T waves:    T waves: non-specific         Last Resulted: 02/08/22 17:07        ECHOCARDIOGRAPHY AND OTHER TESTING/IMAGING  2022 TTE  History    HTN, GERD     Interpretation Summary         Left Ventricle: Left ventricular cavity size is upper normal. Wall thickness is normal. The left ventricular ejection fraction is 55%. Systolic function is normal. Wall motion is normal. Diastolic function is normal.    Mitral Valve: There is trace regurgitation.    Tricuspid Valve: There is mild regurgitation.    Aorta: The aortic root is mildly dilated (4.0 cm). The ascending aorta is normal in size.    Pulmonary Artery: The pulmonary artery systolic pressure is normal.    ANESTHESIA RISK-BENEFIT DISCUSSION  BENEFITS INCLUDE (NBK [id number], PMID 77490022):   (1) A specialized anesthesia team reduces mortality and morbidity for major  surgeries.  (2) The team provides analgesia/sedation/amnesia/akinesia as safely as possible.  (3) The team strives to reduce discomfort as much as reasonably possible.    RISKS ASSESSMENT (AND PLANS TO MITIGATE RISKS) INCLUDE:    Neurologic system: IntraOp awareness (Risk is ~1:1,000 - 1:14,000; PMID 51049693), Stroke (Risk ~<0.1-2% for most cases; PMID 04854171), nerve injury, vision loss, and POCD.     Airway and Pulmonary system: Dental or mouth injury, throat pain, critical hypoxia, pneumothorax, prolonged intubation, post-op respiratory compromise.  Airway/Intubation risks and prior data: Mask Ventilation: Ventilated by mask (1); Technique: Direct laryngoscopy, Stylet; Type: Cuffed, Oral; Tube Size: 8 mm; Laryngoscope: Mac; Blade Size: 3; Location: Oral; Grade View: 1; Insertion Attempts: 1; Placement Verification: Auscultation, Cuff palpitation, End tidal CO2   Major ARISCAT risk factors for pulmonary complications include: none, yielding a score 0-1= Low risk, 1.6%.  Cardiovascular system: Hypotension/Vasoplegia, arrhythmias, blood clot, isaias-op cardiac injury/MACE, bleeding, infection, or injury to vascular structures.  Signs of active, severe cardiac instability: none  Robert's RCRI score items: none, yielding an RCRI Score of 0= 0.4% risk of MACE  Are isaias-op or intra-op beta blockers indicated? (PMID [id number]): no, patient has already taken recently.  FEN/GI system: Aspiration risk (~0.5% [facility] [id number]) and PONV (10-80% per Apfel score).  ASA NPO guideline compliance?: Indeterminate; POC gastric ultrasound shows an empty antrum - see below  Medication risk assessment: Allergic reactions, bleeding due to anticoagulant use, overdoses, drug-drug interactions, injury to a fetus or  in pregnant or breastfeeding patients, sedation while driving/operating heavy machinery.  Recent relevant medications: See MAR or Med Review  Personal or family history of anesthesia complications: no  Pregnancy Status:  N/A  Estimate mortality risks associated with anesthesia based on ASA-PS (PMID 31187458): ASA-PS II: risk 1:20,000

## 2024-02-08 NOTE — DISCHARGE INSTRUCTIONS
Upper Endoscopy   WHAT YOU NEED TO KNOW:   An upper endoscopy is also called an upper gastrointestinal (GI) endoscopy, or an esophagogastroduodenoscopy (EGD). You may feel bloated, gassy, or have some abdominal discomfort after your procedure. Your throat may be sore for 24 to 36 hours. You may burp or pass gas from air that is still inside your body.        DISCHARGE INSTRUCTIONS:      Seek care immediately if:   You feel dizzy or faint.    You have sudden chest pain or trouble breathing.     You have a large amount of bright red blood in your bowel movements.     Your abdomen is hard and firm and you have severe pain.     You vomit blood.     Contact your healthcare provider if:   You feel full or bloated and cannot burp or pass gas.     You have not had a bowel movement for 3 days after your procedure.     You have neck pain.     You have a fever or chills.     You have nausea or are vomiting.     You have a rash or hives.     You have questions or concerns about your endoscopy.     Relieve a sore throat:  Suck on throat lozenges or crushed ice. Gargle with a small amount of warm salt water. Mix 1 teaspoon of salt and 1 cup of warm water to make salt water.     Relieve gas and discomfort from bloating:  Lie on your right side with a heating pad on your abdomen. Take short walks to help pass gas. Eat small meals until bloating is relieved.    Rest after your procedure:  Do not drive or make important decisions until the day after your procedure. Return to your normal activity as directed. You can usually return to work the day after your procedure.    Follow up with your healthcare provider as directed:  Write down your questions so you remember to ask them during your visits.

## 2024-02-08 NOTE — ANESTHESIA POSTPROCEDURE EVALUATION
Post-Op Assessment Note    CV Status:  Stable  Pain Score: 0    Pain management: adequate       Mental Status:  Alert and awake   Hydration Status:  Euvolemic   PONV Controlled:  Controlled   Airway Patency:  Patent     Post Op Vitals Reviewed: Yes    No anethesia notable event occurred.    Staff: CRNA               BP   110/64   Temp   97.4   Pulse  57   Resp   13   SpO2   99%

## 2024-02-08 NOTE — INTERVAL H&P NOTE
H&P reviewed. After examining the patient I find no changes in the patients condition since the H&P had been written.    Vitals:    02/08/24 1107   BP: 108/56   Pulse: (!) 50   Resp: 16   Temp: 97.6 °F (36.4 °C)   SpO2: 97%     Informed consent was obtained for the procedure.  Risks of infection, perforation and hemorrhage were discussed. The patient was agreeable to proceed with the procedure.

## 2024-02-08 NOTE — INTERVAL H&P NOTE
H&P reviewed. After examining the patient I find no changes in the patients condition since the H&P had been written.    Vitals:    02/08/24 1107   BP: 108/56   Pulse: (!) 50   Resp: 16   Temp: 97.6 °F (36.4 °C)   SpO2: 97%

## 2024-02-09 ENCOUNTER — TELEPHONE (OUTPATIENT)
Dept: GASTROENTEROLOGY | Facility: CLINIC | Age: 59
End: 2024-02-09

## 2024-02-09 NOTE — TELEPHONE ENCOUNTER
----- Message from Mimi Plata MD sent at 2/8/2024 12:11 PM EST -----  Hi can we please schedule repeat EGD with me in 2 months?      single lumen

## 2024-02-09 NOTE — TELEPHONE ENCOUNTER
Called pt to schedule spoke w/ caregiver/sister-in-law.     Scheduled date of EGD(as of today): 04/10/2024  Physician performing EGD: Dr. Plata   Location of EGD: [facility]  Instructions reviewed with patient by: Melinda london 02/09/2024  Clearances: N/A

## 2024-02-14 PROCEDURE — 88342 IMHCHEM/IMCYTCHM 1ST ANTB: CPT | Performed by: PATHOLOGY

## 2024-02-14 PROCEDURE — 88305 TISSUE EXAM BY PATHOLOGIST: CPT | Performed by: PATHOLOGY

## 2024-02-25 DIAGNOSIS — G40.909 NONINTRACTABLE EPILEPSY WITHOUT STATUS EPILEPTICUS, UNSPECIFIED EPILEPSY TYPE (HCC): ICD-10-CM

## 2024-02-26 RX ORDER — DIAZEPAM 5 MG/1
5 TABLET ORAL EVERY 12 HOURS
Qty: 60 TABLET | Refills: 1 | Status: SHIPPED | OUTPATIENT
Start: 2024-02-26

## 2024-03-04 ENCOUNTER — OFFICE VISIT (OUTPATIENT)
Dept: INTERNAL MEDICINE CLINIC | Facility: CLINIC | Age: 59
End: 2024-03-04
Payer: MEDICARE

## 2024-03-04 VITALS
SYSTOLIC BLOOD PRESSURE: 98 MMHG | DIASTOLIC BLOOD PRESSURE: 60 MMHG | RESPIRATION RATE: 14 BRPM | BODY MASS INDEX: 25.91 KG/M2 | OXYGEN SATURATION: 97 % | HEIGHT: 70 IN | HEART RATE: 74 BPM | WEIGHT: 181 LBS | TEMPERATURE: 97.9 F

## 2024-03-04 DIAGNOSIS — L97.221 NON-PRESSURE CHRONIC ULCER OF LEFT CALF, LIMITED TO BREAKDOWN OF SKIN (HCC): ICD-10-CM

## 2024-03-04 DIAGNOSIS — G40.909 NONINTRACTABLE EPILEPSY WITHOUT STATUS EPILEPTICUS, UNSPECIFIED EPILEPSY TYPE (HCC): ICD-10-CM

## 2024-03-04 DIAGNOSIS — I87.2 VENOUS STASIS DERMATITIS OF BOTH LOWER EXTREMITIES: ICD-10-CM

## 2024-03-04 DIAGNOSIS — I10 PRIMARY HYPERTENSION: ICD-10-CM

## 2024-03-04 DIAGNOSIS — K74.69 OTHER CIRRHOSIS OF LIVER (HCC): ICD-10-CM

## 2024-03-04 DIAGNOSIS — Z00.00 MEDICARE ANNUAL WELLNESS VISIT, SUBSEQUENT: Primary | ICD-10-CM

## 2024-03-04 DIAGNOSIS — I73.9 PVD (PERIPHERAL VASCULAR DISEASE) (HCC): ICD-10-CM

## 2024-03-04 DIAGNOSIS — D69.6 PLATELETS DECREASED (HCC): ICD-10-CM

## 2024-03-04 PROBLEM — L97.226: Status: ACTIVE | Noted: 2024-03-04

## 2024-03-04 PROCEDURE — 99213 OFFICE O/P EST LOW 20 MIN: CPT | Performed by: INTERNAL MEDICINE

## 2024-03-04 PROCEDURE — G0439 PPPS, SUBSEQ VISIT: HCPCS | Performed by: INTERNAL MEDICINE

## 2024-03-04 RX ORDER — TRIAMCINOLONE ACETONIDE 1 MG/G
OINTMENT TOPICAL
Qty: 454 G | Refills: 0 | Status: SHIPPED | OUTPATIENT
Start: 2024-03-04

## 2024-03-04 RX ORDER — DIAZEPAM 5 MG/1
5 TABLET ORAL EVERY 6 HOURS PRN
Qty: 30 TABLET | Refills: 0 | Status: SHIPPED | OUTPATIENT
Start: 2024-03-25

## 2024-03-04 NOTE — PATIENT INSTRUCTIONS
Take diazepam 5mg twice daily for the next month, then decrease to 5mg daily for 1 month.   Please call your podiatrist (foot and wound doctor) Dr. Farrar to make an appointment for the wound on his left leg  Only use triamcinolone sparingly (use only every other day OR use for one week then take a one week break OR use for 2 weeks then break for 2 weeks)    Medicare Preventive Visit Patient Instructions  Thank you for completing your Welcome to Medicare Visit or Medicare Annual Wellness Visit today. Your next wellness visit will be due in one year (3/5/2025).  The screening/preventive services that you may require over the next 5-10 years are detailed below. Some tests may not apply to you based off risk factors and/or age. Screening tests ordered at today's visit but not completed yet may show as past due. Also, please note that scanned in results may not display below.  Preventive Screenings:  Service Recommendations Previous Testing/Comments   Colorectal Cancer Screening  Colonoscopy    Fecal Occult Blood Test (FOBT)/Fecal Immunochemical Test (FIT)  Fecal DNA/Cologuard Test  Flexible Sigmoidoscopy Age: 45-75 years old   Colonoscopy: every 10 years (May be performed more frequently if at higher risk)  OR  FOBT/FIT: every 1 year  OR  Cologuard: every 3 years  OR  Sigmoidoscopy: every 5 years  Screening may be recommended earlier than age 45 if at higher risk for colorectal cancer. Also, an individualized decision between you and your healthcare provider will decide whether screening between the ages of 76-85 would be appropriate. Colonoscopy: 04/14/2023  FOBT/FIT: 02/10/2022  Cologuard: Not on file  Sigmoidoscopy: Not on file    Screening Current     Prostate Cancer Screening Individualized decision between patient and health care provider in men between ages of 55-69   Medicare will cover every 12 months beginning on the day after your 50th birthday PSA: No results in last 5 years           Hepatitis C Screening  Once for adults born between 1945 and 1965  More frequently in patients at high risk for Hepatitis C Hep C Antibody: Not on file    Screening Current   Diabetes Screening 1-2 times per year if you're at risk for diabetes or have pre-diabetes Fasting glucose: 93 mg/dL (11/13/2023)  A1C: 5.4 % (8/31/2022)  Screening Current   Cholesterol Screening Once every 5 years if you don't have a lipid disorder. May order more often based on risk factors. Lipid panel: 06/01/2022  Screening Current      Other Preventive Screenings Covered by Medicare:  Abdominal Aortic Aneurysm (AAA) Screening: covered once if your at risk. You're considered to be at risk if you have a family history of AAA or a male between the age of 65-75 who smoking at least 100 cigarettes in your lifetime.  Lung Cancer Screening: covers low dose CT scan once per year if you meet all of the following conditions: (1) Age 55-77; (2) No signs or symptoms of lung cancer; (3) Current smoker or have quit smoking within the last 15 years; (4) You have a tobacco smoking history of at least 20 pack years (packs per day x number of years you smoked); (5) You get a written order from a healthcare provider.  Glaucoma Screening: covered annually if you're considered high risk: (1) You have diabetes OR (2) Family history of glaucoma OR (3)  aged 50 and older OR (4)  American aged 65 and older  Osteoporosis Screening: covered every 2 years if you meet one of the following conditions: (1) Have a vertebral abnormality; (2) On glucocorticoid therapy for more than 3 months; (3) Have primary hyperparathyroidism; (4) On osteoporosis medications and need to assess response to drug therapy.  HIV Screening: covered annually if you're between the age of 15-65. Also covered annually if you are younger than 15 and older than 65 with risk factors for HIV infection. For pregnant patients, it is covered up to 3 times per pregnancy.    Immunizations:  Immunization  Recommendations   Influenza Vaccine Annual influenza vaccination during flu season is recommended for all persons aged >= 6 months who do not have contraindications   Pneumococcal Vaccine   * Pneumococcal conjugate vaccine = PCV13 (Prevnar 13), PCV15 (Vaxneuvance), PCV20 (Prevnar 20)  * Pneumococcal polysaccharide vaccine = PPSV23 (Pneumovax) Adults 19-65 yo with certain risk factors or if 65+ yo  If never received any pneumonia vaccine: recommend Prevnar 20 (PCV20)  Give PCV20 if previously received 1 dose of PCV13 or PPSV23   Hepatitis B Vaccine 3 dose series if at intermediate or high risk (ex: diabetes, end stage renal disease, liver disease)   Respiratory syncytial virus (RSV) Vaccine - COVERED BY MEDICARE PART D  * RSVPreF3 (Arexvy) CDC recommends that adults 60 years of age and older may receive a single dose of RSV vaccine using shared clinical decision-making (SCDM)   Tetanus (Td) Vaccine - COST NOT COVERED BY MEDICARE PART B Following completion of primary series, a booster dose should be given every 10 years to maintain immunity against tetanus. Td may also be given as tetanus wound prophylaxis.   Tdap Vaccine - COST NOT COVERED BY MEDICARE PART B Recommended at least once for all adults. For pregnant patients, recommended with each pregnancy.   Shingles Vaccine (Shingrix) - COST NOT COVERED BY MEDICARE PART B  2 shot series recommended in those 19 years and older who have or will have weakened immune systems or those 50 years and older     Health Maintenance Due:      Topic Date Due    HIV Screening  Never done    Colorectal Cancer Screening  04/11/2033    Hepatitis C Screening  Completed     Immunizations Due:      Topic Date Due    HIB Vaccine (1 of 1 - Risk 1-dose series) Never done    Meningococcal ACWY Vaccine (1 - Risk 2-dose series) Never done    COVID-19 Vaccine (4 - 2023-24 season) 09/01/2023    Hepatitis B Vaccine (2 of 3 - Risk 3-dose series) 11/08/2023     Advance Directives   What are  advance directives?  Advance directives are legal documents that state your wishes and plans for medical care. These plans are made ahead of time in case you lose your ability to make decisions for yourself. Advance directives can apply to any medical decision, such as the treatments you want, and if you want to donate organs.   What are the types of advance directives?  There are many types of advance directives, and each state has rules about how to use them. You may choose a combination of any of the following:  Living will:  This is a written record of the treatment you want. You can also choose which treatments you do not want, which to limit, and which to stop at a certain time. This includes surgery, medicine, IV fluid, and tube feedings.   Durable power of  for healthcare (DPAHC):  This is a written record that states who you want to make healthcare choices for you when you are unable to make them for yourself. This person, called a proxy, is usually a family member or a friend. You may choose more than 1 proxy.  Do not resuscitate (DNR) order:  A DNR order is used in case your heart stops beating or you stop breathing. It is a request not to have certain forms of treatment, such as CPR. A DNR order may be included in other types of advance directives.  Medical directive:  This covers the care that you want if you are in a coma, near death, or unable to make decisions for yourself. You can list the treatments you want for each condition. Treatment may include pain medicine, surgery, blood transfusions, dialysis, IV or tube feedings, and a ventilator (breathing machine).  Values history:  This document has questions about your views, beliefs, and how you feel and think about life. This information can help others choose the care that you would choose.  Why are advance directives important?  An advance directive helps you control your care. Although spoken wishes may be used, it is better to have your  wishes written down. Spoken wishes can be misunderstood, or not followed. Treatments may be given even if you do not want them. An advance directive may make it easier for your family to make difficult choices about your care.   Weight Management   Why it is important to manage your weight:  Being overweight increases your risk of health conditions such as heart disease, high blood pressure, type 2 diabetes, and certain types of cancer. It can also increase your risk for osteoarthritis, sleep apnea, and other respiratory problems. Aim for a slow, steady weight loss. Even a small amount of weight loss can lower your risk of health problems.  How to lose weight safely:  A safe and healthy way to lose weight is to eat fewer calories and get regular exercise. You can lose up about 1 pound a week by decreasing the number of calories you eat by 500 calories each day.   Healthy meal plan for weight management:  A healthy meal plan includes a variety of foods, contains fewer calories, and helps you stay healthy. A healthy meal plan includes the following:  Eat whole-grain foods more often.  A healthy meal plan should contain fiber. Fiber is the part of grains, fruits, and vegetables that is not broken down by your body. Whole-grain foods are healthy and provide extra fiber in your diet. Some examples of whole-grain foods are whole-wheat breads and pastas, oatmeal, brown rice, and bulgur.  Eat a variety of vegetables every day.  Include dark, leafy greens such as spinach, kale, dipesh greens, and mustard greens. Eat yellow and orange vegetables such as carrots, sweet potatoes, and winter squash.   Eat a variety of fruits every day.  Choose fresh or canned fruit (canned in its own juice or light syrup) instead of juice. Fruit juice has very little or no fiber.  Eat low-fat dairy foods.  Drink fat-free (skim) milk or 1% milk. Eat fat-free yogurt and low-fat cottage cheese. Try low-fat cheeses such as mozzarella and other  reduced-fat cheeses.  Choose meat and other protein foods that are low in fat.  Choose beans or other legumes such as split peas or lentils. Choose fish, skinless poultry (chicken or turkey), or lean cuts of red meat (beef or pork). Before you cook meat or poultry, cut off any visible fat.   Use less fat and oil.  Try baking foods instead of frying them. Add less fat, such as margarine, sour cream, regular salad dressing and mayonnaise to foods. Eat fewer high-fat foods. Some examples of high-fat foods include french fries, doughnuts, ice cream, and cakes.  Eat fewer sweets.  Limit foods and drinks that are high in sugar. This includes candy, cookies, regular soda, and sweetened drinks.  Exercise:  Exercise at least 30 minutes per day on most days of the week. Some examples of exercise include walking, biking, dancing, and swimming. You can also fit in more physical activity by taking the stairs instead of the elevator or parking farther away from stores. Ask your healthcare provider about the best exercise plan for you.      © Copyright [employer] 2018 Information is for End User's use only and may not be sold, redistributed or otherwise used for commercial purposes. All illustrations and images included in CareNotes® are the copyrighted property of [employer] Inc[employer] or [employer]

## 2024-03-04 NOTE — PROGRESS NOTES
Assessment and Plan:     Problem List Items Addressed This Visit       Nonintractable epilepsy without status epilepticus (HCC)     Instructed patient and brother (caretaker) to take diazepam 5mg BID for 1 month then decrease to 5mg daily for 1 month. Will likely stop at that point, but will see in office again prior to stopping.          Relevant Orders    CBC and differential    Comprehensive metabolic panel    Protime-INR    Lipid Panel with Direct LDL reflex    Primary hypertension    Relevant Orders    CBC and differential    Lipid Panel with Direct LDL reflex    Venous stasis dermatitis of both lower extremities     Strongly advised taking breaks from triamcinolone cream as daily use can have side effects such as skin thinning and skin breakdown.  Has been using daily for weeks to months.  Using either every other day, OR 1 week on 1 week off, OR 2 weeks on 2 weeks off.         Relevant Medications    triamcinolone (KENALOG) 0.1 % ointment    Other cirrhosis of liver (HCC)     Check MELD labs  Sees GI this week, appreciate assistance in care  2/2024 had EGD with banding of esophageal varices         Relevant Orders    Comprehensive metabolic panel    Protime-INR    Lipid Panel with Direct LDL reflex    Non-pressure chronic ulcer of left calf, limited to breakdown of skin (HCC)     Requested he make an appointment with podiatry/wound care  Does not appear infected today         Relevant Medications    triamcinolone (KENALOG) 0.1 % ointment     Other Visit Diagnoses       Medicare annual wellness visit, subsequent    -  Primary    BMI 25.0-25.9,adult                 Preventive health issues were discussed with patient, and age appropriate screening tests were ordered as noted in patient's After Visit Summary.  Personalized health advice and appropriate referrals for health education or preventive services given if needed, as noted in patient's After Visit Summary.     History of Present Illness:     Patient  presents for a Medicare Wellness Visit    Jose Stewrat presents today for follow-up.  He is accompanied by his brother who provides all of the history.  Patient has been doing well lately without any major complaints.  Had an EGD last month with banding of esophageal varices.  Due to see GI again in the office this week.  Reports he has been using triamcinolone cream on bilateral lower extremities daily for weeks to months.  He does have some small excoriations on the bilateral lower extremities with 1 wound on the left lower extremity.       Patient Care Team:  Daphne Guzman MD as PCP - General (Internal Medicine)  Ben Dotosn MD (Surgical Oncology)     Review of Systems:     Review of Systems   Skin:  Positive for wound.   All other systems reviewed and are negative.       Problem List:     Patient Active Problem List   Diagnosis    Ambulatory dysfunction    Nonintractable epilepsy without status epilepticus (HCC)    Primary hypertension    Gastroesophageal reflux disease without esophagitis    Venous stasis dermatitis of both lower extremities    Anemia    Status post splenectomy    Esophageal varices (HCC)    Legally blind in right eye, as defined in [place]    Bilateral hearing loss    Angiodysplasia    History of TB (tuberculosis)    History of prediabetes    Other cirrhosis of liver (HCC)    Gallbladder mass    Hypomagnesemia    Status post cholecystectomy    Platelets decreased (HCC)    Non-pressure chronic ulcer of left calf, limited to breakdown of skin (HCC)      Past Medical and Surgical History:     Past Medical History:   Diagnosis Date    Anxiety     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    Cellulitis of left lower extremity 02/08/2022    GERD (gastroesophageal reflux disease)     Hypertension     Hypoxia 02/11/2022    Mental developmental delay     mumps as a child, developed rheumatic fever    MRSA bacteremia 02/09/2022    Seizures (HCC)      Past Surgical History:   Procedure  Laterality Date    COLONOSCOPY      NM LAPAROSCOPY SURG CHOLECYSTECTOMY N/A 8/25/2023    Procedure: CHOLECYSTECTOMY OPEN;  Surgeon: Ran Fletcher DO;  Location: [facility];  Service: General    SPLENECTOMY      UPPER GASTROINTESTINAL ENDOSCOPY        Family History:     Family History   Problem Relation Age of Onset    Depression Mother     Heart disease Father       Social History:     Social History     Socioeconomic History    Marital status: Single     Spouse name: None    Number of children: None    Years of education: None    Highest education level: None   Occupational History    None   Tobacco Use    Smoking status: Never    Smokeless tobacco: Never   Vaping Use    Vaping status: Never Used   Substance and Sexual Activity    Alcohol use: Never    Drug use: Never    Sexual activity: Not Currently   Other Topics Concern    None   Social History Narrative    None     Social Determinants of Health     Financial Resource Strain: Low Risk  (3/4/2024)    Overall Financial Resource Strain (CARDIA)     Difficulty of Paying Living Expenses: Not hard at all   Food Insecurity: No Food Insecurity (8/27/2023)    Hunger Vital Sign     Worried About Running Out of Food in the Last Year: Never true     Ran Out of Food in the Last Year: Never true   Transportation Needs: No Transportation Needs (3/4/2024)    PRAPARE - Transportation     Lack of Transportation (Medical): No     Lack of Transportation (Non-Medical): No   Physical Activity: Not on file   Stress: Not on file   Social Connections: Not on file   Intimate Partner Violence: Not on file   Housing Stability: Low Risk  (8/27/2023)    Housing Stability Vital Sign     Unable to Pay for Housing in the Last Year: No     Number of Places Lived in the Last Year: 1     Unstable Housing in the Last Year: No      Medications and Allergies:     Current Outpatient Medications   Medication Sig Dispense Refill    acetaminophen (TYLENOL) 325 mg tablet Take 2 tablets (650 mg  total) by mouth every 6 (six) hours as needed for mild pain  0    diazepam (VALIUM) 5 mg tablet Take 1 tablet (5 mg total) by mouth every 12 (twelve) hours 60 tablet 1    folic acid (FOLVITE) 1 mg tablet TAKE 1 TABLET (1 MG TOTAL) BY MOUTH IN THE MORNING. NOT COVERED 90 tablet 1    furosemide (LASIX) 20 mg tablet TAKE 1 TABLET (20 MG TOTAL) BY MOUTH IN THE MORNING AND IN THE EVENING 180 tablet 1    iron polysaccharides (FERREX) 150 mg capsule Take 1 capsule (150 mg total) by mouth in the morning 90 capsule 1    levETIRAcetam (KEPPRA) 500 mg tablet TAKE 1 TABLET BY MOUTH EVERY 12 HOURS 180 tablet 1    nadolol (CORGARD) 40 mg tablet TAKE 1 TABLET (40 MG TOTAL) BY MOUTH IN THE MORNING 90 tablet 1    OXcarbazepine (TRILEPTAL) 600 mg tablet TAKE 1 TABLET BY MOUTH EVERY 12 HOURS 180 tablet 1    pantoprazole (PROTONIX) 40 mg tablet TAKE 1 TABLET BY MOUTH IN THE MORNING AND IN THE EVENING BEFORE MEALS 180 tablet 1    triamcinolone (KENALOG) 0.1 % ointment Apply sparingly to affected areas of legs 2-4 times a day for up to 2 weeks. 454 g 0    sucralfate (CARAFATE) 1 g/10 mL suspension Take 10 mL (1 g total) by mouth 4 (four) times a day (with meals and at bedtime) for 7 days 280 mL 0     No current facility-administered medications for this visit.     No Known Allergies   Immunizations:     Immunization History   Administered Date(s) Administered    COVID-19, unspecified 08/07/2021, 09/06/2021, 06/02/2022    Hep A, adult 10/11/2023    Hep B, adult 10/11/2023    Influenza, injectable, quadrivalent, preservative free 0.5 mL 10/11/2023    Pneumococcal Conjugate Vaccine 20-valent (Pcv20), Polysace 05/11/2022, 05/11/2022, 10/11/2023    Tdap 10/27/2014      Health Maintenance:         Topic Date Due    HIV Screening  Never done    Colorectal Cancer Screening  04/11/2033    Hepatitis C Screening  Completed         Topic Date Due    HIB Vaccine (1 of 1 - Risk 1-dose series) Never done    Meningococcal ACWY Vaccine (1 - Risk 2-dose  series) Never done    COVID-19 Vaccine (4 - 2023-24 season) 09/01/2023    Hepatitis B Vaccine (2 of 3 - Risk 3-dose series) 11/08/2023      Medicare Screening Tests and Risk Assessments:         Health Risk Assessment:   Patient rates overall health as good. Patient feels that their physical health rating is slightly better. Patient is satisfied with their life. Eyesight was rated as same. Hearing was rated as same. Patient feels that their emotional and mental health rating is same. Patients states they are never, rarely angry. Patient states they are sometimes unusually tired/fatigued. Pain experienced in the last 7 days has been none. Patient states that he has experienced no weight loss or gain in last 6 months.     Depression Screening:   PHQ-2 Score: 0      Fall Risk Screening:   In the past year, patient has experienced: history of falling in past year    Number of falls: 1  Injured during fall?: No    Feels unsteady when standing or walking?: No    Worried about falling?: No      Home Safety:  Patient does not have trouble with stairs inside or outside of their home. Patient has working smoke alarms and has working carbon monoxide detector. Home safety hazards include: none.     Nutrition:   Current diet is Regular.     Medications:   Patient is not currently taking any over-the-counter supplements. Patient is able to manage medications.     Activities of Daily Living (ADLs)/Instrumental Activities of Daily Living (IADLs):   Walk and transfer into and out of bed and chair?: Yes  Dress and groom yourself?: Yes    Bathe or shower yourself?: Yes    Feed yourself? Yes  Do your laundry/housekeeping?: No  Manage your money, pay your bills and track your expenses?: No  Make your own meals?: Yes    Do your own shopping?: No    Previous Hospitalizations:   Any hospitalizations or ED visits within the last 12 months?: Yes    How many hospitalizations have you had in the last year?: 1-2    PREVENTIVE SCREENINGS       "Cardiovascular Screening:    General: Screening Current      Diabetes Screening:     General: Screening Current      Colorectal Cancer Screening:     General: Screening Current      Lung Cancer Screening:     General: Screening Not Indicated      Hepatitis C Screening:    General: Screening Current    Screening, Brief Intervention, and Referral to Treatment (SBIRT)    Screening      AUDIT-C Screenin) How often did you have a drink containing alcohol in the past year? never  2) How many drinks did you have on a typical day when you were drinking in the past year? 0  3) How often did you have 6 or more drinks on one occasion in the past year? never    AUDIT-C Score: 0  Interpretation: Score 0-3 (male): Negative screen for alcohol misuse    Single Item Drug Screening:  How often have you used an illegal drug (including marijuana) or a prescription medication for non-medical reasons in the past year? never    Single Item Drug Screen Score: 0  Interpretation: Negative screen for possible drug use disorder    No results found.     Physical Exam:     BP 98/60 (BP Location: Right arm, Patient Position: Sitting, Cuff Size: Adult)   Pulse 74   Temp 97.9 °F (36.6 °C) (Tympanic)   Resp 14   Ht 5' 10\" (1.778 m)   Wt 82.1 kg (181 lb)   SpO2 97%   BMI 25.97 kg/m²     Physical Exam  Constitutional:       General: He is not in acute distress.     Appearance: He is not ill-appearing.   HENT:      Mouth/Throat:      Mouth: Mucous membranes are moist.      Pharynx: Oropharynx is clear.   Cardiovascular:      Rate and Rhythm: Normal rate and regular rhythm.      Heart sounds: No murmur heard.  Pulmonary:      Effort: Pulmonary effort is normal. No respiratory distress.      Breath sounds: Normal breath sounds.   Abdominal:      General: Abdomen is flat. There is distension.      Palpations: Abdomen is soft.      Tenderness: There is no abdominal tenderness.   Musculoskeletal:      Right lower leg: Edema present.      Left " lower leg: Edema present.   Skin:     General: Skin is warm and dry.      Comments: Small scattered excoriations bilateral lower extremities.  1 wound on left lower leg approximately 1 x 2 cm without any purulent drainage, erythema, or necrotic tissue.   Neurological:      Mental Status: He is alert. Mental status is at baseline.   Psychiatric:         Mood and Affect: Mood normal.          Bennie Martin, DO

## 2024-03-04 NOTE — ASSESSMENT & PLAN NOTE
Instructed patient and brother (caretaker) to take diazepam 5mg BID for 1 month then decrease to 5mg daily for 1 month. Will likely stop at that point, but will see in office again prior to stopping.

## 2024-03-04 NOTE — ASSESSMENT & PLAN NOTE
Check MELD labs  Sees GI this week, appreciate assistance in care  2/2024 had EGD with banding of esophageal varices

## 2024-03-04 NOTE — ASSESSMENT & PLAN NOTE
Strongly advised taking breaks from triamcinolone cream as daily use can have side effects such as skin thinning and skin breakdown.  Has been using daily for weeks to months.  Using either every other day, OR 1 week on 1 week off, OR 2 weeks on 2 weeks off.

## 2024-03-05 ENCOUNTER — OFFICE VISIT (OUTPATIENT)
Dept: GASTROENTEROLOGY | Facility: CLINIC | Age: 59
End: 2024-03-05
Payer: MEDICARE

## 2024-03-05 VITALS
BODY MASS INDEX: 25.74 KG/M2 | WEIGHT: 179.8 LBS | SYSTOLIC BLOOD PRESSURE: 106 MMHG | DIASTOLIC BLOOD PRESSURE: 70 MMHG | HEIGHT: 70 IN | TEMPERATURE: 96.6 F

## 2024-03-05 DIAGNOSIS — Z11.59 ENCOUNTER FOR SCREENING FOR OTHER VIRAL DISEASES: ICD-10-CM

## 2024-03-05 DIAGNOSIS — I85.00 ESOPHAGEAL VARICES WITHOUT BLEEDING, UNSPECIFIED ESOPHAGEAL VARICES TYPE (HCC): ICD-10-CM

## 2024-03-05 DIAGNOSIS — K74.69 OTHER CIRRHOSIS OF LIVER (HCC): Primary | ICD-10-CM

## 2024-03-05 PROCEDURE — 99214 OFFICE O/P EST MOD 30 MIN: CPT | Performed by: STUDENT IN AN ORGANIZED HEALTH CARE EDUCATION/TRAINING PROGRAM

## 2024-03-05 PROCEDURE — G2211 COMPLEX E/M VISIT ADD ON: HCPCS | Performed by: STUDENT IN AN ORGANIZED HEALTH CARE EDUCATION/TRAINING PROGRAM

## 2024-03-05 RX ORDER — NADOLOL 40 MG/1
60 TABLET ORAL DAILY
Qty: 90 TABLET | Refills: 1 | Status: SHIPPED | OUTPATIENT
Start: 2024-03-05

## 2024-03-05 NOTE — PROGRESS NOTES
[facility] - Outpatient Consultation  Jose Stewart 58 y.o. male MRN: [id number]  Encounter: [id number]      PCP: Daphne Guzman MD  Referring: No referring provider defined for this encounter.      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    1. Decompensated cirrhosis: MELD 3.0 12 , Child-[place] B  - Etiology: Cryptogenic.  Patient has negative serological workup and denies significant alcohol use.    2. Transplant evaluation: Not indicated    3. Ascites   - Patient had nonhealing wound after his cholecystectomy but no significant ascites was seen.     4. Varices  - Multiple EGDs with banding.  He has had 3 in the past 6 months.  - Currently on nadolol 40 mg daily.  Will increase the dose to 60 mg daily as his heart rate and the clinic was in 70s  - Next EGD scheduled in April 5. HCC surveillance  His last imaging with ultrasound liver was done in November 2023 and showed no evidence of HCC. As there is an annual risk of HCC of 1-2%, repeat imaging will need to be done every 6 months for early detection.    - Imaging and AFP next due in May 2024.    6. Nutrition and sarcopenia  - His was instructed to eat multiple small meals a day and a snack prior to bedtime to help prevent protein loss and sarcopenia    7. Colorectal cancer screening  - His is up-to-date on his colorectal cancer screening     8-. Healthcare maintenance for patients with cirrhosis  -He was instructed to take no more than 2 grams of tylenol in 24 hours and no products containing NSAIDs, benzodiazapines, and narcotics.  -He was also instructed to avoid raw shellfish   -He should participate in daily exercise as to prevent loss of muscle mass  -He should abstain from all alcohol intake and was counseled on this.    -Will check vitamin D level  -HAV and HBV immunity serologies will be  checked  -Additionally, he should receive a yearly flu shot and the pneumonia vaccine through his primary care provider.       Follow-up in clinic in 4 to 5 months    Ana Hager MD   Gastroenterology Fellow      ______________________________________________________________________    CC:  Chief Complaint   Patient presents with    Follow-up     egd       HPI:  58 yr old M w/ history of HTN, GERD, seizures, traumatic splenectomy, decompensated cryptogenic cirrhosis complicated by esophageal varices.    Patient is accompanied by his brother who provides most of the history.    Has had multiple EGDs for esophageal varices and is currently on nadolol 40 mg daily.  According to the patient's brother he is not eating well and mostly eats junk food.  Does not like to eat any vegetables and really likes to eat meat.      REVIEW OF SYSTEMS:    CONSTITUTIONAL: Denies any fever, chills, rigors, and weight loss.  HEENT: No earache or tinnitus. Denies hearing loss or visual disturbances.  CARDIOVASCULAR: No chest pain or palpitations.   RESPIRATORY: Denies any cough, hemoptysis, shortness of breath or dyspnea on exertion.  GASTROINTESTINAL: As noted in the History of Present Illness.   GENITOURINARY: No problems with urination. Denies any hematuria or dysuria.  NEUROLOGIC: No dizziness or vertigo, denies headaches.   MUSCULOSKELETAL: Denies any muscle or joint pain.   SKIN: Denies skin rashes or itching.   ENDOCRINE: Denies excessive thirst. Denies intolerance to heat or cold.  PSYCHOSOCIAL: Denies depression or anxiety. Denies any recent memory loss.       Historical Information   Past Medical History:   Diagnosis Date    Anxiety     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    Cellulitis of left lower extremity 02/08/2022    GERD (gastroesophageal reflux disease)     Hypertension     Hypoxia 02/11/2022    Mental developmental delay     mumps as a child, developed rheumatic fever    MRSA bacteremia 02/09/2022     "Seizures (HCC)      Past Surgical History:   Procedure Laterality Date    COLONOSCOPY      [place] LAPAROSCOPY SURG CHOLECYSTECTOMY N/A 8/25/2023    Procedure: CHOLECYSTECTOMY OPEN;  Surgeon: Ran Fletcher DO;  [facility]: [facility];  Service: General    SPLENECTOMY      UPPER GASTROINTESTINAL ENDOSCOPY       Social History   Social History     Substance and Sexual Activity   Alcohol Use Never     Social History     Substance and Sexual Activity   Drug Use Never     Social History     Tobacco Use   Smoking Status Never   Smokeless Tobacco Never     Family History   Problem Relation Age of Onset    Depression Mother     Heart disease Father        Meds/Allergies       Current Outpatient Medications:     acetaminophen (TYLENOL) 325 mg tablet    diazepam (VALIUM) 5 mg tablet    [START ON 3/25/2024] diazepam (VALIUM) 5 mg tablet    folic acid (FOLVITE) 1 mg tablet    furosemide (LASIX) 20 mg tablet    iron polysaccharides (FERREX) 150 mg capsule    levETIRAcetam (KEPPRA) 500 mg tablet    nadolol (CORGARD) 40 mg tablet    OXcarbazepine (TRILEPTAL) 600 mg tablet    pantoprazole (PROTONIX) 40 mg tablet    triamcinolone (KENALOG) 0.1 % ointment    sucralfate (CARAFATE) 1 g/10 mL suspension    No Known Allergies        Objective     Blood pressure 106/70, temperature (!) 96.6 °F (35.9 °C), temperature source Tympanic, height 5' 10\" (1.778 m), weight 81.6 kg (179 lb 12.8 oz). Body mass index is 25.8 kg/m².     PHYSICAL EXAM:      General Appearance:   Alert, cooperative, no distress   HEENT:   Normocephalic, atraumatic, anicteric.     Neck:  Supple, symmetrical, trachea midline   Lungs:   Clear to auscultation bilaterally; no rales, rhonchi or wheezing; respirations unlabored    Heart::   Regular rate and rhythm; no murmur, rub, or gallop.   Abdomen:   Soft, non-tender, non-distended; normal bowel sounds; no masses, no organomegaly    Genitalia:   Deferred    Rectal:   Deferred    Extremities:  No cyanosis, clubbing or " edema    Pulses:  2+ and symmetric    Skin:  No jaundice, rashes, or lesions    Lymph nodes:  No palpable cervical lymphadenopathy        Lab Results:     Lab Results   Component Value Date    WBC 5.68 11/13/2023    HGB 7.3 (L) 11/13/2023    HCT 26.4 (L) 11/13/2023    MCV 85 11/13/2023     11/13/2023       Lab Results   Component Value Date    K 3.2 (L) 11/13/2023     11/13/2023    CO2 34 (H) 11/13/2023    BUN 8 11/13/2023    CREATININE 0.58 (L) 11/13/2023    GLUCOSE 132 08/25/2023    GLUF 93 11/13/2023    CALCIUM 7.5 (L) 11/13/2023    CORRECTEDCA 8.9 11/13/2023    AST 14 11/13/2023    ALT 7 11/13/2023    ALKPHOS 107 (H) 11/13/2023    EGFR 110 11/13/2023       Lab Results   Component Value Date    INR 1.37 (H) 11/13/2023    INR 1.33 (H) 08/26/2023    INR 1.30 (H) 08/25/2023    PROTIME 16.7 (H) 11/13/2023    PROTIME 17.2 (H) 08/26/2023    PROTIME 16.9 (H) 08/25/2023         Radiology Results:   EGD    Result Date: 2/8/2024  Narrative: Table formatting from the original result was not included. [facility] [address] [place] [address] [id number] [phone] DATE OF SERVICE: 2/08/24 PHYSICIAN(S): Attending: Mimi Plata MD Fellow: Tree Connors MD INDICATION: Secondary esophageal varices with bleeding (HCC) POST-OP DIAGNOSIS: See the impression below. PREPROCEDURE: Informed consent was obtained for the procedure, including sedation.  Risks of perforation, hemorrhage, adverse drug reaction and aspiration were discussed. The patient was placed in the left lateral decubitus position. Patient was explained about the risks and benefits of the procedure. Risks including but not limited to bleeding, infection, and perforation were explained in detail. Also explained about less than 100% sensitivity with the exam and other alternatives. PROCEDURE: EGD DETAILS OF PROCEDURE: Patient was taken to the procedure room where a time out was performed to confirm correct patient and correct  "procedure. The patient underwent monitored anesthesia care, which was administered by an anesthesia professional. The patient's blood pressure, heart rate, level of consciousness, respirations, oxygen and ETCO2 were monitored throughout the procedure. The scope was advanced to the second part of the duodenum. Retroflexion was performed in the fundus. The patient experienced no blood loss. The procedure was not difficult. The patient tolerated the procedure well. There were no apparent adverse events. ANESTHESIA INFORMATION: ASA: ASA status not filed in the log. Anesthesia Type: Anesthesia type not filed in the log. MEDICATIONS: No administrations occurring from 1141 to 1158 on 02/08/24 FINDINGS: Multiple large varices in the lower third of the esophagus without high risk stigmata or signs of recent bleeding; placed 4 bands successfully, resulting in complete eradication Z-line 45 cm from the incisors Abnormal mucosa in the antrum, prepyloric region and pylorus. Erythematous nodules and linear erythema c/w portal hypertensive gastropathy and GAVE. Biopsied  with cold forceps of representative samples of nodules The duodenum appeared normal. SPECIMENS: ID Type Source Tests Collected by Time Destination 1 : Cold Targeted Biopsy Gastric Polyp Tissue Polyp, Stomach/Small Intestine TISSUE EXAM Mimi Plata MD 2/8/2024 11:47 AM      Impression: Multiple large varices in the lower third of the esophagus; placed 4 bands successfully, resulting in complete eradication Abnormal mucosa in the antrum, prepyloric region and pylorus The duodenum appeared normal. RECOMMENDATION:  Await pathology results  Schedule repeat EGD  Sucralfate PRN x7 days for pain Soft diet x2 days, advance as tolerated Continue nadolol for secondary prophylaxis.    Mimi Plata MD       Portions of the record may have been created with voice recognition software. Occasional wrong word or \"sound a like\" substitutions may have occurred due to the inherent " limitations of voice recognition software. Read the chart carefully and recognize, using context, where substitutions have occurred.

## 2024-03-08 ENCOUNTER — TELEPHONE (OUTPATIENT)
Age: 59
End: 2024-03-08

## 2024-03-08 NOTE — TELEPHONE ENCOUNTER
Patients GI provider:  Dr. Plata    Number to return call: [phone]    Reason for call: Pt's sister in law called in regard to call she received to schedule pt's US. She stated she was asked if US was being done for a liver transplant.      Scheduled procedure/appointment date if applicable: 4/10/2024

## 2024-03-10 DIAGNOSIS — G40.909 NONINTRACTABLE EPILEPSY WITHOUT STATUS EPILEPTICUS, UNSPECIFIED EPILEPSY TYPE (HCC): ICD-10-CM

## 2024-03-11 RX ORDER — OXCARBAZEPINE 600 MG/1
TABLET, FILM COATED ORAL
Qty: 180 TABLET | Refills: 1 | Status: SHIPPED | OUTPATIENT
Start: 2024-03-11

## 2024-03-13 ENCOUNTER — HOSPITAL ENCOUNTER (OUTPATIENT)
Dept: ULTRASOUND IMAGING | Facility: HOSPITAL | Age: 59
Discharge: HOME | End: 2024-03-13
Attending: STUDENT IN AN ORGANIZED HEALTH CARE EDUCATION/TRAINING PROGRAM
Payer: MEDICARE

## 2024-03-13 DIAGNOSIS — K74.69 OTHER CIRRHOSIS OF LIVER (HCC): ICD-10-CM

## 2024-03-13 PROCEDURE — 76705 ECHO EXAM OF ABDOMEN: CPT

## 2024-03-25 ENCOUNTER — TELEPHONE (OUTPATIENT)
Dept: NEUROLOGY | Facility: CLINIC | Age: 59
End: 2024-03-25

## 2024-03-28 ENCOUNTER — APPOINTMENT (OUTPATIENT)
Dept: LAB | Facility: AMBULARY SURGERY CENTER | Age: 59
End: 2024-03-28
Payer: MEDICARE

## 2024-03-28 DIAGNOSIS — D50.0 IRON DEFICIENCY ANEMIA DUE TO CHRONIC BLOOD LOSS: ICD-10-CM

## 2024-03-28 DIAGNOSIS — K74.69 OTHER CIRRHOSIS OF LIVER (HCC): ICD-10-CM

## 2024-03-28 DIAGNOSIS — Z11.59 ENCOUNTER FOR SCREENING FOR OTHER VIRAL DISEASES: ICD-10-CM

## 2024-03-28 LAB
25(OH)D3 SERPL-MCNC: 13.8 NG/ML (ref 30–100)
AFP-TM SERPL-MCNC: 1.62 NG/ML (ref 0–9)
ALBUMIN SERPL BCP-MCNC: 3 G/DL (ref 3.5–5)
ALP SERPL-CCNC: 89 U/L (ref 34–104)
ALT SERPL W P-5'-P-CCNC: 7 U/L (ref 7–52)
ANION GAP SERPL CALCULATED.3IONS-SCNC: 6 MMOL/L (ref 4–13)
AST SERPL W P-5'-P-CCNC: 18 U/L (ref 13–39)
BASOPHILS # BLD AUTO: 0.03 THOUSANDS/ÂΜL (ref 0–0.1)
BASOPHILS NFR BLD AUTO: 1 % (ref 0–1)
BILIRUB SERPL-MCNC: 0.36 MG/DL (ref 0.2–1)
BUN SERPL-MCNC: 9 MG/DL (ref 5–25)
CALCIUM ALBUM COR SERPL-MCNC: 9.1 MG/DL (ref 8.3–10.1)
CALCIUM SERPL-MCNC: 8.3 MG/DL (ref 8.4–10.2)
CHLORIDE SERPL-SCNC: 100 MMOL/L (ref 96–108)
CO2 SERPL-SCNC: 32 MMOL/L (ref 21–32)
CREAT SERPL-MCNC: 0.66 MG/DL (ref 0.6–1.3)
EOSINOPHIL # BLD AUTO: 0.34 THOUSAND/ÂΜL (ref 0–0.61)
EOSINOPHIL NFR BLD AUTO: 9 % (ref 0–6)
ERYTHROCYTE [DISTWIDTH] IN BLOOD BY AUTOMATED COUNT: 16.3 % (ref 11.6–15.1)
FERRITIN SERPL-MCNC: 14 NG/ML (ref 24–336)
GFR SERPL CREATININE-BSD FRML MDRD: 105 ML/MIN/1.73SQ M
GLUCOSE P FAST SERPL-MCNC: 88 MG/DL (ref 65–99)
HAV AB SER QL IA: NORMAL
HBV SURFACE AB SER-ACNC: <3 MIU/ML
HCT VFR BLD AUTO: 30 % (ref 36.5–49.3)
HGB BLD-MCNC: 8.1 G/DL (ref 12–17)
IMM GRANULOCYTES # BLD AUTO: 0.02 THOUSAND/UL (ref 0–0.2)
IMM GRANULOCYTES NFR BLD AUTO: 1 % (ref 0–2)
INR PPP: 1.29 (ref 0.84–1.19)
IRON SATN MFR SERPL: 7 % (ref 15–50)
IRON SERPL-MCNC: 21 UG/DL (ref 50–212)
LYMPHOCYTES # BLD AUTO: 0.95 THOUSANDS/ÂΜL (ref 0.6–4.47)
LYMPHOCYTES NFR BLD AUTO: 24 % (ref 14–44)
MCH RBC QN AUTO: 22.3 PG (ref 26.8–34.3)
MCHC RBC AUTO-ENTMCNC: 27 G/DL (ref 31.4–37.4)
MCV RBC AUTO: 83 FL (ref 82–98)
MONOCYTES # BLD AUTO: 0.3 THOUSAND/ÂΜL (ref 0.17–1.22)
MONOCYTES NFR BLD AUTO: 8 % (ref 4–12)
NEUTROPHILS # BLD AUTO: 2.37 THOUSANDS/ÂΜL (ref 1.85–7.62)
NEUTS SEG NFR BLD AUTO: 57 % (ref 43–75)
NRBC BLD AUTO-RTO: 0 /100 WBCS
PLATELET # BLD AUTO: 237 THOUSANDS/UL (ref 149–390)
PMV BLD AUTO: 9.2 FL (ref 8.9–12.7)
POTASSIUM SERPL-SCNC: 3.9 MMOL/L (ref 3.5–5.3)
PROT SERPL-MCNC: 7.9 G/DL (ref 6.4–8.4)
PROTHROMBIN TIME: 15.9 SECONDS (ref 11.6–14.5)
RBC # BLD AUTO: 3.63 MILLION/UL (ref 3.88–5.62)
SODIUM SERPL-SCNC: 138 MMOL/L (ref 135–147)
TIBC SERPL-MCNC: 316 UG/DL (ref 250–450)
UIBC SERPL-MCNC: 295 UG/DL (ref 155–355)
WBC # BLD AUTO: 4.01 THOUSAND/UL (ref 4.31–10.16)

## 2024-03-28 PROCEDURE — 83550 IRON BINDING TEST: CPT

## 2024-03-28 PROCEDURE — 36415 COLL VENOUS BLD VENIPUNCTURE: CPT

## 2024-03-28 PROCEDURE — 83540 ASSAY OF IRON: CPT

## 2024-03-28 PROCEDURE — 82728 ASSAY OF FERRITIN: CPT

## 2024-03-28 PROCEDURE — 80053 COMPREHEN METABOLIC PANEL: CPT

## 2024-03-28 PROCEDURE — 86706 HEP B SURFACE ANTIBODY: CPT

## 2024-03-28 PROCEDURE — 86708 HEPATITIS A ANTIBODY: CPT

## 2024-03-28 PROCEDURE — 85025 COMPLETE CBC W/AUTO DIFF WBC: CPT

## 2024-03-28 PROCEDURE — 82306 VITAMIN D 25 HYDROXY: CPT

## 2024-03-28 PROCEDURE — 82105 ALPHA-FETOPROTEIN SERUM: CPT

## 2024-03-28 PROCEDURE — 85610 PROTHROMBIN TIME: CPT

## 2024-03-29 DIAGNOSIS — E55.9 VITAMIN D DEFICIENCY: Primary | ICD-10-CM

## 2024-03-29 RX ORDER — ERGOCALCIFEROL 1.25 MG/1
50000 CAPSULE ORAL WEEKLY
Qty: 8 CAPSULE | Refills: 0 | Status: SHIPPED | OUTPATIENT
Start: 2024-03-29 | End: 2024-05-18

## 2024-04-04 ENCOUNTER — TELEPHONE (OUTPATIENT)
Dept: HEMATOLOGY ONCOLOGY | Facility: CLINIC | Age: 59
End: 2024-04-04

## 2024-04-04 DIAGNOSIS — I10 PRIMARY HYPERTENSION: ICD-10-CM

## 2024-04-04 RX ORDER — FUROSEMIDE 20 MG/1
TABLET ORAL
Qty: 180 TABLET | Refills: 1 | Status: SHIPPED | OUTPATIENT
Start: 2024-04-04

## 2024-04-04 NOTE — TELEPHONE ENCOUNTER
Left message on patient's phone indicating to have labs drawn prior to appointment.  Indicated that the scripts are in the system, the tests are non-fasting and that patient can go to any [facility] facility to have the labs drawn.  Provided callback number [phone].

## 2024-04-05 DIAGNOSIS — G40.909 NONINTRACTABLE EPILEPSY WITHOUT STATUS EPILEPTICUS, UNSPECIFIED EPILEPSY TYPE (HCC): ICD-10-CM

## 2024-04-05 RX ORDER — LEVETIRACETAM 500 MG/1
500 TABLET ORAL EVERY 12 HOURS
Qty: 180 TABLET | Refills: 1 | Status: SHIPPED | OUTPATIENT
Start: 2024-04-05 | End: 2024-04-11 | Stop reason: SDUPTHER

## 2024-04-08 ENCOUNTER — TELEPHONE (OUTPATIENT)
Age: 59
End: 2024-04-08

## 2024-04-08 ENCOUNTER — TELEPHONE (OUTPATIENT)
Dept: NEUROLOGY | Facility: CLINIC | Age: 59
End: 2024-04-08

## 2024-04-08 NOTE — TELEPHONE ENCOUNTER
Pt's sister calling because they missed a call. I did not see any notes on the chart. Confirmed procedure with pt's sister

## 2024-04-09 ENCOUNTER — ANESTHESIA EVENT (OUTPATIENT)
Dept: GASTROENTEROLOGY | Facility: HOSPITAL | Age: 59
End: 2024-04-09

## 2024-04-09 RX ORDER — SODIUM CHLORIDE, SODIUM LACTATE, POTASSIUM CHLORIDE, CALCIUM CHLORIDE 600; 310; 30; 20 MG/100ML; MG/100ML; MG/100ML; MG/100ML
50 INJECTION, SOLUTION INTRAVENOUS CONTINUOUS
Status: CANCELLED | OUTPATIENT
Start: 2024-04-09

## 2024-04-10 ENCOUNTER — ANESTHESIA (OUTPATIENT)
Dept: GASTROENTEROLOGY | Facility: HOSPITAL | Age: 59
End: 2024-04-10

## 2024-04-10 ENCOUNTER — HOSPITAL ENCOUNTER (OUTPATIENT)
Dept: GASTROENTEROLOGY | Facility: HOSPITAL | Age: 59
Setting detail: OUTPATIENT SURGERY
Discharge: HOME | End: 2024-04-10
Attending: STUDENT IN AN ORGANIZED HEALTH CARE EDUCATION/TRAINING PROGRAM
Payer: MEDICARE

## 2024-04-10 VITALS
OXYGEN SATURATION: 92 % | RESPIRATION RATE: 12 BRPM | SYSTOLIC BLOOD PRESSURE: 102 MMHG | HEIGHT: 70 IN | TEMPERATURE: 97.6 F | HEART RATE: 66 BPM | DIASTOLIC BLOOD PRESSURE: 62 MMHG | WEIGHT: 181.1 LBS | BODY MASS INDEX: 25.93 KG/M2

## 2024-04-10 DIAGNOSIS — I85.11 SECONDARY ESOPHAGEAL VARICES WITH BLEEDING (HCC): ICD-10-CM

## 2024-04-10 RX ORDER — LIDOCAINE HYDROCHLORIDE 20 MG/ML
INJECTION, SOLUTION EPIDURAL; INFILTRATION; INTRACAUDAL; PERINEURAL AS NEEDED
Status: DISCONTINUED | OUTPATIENT
Start: 2024-04-10 | End: 2024-04-10

## 2024-04-10 RX ORDER — PROPOFOL 10 MG/ML
INJECTION, EMULSION INTRAVENOUS CONTINUOUS PRN
Status: DISCONTINUED | OUTPATIENT
Start: 2024-04-10 | End: 2024-04-10

## 2024-04-10 RX ORDER — PROPOFOL 10 MG/ML
INJECTION, EMULSION INTRAVENOUS AS NEEDED
Status: DISCONTINUED | OUTPATIENT
Start: 2024-04-10 | End: 2024-04-10

## 2024-04-10 RX ORDER — SODIUM CHLORIDE, SODIUM LACTATE, POTASSIUM CHLORIDE, CALCIUM CHLORIDE 600; 310; 30; 20 MG/100ML; MG/100ML; MG/100ML; MG/100ML
INJECTION, SOLUTION INTRAVENOUS CONTINUOUS PRN
Status: DISCONTINUED | OUTPATIENT
Start: 2024-04-10 | End: 2024-04-10

## 2024-04-10 RX ADMIN — PROPOFOL 50 MG: 10 INJECTION, EMULSION INTRAVENOUS at 08:44

## 2024-04-10 RX ADMIN — PROPOFOL 125 MCG/KG/MIN: 10 INJECTION, EMULSION INTRAVENOUS at 08:45

## 2024-04-10 RX ADMIN — SODIUM CHLORIDE, SODIUM LACTATE, POTASSIUM CHLORIDE, AND CALCIUM CHLORIDE: .6; .31; .03; .02 INJECTION, SOLUTION INTRAVENOUS at 08:37

## 2024-04-10 RX ADMIN — PROPOFOL 100 MG: 10 INJECTION, EMULSION INTRAVENOUS at 08:40

## 2024-04-10 RX ADMIN — LIDOCAINE HYDROCHLORIDE 80 MG: 20 INJECTION, SOLUTION EPIDURAL; INFILTRATION; INTRACAUDAL; PERINEURAL at 08:40

## 2024-04-10 NOTE — ANESTHESIA PREPROCEDURE EVALUATION
Procedure:  EGD    Relevant Problems   CARDIO   (+) Primary hypertension      GI/HEPATIC   (+) Gastroesophageal reflux disease without esophagitis   (+) Other cirrhosis of liver (HCC)      HEMATOLOGY   (+) Anemia      NEURO/PSYCH   (+) Legally blind in right eye, as defined in [place]   (+) Nonintractable epilepsy without status epilepticus (HCC)     Sinus rhythm with 1st degree A-V block  Nonspecific ST and T wave abnormality  Abnormal ECG  No previous ECGs available  Confirmed by Terrence Webb ([id number]) on 2/9/2022 12:52:06 PM  ECHO 2/9/22:        Left Ventricle: Left ventricular cavity size is upper normal. Wall thickness is normal. The left ventricular ejection fraction is 55%. Systolic function is normal. Wall motion is normal. Diastolic function is normal.    Mitral Valve: There is trace regurgitation.    Tricuspid Valve: There is mild regurgitation.    Aorta: The aortic root is mildly dilated (4.0 cm). The ascending aorta is normal in size.    Pulmonary Artery: The pulmonary artery systolic pressure is normal.       Anesthesia Plan  ASA Score- 3     Anesthesia Type- IV sedation with anesthesia with ASA Monitors.         Additional Monitors:     Airway Plan:            Plan Factors-Exercise tolerance (METS): <4 METS.    Chart reviewed.    Patient summary reviewed.    Patient is not a current smoker.  Patient did not smoke on day of surgery.            Induction- intravenous.    Postoperative Plan-     Informed Consent- Anesthetic plan and risks discussed with patient.  I personally reviewed this patient with the CRNA. Discussed and agreed on the Anesthesia Plan with the CRNA..

## 2024-04-10 NOTE — H&P
"History and Physical -  Gastroenterology Specialists  Jose Stewart 58 y.o. male MRN: [id number]    HPI: Jose Stewart is a 58 y.o. year old male who presents with decompensated cirrhosis here for EGD with banding      Review of Systems    Historical Information   Past Medical History:   Diagnosis Date    Anxiety     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    Cellulitis of left lower extremity 02/08/2022    GERD (gastroesophageal reflux disease)     Hypertension     Hypoxia 02/11/2022    Mental developmental delay     mumps as a child, developed rheumatic fever    MRSA bacteremia 02/09/2022    Seizures (HCC)      Past Surgical History:   Procedure Laterality Date    COLONOSCOPY      [place] LAPAROSCOPY SURG CHOLECYSTECTOMY N/A 8/25/2023    Procedure: CHOLECYSTECTOMY OPEN;  Surgeon: Ran Fletcher DO;  [facility];  Service: General    SPLENECTOMY      UPPER GASTROINTESTINAL ENDOSCOPY       Social History   Social History     Substance and Sexual Activity   Alcohol Use Never     Social History     Substance and Sexual Activity   Drug Use Never     Social History     Tobacco Use   Smoking Status Never   Smokeless Tobacco Never     Family History   Problem Relation Age of Onset    Depression Mother     Heart disease Father        Meds/Allergies     (Not in a hospital admission)      No Known Allergies    Objective     /58   Pulse 57   Temp (!) 97.3 °F (36.3 °C) (Temporal)   Resp 16   Ht 5' 10\" (1.778 m)   Wt 82.1 kg (181 lb 1.6 oz)   SpO2 98%   BMI 25.99 kg/m²       PHYSICAL EXAM    Gen: NAD  CV: RRR  CHEST: Clear  ABD: soft, NT/ND  EXT: no edema  Neuro: AAO      ASSESSMENT/PLAN:  This is a 58 y.o. year old male here for EGD with banding    PLAN:   Procedure: EGD with banding      "

## 2024-04-10 NOTE — ANESTHESIA POSTPROCEDURE EVALUATION
Post-Op Assessment Note    CV Status:  Stable    Pain management: adequate       Mental Status:  Sleepy   Hydration Status:  Euvolemic   PONV Controlled:  Controlled   Airway Patency:  Patent     Post Op Vitals Reviewed: Yes    No anethesia notable event occurred.    Staff: KATERINA               /63 (04/10/24 0904)    Temp 97.6 °F (36.4 °C) (04/10/24 0904)    Pulse 56 (04/10/24 0904)   Resp 12 (04/10/24 0904)    SpO2 100 % (04/10/24 0904)

## 2024-04-11 ENCOUNTER — OFFICE VISIT (OUTPATIENT)
Dept: NEUROLOGY | Facility: CLINIC | Age: 59
End: 2024-04-11
Payer: MEDICARE

## 2024-04-11 VITALS
HEIGHT: 70 IN | SYSTOLIC BLOOD PRESSURE: 105 MMHG | BODY MASS INDEX: 26.54 KG/M2 | TEMPERATURE: 97.9 F | DIASTOLIC BLOOD PRESSURE: 56 MMHG | WEIGHT: 185.4 LBS | HEART RATE: 75 BPM

## 2024-04-11 DIAGNOSIS — G40.909 NONINTRACTABLE EPILEPSY WITHOUT STATUS EPILEPTICUS, UNSPECIFIED EPILEPSY TYPE (HCC): Primary | ICD-10-CM

## 2024-04-11 PROCEDURE — 99214 OFFICE O/P EST MOD 30 MIN: CPT | Performed by: NURSE PRACTITIONER

## 2024-04-11 RX ORDER — OXCARBAZEPINE 600 MG/1
600 TABLET, FILM COATED ORAL EVERY 12 HOURS
Qty: 180 TABLET | Refills: 3 | Status: SHIPPED | OUTPATIENT
Start: 2024-04-11

## 2024-04-11 RX ORDER — LEVETIRACETAM 500 MG/1
500 TABLET ORAL EVERY 12 HOURS
Qty: 180 TABLET | Refills: 3 | Status: SHIPPED | OUTPATIENT
Start: 2024-04-11

## 2024-04-11 NOTE — ASSESSMENT & PLAN NOTE
Patient has continued to be seizure free since his last visit. Currently on levetiracetam 500 mg BID and oxcarbazepine 600 BID. Tolerating well. Recent sodium was normal. No recent AED levels. Vitamin D is being replaced by PCP with high dose weekly supplementation. Oxcarbazepine use may contribute to vitamin D deficiency, would likely benefit from daily supplementation after high dose regimen is completed.     Recommend that he continue current medications unchanged, as above. If there are seizures his levetiracetam could be increased. They will call the office with seizures, issues or concerns. Follow up in 1 year or sooner if needed.

## 2024-04-11 NOTE — PATIENT INSTRUCTIONS
- Continue current seizure medications  - Call the office with seizures, issues or concerns  - Have blood work in the near future  - Follow up in 1 year with Dr Rodriguez

## 2024-04-11 NOTE — PROGRESS NOTES
Patient ID: Jose Stewart is a 58 y.o. male with intellectual disability and epilepsy , who is returning to Neurology office for follow up of his seizures.     Assessment/Plan:    Nonintractable epilepsy without status epilepticus (HCC)  Patient has continued to be seizure free since his last visit. Currently on levetiracetam 500 mg BID and oxcarbazepine 600 BID. Tolerating well. Recent sodium was normal. No recent AED levels. Vitamin D is being replaced by PCP with high dose weekly supplementation. Oxcarbazepine use may contribute to vitamin D deficiency, would likely benefit from daily supplementation after high dose regimen is completed.     Recommend that he continue current medications unchanged, as above. If there are seizures his levetiracetam could be increased. They will call the office with seizures, issues or concerns. Follow up in 1 year or sooner if needed.     He will Return in about 1 year (around 4/11/2025) for Follow up with Dr Rodriguez.    Subjective:  Jose Stewart is a 58 y.o. male with intellectual disability and epilepsy , who is returning to Neurology office for follow up of his seizures. He was last seen in the office on 4/4/2023 b Dr Rodriguez. At that time there were no recurrence seizures. There was some concern for nocturnal seizures based on drowsiness on awakening. Ambulatory EEG was offered but this was deferred as the concern is low. Family was aware that we can do this at a later point if interested. Recommended continuing on oxcarbazepine 600 mg BID and levetiracetam 500 mg BID. There was also noted head tremor (possible ET) without signifiacnt worsenings helena prior visit. Recommended 1 year follow up.     Since their last visit, he continues to do well. He has not had any seizures. No possible signs that seizures occurred overnight. On levetiracetam 500 mg twice per day and oxcarbazepine 600 mg BID. Tolerating AEDs well without  issues or concerns.     Vitamin D was low,  PCP started high dose vitamin D. He does have some chronic issues with jaundice, following with GI for cirrhosis. Brother denies any recent change to skin color. Follows closely with PCP as well.     Noticed right pupil was abnormal shape. Per brother this is chronic from childhood. He was hammering something with a nail and a piece broke off and hit him in the eye. He is blind in the right eye as well due to this injury.     Current seizure medications:  - levetiracetam 500 mg BID  - oxcarbazepine 600 mg BID  Other medications as per Epic.     Latest Reference Range & Units 03/28/24 08:29   VITAMIN D, 25-HYDROXY 30.0 - 100.0 ng/mL 13.8 (L)     Special Features  Age/Date of seizure onset: 5 yo  Status epilepticus: no  Self Injury Seizures: no  Precipitating Factors: stress  Longest seizure free interval: 6-7 years (current)     Epilepsy Risk Factors:  Intellectual disability  Possible febrile illness as a child (?encephalitis)     Current AEDs:  Oxcarbazepine 600 BID, Keppra 500 BID  Medication side effects: None  Medication adherence: No     Prior AEDs:  None     Prior Evaluation:  - Routine EEG 10/5/2022: Occasional right anterior temporal sharp wave discharges followed by right temporal delta activities suggests an underlying area of neuronal dysfunction that is a potential source of seizures.      - CTH 2/8/2022: No structural abnormalities seen     History Reviewed:   The following were reviewed and updated as appropriate: allergies, current medications, past family history, past medical history, past social history, past surgical history and problem list     Psychiatric History:  He has an intellectual disability since age 4. He has always lived with family. He can perform most ADLs without help. Can ride a bike.        Social History:   Driving: No  Lives Alone: No  Occupation: on permanent disability    His history was also obtained from his brother, who was present at today's visit.      I reviewed prior  "neurology notes, most recent labs, as documented in Epic/[employer] [facility], and summarized above.        Objective:    Blood pressure 105/56, pulse 75, temperature 97.9 °F (36.6 °C), temperature source Temporal, height 5' 10\" (1.778 m), weight 84.1 kg (185 lb 6.4 oz).    Physical Exam  No apparent distress.   Appears well nourished.   Mood appropriate for situation     Neurologic Exam  Mental status- alert and oriented to person, place, and time. Speech appropriate.     Cranial Nerves- slightly abnormal shape of right pupil due to injury with nail in childhood, chronically blind in right eye, left eye normal, EOMS normal, facial muscles symmetric, hearing intact bilaterally to finger rubs, tongue midline, palate rise symmetrical, shoulder shrug symmetrical.    Motor- No pronator drift. Appropriate strength. Moves all extremities freely. No tremor. No head tremor appreciated at visit today.    Sensory-  Intact distally in all extremities to light touch.     DTRs- 2+ and symmetric in all extremities.     Gait- slow casual gait.     Coordination- FNF intact.     ROS:  Review of Systems   Constitutional:  Negative for appetite change, fatigue and fever.   HENT: Negative.  Negative for hearing loss, tinnitus, trouble swallowing and voice change.    Eyes: Negative.  Negative for photophobia, pain and visual disturbance.   Respiratory: Negative.  Negative for shortness of breath.    Cardiovascular: Negative.  Negative for palpitations.   Gastrointestinal: Negative.  Negative for nausea and vomiting.   Endocrine: Negative.  Negative for cold intolerance.   Genitourinary: Negative.  Negative for dysuria, frequency and urgency.   Musculoskeletal:  Negative for back pain, gait problem, myalgias, neck pain and neck stiffness.   Skin: Negative.  Negative for rash.   Allergic/Immunologic: Negative.    Neurological: Negative.  Negative for dizziness, tremors, seizures, syncope, facial asymmetry, speech difficulty, weakness, " light-headedness, numbness and headaches.   Hematological: Negative.  Does not bruise/bleed easily.   Psychiatric/Behavioral: Negative.  Negative for confusion, hallucinations and sleep disturbance.    All other systems reviewed and are negative.       ROS obtained by MA and reviewed by myself.       This note may have been created using voice recognition software. There may be unintentional errors such as grammatical errors, spelling errors, or pronoun errors.

## 2024-04-12 DIAGNOSIS — E55.9 VITAMIN D DEFICIENCY: Primary | ICD-10-CM

## 2024-04-17 ENCOUNTER — APPOINTMENT (OUTPATIENT)
Dept: LAB | Facility: AMBULARY SURGERY CENTER | Age: 59
End: 2024-04-17
Payer: MEDICARE

## 2024-04-17 DIAGNOSIS — G40.909 NONINTRACTABLE EPILEPSY WITHOUT STATUS EPILEPTICUS, UNSPECIFIED EPILEPSY TYPE (HCC): ICD-10-CM

## 2024-04-17 LAB
ALBUMIN SERPL BCP-MCNC: 3.2 G/DL (ref 3.5–5)
ALP SERPL-CCNC: 89 U/L (ref 34–104)
ALT SERPL W P-5'-P-CCNC: 8 U/L (ref 7–52)
ANION GAP SERPL CALCULATED.3IONS-SCNC: 7 MMOL/L (ref 4–13)
AST SERPL W P-5'-P-CCNC: 16 U/L (ref 13–39)
BASOPHILS # BLD AUTO: 0.03 THOUSANDS/ÂΜL (ref 0–0.1)
BASOPHILS NFR BLD AUTO: 1 % (ref 0–1)
BILIRUB SERPL-MCNC: 0.31 MG/DL (ref 0.2–1)
BUN SERPL-MCNC: 7 MG/DL (ref 5–25)
CALCIUM ALBUM COR SERPL-MCNC: 8.9 MG/DL (ref 8.3–10.1)
CALCIUM SERPL-MCNC: 8.3 MG/DL (ref 8.4–10.2)
CHLORIDE SERPL-SCNC: 98 MMOL/L (ref 96–108)
CO2 SERPL-SCNC: 33 MMOL/L (ref 21–32)
CREAT SERPL-MCNC: 0.64 MG/DL (ref 0.6–1.3)
EOSINOPHIL # BLD AUTO: 0.5 THOUSAND/ÂΜL (ref 0–0.61)
EOSINOPHIL NFR BLD AUTO: 9 % (ref 0–6)
ERYTHROCYTE [DISTWIDTH] IN BLOOD BY AUTOMATED COUNT: 16 % (ref 11.6–15.1)
GFR SERPL CREATININE-BSD FRML MDRD: 106 ML/MIN/1.73SQ M
GLUCOSE SERPL-MCNC: 66 MG/DL (ref 65–140)
HCT VFR BLD AUTO: 29.6 % (ref 36.5–49.3)
HGB BLD-MCNC: 7.9 G/DL (ref 12–17)
IMM GRANULOCYTES # BLD AUTO: 0.01 THOUSAND/UL (ref 0–0.2)
IMM GRANULOCYTES NFR BLD AUTO: 0 % (ref 0–2)
LYMPHOCYTES # BLD AUTO: 1.2 THOUSANDS/ÂΜL (ref 0.6–4.47)
LYMPHOCYTES NFR BLD AUTO: 23 % (ref 14–44)
MCH RBC QN AUTO: 21.8 PG (ref 26.8–34.3)
MCHC RBC AUTO-ENTMCNC: 26.7 G/DL (ref 31.4–37.4)
MCV RBC AUTO: 82 FL (ref 82–98)
MONOCYTES # BLD AUTO: 0.46 THOUSAND/ÂΜL (ref 0.17–1.22)
MONOCYTES NFR BLD AUTO: 9 % (ref 4–12)
NEUTROPHILS # BLD AUTO: 3.11 THOUSANDS/ÂΜL (ref 1.85–7.62)
NEUTS SEG NFR BLD AUTO: 58 % (ref 43–75)
NRBC BLD AUTO-RTO: 0 /100 WBCS
PLATELET # BLD AUTO: 155 THOUSANDS/UL (ref 149–390)
PMV BLD AUTO: 9.5 FL (ref 8.9–12.7)
POTASSIUM SERPL-SCNC: 3.3 MMOL/L (ref 3.5–5.3)
PROT SERPL-MCNC: 7.9 G/DL (ref 6.4–8.4)
RBC # BLD AUTO: 3.62 MILLION/UL (ref 3.88–5.62)
SODIUM SERPL-SCNC: 138 MMOL/L (ref 135–147)
WBC # BLD AUTO: 5.31 THOUSAND/UL (ref 4.31–10.16)

## 2024-04-17 PROCEDURE — 80183 DRUG SCRN QUANT OXCARBAZEPIN: CPT

## 2024-04-18 LAB — HAPTOGLOB SERPL-MCNC: 172 MG/DL (ref 29–370)

## 2024-04-19 ENCOUNTER — TELEPHONE (OUTPATIENT)
Age: 59
End: 2024-04-19

## 2024-04-19 NOTE — TELEPHONE ENCOUNTER
Patients GI provider:  Dr. Plata    Number to return call: [phone]    Reason for call: Pt's sister calling asking why  prescribed Vit D when pt is still taking the ones prescribed from .    Scheduled procedure/appointment date if applicable: N/A

## 2024-04-21 LAB — OXCARBAZEPINE SERPL-MCNC: 9 UG/ML (ref 10–35)

## 2024-04-22 NOTE — TELEPHONE ENCOUNTER
Spoke with pt's sister in law, VIDYA. Pt is currently taking 50,000 units once a week (Tuesday) x 8 wks. Advised the 1000 units is for the daily dose once the weekly doses are completed. Vidya verbalized understanding and is agreeable.

## 2024-04-26 ENCOUNTER — TELEPHONE (OUTPATIENT)
Dept: NEUROLOGY | Facility: CLINIC | Age: 59
End: 2024-04-26

## 2024-04-26 NOTE — TELEPHONE ENCOUNTER
----- Message from SIDDHARTHA Williamson sent at 4/25/2024  6:16 PM EDT -----  Confirm dosing and seizure freedom.   ----- Message -----  From: Lab, Background User  Sent: 4/24/2024   9:33 AM EDT  To: SIDDHARTHA Williamson

## 2024-04-26 NOTE — TELEPHONE ENCOUNTER
Called re: below and spoke with pt's sister, who asked to be called back in 1 hour.    Called back and spoke with pt's sister again. She confirmed pt dose of Trileptal to be 600 mg BID. She denies any missed doses. She also denies any seizures.    Please advise. Thank you.

## 2024-04-28 DIAGNOSIS — I85.00 ESOPHAGEAL VARICES WITHOUT BLEEDING, UNSPECIFIED ESOPHAGEAL VARICES TYPE (HCC): ICD-10-CM

## 2024-04-29 ENCOUNTER — OFFICE VISIT (OUTPATIENT)
Dept: INTERNAL MEDICINE CLINIC | Facility: CLINIC | Age: 59
End: 2024-04-29
Payer: MEDICARE

## 2024-04-29 VITALS
WEIGHT: 189 LBS | TEMPERATURE: 98.9 F | BODY MASS INDEX: 26.46 KG/M2 | RESPIRATION RATE: 20 BRPM | HEIGHT: 71 IN | SYSTOLIC BLOOD PRESSURE: 108 MMHG | OXYGEN SATURATION: 97 % | HEART RATE: 74 BPM | DIASTOLIC BLOOD PRESSURE: 56 MMHG

## 2024-04-29 DIAGNOSIS — I87.2 VENOUS STASIS DERMATITIS OF BOTH LOWER EXTREMITIES: ICD-10-CM

## 2024-04-29 DIAGNOSIS — Z23 NEED FOR HEPATITIS A AND B VACCINATION: Primary | ICD-10-CM

## 2024-04-29 DIAGNOSIS — G40.909 NONINTRACTABLE EPILEPSY WITHOUT STATUS EPILEPTICUS, UNSPECIFIED EPILEPSY TYPE (HCC): ICD-10-CM

## 2024-04-29 PROBLEM — R26.2 AMBULATORY DYSFUNCTION: Status: RESOLVED | Noted: 2022-02-08 | Resolved: 2024-04-29

## 2024-04-29 PROBLEM — K82.8 GALLBLADDER MASS: Status: RESOLVED | Noted: 2023-05-01 | Resolved: 2024-04-29

## 2024-04-29 PROCEDURE — 90746 HEPB VACCINE 3 DOSE ADULT IM: CPT

## 2024-04-29 PROCEDURE — G0010 ADMIN HEPATITIS B VACCINE: HCPCS

## 2024-04-29 PROCEDURE — 99214 OFFICE O/P EST MOD 30 MIN: CPT | Performed by: INTERNAL MEDICINE

## 2024-04-29 RX ORDER — DIAZEPAM 5 MG/1
5 TABLET ORAL DAILY
Qty: 90 TABLET | Refills: 0 | Status: SHIPPED | OUTPATIENT
Start: 2024-04-29

## 2024-04-29 NOTE — ASSESSMENT & PLAN NOTE
Follows with neurology, currently on Keppra, and Trileptal.  He was taking diazepam for several years.  Tapering off slowly he is now on 5 mg twice a day will reduce to 5 mg daily for the next 1 to 2 months until he comes back from [facility].

## 2024-04-29 NOTE — ASSESSMENT & PLAN NOTE
He has been using topical steroid every other day recommended to give it a break at least once every 1-2 months for a week.

## 2024-04-29 NOTE — ASSESSMENT & PLAN NOTE
With underlying cirrhosis he needs vaccination against hepatitis A and B.  He will get hepatitis B in the office, he had his first dose in October able to catch up  Doses here, and hepatitis A was recommended to get at the pharmacy based on insurance coverage.

## 2024-04-29 NOTE — PROGRESS NOTES
Assessment/Plan:    Nonintractable epilepsy without status epilepticus (HCC)  Follows with neurology, currently on Keppra, and Trileptal.  He was taking diazepam for several years.  Tapering off slowly he is now on 5 mg twice a day will reduce to 5 mg daily for the next 1 to 2 months until he comes back from [place].    Need for hepatitis A and B vaccination  With underlying cirrhosis he needs vaccination against hepatitis A and B.  He will get hepatitis B in the office, he had his first dose in October able to catch up  Doses here, and hepatitis A was recommended to get at the pharmacy based on insurance coverage.    Venous stasis dermatitis of both lower extremities  He has been using topical steroid every other day recommended to give it a break at least once every 1-2 months for a week.       Diagnoses and all orders for this visit:    Need for hepatitis A and B vaccination  -     Hepatitis B Vaccine Adult IM    Nonintractable epilepsy without status epilepticus, unspecified epilepsy type (HCC)  -     diazepam (VALIUM) 5 mg tablet; Take 1 tablet (5 mg total) by mouth in the morning    Venous stasis dermatitis of both lower extremities          Subjective:      Patient ID: Jose Stewart is a 58 y.o. male.    Patient presents in office for follow-up visit.  He is planning to travel to [facility] in 2 weeks.  He is doing well, he has no subjective complaints.  He saw neurology recently seizure being managed with Trileptal and Keppra, vitamin D deficiency is being treated with 50,000 units weekly for 8 weeks and then he will start daily doses, he is also tapering off of diazepam he is currently on 5 mg twice a day.  No recent seizure.        The following portions of the patient's history were reviewed and updated as appropriate: allergies, current medications, past family history, past medical history, past social history, past surgical history, and problem list.    Review of Systems   Constitutional:  Negative  "for appetite change and fatigue.   Eyes:  Negative for visual disturbance.   Respiratory:  Negative for cough, chest tightness, shortness of breath and wheezing.    Cardiovascular:  Negative for chest pain, palpitations and leg swelling.   Gastrointestinal:  Negative for abdominal pain, nausea and vomiting.   Genitourinary:  Negative for difficulty urinating and frequency.   Musculoskeletal:  Negative for arthralgias and joint swelling.   Skin:  Positive for rash (Chronic on left lower leg).   Neurological:  Negative for dizziness, seizures and headaches.   Psychiatric/Behavioral:  Negative for confusion and sleep disturbance.          Objective:      /56 (BP Location: Right arm, Patient Position: Sitting, Cuff Size: Large)   Pulse 74   Temp 98.9 °F (37.2 °C)   Resp 20   Ht 5' 10.75\" (1.797 m)   Wt 85.7 kg (189 lb)   SpO2 97%   BMI 26.55 kg/m²          Physical Exam  Vitals and nursing note reviewed.   Constitutional:       General: He is not in acute distress.     Appearance: He is well-developed.   HENT:      Head: Normocephalic and atraumatic.   Eyes:      Conjunctiva/sclera: Conjunctivae normal.      Pupils: Pupils are equal, round, and reactive to light.   Neck:      Thyroid: No thyromegaly.   Cardiovascular:      Rate and Rhythm: Normal rate and regular rhythm.      Heart sounds: Normal heart sounds.   Pulmonary:      Effort: No respiratory distress.      Breath sounds: Normal breath sounds. No wheezing.   Abdominal:      General: A surgical scar is present. Bowel sounds are normal. There is no distension.      Palpations: Abdomen is soft.      Tenderness: There is no abdominal tenderness.   Musculoskeletal:         General: Normal range of motion.      Cervical back: Normal range of motion and neck supple.   Skin:     General: Skin is warm and dry.      Capillary Refill: Capillary refill takes less than 2 seconds.      Findings: Rash (Chronic dermatitis on the left lower leg improving) present. "   Neurological:      Mental Status: He is alert and oriented to person, place, and time.      Motor: No abnormal muscle tone.   Psychiatric:         Thought Content: Thought content normal.         Judgment: Judgment normal.

## 2024-04-29 NOTE — PATIENT INSTRUCTIONS
Continue vitamin d 50 K every week until finish then start vitamin D 1000 units daily   Reduce diazepam from 5 mg twice a day to once a day until after your trip  Please get vaccinated against hepatitis A at your local pharmacy

## 2024-04-30 ENCOUNTER — TELEPHONE (OUTPATIENT)
Dept: HEMATOLOGY ONCOLOGY | Facility: CLINIC | Age: 59
End: 2024-04-30

## 2024-04-30 ENCOUNTER — OFFICE VISIT (OUTPATIENT)
Dept: HEMATOLOGY ONCOLOGY | Facility: CLINIC | Age: 59
End: 2024-04-30
Payer: MEDICARE

## 2024-04-30 VITALS
SYSTOLIC BLOOD PRESSURE: 100 MMHG | DIASTOLIC BLOOD PRESSURE: 58 MMHG | OXYGEN SATURATION: 95 % | WEIGHT: 187 LBS | RESPIRATION RATE: 15 BRPM | TEMPERATURE: 97.2 F | HEIGHT: 71 IN | HEART RATE: 65 BPM | BODY MASS INDEX: 26.18 KG/M2

## 2024-04-30 DIAGNOSIS — D50.0 IRON DEFICIENCY ANEMIA DUE TO CHRONIC BLOOD LOSS: Primary | ICD-10-CM

## 2024-04-30 DIAGNOSIS — I85.11 SECONDARY ESOPHAGEAL VARICES WITH BLEEDING (HCC): ICD-10-CM

## 2024-04-30 DIAGNOSIS — E46 PROTEIN-CALORIE MALNUTRITION, UNSPECIFIED SEVERITY (HCC): ICD-10-CM

## 2024-04-30 DIAGNOSIS — D53.9 NUTRITIONAL ANEMIA: ICD-10-CM

## 2024-04-30 PROCEDURE — 99214 OFFICE O/P EST MOD 30 MIN: CPT | Performed by: INTERNAL MEDICINE

## 2024-04-30 RX ORDER — SODIUM CHLORIDE 9 MG/ML
20 INJECTION, SOLUTION INTRAVENOUS ONCE
OUTPATIENT
Start: 2024-05-07

## 2024-04-30 RX ORDER — NADOLOL 40 MG/1
60 TABLET ORAL DAILY
Qty: 135 TABLET | Refills: 1 | Status: SHIPPED | OUTPATIENT
Start: 2024-04-30

## 2024-04-30 NOTE — TELEPHONE ENCOUNTER
Reviewed the following with the patient's sister in law who verbalizes understanding  Discussed initiation of parenteral iron-supplementation with Ferumoxytol (Feraheme) 510 mg IV Once on Days 1 and 8 (Total of 2-doses), given persistent iron-deficiency anemia despite oral iron supplementation. Patient and his brother are in agreement. Plan to continue oral iron supplementation, thereafter. Recommend close interval follow-up in approximately 6-months

## 2024-04-30 NOTE — TELEPHONE ENCOUNTER
Spoke with patient's sister-in-law and went over scheduled dates and times. Patient Is aware and understanding of his upcoming appointments

## 2024-04-30 NOTE — PROGRESS NOTES
Progress Note: Medical Oncology:  Jose Stewart 58 y.o. male MRN: [id number]  Unit/Bed#:  Encounter: [id number]    Assessment and Plan:  1. Multifactorial Anemia:   A. Iron-Deficiency:   B. Anemia of Chronic Inflammation:  2. Decompensated Cryptogenic Cirrhosis with Esophageal Varices:  Patient is a 58-year-old male, with an established history of multifactorial anemia with iron-deficiency, in the setting of well-established decompensated cryptogenic cirrhosis with esophageal varices (Status-Post Banding in February 2024); who presents today for interval follow-up. Of particular importance, patient followed-up with Hematology in October 2023, without clear etiology of anemia (e.g. Prior history of iron-deficiency, complicated by established anemia of chronic inflammation). Nutritional-parameters obtained in February 2022, demonstrated low-normal vitamin B12 (Vitamin B12: 289), and a folate level within normal limits (Folate: Greater than 20.0). More recent Iron Panel obtained in March 2024, demonstrated findings consistent with iron-deficiency, as follows: Iron: 21 Percent Saturation: 7% TIBC: 316 Ferritin: 14. This is despite oral-iron supplementation. On evaluation this morning, patient is clinically well. Hematologic-parameters demonstrate stable normocytic anemia (Hemoglobin: 7.9 MCV: 82 RDW: 16.0 Baseline: 7.3-8.6 gm/dL), without other cytologic-abnormalities. Discussed initiation of parenteral iron-supplementation with Ferumoxytol (Feraheme) 510 mg IV Once on Days 1 and 8 (Total of 2-doses), given persistent iron-deficiency anemia despite oral iron supplementation. Patient and his brother are in agreement. Plan to continue oral iron supplementation, thereafter. Recommend close interval follow-up in approximately 6-months, with repeat hematologic- and nutritional-parameters prior to visit. Patient and his caregiver (Brother) express understanding and agreement.   1. Initiate Ferumoxytol 510 mg IV Once on  Days 1 and 8 (Total of 2-doses).    A. Continue oral iron-supplementation, thereafter.   2. Recommend close interval follow-up in 6-months:    A. Repeat CBC-D, CMP, Iron Panel, Vitamin B12, and Folate prior to follow-up.    3. Gallbladder with Pyloric Gland Adenomas Status-Post Cholecystectomy:  Of particular importance, patient was incidentally found to have an echogenic lesion concerning for a mass within the gallbladder lumen, with vascularity (Measuring 2.9 x 1.8 x 2.8-centimeters) on right upper quadrant ultrasound, obtained in February 2023 (e.g. Obtained on surveillance of established cirrhosis). Subsequent MRI Abdomen with MRCP showed two nodular-enhancing masses within the gallbladder, with additional nodular-lesions within the right paracolic gutter inferior to the gallbladder. Given the above-mentioned, patient underwent diagnostic-laparoscopy with open-cholecystectomy on August 25th, 2023. Pathology demonstrated gallbladder with three pyloric gland adenomas (Measuring up to 0.7-centimeters), with chronic cholecystitis and cholelithiasis. No evidence of malignancy reported. Patient continues to follow-up with General Surgery.   1. Continue follow-up with General Surgery, as scheduled.    Subjective:  Interval History: Jose Stewart is a 58-year-old male, with an established history of multifactorial anemia with iron-deficiency; who presents today for interval follow-up. On evaluation this morning, patient presents with his caregiver, and brother (Yamil Stewart). Patient and his brother state that he is doing exceptionally well. He continues to tolerate his activities of daily living appropriately, without difficulty. He is also relatively active; and endorses walking, and bicycle-riding on a regular basis. Patient does not describe significant fatigue, or exertional dyspnea with activity. Otherwise, appetite is intact. Weight remains stable. Patient denies significant gastrointestinal  adverse-effects from oral iron, and notes regular bowel movements without melena, or hematochezia. Patient and brother have no further complaints or concerns at the present time.    Review of Systems:  All systems reviewed and negative except otherwise listed in the History of Present Illness.    Objective:  Vitals:    04/30/24 1016   BP: 100/58   Pulse: 65   Resp: 15   Temp: (!) 97.2 °F (36.2 °C)   SpO2: 95%     Physical Exam:  General: Alert, and oriented; Pleasant, and conversational; Chronically-Ill Appearing  Skin: Diffuse Skin Pallor Appreciated   HEENT: Atraumatic, and normocephalic; PERRLA; EOMI; Dry Mucosal Membranes  Neck: Trachea midline; No neck masses, thyromegaly, or cervical lymphadenopathy  Cardiovascular: Regular rate and rhythm; No murmurs, rubs, or gallops; No edema  Respiratory: Clear to auscultation bilaterally; Adequate aeration; No supplemental oxygen  Abdomen: Soft, non-tender; Non-distended; No organomegaly appreciated; Bowel sounds present in 4-quadrants; Abdominal Surgical-Scars are Healing Appropriately Without Evidence of Purulent Drainage or Adjacent Cellulitis  Extremities: No obvious deformities; No peripheral edema; Moves all  Neurologic: Appropriately alert, and oriented to Person, Place, and Time; No focal neurologic deficits    Laboratory Studies:          Lab Results: I have reviewed all pertinent labs.  Imaging: I have personally reviewed pertinent reports.    EKG, Pathology, and Other Studies: I have personally reviewed pertinent reports.      Patient was seen and discussed with attending physician, Chase Mahmood M.D.    Citlali Rothman D.O.  Hematology-Oncology Fellow (PGY-4)

## 2024-04-30 NOTE — TELEPHONE ENCOUNTER
Patient Call    Who are you speaking with? Malia      If it is not the patient, are they listed on an active communication consent form? N/A   What is the reason for this call? Vidya is calling to discuss the patient's iron infusions and Ferrex medication.    Does this require a call back? Yes   If a call back is required, please list best call back number [phone]   If a call back is required, advise that a message will be forwarded to their care team and someone will return their call as soon as possible.   Did you relay this information to the patient? Yes

## 2024-05-02 ENCOUNTER — TELEPHONE (OUTPATIENT)
Dept: INTERNAL MEDICINE CLINIC | Facility: CLINIC | Age: 59
End: 2024-05-02

## 2024-05-02 DIAGNOSIS — K21.9 GASTROESOPHAGEAL REFLUX DISEASE WITHOUT ESOPHAGITIS: Primary | ICD-10-CM

## 2024-05-02 DIAGNOSIS — I85.11 SECONDARY ESOPHAGEAL VARICES WITH BLEEDING (HCC): ICD-10-CM

## 2024-05-02 DIAGNOSIS — D50.0 IRON DEFICIENCY ANEMIA DUE TO CHRONIC BLOOD LOSS: ICD-10-CM

## 2024-05-02 RX ORDER — SUCRALFATE 1 G/1
1 TABLET ORAL 4 TIMES DAILY
Qty: 360 TABLET | Refills: 1 | Status: SHIPPED | OUTPATIENT
Start: 2024-05-02

## 2024-05-02 NOTE — TELEPHONE ENCOUNTER
Reason for call:   [x] Refill   [] Prior Auth  [] Other:     Office:   [x] PCP/Provider - Daphne Guzman MD   [] Specialty/Provider -     Medication: folic acid     Dose/Frequency: 1 mg / 1 tab daily    Quantity: 90 tabs    Pharmacy: [facility]/pharmacy #[id number] - [place], [place] - [address] [place] [address]      Does the patient have enough for 3 days?   [x] Yes   [] No - Send as HP to POD

## 2024-05-02 NOTE — TELEPHONE ENCOUNTER
Patient called the RX Refill Line. Message is being forwarded to the office.     Patient is requesting - Pt's sister in law called and stated Pt needs new Rx for Carafate 1 g/10ml tabs. Janet stated the liquid one that is active on Pt's med list is only used certain times but daily Pt takes the tabs not the liquid. Please review, Thank you.    Please contact patient at - [phone]

## 2024-05-03 RX ORDER — FOLIC ACID 1 MG/1
TABLET ORAL
Qty: 90 TABLET | Refills: 1 | Status: SHIPPED | OUTPATIENT
Start: 2024-05-03

## 2024-05-08 ENCOUNTER — TELEPHONE (OUTPATIENT)
Age: 59
End: 2024-05-08

## 2024-05-08 ENCOUNTER — TELEPHONE (OUTPATIENT)
Dept: INTERNAL MEDICINE CLINIC | Facility: CLINIC | Age: 59
End: 2024-05-08

## 2024-05-08 NOTE — TELEPHONE ENCOUNTER
I called Vidya his sister in law and left message regarding referral to see Dr. Farrar Podiatrist which currently is active in the system. She will be able to schedule appointment for patient at her convenience.

## 2024-05-10 DIAGNOSIS — D50.0 IRON DEFICIENCY ANEMIA DUE TO CHRONIC BLOOD LOSS: Primary | ICD-10-CM

## 2024-05-10 RX ORDER — SODIUM CHLORIDE 9 MG/ML
20 INJECTION, SOLUTION INTRAVENOUS ONCE
Status: CANCELLED | OUTPATIENT
Start: 2024-05-14

## 2024-05-14 ENCOUNTER — HOSPITAL ENCOUNTER (OUTPATIENT)
Dept: INFUSION CENTER | Facility: HOSPITAL | Age: 59
Discharge: HOME | End: 2024-05-14
Attending: INTERNAL MEDICINE
Payer: MEDICARE

## 2024-05-14 VITALS
HEART RATE: 65 BPM | RESPIRATION RATE: 18 BRPM | DIASTOLIC BLOOD PRESSURE: 56 MMHG | TEMPERATURE: 97.2 F | SYSTOLIC BLOOD PRESSURE: 97 MMHG

## 2024-05-14 DIAGNOSIS — D50.0 IRON DEFICIENCY ANEMIA DUE TO CHRONIC BLOOD LOSS: Primary | ICD-10-CM

## 2024-05-14 RX ORDER — SODIUM CHLORIDE 9 MG/ML
20 INJECTION, SOLUTION INTRAVENOUS ONCE
Status: CANCELLED | OUTPATIENT
Start: 2024-05-22

## 2024-05-14 RX ORDER — SODIUM CHLORIDE 9 MG/ML
20 INJECTION, SOLUTION INTRAVENOUS ONCE
Status: COMPLETED | OUTPATIENT
Start: 2024-05-14 | End: 2024-05-14

## 2024-05-14 RX ADMIN — FERUMOXYTOL 510 MG: 510 INJECTION INTRAVENOUS at 08:27

## 2024-05-14 RX ADMIN — SODIUM CHLORIDE 20 ML/HR: 0.9 INJECTION, SOLUTION INTRAVENOUS at 08:27

## 2024-05-14 NOTE — PROGRESS NOTES
Jose Stewart  tolerated treatment well with no complications.      Jose Stewart is aware of future appt on 5/22 at 1030.     AVS printed and given to Jose Stewart:  Yes

## 2024-05-22 ENCOUNTER — HOSPITAL ENCOUNTER (OUTPATIENT)
Dept: INFUSION CENTER | Facility: HOSPITAL | Age: 59
Discharge: HOME | End: 2024-05-22
Attending: INTERNAL MEDICINE
Payer: MEDICARE

## 2024-05-22 VITALS
SYSTOLIC BLOOD PRESSURE: 110 MMHG | HEART RATE: 64 BPM | DIASTOLIC BLOOD PRESSURE: 60 MMHG | RESPIRATION RATE: 18 BRPM | TEMPERATURE: 97.9 F

## 2024-05-22 DIAGNOSIS — D50.0 IRON DEFICIENCY ANEMIA DUE TO CHRONIC BLOOD LOSS: Primary | ICD-10-CM

## 2024-05-22 PROCEDURE — 96365 THER/PROPH/DIAG IV INF INIT: CPT

## 2024-05-22 RX ORDER — SODIUM CHLORIDE 9 MG/ML
20 INJECTION, SOLUTION INTRAVENOUS ONCE
Status: CANCELLED | OUTPATIENT
Start: 2024-05-28

## 2024-05-22 RX ORDER — SODIUM CHLORIDE 9 MG/ML
20 INJECTION, SOLUTION INTRAVENOUS ONCE
Status: COMPLETED | OUTPATIENT
Start: 2024-05-22 | End: 2024-05-22

## 2024-05-22 RX ADMIN — SODIUM CHLORIDE 20 ML/HR: 9 INJECTION, SOLUTION INTRAVENOUS at 10:47

## 2024-05-22 RX ADMIN — FERUMOXYTOL 510 MG: 510 INJECTION INTRAVENOUS at 10:49

## 2024-05-22 NOTE — PROGRESS NOTES
Jose Stewart  tolerated treatment well with no complications.      Jose Stewart is aware that he has no further infusion appts.     AVS printed and given to Jose Stewart:    No (Declined by Jose Stewart)

## 2024-06-02 NOTE — TELEPHONE ENCOUNTER
Please review for refilll pt di have an appt in [place] (no pain/absence of nonverbal indicators of pain)

## 2024-06-17 ENCOUNTER — TELEPHONE (OUTPATIENT)
Dept: GASTROENTEROLOGY | Facility: CLINIC | Age: 59
End: 2024-06-17

## 2024-06-17 NOTE — TELEPHONE ENCOUNTER
I called and lm for the patient in regards to re-scheduling appointment on 9/6 with Dr. Plata in [facility] office because she will not be in that day.

## 2024-06-28 DIAGNOSIS — D50.0 IRON DEFICIENCY ANEMIA DUE TO CHRONIC BLOOD LOSS: ICD-10-CM

## 2024-06-30 DIAGNOSIS — K21.9 GASTROESOPHAGEAL REFLUX DISEASE WITHOUT ESOPHAGITIS: ICD-10-CM

## 2024-06-30 RX ORDER — PANTOPRAZOLE SODIUM 40 MG/1
TABLET, DELAYED RELEASE ORAL
Qty: 180 TABLET | Refills: 1 | Status: SHIPPED | OUTPATIENT
Start: 2024-06-30

## 2024-07-01 RX ORDER — IRON POLYSACCHARIDE COMPLEX 150 MG
150 CAPSULE ORAL DAILY
Qty: 90 CAPSULE | Refills: 1 | Status: SHIPPED | OUTPATIENT
Start: 2024-07-01

## 2024-07-09 ENCOUNTER — TELEPHONE (OUTPATIENT)
Age: 59
End: 2024-07-09

## 2024-07-09 DIAGNOSIS — I87.2 VENOUS STASIS DERMATITIS OF BOTH LOWER EXTREMITIES: ICD-10-CM

## 2024-07-09 RX ORDER — TRIAMCINOLONE ACETONIDE 1 MG/G
OINTMENT TOPICAL
Qty: 454 G | Refills: 0 | Status: SHIPPED | OUTPATIENT
Start: 2024-07-09

## 2024-07-09 NOTE — TELEPHONE ENCOUNTER
I called and left message regarding Dr. Guzman's instructions on the Ointment ordered and sent to pharmacy.

## 2024-07-09 NOTE — TELEPHONE ENCOUNTER
Patient can continue using as long as he takes breaks from it (1 week off per month at least). Will send a refill

## 2024-07-09 NOTE — TELEPHONE ENCOUNTER
Pt's sister in law called. Pt has been weaning off of the triamcinolone (KENALOG) 0.1 % ointment  as per PCP. Medication is currently low. Should he refill and continue to wean or stop when medication is empty.     Please advise. Thank you

## 2024-07-10 ENCOUNTER — OFFICE VISIT (OUTPATIENT)
Dept: PODIATRY | Facility: CLINIC | Age: 59
End: 2024-07-10
Payer: MEDICARE

## 2024-07-10 VITALS
DIASTOLIC BLOOD PRESSURE: 65 MMHG | WEIGHT: 179 LBS | HEART RATE: 62 BPM | SYSTOLIC BLOOD PRESSURE: 100 MMHG | OXYGEN SATURATION: 94 % | HEIGHT: 71 IN | BODY MASS INDEX: 25.06 KG/M2

## 2024-07-10 DIAGNOSIS — B35.1 ONYCHOMYCOSIS: Primary | ICD-10-CM

## 2024-07-10 DIAGNOSIS — L60.2 THICKENED NAILS: ICD-10-CM

## 2024-07-10 DIAGNOSIS — I73.9 PVD (PERIPHERAL VASCULAR DISEASE) (HCC): ICD-10-CM

## 2024-07-10 PROCEDURE — RECHECK: Performed by: PODIATRIST

## 2024-07-10 PROCEDURE — 11721 DEBRIDE NAIL 6 OR MORE: CPT | Performed by: PODIATRIST

## 2024-07-10 NOTE — PROGRESS NOTES
Assessment/Plan:     The patient's clinical examination today is significant for thickened and dystrophic pedal nail plates with discoloration, subungual debris, brittleness consistent with onychomycosis x10.  The interdigital spaces are clear and without maceration.  There is +2 pitting edema of the bilateral extremities with superficial varicosities noted consistent with venous insufficiency to both lower limbs.  Skin is thin with decreased turgor.  There is absence of hair growth to the bilateral extremities.  There are no signs of infection noted to either lower extremity.  Pedal pulses are nonpalpable likely secondary to his peripheral edema.  There are no signs of any acute ischemia to either lower extremity.     The patient's mycotic nails were sharply debrided with a sterile nail clipper x10 without complication.  The nails with a mechanically reduced in thickness and girth utilize a rotary bur.  The patient does have a chronic history of stasis ulcerations that are typically managed with local wound care.  There were no other acute pedal issues.      Recommend follow-up in 6 months.     Diagnoses and all orders for this visit:    Onychomycosis    Thickened nails    PVD (peripheral vascular disease) ([facility])          Subjective:     Patient ID: Jose Stewart is a 58 y.o. male.    The patient presents today for follow-up of thickened and elongated pedal nail plates that are causing discomfort in his close toed shoes.  The patient has a history of hearing loss and is blind in one eye.  He has a history of chronic venous insufficiency and swelling in his legs which often results in stasis wounds.  His brother is present with him in the exam room and is his caretaker.              PAST MEDICAL HISTORY:  Past Medical History:   Diagnosis Date    Ambulatory dysfunction 02/08/2022    Anxiety     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    Cellulitis of left lower extremity 02/08/2022    Gallbladder mass  05/01/2023    GERD (gastroesophageal reflux disease)     Hypertension     Hypoxia 02/11/2022    Mental developmental delay     mumps as a child, developed rheumatic fever    MRSA bacteremia 02/09/2022    Seizures (HCC)        PAST SURGICAL HISTORY:  Past Surgical History:   Procedure Laterality Date    COLONOSCOPY      WI LAPAROSCOPY SURG CHOLECYSTECTOMY N/A 8/25/2023    Procedure: CHOLECYSTECTOMY OPEN;  Surgeon: Ran Fletcher DO;  Location: [facility];  Service: General    SPLENECTOMY      UPPER GASTROINTESTINAL ENDOSCOPY          ALLERGIES:  Patient has no known allergies.    MEDICATIONS:  Current Outpatient Medications   Medication Sig Dispense Refill    acetaminophen (TYLENOL) 325 mg tablet Take 2 tablets (650 mg total) by mouth every 6 (six) hours as needed for mild pain  0    cholecalciferol 1,000 units tablet Take 1 tablet (1,000 Units total) by mouth daily 90 tablet 3    diazepam (VALIUM) 5 mg tablet Take 1 tablet (5 mg total) by mouth in the morning 90 tablet 0    ergocalciferol (VITAMIN D2) 50,000 units Take 1 capsule (50,000 Units total) by mouth once a week for 8 doses 8 capsule 0    folic acid (FOLVITE) 1 mg tablet TAKE 1 TABLET (1 MG TOTAL) BY MOUTH IN THE MORNING. 90 tablet 1    furosemide (LASIX) 20 mg tablet TAKE 1 TABLET (20 MG TOTAL) BY MOUTH IN THE MORNING AND IN THE EVENING 180 tablet 1    iron polysaccharides (FERREX) 150 mg capsule TAKE 1 CAPSULE (150 MG TOTAL) BY MOUTH IN THE MORNING 90 capsule 1    levETIRAcetam (KEPPRA) 500 mg tablet Take 1 tablet (500 mg total) by mouth every 12 (twelve) hours 180 tablet 3    nadolol (CORGARD) 40 mg tablet TAKE 1.5 TABLETS BY MOUTH DAILY. 135 tablet 1    OXcarbazepine (TRILEPTAL) 600 mg tablet Take 1 tablet (600 mg total) by mouth every 12 (twelve) hours 180 tablet 3    pantoprazole (PROTONIX) 40 mg tablet TAKE 1 TABLET BY MOUTH IN THE MORNING AND IN THE EVENING BEFORE MEALS 180 tablet 1    sucralfate (CARAFATE) 1 g tablet Take 1 tablet (1 g total)  by mouth 4 (four) times a day 360 tablet 1    triamcinolone (KENALOG) 0.1 % ointment Apply sparingly to affected areas of legs 2 times a day 454 g 0     No current facility-administered medications for this visit.       SOCIAL HISTORY:  Social History     Socioeconomic History    Marital status: Single     Spouse name: None    Number of children: None    Years of education: None    Highest education level: None   Occupational History    None   Tobacco Use    Smoking status: Never     Passive exposure: Current    Smokeless tobacco: Never   Vaping Use    Vaping status: Never Used   Substance and Sexual Activity    Alcohol use: Never    Drug use: Never    Sexual activity: Not Currently   Other Topics Concern    None   Social History Narrative    None     Social Determinants of Health     Financial Resource Strain: Low Risk  (3/4/2024)    Overall Financial Resource Strain (CARDIA)     Difficulty of Paying Living Expenses: Not hard at all   Food Insecurity: No Food Insecurity (8/27/2023)    Hunger Vital Sign     Worried About Running Out of Food in the Last Year: Never true     Ran Out of Food in the Last Year: Never true   Transportation Needs: No Transportation Needs (3/4/2024)    PRAPARE - Transportation     Lack of Transportation (Medical): No     Lack of Transportation (Non-Medical): No   Physical Activity: Not on file   Stress: Not on file   Social Connections: Not on file   Intimate Partner Violence: Not on file   Housing Stability: Low Risk  (8/27/2023)    Housing Stability Vital Sign     Unable to Pay for Housing in the Last Year: No     Number of Times Moved in the Last Year: 1     Homeless in the Last Year: No        Review of Systems   Constitutional: Negative.    HENT: Negative.     Eyes: Negative.    Respiratory: Negative.     Cardiovascular: Negative.    Endocrine: Negative.    Musculoskeletal: Negative.    Skin: Negative.    Neurological: Negative.    Hematological: Negative.    Psychiatric/Behavioral:  Negative.           Objective:     Physical Exam  Vitals reviewed.   Constitutional:       Appearance: Normal appearance.   HENT:      Head: Normocephalic and atraumatic.      Nose: Nose normal.   Eyes:      Conjunctiva/sclera: Conjunctivae normal.      Pupils: Pupils are equal, round, and reactive to light.   Cardiovascular:      Pulses:           Dorsalis pedis pulses are 0 on the right side and 0 on the left side.        Posterior tibial pulses are 0 on the right side and 0 on the left side.   Pulmonary:      Effort: Pulmonary effort is normal.   Feet:      Right foot:      Skin integrity: Skin integrity normal.      Toenail Condition: Right toenails are abnormally thick and long. Fungal disease present.     Left foot:      Skin integrity: Skin integrity normal.      Toenail Condition: Left toenails are abnormally thick and long. Fungal disease present.     Comments: The patient's clinical examination today is significant for thickened and dystrophic pedal nail plates with discoloration, subungual debris, brittleness consistent with onychomycosis x10.  The interdigital spaces are clear and without maceration.  There is +2 pitting edema of the bilateral extremities with superficial varicosities noted consistent with venous insufficiency to both lower limbs.  Skin is thin with decreased turgor.  There is absence of hair growth to the bilateral extremities.  There are no signs of infection noted to either lower extremity.  Pedal pulses are nonpalpable likely secondary to his peripheral edema.  There are no signs of any acute ischemia to either lower extremity.  Skin:     General: Skin is warm.      Capillary Refill: Capillary refill takes 2 to 3 seconds.   Neurological:      General: No focal deficit present.      Mental Status: He is alert and oriented to person, place, and time.   Psychiatric:         Mood and Affect: Mood normal.         Behavior: Behavior normal.         Thought Content: Thought content normal.

## 2024-07-29 NOTE — ASSESSMENT & PLAN NOTE
Continue nodolol 40 mg daily, Protonix and sucralfate    Follow-up with GI 
Iron deficiency anemia in the setting of Esophageal varices and hx of angiodysplasia  Continue iron sulfate to decreased dose of 258 mg daily, folic acid, check CBC and iron panel      GI referral given
Moisturizer daily  Avoid scratching 
On Carafate 1 g every 6 hours and pantoprazole 40 mg twice a day, continue the same    Follow-up with GI, he needed repeated EGD
Patient was previously on propranolol and Lasix, switched to propranolol to nodolol bc of esophageal varices  Continue Lasix 20 mg daily, also for leg swelling 
Resolved
Unknown vaccination history  Based on prior medical records patient did not have pneumonia vaccine  He does have a history of pneumonia and hospitalization for the same in the past   Will give Pneumonia vaccination today 
Well controlled on Keppra and Trileptal   He is also on diazepam 10mg twice a day  I plan to continue diazepam because after long-term use and risk of seizure with discontinuation  Plan is to taper over time  Continue the same for now  Check Trileptal level  Neurology referral was given 
Statement Selected

## 2024-08-21 DIAGNOSIS — G40.909 NONINTRACTABLE EPILEPSY WITHOUT STATUS EPILEPTICUS, UNSPECIFIED EPILEPSY TYPE (HCC): ICD-10-CM

## 2024-08-22 RX ORDER — DIAZEPAM 5 MG
5 TABLET ORAL DAILY
Qty: 30 TABLET | Refills: 0 | Status: SHIPPED | OUTPATIENT
Start: 2024-08-22

## 2024-09-18 DIAGNOSIS — G40.909 NONINTRACTABLE EPILEPSY WITHOUT STATUS EPILEPTICUS, UNSPECIFIED EPILEPSY TYPE (HCC): ICD-10-CM

## 2024-09-18 NOTE — TELEPHONE ENCOUNTER
Reason for call:   [x] Refill   [] Prior Auth  [] Other:     Office:   [x] PCP/Provider - Thomas  [] Specialty/Provider -     Medication: Valium 5mg    Dose/Frequency: 1 tab am    Quantity: 30    Pharmacy: [facility]/pharmacy #[id number] - [place], [place] - [address] [place] [address] [phone]     Does the patient have enough for 3 days?   [x] Yes   [] No - Send as HP to POD

## 2024-09-19 RX ORDER — DIAZEPAM 5 MG
5 TABLET ORAL DAILY
Qty: 30 TABLET | Refills: 0 | Status: SHIPPED | OUTPATIENT
Start: 2024-09-19

## 2024-09-29 DIAGNOSIS — I10 PRIMARY HYPERTENSION: ICD-10-CM

## 2024-09-30 ENCOUNTER — APPOINTMENT (OUTPATIENT)
Dept: LAB | Facility: AMBULARY SURGERY CENTER | Age: 59
End: 2024-09-30
Payer: MEDICARE

## 2024-09-30 DIAGNOSIS — E46 PROTEIN-CALORIE MALNUTRITION, UNSPECIFIED SEVERITY (HCC): ICD-10-CM

## 2024-09-30 DIAGNOSIS — D50.0 IRON DEFICIENCY ANEMIA DUE TO CHRONIC BLOOD LOSS: ICD-10-CM

## 2024-09-30 DIAGNOSIS — D53.9 NUTRITIONAL ANEMIA: ICD-10-CM

## 2024-09-30 LAB
FERRITIN SERPL-MCNC: 21 NG/ML (ref 24–336)
FOLATE SERPL-MCNC: >22.3 NG/ML
IRON SATN MFR SERPL: 27 % (ref 15–50)
IRON SERPL-MCNC: 69 UG/DL (ref 50–212)
TIBC SERPL-MCNC: 253 UG/DL (ref 250–450)
UIBC SERPL-MCNC: 184 UG/DL (ref 155–355)
VIT B12 SERPL-MCNC: 100 PG/ML (ref 180–914)

## 2024-09-30 PROCEDURE — 82607 VITAMIN B-12: CPT

## 2024-09-30 PROCEDURE — 83540 ASSAY OF IRON: CPT

## 2024-09-30 PROCEDURE — 83550 IRON BINDING TEST: CPT

## 2024-09-30 PROCEDURE — 82728 ASSAY OF FERRITIN: CPT

## 2024-09-30 PROCEDURE — 82746 ASSAY OF FOLIC ACID SERUM: CPT

## 2024-09-30 RX ORDER — FUROSEMIDE 20 MG
TABLET ORAL
Qty: 180 TABLET | Refills: 1 | Status: SHIPPED | OUTPATIENT
Start: 2024-09-30

## 2024-10-01 ENCOUNTER — TELEPHONE (OUTPATIENT)
Age: 59
End: 2024-10-01

## 2024-10-01 NOTE — TELEPHONE ENCOUNTER
Patients GI provider:  Dr. Plata     Number to return call: [phone]     Reason for call: Pt sister n law calling to confirm if labs are needed prior to this appt.     Scheduled procedure/appointment date if applicable: appt 10/11/24

## 2024-10-03 ENCOUNTER — RA CDI HCC (OUTPATIENT)
Dept: OTHER | Facility: HOSPITAL | Age: 59
End: 2024-10-03

## 2024-10-08 ENCOUNTER — OFFICE VISIT (OUTPATIENT)
Dept: INTERNAL MEDICINE CLINIC | Facility: CLINIC | Age: 59
End: 2024-10-08
Payer: MEDICARE

## 2024-10-08 VITALS
TEMPERATURE: 97.6 F | RESPIRATION RATE: 14 BRPM | DIASTOLIC BLOOD PRESSURE: 52 MMHG | WEIGHT: 184 LBS | HEART RATE: 70 BPM | SYSTOLIC BLOOD PRESSURE: 90 MMHG | HEIGHT: 70 IN | OXYGEN SATURATION: 98 % | BODY MASS INDEX: 26.34 KG/M2

## 2024-10-08 DIAGNOSIS — I95.9 HYPOTENSION, UNSPECIFIED HYPOTENSION TYPE: ICD-10-CM

## 2024-10-08 DIAGNOSIS — G40.909 NONINTRACTABLE EPILEPSY WITHOUT STATUS EPILEPTICUS, UNSPECIFIED EPILEPSY TYPE (HCC): ICD-10-CM

## 2024-10-08 DIAGNOSIS — Z23 ENCOUNTER FOR IMMUNIZATION: ICD-10-CM

## 2024-10-08 DIAGNOSIS — E53.8 B12 DEFICIENCY: Primary | ICD-10-CM

## 2024-10-08 DIAGNOSIS — I87.2 VENOUS STASIS DERMATITIS OF BOTH LOWER EXTREMITIES: ICD-10-CM

## 2024-10-08 PROCEDURE — 96372 THER/PROPH/DIAG INJ SC/IM: CPT | Performed by: INTERNAL MEDICINE

## 2024-10-08 PROCEDURE — 99213 OFFICE O/P EST LOW 20 MIN: CPT | Performed by: INTERNAL MEDICINE

## 2024-10-08 PROCEDURE — 90746 HEPB VACCINE 3 DOSE ADULT IM: CPT | Performed by: INTERNAL MEDICINE

## 2024-10-08 PROCEDURE — G0010 ADMIN HEPATITIS B VACCINE: HCPCS | Performed by: INTERNAL MEDICINE

## 2024-10-08 RX ORDER — TRIAMCINOLONE ACETONIDE 1 MG/G
OINTMENT TOPICAL
Qty: 454 G | Refills: 0 | Status: SHIPPED | OUTPATIENT
Start: 2024-10-08

## 2024-10-08 RX ORDER — CYANOCOBALAMIN 1000 UG/ML
1000 INJECTION, SOLUTION INTRAMUSCULAR; SUBCUTANEOUS
Status: SHIPPED | OUTPATIENT
Start: 2024-10-08 | End: 2024-11-05

## 2024-10-08 RX ORDER — DIAZEPAM 5 MG
2.5 TABLET ORAL DAILY
Start: 2024-10-08 | End: 2024-10-15 | Stop reason: SDUPTHER

## 2024-10-08 RX ADMIN — CYANOCOBALAMIN 1000 MCG: 1000 INJECTION, SOLUTION INTRAMUSCULAR; SUBCUTANEOUS at 11:00

## 2024-10-08 NOTE — ASSESSMENT & PLAN NOTE
Advised patient and family to use cream no more than 50% of the time (example, 1 week on 1 week off, every other day, etc.)  Orders:    triamcinolone (KENALOG) 0.1 % ointment; Apply sparingly to affected areas of legs 2 times a day

## 2024-10-08 NOTE — ASSESSMENT & PLAN NOTE
Continue diazepam taper.  Start 2.5 mg daily.  Plan to taper further at next appointment.  Continue neurology follow-up, Keppra, Trileptal.  Orders:    diazepam (VALIUM) 5 mg tablet; Take 0.5 tablets (2.5 mg total) by mouth in the morning

## 2024-10-08 NOTE — PROGRESS NOTES
Ambulatory Visit  Name: Jose Stewart      : 1965      MRN: [id number]  Encounter Provider: Bennie Martin DO  Encounter Date: 10/8/2024   Encounter department: [facility] INTERNAL MEDICINE [place]    Assessment & Plan  Nonintractable epilepsy without status epilepticus, unspecified epilepsy type (HCC)  Continue diazepam taper.  Start 2.5 mg daily.  Plan to taper further at next appointment.  Continue neurology follow-up, Keppra, Trileptal.  Orders:    diazepam (VALIUM) 5 mg tablet; Take 0.5 tablets (2.5 mg total) by mouth in the morning    B12 deficiency  B12 very low at 100, start weekly injection x 4 doses today followed by monthly injections  Orders:    cyanocobalamin injection 1,000 mcg    Hypotension, unspecified hypotension type  Blood pressure little lower than normal today at 90 systolic, usually in the low 100s systolic.  He is asymptomatic without any complaints.  Suspect this is likely due to his cirrhosis and preventative medication.  Will continue to monitor.  Advised to go to the ED for any worsening lightheadedness/orthostatic symptoms, syncope, feeling like he is going pass out.       Venous stasis dermatitis of both lower extremities  Advised patient and family to use cream no more than 50% of the time (example, 1 week on 1 week off, every other day, etc.)  Orders:    triamcinolone (KENALOG) 0.1 % ointment; Apply sparingly to affected areas of legs 2 times a day    Encounter for immunization  Needs third dose of hep B vaccine, will give today  Orders:    HEPATITIS B VACCINE ADULT IM       History of Present Illness     Jose Stewart presents today for follow-up.  He is doing generally well without complaints.  No lightheadedness, dizziness, shortness of breath, chest pain, abdominal pain, melena, hematochezia.  Rash on his right lower extremity is improved but still has a dermatitis on the left lower extremity.  Recent illnesses.          Review of Systems  "  Constitutional:  Negative for activity change, appetite change, chills and fever.   Neurological:  Negative for dizziness, syncope and light-headedness.   All other systems reviewed and are negative.          Objective     BP 90/52 (BP Location: Right arm, Patient Position: Sitting, Cuff Size: Large)   Pulse 70   Temp 97.6 °F (36.4 °C) (Tympanic)   Resp 14   Ht 5' 10\" (1.778 m)   Wt 83.5 kg (184 lb)   SpO2 98%   BMI 26.40 kg/m²     Physical Exam  Constitutional:       General: He is not in acute distress.     Appearance: He is normal weight. He is ill-appearing (chronically).   HENT:      Mouth/Throat:      Mouth: Mucous membranes are moist.   Cardiovascular:      Rate and Rhythm: Normal rate and regular rhythm.      Heart sounds: No murmur heard.  Pulmonary:      Effort: Pulmonary effort is normal. No respiratory distress.      Breath sounds: Normal breath sounds.   Abdominal:      Palpations: Abdomen is soft.   Musculoskeletal:      Right lower leg: Edema present.      Left lower leg: Edema present.   Skin:     General: Skin is warm and dry.   Neurological:      General: No focal deficit present.      Mental Status: He is alert. Mental status is at baseline.         "

## 2024-10-11 ENCOUNTER — OFFICE VISIT (OUTPATIENT)
Age: 59
End: 2024-10-11
Payer: MEDICARE

## 2024-10-11 VITALS
BODY MASS INDEX: 26.48 KG/M2 | DIASTOLIC BLOOD PRESSURE: 70 MMHG | HEART RATE: 64 BPM | TEMPERATURE: 97.1 F | OXYGEN SATURATION: 99 % | HEIGHT: 70 IN | SYSTOLIC BLOOD PRESSURE: 110 MMHG | WEIGHT: 185 LBS

## 2024-10-11 DIAGNOSIS — K74.69 OTHER CIRRHOSIS OF LIVER (HCC): Primary | ICD-10-CM

## 2024-10-11 DIAGNOSIS — I85.00 ESOPHAGEAL VARICES WITHOUT BLEEDING, UNSPECIFIED ESOPHAGEAL VARICES TYPE (HCC): ICD-10-CM

## 2024-10-11 PROCEDURE — G2211 COMPLEX E/M VISIT ADD ON: HCPCS | Performed by: STUDENT IN AN ORGANIZED HEALTH CARE EDUCATION/TRAINING PROGRAM

## 2024-10-11 PROCEDURE — 99214 OFFICE O/P EST MOD 30 MIN: CPT | Performed by: STUDENT IN AN ORGANIZED HEALTH CARE EDUCATION/TRAINING PROGRAM

## 2024-10-11 NOTE — PATIENT INSTRUCTIONS
Scheduled date of EGD(as of today): 11/13/2024  Physician performing EGD: Dr. Plata  Location of EGD: [place]  Instructions reviewed with patient by: Sue  Clearances: N/A    MRI scheduled @ [place] 11/5/2024

## 2024-10-11 NOTE — PROGRESS NOTES
[facility] - Outpatient Follow-up Note  Jose Stewart 59 y.o. male MRN: [id number]  Encounter: [id number]          ASSESSMENT AND PLAN:    Jose Stewart is a 59 y.o. male with hx of HTN, GERD, seizure disorder, splenectomy, decompensated cirrhosis (db prior bleeding EV) presenting for f/u.    Decompensated Cirrhosis  MELD 3.0: 8 at 9/30/2024  9:10 AM  MELD-Na: 8 at 9/30/2024  9:10 AM  Calculated from:  Serum Creatinine: 0.60 mg/dL (Using min of 1 mg/dL) at 9/30/2024  9:10 AM  Serum Sodium: 138 mmol/L (Using max of 137 mmol/L) at 9/30/2024  9:10 AM  Total Bilirubin: 0.61 mg/dL (Using min of 1 mg/dL) at 9/30/2024  9:10 AM  Serum Albumin: 3.3 g/dL at 9/30/2024  9:10 AM  INR(ratio): 1.15 at 9/30/2024  9:10 AM  Age at listing (hypothetical): 59 years  Sex: Male at 9/30/2024  9:10 AM  EV  EGD 4/10/24 w medium varices s/p banding w eradication, PHG, linear GAVE s/p APC  Recommended for repeat EGD in 3-6 months, not yet performed. Ordered today  Ascites  None  HE  None  HCC Screening  MRI liver protocol now   AFP now  Repeat MELD labs    1. Esophageal varices without bleeding, unspecified esophageal varices type (HCC)    2. Other cirrhosis of liver (HCC)        Orders Placed This Encounter   Procedures    MRI abdomen w wo contrast and mrcp    CBC    Comprehensive metabolic panel    Protime-INR    EGD     ______________________________________________________________________    SUBJECTIVE:    Jose Stewart is a 59 y.o. male with hx of HTN, GERD, seizure disorder, splenectomy, decompensated cirrhosis (db prior bleeding EV) presenting for f/u.    Last seen in office 3/5/24. Most recent RUQ U/S 3/13/24 without hepatomas. MELD labs obtained 9/30/24 unremarkable. EGD performed in April with medium varices, PHG, linear GAVE. Had variceal banding and APC performed. Recommended for repeat EGD, not yet performed. Last ultrasound of liver performed 3/2024, normal.     Weight has been stable,  and has gained 6 lbs from July (179 lb -> 185 lb).     Had gallbladder distention and probable gallbladder polyps on 7/13/23 CT scan. Subsequent U/S w dopplers showed chronic PVT, which was not present on 3/13/24 US.     No abdominal pain, distention, LE edema, nausea, emesis, change in bowel habits. Is in his usual state of health without any new concerns at this time.     REVIEW OF SYSTEMS IS OTHERWISE NEGATIVE.      Historical Information   Past Medical History:   Diagnosis Date    Ambulatory dysfunction 02/08/2022    Anxiety     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    Cellulitis of left lower extremity 02/08/2022    Gallbladder mass 05/01/2023    GERD (gastroesophageal reflux disease)     Hypertension     Hypoxia 02/11/2022    Mental developmental delay     mumps as a child, developed rheumatic fever    MRSA bacteremia 02/09/2022    Seizures (HCC)      Past Surgical History:   Procedure Laterality Date    COLONOSCOPY      WY LAPAROSCOPY SURG CHOLECYSTECTOMY N/A 8/25/2023    Procedure: CHOLECYSTECTOMY OPEN;  Surgeon: Ran Fletcher DO;  Location: [facility];  Service: General    SPLENECTOMY      UPPER GASTROINTESTINAL ENDOSCOPY       Social History   Social History     Substance and Sexual Activity   Alcohol Use Never     Social History     Substance and Sexual Activity   Drug Use Never     Social History     Tobacco Use   Smoking Status Never    Passive exposure: Current   Smokeless Tobacco Never     Family History   Problem Relation Age of Onset    Depression Mother     Heart disease Father        Meds/Allergies       Current Outpatient Medications:     acetaminophen (TYLENOL) 325 mg tablet    cholecalciferol 1,000 units tablet    diazepam (VALIUM) 5 mg tablet    folic acid (FOLVITE) 1 mg tablet    furosemide (LASIX) 20 mg tablet    iron polysaccharides (FERREX) 150 mg capsule    levETIRAcetam (KEPPRA) 500 mg tablet    nadolol (CORGARD) 40 mg tablet    OXcarbazepine (TRILEPTAL) 600 mg  "tablet    pantoprazole (PROTONIX) 40 mg tablet    sucralfate (CARAFATE) 1 g tablet    triamcinolone (KENALOG) 0.1 % ointment    ergocalciferol (VITAMIN D2) 50,000 units    Current Facility-Administered Medications:     cyanocobalamin injection 1,000 mcg, 1,000 mcg, Intramuscular, Q7 Days, 1,000 mcg at 10/08/24 1100    No Known Allergies        Objective     Blood pressure 110/70, pulse 64, temperature (!) 97.1 °F (36.2 °C), temperature source Tympanic, height 5' 10\" (1.778 m), weight 83.9 kg (185 lb), SpO2 99%.   Body mass index is 26.54 kg/m².  Wt Readings from Last 3 Encounters:   10/11/24 83.9 kg (185 lb)   10/08/24 83.5 kg (184 lb)   07/10/24 81.2 kg (179 lb)        PHYSICAL EXAM:      General Appearance:   Alert, cooperative, no distress   HEENT:   Normocephalic, atraumatic, anicteric.     Neck:  Supple, symmetrical, trachea midline   Lungs:   No respiratory distress    Heart::   Regular rate; no murmur, rub, or gallop.   Abdomen:   Soft, non-tender, non-distended; no masses, no organomegaly    Neurologic:   Normal   Rectal:   Deferred    Extremities:  No cyanosis, clubbing or edema    Pulses:  2+ and symmetric    Skin:  No jaundice, rashes, or lesions    Lymph nodes:  No palpable cervical lymphadenopathy        Lab Results:       Lab Units 09/30/24  0910 04/17/24  1734 03/28/24  0829 11/13/23  0948 10/25/23  1350 08/28/23  0516 08/27/23  0438 08/26/23  0500 08/25/23  1825   SODIUM mmol/L 138 138 138 136 139 135 136 138 140   POTASSIUM mmol/L 3.5 3.3* 3.9 3.2* 2.9* 3.9 4.3 3.8 4.7   CHLORIDE mmol/L 99 98 100 100 99 102 104 105 107   CO2 mmol/L 31 33* 32 34* 32 31 30 27 31   BUN mg/dL 8 7 9 8 8 10 12 15 14   CREATININE mg/dL 0.60 0.64 0.66 0.58* 0.54* 0.49* 0.51* 0.68 0.57*   GLUCOSE RANDOM mg/dL  --  66  --   --  82 99 105 121 153*   CALCIUM mg/dL 8.6 8.3* 8.3* 7.5* 7.8* 7.8* 7.5* 7.8* 8.0*   MAGNESIUM mg/dL  --   --   --   --   --   --  2.1 1.8* 1.6*   PHOSPHORUS mg/dL  --   --   --   --   --  2.1* 2.0* 2.8  " "--             Lab Units 09/30/24  0910 04/17/24  1734 03/28/24  0829 11/13/23  0948 10/25/23  1350   TOTAL PROTEIN g/dL 7.3 7.9 7.9 7.6 7.3   ALBUMIN g/dL 3.3* 3.2* 3.0* 2.3* 2.6*   TOTAL BILIRUBIN mg/dL 0.61 0.31 0.36 0.51 0.45   AST U/L 19 16 18 14 16   ALT U/L 9 8 7 7 6*   ALK PHOS U/L 80 89 89 107* 66           Lab Units 09/30/24  0910 04/17/24  1447 03/28/24  0829 11/13/23  0948 10/25/23  1350   WBC Thousand/uL 5.03 5.31 4.01* 5.68 6.83   HEMOGLOBIN g/dL 11.5* 7.9* 8.1* 7.3* 8.4*   HEMATOCRIT % 36.2* 29.6* 30.0* 26.4* 29.1*   PLATELETS Thousands/uL 175 155 237 210 193   MCV fL 95 82 83 85 86   MCH pg 30.0 21.8* 22.3* 23.6* 24.7*   MCHC g/dL 31.8 26.7* 27.0* 27.7* 28.9*   RDW % 14.3 16.0* 16.3* 16.8* 16.6*   MPV fL 9.4 9.5 9.2 9.4 9.8   NRBC AUTO /100 WBCs 0 0 0 0 0   SEGS PCT % 52 58 57 73 78*   IMMATURE GRANULOCYTES % % 0 0 1 0 0   LYMPHO PCT % 25 23 24 16 13*   MONO PCT % 8 9 8 7 8   EOS PCT % 14* 9* 9* 3 1   BASOS PCT % 1 1 1 1 0   TOTAL NEUT ABS Thousands/µL 2.64 3.11 2.37 4.18 5.26   IMMATURE GRANULOCYES ABS Thousand/uL 0.01 0.01 0.02 0.02 0.03   LYMPHS ABS Thousands/µL 1.24 1.20 0.95 0.91 0.86   MONOS ABS Thousand/µL 0.38 0.46 0.30 0.40 0.57   EOS ABS Thousand/µL 0.71* 0.50 0.34 0.14 0.08   BASOS ABS Thousands/µL 0.05 0.03 0.03 0.03 0.03           Lab Units 09/30/24  0910 03/28/24  0829 10/25/23  1350   IRON ug/dL 69 21* 14*   FERRITIN ng/mL 21* 14* 19*       No results found for: \"CRP\"    Lab Results   Component Value Date    AYP0JHONGYNQ 0.848 02/08/2022       Radiology Results:   No results found.    Gastroenterological Procedures:   04/10/24    EGD    Impression:  Three medium varices in the middle third of the esophagus and lower third  of the esophagus; placed 6 bands successfully, resulting in complete  eradication  Linear gastric antral vascular ectasia in the antrum; tissue was ablated  with argon plasma coagulation  Moderate and mosaic portal hypertensive gastropathy in the body of " the  stomach  The duodenal bulb and 2nd part of the duodenum appeared normal.    RECOMMENDATION:  Continue nadolol  Repeat EGD in 3-6 months  Follow up with Dr Plata  Resume diet    Ta Márquez MD      02/08/24    EGD    Impression:  Multiple large varices in the lower third of the esophagus; placed 4 bands  successfully, resulting in complete eradication  Abnormal mucosa in the antrum, prepyloric region and pylorus  The duodenum appeared normal.    RECOMMENDATION:  Await pathology results  Schedule repeat EGD  Sucralfate PRN x7 days for pain  Soft diet x2 days, advance as tolerated  Continue nadolol for secondary prophylaxis.    Mimi Plata MD      01/10/24    EGD Banding    Impression:  Large varices in the esophagus which were not banded due to retained food.    Procedure aborted due to large bolus in the stomach.    RECOMMENDATION:  Schedule repeat EGD  Follow up with me in clinic  Continue nadolol for secondary prophylaxis    Mimi Plata MD      12/05/23    EGD Banding    Impression:  There were 3-4 columns of large varices (red beryl sign) in the esophagus;  placed 6 bands successfully, resulting in partial eradication.  There were portal hypertensive polyps in the antrum and along the greater  curvature of the stomach.  No gastric varices seen.  The duodenum appeared normal.    RECOMMENDATION:  Follow up with me in clinic  Schedule repeat EGD in 4 weeks for serial banding  Soft diet for 48 hours and then advance as tolerated  Carafate 1 g QID as needed for post-procedure pain  Continue nadolol for secondary prophylaxis    Mimi Plata MD      04/14/23    Colonoscopy    Impression:  Normal screening colonoscopy    RECOMMENDATION:  Repeat screening colonoscopy in 10 years  Follow up with me in clinic    Referral placed for oncology regarding gallbladder mass and CBD stricture.    MD Tree Degroot MD  PGY-6 Gastroenterology Fellow  10/11/2024 9:09 AM

## 2024-10-15 ENCOUNTER — TELEPHONE (OUTPATIENT)
Dept: INTERNAL MEDICINE CLINIC | Facility: CLINIC | Age: 59
End: 2024-10-15

## 2024-10-15 ENCOUNTER — CLINICAL SUPPORT (OUTPATIENT)
Dept: INTERNAL MEDICINE CLINIC | Facility: CLINIC | Age: 59
End: 2024-10-15
Payer: MEDICARE

## 2024-10-15 DIAGNOSIS — E53.8 B12 DEFICIENCY: Primary | ICD-10-CM

## 2024-10-15 DIAGNOSIS — G40.909 NONINTRACTABLE EPILEPSY WITHOUT STATUS EPILEPTICUS, UNSPECIFIED EPILEPSY TYPE (HCC): ICD-10-CM

## 2024-10-15 PROCEDURE — 96372 THER/PROPH/DIAG INJ SC/IM: CPT

## 2024-10-15 RX ORDER — DIAZEPAM 5 MG/1
2.5 TABLET ORAL DAILY
Qty: 15 TABLET | Refills: 0 | Status: SHIPPED | OUTPATIENT
Start: 2024-10-15

## 2024-10-15 RX ADMIN — CYANOCOBALAMIN 1000 MCG: 1000 INJECTION, SOLUTION INTRAMUSCULAR; SUBCUTANEOUS at 10:00

## 2024-10-17 NOTE — TELEPHONE ENCOUNTER
PA for diazepam (VALIUM) 5 mg tablet SUBMITTED     via    [x]CMM-KEY: [id number]  []Surescripts-Case ID #   []Availity-Auth ID # NDC #   []Faxed to plan   []Other website   []Phone call Case ID #     Office notes sent, clinical questions answered. Awaiting determination    Turnaround time for your insurance to make a decision on your Prior Authorization can take 7-21 business days.

## 2024-10-21 NOTE — TELEPHONE ENCOUNTER
PA for diazepam (VALIUM) 5 mg tablet NOT REQUIRED     Reason (screenshot if applicable)          Patient advised by          [] MyChart Message  [] Phone call   []LMOM  []L/M to call office as no active Communication consent on file  [x]Patient obtained medication       Pharmacy advised by    []Fax  [x]Phone call

## 2024-10-22 ENCOUNTER — CLINICAL SUPPORT (OUTPATIENT)
Dept: INTERNAL MEDICINE CLINIC | Facility: CLINIC | Age: 59
End: 2024-10-22
Payer: MEDICARE

## 2024-10-22 DIAGNOSIS — E53.8 B12 DEFICIENCY: Primary | ICD-10-CM

## 2024-10-22 PROCEDURE — 96372 THER/PROPH/DIAG INJ SC/IM: CPT | Performed by: INTERNAL MEDICINE

## 2024-10-22 RX ADMIN — CYANOCOBALAMIN 1000 MCG: 1000 INJECTION, SOLUTION INTRAMUSCULAR; SUBCUTANEOUS at 09:00

## 2024-10-23 ENCOUNTER — TELEPHONE (OUTPATIENT)
Dept: HEMATOLOGY ONCOLOGY | Facility: CLINIC | Age: 59
End: 2024-10-23

## 2024-10-23 NOTE — TELEPHONE ENCOUNTER
Left  for pt and his family. Due to a medical emergency Dr. Mahmood is out of the office. Pt's appt had to be rs for Dr. Tiwari on 11/5 @ 1000 am located on [address] [facility] in [facility]. Left [place] line # for pt to confirm or rs appt.

## 2024-10-29 ENCOUNTER — OFFICE VISIT (OUTPATIENT)
Dept: INTERNAL MEDICINE CLINIC | Facility: CLINIC | Age: 59
End: 2024-10-29
Payer: MEDICARE

## 2024-10-29 ENCOUNTER — TELEPHONE (OUTPATIENT)
Dept: INTERNAL MEDICINE CLINIC | Facility: CLINIC | Age: 59
End: 2024-10-29

## 2024-10-29 ENCOUNTER — APPOINTMENT (OUTPATIENT)
Dept: LAB | Facility: AMBULARY SURGERY CENTER | Age: 59
End: 2024-10-29
Payer: MEDICARE

## 2024-10-29 DIAGNOSIS — E53.8 B12 DEFICIENCY: Primary | ICD-10-CM

## 2024-10-29 DIAGNOSIS — K74.69 OTHER CIRRHOSIS OF LIVER (HCC): ICD-10-CM

## 2024-10-29 LAB
AFP-TM SERPL-MCNC: 1.98 NG/ML (ref 0–9)
ALBUMIN SERPL BCG-MCNC: 3.3 G/DL (ref 3.5–5)
ALP SERPL-CCNC: 106 U/L (ref 34–104)
ALT SERPL W P-5'-P-CCNC: 11 U/L (ref 7–52)
ANION GAP SERPL CALCULATED.3IONS-SCNC: 7 MMOL/L (ref 4–13)
AST SERPL W P-5'-P-CCNC: 18 U/L (ref 13–39)
BILIRUB SERPL-MCNC: 0.44 MG/DL (ref 0.2–1)
BUN SERPL-MCNC: 7 MG/DL (ref 5–25)
CALCIUM ALBUM COR SERPL-MCNC: 9.4 MG/DL (ref 8.3–10.1)
CALCIUM SERPL-MCNC: 8.8 MG/DL (ref 8.4–10.2)
CHLORIDE SERPL-SCNC: 102 MMOL/L (ref 96–108)
CO2 SERPL-SCNC: 33 MMOL/L (ref 21–32)
CREAT SERPL-MCNC: 0.66 MG/DL (ref 0.6–1.3)
ERYTHROCYTE [DISTWIDTH] IN BLOOD BY AUTOMATED COUNT: 13.5 % (ref 11.6–15.1)
GFR SERPL CREATININE-BSD FRML MDRD: 104 ML/MIN/1.73SQ M
GLUCOSE P FAST SERPL-MCNC: 91 MG/DL (ref 65–99)
HCT VFR BLD AUTO: 38 % (ref 36.5–49.3)
HGB BLD-MCNC: 11.9 G/DL (ref 12–17)
INR PPP: 1.17 (ref 0.85–1.19)
MCH RBC QN AUTO: 29.8 PG (ref 26.8–34.3)
MCHC RBC AUTO-ENTMCNC: 31.3 G/DL (ref 31.4–37.4)
MCV RBC AUTO: 95 FL (ref 82–98)
PLATELET # BLD AUTO: 199 THOUSANDS/UL (ref 149–390)
PMV BLD AUTO: 9.3 FL (ref 8.9–12.7)
POTASSIUM SERPL-SCNC: 3.7 MMOL/L (ref 3.5–5.3)
PROT SERPL-MCNC: 7.7 G/DL (ref 6.4–8.4)
PROTHROMBIN TIME: 15.2 SECONDS (ref 12.3–15)
RBC # BLD AUTO: 3.99 MILLION/UL (ref 3.88–5.62)
SODIUM SERPL-SCNC: 142 MMOL/L (ref 135–147)
WBC # BLD AUTO: 4.29 THOUSAND/UL (ref 4.31–10.16)

## 2024-10-29 PROCEDURE — 36415 COLL VENOUS BLD VENIPUNCTURE: CPT

## 2024-10-29 PROCEDURE — 85027 COMPLETE CBC AUTOMATED: CPT

## 2024-10-29 PROCEDURE — 96372 THER/PROPH/DIAG INJ SC/IM: CPT | Performed by: INTERNAL MEDICINE

## 2024-10-29 PROCEDURE — 85610 PROTHROMBIN TIME: CPT

## 2024-10-29 PROCEDURE — 82105 ALPHA-FETOPROTEIN SERUM: CPT

## 2024-10-29 PROCEDURE — 80053 COMPREHEN METABOLIC PANEL: CPT

## 2024-10-29 RX ORDER — CYANOCOBALAMIN 1000 UG/ML
1000 INJECTION, SOLUTION INTRAMUSCULAR; SUBCUTANEOUS WEEKLY
Status: SHIPPED | OUTPATIENT
Start: 2024-10-29 | End: 2024-11-12

## 2024-10-29 RX ADMIN — CYANOCOBALAMIN 1000 MCG: 1000 INJECTION, SOLUTION INTRAMUSCULAR; SUBCUTANEOUS at 09:16

## 2024-10-29 NOTE — TELEPHONE ENCOUNTER
Patient has two week left of valium and does he need to continue this medication, and if so he will need a new script

## 2024-10-30 ENCOUNTER — TELEPHONE (OUTPATIENT)
Dept: GASTROENTEROLOGY | Facility: CLINIC | Age: 59
End: 2024-10-30

## 2024-10-30 DIAGNOSIS — I85.00 ESOPHAGEAL VARICES WITHOUT BLEEDING, UNSPECIFIED ESOPHAGEAL VARICES TYPE (HCC): ICD-10-CM

## 2024-10-30 DIAGNOSIS — D50.0 IRON DEFICIENCY ANEMIA DUE TO CHRONIC BLOOD LOSS: ICD-10-CM

## 2024-10-30 RX ORDER — NADOLOL 40 MG/1
TABLET ORAL
Qty: 135 TABLET | Refills: 1 | Status: SHIPPED | OUTPATIENT
Start: 2024-10-30

## 2024-10-30 NOTE — TELEPHONE ENCOUNTER
----- Message from Mimi Plata MD sent at 10/29/2024  9:51 PM EDT -----  Hi can we please contact his brother to inform him that his liver tests and function are stable. His updated MELD is 8 which is great news. I recommend repeating his blood work in 6 months. Thanks!    MELD 3.0: 8 at 10/29/2024  9:47 AM  MELD-Na: 8 at 10/29/2024  9:47 AM  Calculated from:  Serum Creatinine: 0.66 mg/dL (Using min of 1 mg/dL) at 10/29/2024  9:47 AM  Serum Sodium: 142 mmol/L (Using max of 137 mmol/L) at 10/29/2024  9:47 AM  Total Bilirubin: 0.44 mg/dL (Using min of 1 mg/dL) at 10/29/2024  9:47 AM  Serum Albumin: 3.3 g/dL at 10/29/2024  9:47 AM  INR(ratio): 1.17 at 10/29/2024  9:47 AM  Age at listing (hypothetical): 59 years  Sex: Male at 10/29/2024  9:47 AM

## 2024-10-30 NOTE — TELEPHONE ENCOUNTER
Pt. Sister called back, reviewed lab results with provider recommendation, no further review needed

## 2024-10-31 RX ORDER — FOLIC ACID 1 MG/1
TABLET ORAL
Qty: 90 TABLET | Refills: 1 | Status: SHIPPED | OUTPATIENT
Start: 2024-10-31

## 2024-11-05 ENCOUNTER — HOSPITAL ENCOUNTER (OUTPATIENT)
Dept: MRI IMAGING | Facility: HOSPITAL | Age: 59
Discharge: HOME | End: 2024-11-05
Payer: MEDICARE

## 2024-11-05 ENCOUNTER — OFFICE VISIT (OUTPATIENT)
Dept: HEMATOLOGY ONCOLOGY | Facility: CLINIC | Age: 59
End: 2024-11-05
Payer: MEDICARE

## 2024-11-05 VITALS
HEART RATE: 69 BPM | TEMPERATURE: 97.2 F | BODY MASS INDEX: 26.48 KG/M2 | OXYGEN SATURATION: 97 % | HEIGHT: 70 IN | RESPIRATION RATE: 14 BRPM | DIASTOLIC BLOOD PRESSURE: 70 MMHG | SYSTOLIC BLOOD PRESSURE: 100 MMHG | WEIGHT: 185 LBS

## 2024-11-05 DIAGNOSIS — K74.69 OTHER CIRRHOSIS OF LIVER (HCC): ICD-10-CM

## 2024-11-05 DIAGNOSIS — D50.0 IRON DEFICIENCY ANEMIA DUE TO CHRONIC BLOOD LOSS: Primary | ICD-10-CM

## 2024-11-05 PROCEDURE — 99213 OFFICE O/P EST LOW 20 MIN: CPT | Performed by: INTERNAL MEDICINE

## 2024-11-05 PROCEDURE — A9585 GADOBUTROL INJECTION: HCPCS | Performed by: INTERNAL MEDICINE

## 2024-11-05 PROCEDURE — 74183 MRI ABD W/O CNTR FLWD CNTR: CPT

## 2024-11-05 RX ORDER — GADOBUTROL 604.72 MG/ML
8 INJECTION INTRAVENOUS
Status: COMPLETED | OUTPATIENT
Start: 2024-11-05 | End: 2024-11-05

## 2024-11-05 RX ADMIN — GADOBUTROL 8 ML: 604.72 INJECTION INTRAVENOUS at 14:26

## 2024-11-05 NOTE — PROGRESS NOTES
Ambulatory Visit  Name: Jose Stewart      : 1965      MRN: [id number]  Encounter Provider: Mirna Tiwari MD  Encounter Date: 2024   Encounter department: [facility] [place]    Assessment & Plan  Iron deficiency anemia due to chronic blood loss  59-year-old male with history of liver cirrhosis, esophageal varices, and portal hypertension, with chronic multifactorial hypoproliferative mild normocytic anemia related to different factors including iron deficiency and chronic disease.  His most recent hemoglobin on 2024 was 11.9 g/.  On 2024 serum iron profile did not show iron deficiency. Serum vitamin B12 was low at 100 pg/mL.  On 2024, the patient initiated vitamin B12 replacement therapy by his primary care physician.    Plan:  Continue vitamin B12 replacement  Return to hematology clinic as needed.  The patient does not have indication for scheduled outpatient assessment at the hematology clinic         History of Present Illness     Jose Stewart is a 59 y.o. male with history of liver cirrhosis, esophageal varices, and portal hypertension, with chronic multifactorial hypoproliferative mild normocytic anemia related to different factors including iron deficiency and chronic disease.  His most recent hemoglobin on 2024 was 11.9 g/.  On 2024 serum iron profile did not show iron deficiency.  Serum vitamin B12 was low at 100 pg/mL.  Today, Mr. Stewart does not have new systemic, cardiorespiratory, gastrointestinal, genitourinary, musculoskeletal, or neurologic symptoms.  He does not have new complaints.      Review of Systems   Constitutional:  Negative for activity change, appetite change, chills, diaphoresis, fatigue, fever and unexpected weight change.   HENT:  Negative for congestion, dental problem, ear discharge, ear pain, facial swelling, hearing loss, mouth sores, nosebleeds, postnasal drip,  rhinorrhea, sinus pain, sneezing, sore throat, tinnitus, trouble swallowing and voice change.    Eyes:  Negative for photophobia, pain, discharge, redness, itching and visual disturbance.   Respiratory:  Negative for apnea, cough, choking, chest tightness, shortness of breath, wheezing and stridor.    Cardiovascular:  Negative for chest pain, palpitations and leg swelling.   Gastrointestinal:  Negative for abdominal distention, abdominal pain, anal bleeding, blood in stool, constipation, diarrhea, nausea, rectal pain and vomiting.   Endocrine: Negative for cold intolerance and heat intolerance.   Genitourinary:  Negative for decreased urine volume, difficulty urinating, dysuria, enuresis, flank pain, frequency, hematuria and urgency.   Musculoskeletal:  Negative for arthralgias, back pain, gait problem, joint swelling, myalgias, neck pain and neck stiffness.   Skin:  Negative for color change, pallor, rash and wound.   Neurological:  Negative for dizziness, tremors, seizures, syncope, facial asymmetry, speech difficulty, weakness, light-headedness, numbness and headaches.   Hematological:  Negative for adenopathy. Does not bruise/bleed easily.   Psychiatric/Behavioral:  Negative for behavioral problems, confusion, dysphoric mood and sleep disturbance. The patient is not nervous/anxious.          Objective     There were no vitals taken for this visit.    Physical Exam  Vitals reviewed.   Constitutional:       General: He is not in acute distress.     Appearance: Normal appearance. He is normal weight. He is not toxic-appearing.   HENT:      Head: Normocephalic and atraumatic.      Nose: Nose normal. No congestion.      Mouth/Throat:      Mouth: Mucous membranes are moist.      Pharynx: Oropharynx is clear. No oropharyngeal exudate or posterior oropharyngeal erythema.   Eyes:      General: No scleral icterus.     Extraocular Movements: Extraocular movements intact.      Conjunctiva/sclera: Conjunctivae normal.       Pupils: Pupils are equal, round, and reactive to light.   Cardiovascular:      Rate and Rhythm: Normal rate and regular rhythm.      Pulses: Normal pulses.      Heart sounds: Normal heart sounds. No murmur heard.     No friction rub. No gallop.   Pulmonary:      Effort: Pulmonary effort is normal. No respiratory distress.      Breath sounds: Normal breath sounds. No stridor. No wheezing, rhonchi or rales.   Chest:      Chest wall: No tenderness.   Abdominal:      General: Bowel sounds are normal. There is no distension.      Palpations: Abdomen is soft. There is no mass.      Tenderness: There is no abdominal tenderness. There is no right CVA tenderness, left CVA tenderness, guarding or rebound.      Hernia: No hernia is present.   Musculoskeletal:         General: No swelling, tenderness or deformity. Normal range of motion.      Cervical back: Normal range of motion and neck supple. No rigidity or tenderness.      Right lower leg: No edema.      Left lower leg: No edema.   Lymphadenopathy:      Cervical: No cervical adenopathy.   Skin:     General: Skin is warm and dry.      Capillary Refill: Capillary refill takes less than 2 seconds.      Coloration: Skin is not jaundiced or pale.      Findings: No bruising, erythema, lesion or rash.   Neurological:      General: No focal deficit present.      Mental Status: He is alert and oriented to person, place, and time. Mental status is at baseline.      Cranial Nerves: No cranial nerve deficit.      Sensory: No sensory deficit.      Motor: No weakness.      Coordination: Coordination normal.      Gait: Gait normal.      Deep Tendon Reflexes: Reflexes normal.   Psychiatric:         Mood and Affect: Mood normal.         Behavior: Behavior normal.         Thought Content: Thought content normal.

## 2024-11-05 NOTE — ASSESSMENT & PLAN NOTE
59-year-old male with history of liver cirrhosis, esophageal varices, and portal hypertension, with chronic multifactorial hypoproliferative mild normocytic anemia related to different factors including iron deficiency and chronic disease.  His most recent hemoglobin on October 29, 2024 was 11.9 g/.  On September 30, 2024 serum iron profile did not show iron deficiency. Serum vitamin B12 was low at 100 pg/mL.  On October 9, 2024, the patient initiated vitamin B12 replacement therapy by his primary care physician.    Plan:  Continue vitamin B12 replacement  Return to hematology clinic as needed.  The patient does not have indication for scheduled outpatient assessment at the hematology clinic

## 2024-11-09 NOTE — ASSESSMENT & PLAN NOTE
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 He was on diazepam in the past for seizures but unsure now. Oriented family to confirm this, if on diazepam will taper off. Not immune to Hep A or B. Recommended vaccinations today, as well as Flu and Pneumonia vaccine as per hepatology. Continue to follow with specialist also for surveillance for [address] [facility].
Only on nadolol for esophageal varices.
Recheck CBC and iron panel.
Recheck CBC.
S/p cholecystectomy pathology benign. Had dehiscence of wound but now slowing healing.
Unsure if still on valium, oriented to confirm this.   Continue keppra and Trileptal.
5 (moderate pain)

## 2024-11-13 ENCOUNTER — ANESTHESIA EVENT (OUTPATIENT)
Dept: GASTROENTEROLOGY | Facility: HOSPITAL | Age: 59
End: 2024-11-13
Payer: MEDICARE

## 2024-11-13 ENCOUNTER — PREP FOR PROCEDURE (OUTPATIENT)
Dept: GASTROENTEROLOGY | Facility: AMBULARY SURGERY CENTER | Age: 59
End: 2024-11-13

## 2024-11-13 ENCOUNTER — ANESTHESIA (OUTPATIENT)
Dept: GASTROENTEROLOGY | Facility: HOSPITAL | Age: 59
End: 2024-11-13
Payer: MEDICARE

## 2024-11-13 ENCOUNTER — HOSPITAL ENCOUNTER (OUTPATIENT)
Dept: GASTROENTEROLOGY | Facility: HOSPITAL | Age: 59
Setting detail: OUTPATIENT SURGERY
Discharge: HOME | End: 2024-11-13
Payer: MEDICARE

## 2024-11-13 VITALS
OXYGEN SATURATION: 95 % | HEIGHT: 70 IN | RESPIRATION RATE: 18 BRPM | DIASTOLIC BLOOD PRESSURE: 64 MMHG | TEMPERATURE: 97.4 F | BODY MASS INDEX: 26.17 KG/M2 | SYSTOLIC BLOOD PRESSURE: 104 MMHG | WEIGHT: 182.8 LBS | HEART RATE: 55 BPM

## 2024-11-13 DIAGNOSIS — I85.00 ESOPHAGEAL VARICES WITHOUT BLEEDING, UNSPECIFIED ESOPHAGEAL VARICES TYPE (HCC): ICD-10-CM

## 2024-11-13 DIAGNOSIS — I85.11 SECONDARY ESOPHAGEAL VARICES WITH BLEEDING (HCC): Primary | ICD-10-CM

## 2024-11-13 PROCEDURE — 43244 EGD VARICES LIGATION: CPT | Performed by: STUDENT IN AN ORGANIZED HEALTH CARE EDUCATION/TRAINING PROGRAM

## 2024-11-13 RX ORDER — SODIUM CHLORIDE, SODIUM LACTATE, POTASSIUM CHLORIDE, CALCIUM CHLORIDE 600; 310; 30; 20 MG/100ML; MG/100ML; MG/100ML; MG/100ML
125 INJECTION, SOLUTION INTRAVENOUS CONTINUOUS
OUTPATIENT
Start: 2024-11-13

## 2024-11-13 RX ORDER — SODIUM CHLORIDE, SODIUM LACTATE, POTASSIUM CHLORIDE, CALCIUM CHLORIDE 600; 310; 30; 20 MG/100ML; MG/100ML; MG/100ML; MG/100ML
INJECTION, SOLUTION INTRAVENOUS CONTINUOUS PRN
Status: DISCONTINUED | OUTPATIENT
Start: 2024-11-13 | End: 2024-11-13

## 2024-11-13 RX ORDER — LIDOCAINE HYDROCHLORIDE 20 MG/ML
INJECTION, SOLUTION EPIDURAL; INFILTRATION; INTRACAUDAL; PERINEURAL AS NEEDED
Status: DISCONTINUED | OUTPATIENT
Start: 2024-11-13 | End: 2024-11-13

## 2024-11-13 RX ORDER — SUCRALFATE ORAL 1 G/10ML
1 SUSPENSION ORAL 4 TIMES DAILY PRN
Qty: 280 ML | Refills: 0 | Status: SHIPPED | OUTPATIENT
Start: 2024-11-13 | End: 2024-11-20

## 2024-11-13 RX ORDER — PROPOFOL 10 MG/ML
INJECTION, EMULSION INTRAVENOUS AS NEEDED
Status: DISCONTINUED | OUTPATIENT
Start: 2024-11-13 | End: 2024-11-13

## 2024-11-13 RX ADMIN — LIDOCAINE HYDROCHLORIDE 100 MG: 20 INJECTION, SOLUTION EPIDURAL; INFILTRATION; INTRACAUDAL; PERINEURAL at 11:28

## 2024-11-13 RX ADMIN — PROPOFOL 50 MG: 10 INJECTION, EMULSION INTRAVENOUS at 11:31

## 2024-11-13 RX ADMIN — PROPOFOL 20 MG: 10 INJECTION, EMULSION INTRAVENOUS at 11:34

## 2024-11-13 RX ADMIN — PROPOFOL 50 MG: 10 INJECTION, EMULSION INTRAVENOUS at 11:42

## 2024-11-13 RX ADMIN — PROPOFOL 150 MG: 10 INJECTION, EMULSION INTRAVENOUS at 11:29

## 2024-11-13 RX ADMIN — SODIUM CHLORIDE, SODIUM LACTATE, POTASSIUM CHLORIDE, AND CALCIUM CHLORIDE: .6; .31; .03; .02 INJECTION, SOLUTION INTRAVENOUS at 11:20

## 2024-11-13 RX ADMIN — PROPOFOL 20 MG: 10 INJECTION, EMULSION INTRAVENOUS at 11:48

## 2024-11-13 RX ADMIN — PROPOFOL 20 MG: 10 INJECTION, EMULSION INTRAVENOUS at 11:45

## 2024-11-13 RX ADMIN — PROPOFOL 20 MG: 10 INJECTION, EMULSION INTRAVENOUS at 11:38

## 2024-11-13 NOTE — DISCHARGE INSTRUCTIONS
Soft diet for 48 hours and then advance as tolerated  Carafate as needed four times daily for pain after procedure  Schedule repeat EGD in 4-6 weeks    Upper Endoscopy   WHAT YOU NEED TO KNOW:   An upper endoscopy is also called an upper gastrointestinal (GI) endoscopy, or an esophagogastroduodenoscopy (EGD). You may feel bloated, gassy, or have some abdominal discomfort after your procedure. Your throat may be sore for 24 to 36 hours. You may burp or pass gas from air that is still inside your body.        DISCHARGE INSTRUCTIONS:      Seek care immediately if:   You feel dizzy or faint.    You have sudden chest pain or trouble breathing.     You have a large amount of bright red blood in your bowel movements.     Your abdomen is hard and firm and you have severe pain.     You vomit blood.     Contact your healthcare provider if:   You feel full or bloated and cannot burp or pass gas.     You have not had a bowel movement for 3 days after your procedure.     You have neck pain.     You have a fever or chills.     You have nausea or are vomiting.     You have a rash or hives.     You have questions or concerns about your endoscopy.     Relieve a sore throat:  Suck on throat lozenges or crushed ice. Gargle with a small amount of warm salt water. Mix 1 teaspoon of salt and 1 cup of warm water to make salt water.     Relieve gas and discomfort from bloating:  Lie on your right side with a heating pad on your abdomen. Take short walks to help pass gas. Eat small meals until bloating is relieved.    Rest after your procedure:  Do not drive or make important decisions until the day after your procedure. Return to your normal activity as directed. You can usually return to work the day after your procedure.    Follow up with your healthcare provider as directed:  Write down your questions so you remember to ask them during your visits.

## 2024-11-13 NOTE — ANESTHESIA POSTPROCEDURE EVALUATION
Post-Op Assessment Note    CV Status:  Stable  Pain Score: 0    Pain management: adequate       Mental Status:  Sleepy and arousable   Hydration Status:  Stable   PONV Controlled:  Controlled   Airway Patency:  Patent     Post Op Vitals Reviewed: Yes    No anethesia notable event occurred.    Staff: CRNA           Last Filed PACU Vitals:  Vitals Value Taken Time   Temp 97    Pulse 61    /64    Resp 11    SpO2 96        Modified Juan:  Activity: 2 (11/13/2024 11:02 AM)  Respiration: 2 (11/13/2024 11:02 AM)  Circulation: 2 (11/13/2024 11:02 AM)  Consciousness: 2 (11/13/2024 11:02 AM)  Oxygen Saturation: 2 (11/13/2024 11:02 AM)  Modified Juan Score: 10 (11/13/2024 11:02 AM)

## 2024-11-13 NOTE — ANESTHESIA PREPROCEDURE EVALUATION
Procedure:  EGD    Relevant Problems   CARDIO   (+) Angiodysplasia   (+) Esophageal varices (HCC)   (+) PVD (peripheral vascular disease) (HCC)   (+) Primary hypertension   (+) Venous stasis dermatitis of both lower extremities      GI/HEPATIC   (+) Gastroesophageal reflux disease without esophagitis   (+) Other cirrhosis of liver (HCC)      HEMATOLOGY   (+) Anemia   (+) Iron deficiency anemia due to chronic blood loss      NEURO/PSYCH   (+) Legally blind in right eye, as defined in [place]   (+) Nonintractable epilepsy without status epilepticus (HCC)     Sinus rhythm with 1st degree A-V block  Nonspecific ST and T wave abnormality  Abnormal ECG  No previous ECGs available  Confirmed by Terrence Webb ([id number]) on 2/9/2022 12:52:06 PM  ECHO 2/9/22:        Left Ventricle: Left ventricular cavity size is upper normal. Wall thickness is normal. The left ventricular ejection fraction is 55%. Systolic function is normal. Wall motion is normal. Diastolic function is normal.    Mitral Valve: There is trace regurgitation.    Tricuspid Valve: There is mild regurgitation.    Aorta: The aortic root is mildly dilated (4.0 cm). The ascending aorta is normal in size.    Pulmonary Artery: The pulmonary artery systolic pressure is normal.  Physical Exam    Airway    Mallampati score: II         Dental       Cardiovascular      Pulmonary      Other Findings        Anesthesia Plan  ASA Score- 3     Anesthesia Type- IV sedation with anesthesia with ASA Monitors.         Additional Monitors:     Airway Plan:            Plan Factors-Exercise tolerance (METS): <4 METS.    Chart reviewed.    Patient summary reviewed.    Patient is not a current smoker.  Patient did not smoke on day of surgery.            Induction- intravenous.    Postoperative Plan-         Informed Consent- Anesthetic plan and risks discussed with patient.  I personally reviewed this patient with the CRNA. Discussed and agreed on the Anesthesia Plan with the CRNA..

## 2024-11-13 NOTE — H&P
History and Physical - [facility]  Jose Stewart 59 y.o. male MRN: [id number]      HPI: Jose Stewart is a 59 y.o. year-old male who presents for variceal surveillance.    REVIEW OF SYSTEMS: Per the HPI, and otherwise unremarkable.    PAST MEDICAL/SURGICAL HISTORY:  Past Medical History:   Diagnosis Date    Ambulatory dysfunction 02/08/2022    Anxiety     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    Cellulitis of left lower extremity 02/08/2022    Gallbladder mass 05/01/2023    GERD (gastroesophageal reflux disease)     Hypertension     Hypoxia 02/11/2022    Mental developmental delay     mumps as a child, developed rheumatic fever    MRSA bacteremia 02/09/2022    Seizures (HCC)         Past Surgical History:   Procedure Laterality Date    COLONOSCOPY      [place] LAPAROSCOPY SURG CHOLECYSTECTOMY N/A 8/25/2023    Procedure: CHOLECYSTECTOMY OPEN;  Surgeon: Ran Fletcher DO;  Location[facility];  Service: General    SPLENECTOMY      UPPER GASTROINTESTINAL ENDOSCOPY         FAMILY/SOCIAL HISTORY:  Family History   Problem Relation Age of Onset    Depression Mother     Heart disease Father        Social History     Tobacco Use    Smoking status: Never     Passive exposure: Current    Smokeless tobacco: Never   Vaping Use    Vaping status: Never Used   Substance Use Topics    Alcohol use: Never    Drug use: Never       Meds/Allergies       Current Outpatient Medications:     acetaminophen (TYLENOL) 325 mg tablet    cholecalciferol 1,000 units tablet    ergocalciferol (VITAMIN D2) 50,000 units    folic acid (FOLVITE) 1 mg tablet    furosemide (LASIX) 20 mg tablet    iron polysaccharides (FERREX) 150 mg capsule    levETIRAcetam (KEPPRA) 500 mg tablet    nadolol (CORGARD) 40 mg tablet    OXcarbazepine (TRILEPTAL) 600 mg tablet    pantoprazole (PROTONIX) 40 mg tablet    sucralfate (CARAFATE) 1 g tablet    triamcinolone (KENALOG) 0.1 % ointment    No Known Allergies    Objective      There were no vitals taken for this visit.  PHYSICAL EXAM    GEN: NAD  CARDIO: RRR  PULM: CTA bilaterally  ABD: soft, non-tender, non-distended  EXT: no lower extremity edema  NEURO: AAOx3    ASSESSMENT/PLAN:  59 y.o. year old male here for EGD. he is stable and optimized for his procedure. Informed consent was obtained for the procedure.  Risks of infection, perforation and hemorrhage were discussed. The patient was agreeable to proceed with the procedure.

## 2024-11-15 ENCOUNTER — NURSE TRIAGE (OUTPATIENT)
Age: 59
End: 2024-11-15

## 2024-11-15 ENCOUNTER — TELEPHONE (OUTPATIENT)
Age: 59
End: 2024-11-15

## 2024-11-15 NOTE — TELEPHONE ENCOUNTER
Called spoke to patient sister-in-law regarding scheduling another EGD for 4-6 weeks per Dr. Plata. Patient Scheduled on 1/14/2024 which the first available with dr. Plata. Patient's family will call back if date does not work.

## 2024-11-15 NOTE — TELEPHONE ENCOUNTER
----- Message from Sue RIVAS sent at 11/13/2024  2:51 PM EST -----    ----- Message -----  From: Mimi Plata MD  Sent: 11/13/2024  11:59 AM EST  To: [facility] [address] [facility]    Hi can we please contact the patient to schedule an EGD with me in 4 -6 weeks for serial banding?

## 2024-11-15 NOTE — TELEPHONE ENCOUNTER
"SPOKE WITH PT SISTER IN [place] [phone]. PT WITH HX CIRRHOSIS, ESOPHAGEAL VARICES, EGD ON 11/13/24. WAS TOLD EGD WOULD BE YEARLY, BUT WAS CALLED TO SCHEDULE EGD IN 1 MONTH. REQUESTING CLARIFICATION.         Answer Assessment - Initial Assessment Questions  1. REASON FOR CALL: \"What is the main reason for your call?\" or \"How can I best help you?\"      SPOKE WITH PT SISTER IN [place]. PT WITH HX CIRRHOSIS, ESOPHAGEAL VARICES, EGD ON 11/13/24. WAS TOLD EGD WOULD BE YEARLY, BUT WAS CALLED TO SCHEDULE EGD IN 1 MONTH. REQUESTING CLARIFICATION.    Protocols used: Information Only Call - No Triage-Adult-[place]    "

## 2024-11-18 NOTE — TELEPHONE ENCOUNTER
Called and spoke to patient's sister in-law regarding scheduling repeat 4-6 week EGD as per Dr. Plata. EGD has been moved up to 12/26 at [place].      Scheduled date of EGD(as of today): 12/26/2024  Physician performing EGD: Dr. Plata  Location of EGD: [place]  Instructions reviewed with patient by: Sue  Clearances: N/A    Instructions mailed out to home address

## 2024-11-27 ENCOUNTER — CLINICAL SUPPORT (OUTPATIENT)
Dept: INTERNAL MEDICINE CLINIC | Facility: CLINIC | Age: 59
End: 2024-11-27
Payer: MEDICARE

## 2024-11-27 DIAGNOSIS — E53.8 B12 DEFICIENCY: Primary | ICD-10-CM

## 2024-11-27 PROCEDURE — 96372 THER/PROPH/DIAG INJ SC/IM: CPT

## 2024-11-27 RX ORDER — CYANOCOBALAMIN 1000 UG/ML
1000 INJECTION, SOLUTION INTRAMUSCULAR; SUBCUTANEOUS
Status: SHIPPED | OUTPATIENT
Start: 2024-11-27

## 2024-11-27 RX ORDER — CYANOCOBALAMIN 1000 UG/ML
1000 INJECTION, SOLUTION INTRAMUSCULAR; SUBCUTANEOUS ONCE
Status: COMPLETED | OUTPATIENT
Start: 2024-11-27 | End: 2024-11-27

## 2024-11-27 RX ADMIN — CYANOCOBALAMIN 1000 MCG: 1000 INJECTION, SOLUTION INTRAMUSCULAR; SUBCUTANEOUS at 10:28

## 2024-12-13 ENCOUNTER — TELEPHONE (OUTPATIENT)
Age: 59
End: 2024-12-13

## 2024-12-17 ENCOUNTER — OFFICE VISIT (OUTPATIENT)
Dept: INTERNAL MEDICINE CLINIC | Facility: CLINIC | Age: 59
End: 2024-12-17

## 2024-12-17 VITALS
TEMPERATURE: 98 F | WEIGHT: 183 LBS | OXYGEN SATURATION: 96 % | HEART RATE: 75 BPM | SYSTOLIC BLOOD PRESSURE: 106 MMHG | HEIGHT: 70 IN | DIASTOLIC BLOOD PRESSURE: 62 MMHG | RESPIRATION RATE: 16 BRPM | BODY MASS INDEX: 26.2 KG/M2

## 2024-12-17 DIAGNOSIS — E53.8 B12 DEFICIENCY: ICD-10-CM

## 2024-12-17 DIAGNOSIS — K74.69 OTHER CIRRHOSIS OF LIVER (HCC): Primary | ICD-10-CM

## 2024-12-17 DIAGNOSIS — G40.909 NONINTRACTABLE EPILEPSY WITHOUT STATUS EPILEPTICUS, UNSPECIFIED EPILEPSY TYPE (HCC): ICD-10-CM

## 2024-12-17 DIAGNOSIS — D50.0 IRON DEFICIENCY ANEMIA DUE TO CHRONIC BLOOD LOSS: ICD-10-CM

## 2024-12-17 DIAGNOSIS — Z87.898 HISTORY OF PREDIABETES: ICD-10-CM

## 2024-12-17 DIAGNOSIS — I10 PRIMARY HYPERTENSION: ICD-10-CM

## 2024-12-17 NOTE — ASSESSMENT & PLAN NOTE
Had EGD last month with several large varices which were banded  Planning for repeat EGD later this month  No bleeding episodes  Return to office in 3 months for Medicare annual wellness and recheck, patient and brother are in agreement  Orders:    CBC and differential; Future    Comprehensive metabolic panel; Future    Lipid Panel with Direct LDL reflex; Future
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
## 2024-12-17 NOTE — ASSESSMENT & PLAN NOTE
Continue monthly B12 shots, recheck before next visit  Orders:    CBC and differential; Future    Comprehensive metabolic panel; Future    Vitamin B12; Future    Lipid Panel with Direct LDL reflex; Future

## 2024-12-26 ENCOUNTER — ANESTHESIA EVENT (OUTPATIENT)
Dept: GASTROENTEROLOGY | Facility: HOSPITAL | Age: 59
End: 2024-12-26
Payer: MEDICARE

## 2024-12-26 ENCOUNTER — HOSPITAL ENCOUNTER (OUTPATIENT)
Dept: GASTROENTEROLOGY | Facility: HOSPITAL | Age: 59
Setting detail: OUTPATIENT SURGERY
End: 2024-12-26
Attending: STUDENT IN AN ORGANIZED HEALTH CARE EDUCATION/TRAINING PROGRAM
Payer: MEDICARE

## 2024-12-26 ENCOUNTER — ANESTHESIA (OUTPATIENT)
Dept: GASTROENTEROLOGY | Facility: HOSPITAL | Age: 59
End: 2024-12-26
Payer: MEDICARE

## 2024-12-26 VITALS
HEIGHT: 69 IN | BODY MASS INDEX: 27.25 KG/M2 | DIASTOLIC BLOOD PRESSURE: 59 MMHG | OXYGEN SATURATION: 98 % | TEMPERATURE: 97.9 F | RESPIRATION RATE: 18 BRPM | SYSTOLIC BLOOD PRESSURE: 119 MMHG | WEIGHT: 184 LBS | HEART RATE: 66 BPM

## 2024-12-26 DIAGNOSIS — I85.11 SECONDARY ESOPHAGEAL VARICES WITH BLEEDING (HCC): ICD-10-CM

## 2024-12-26 DIAGNOSIS — K21.9 GASTROESOPHAGEAL REFLUX DISEASE WITHOUT ESOPHAGITIS: ICD-10-CM

## 2024-12-26 PROCEDURE — 43244 EGD VARICES LIGATION: CPT | Performed by: STUDENT IN AN ORGANIZED HEALTH CARE EDUCATION/TRAINING PROGRAM

## 2024-12-26 RX ORDER — LIDOCAINE HYDROCHLORIDE 20 MG/ML
INJECTION, SOLUTION EPIDURAL; INFILTRATION; INTRACAUDAL; PERINEURAL AS NEEDED
Status: DISCONTINUED | OUTPATIENT
Start: 2024-12-26 | End: 2024-12-26

## 2024-12-26 RX ORDER — PROPOFOL 10 MG/ML
INJECTION, EMULSION INTRAVENOUS CONTINUOUS PRN
Status: DISCONTINUED | OUTPATIENT
Start: 2024-12-26 | End: 2024-12-26

## 2024-12-26 RX ORDER — SODIUM CHLORIDE, SODIUM LACTATE, POTASSIUM CHLORIDE, CALCIUM CHLORIDE 600; 310; 30; 20 MG/100ML; MG/100ML; MG/100ML; MG/100ML
125 INJECTION, SOLUTION INTRAVENOUS CONTINUOUS
Status: DISPENSED | OUTPATIENT
Start: 2024-12-26

## 2024-12-26 RX ORDER — PANTOPRAZOLE SODIUM 40 MG/1
TABLET, DELAYED RELEASE ORAL
Qty: 180 TABLET | Refills: 1 | Status: SHIPPED | OUTPATIENT
Start: 2024-12-26

## 2024-12-26 RX ORDER — PROPOFOL 10 MG/ML
INJECTION, EMULSION INTRAVENOUS AS NEEDED
Status: DISCONTINUED | OUTPATIENT
Start: 2024-12-26 | End: 2024-12-26

## 2024-12-26 RX ORDER — SUCRALFATE ORAL 1 G/10ML
1 SUSPENSION ORAL 4 TIMES DAILY PRN
Qty: 280 ML | Refills: 0 | Status: SHIPPED | OUTPATIENT
Start: 2024-12-26 | End: 2025-01-02

## 2024-12-26 RX ADMIN — PROPOFOL 100 MCG/KG/MIN: 10 INJECTION, EMULSION INTRAVENOUS at 08:58

## 2024-12-26 RX ADMIN — LIDOCAINE HYDROCHLORIDE 100 MG: 20 INJECTION, SOLUTION EPIDURAL; INFILTRATION; INTRACAUDAL at 08:58

## 2024-12-26 RX ADMIN — SODIUM CHLORIDE, SODIUM LACTATE, POTASSIUM CHLORIDE, AND CALCIUM CHLORIDE: .6; .31; .03; .02 INJECTION, SOLUTION INTRAVENOUS at 08:50

## 2024-12-26 RX ADMIN — PROPOFOL 130 MG: 10 INJECTION, EMULSION INTRAVENOUS at 08:58

## 2024-12-26 NOTE — ANESTHESIA PREPROCEDURE EVALUATION
Procedure:  EGD    Relevant Problems   CARDIO   (+) Angiodysplasia   (+) Esophageal varices (HCC)   (+) PVD (peripheral vascular disease) (HCC)   (+) Primary hypertension   (+) Venous stasis dermatitis of both lower extremities      GI/HEPATIC   (+) Gastroesophageal reflux disease without esophagitis   (+) Other cirrhosis of liver (HCC)      HEMATOLOGY   (+) Anemia   (+) Iron deficiency anemia due to chronic blood loss      NEURO/PSYCH   (+) Legally blind in right eye, as defined in [place]   (+) Nonintractable epilepsy without status epilepticus (HCC)      Endocrine   (+) History of prediabetes      Nervous and Auditory   (+) Bilateral hearing loss      Blood   (+) Platelets decreased (HCC)      Surgery/Wound/Pain   (+) Status post cholecystectomy   (+) Status post splenectomy      Orthopedic/Musculoskeletal   (+) Non-pressure chronic ulcer of left calf, limited to breakdown of skin (HCC)      Other   (+) B12 deficiency   (+) History of TB (tuberculosis)   (+) Hypomagnesemia   (+) Need for hepatitis A and B vaccination        Physical Exam    Airway    Mallampati score: II  TM Distance: >3 FB  Neck ROM: full     Dental    lower dentures    Cardiovascular  Cardiovascular exam normal    Pulmonary  Pulmonary exam normal     Other Findings        Anesthesia Plan  ASA Score- 3     Anesthesia Type- IV sedation with anesthesia with ASA Monitors.         Additional Monitors:     Airway Plan:     Comment: [facility]   [facility] [address]  [phone]  Name: Jose Stewart                       : 1965  MRN: [id number]                       Age: 59 y.o.  Patient Status: Inpatient          Gender: male  ECHO Complete With Contrast If Indicated    Height: 6' (1.829 m)  Weight: 79.4 kg (175 lb)  BSA: 2.01 m²  Blood Pressure: 110/55     Date of Study: 22  Ordering Provider: Criss Montemayor MD   Clinical Indications: Abnormal Heart Sound     Reading Physicians   Performing Staff  Cardiology: Prince Feldman MD      Tech: Renetta Zion, RS       Vitals    Height Weight BSA (Calculated - m2) BP Pulse  6' (1.829 m) 79.4 kg (175 lb) 2.01 sq meters 110/55 74    PACS Images     Show images for Echo complete w/ contrast if indicated  Study Details    This transthoracic echocardiogram was performed at bedside. This was a routine, inpatient study. Study quality was adequate. A complete 2D, color flow Doppler, spectral Doppler, 2D, color flow Doppler and spectral Doppler transthoracic echocardiogram was performed.  The apical, parasternal, subcostal and suprasternal views were obtained.    History    HTN, GERD    Interpretation Summary       ·  Left Ventricle: Left ventricular cavity size is upper normal. Wall thickness is normal. The left ventricular ejection fraction is 55%. Systolic function is normal. Wall motion is normal. Diastolic function is normal.  ·  Mitral Valve: There is trace regurgitation.  ·  Tricuspid Valve: There is mild regurgitation.  ·  Aorta: The aortic root is mildly dilated (4.0 cm). The ascending aorta is normal in size.  ·  Pulmonary Artery: The pulmonary artery systolic pressure is normal.     Findings    Left Ventricle Left ventricular cavity size is upper normal. Wall thickness is normal. The left ventricular ejection fraction is 55%. Systolic function is normal.  Wall motion is normal. Diastolic function is normal.  Right Ventricle Right ventricular cavity size is normal. Systolic function is normal. Wall thickness is normal.  Left Atrium The atrium is normal in size.  Right Atrium The atrium is normal in size.  Aortic Valve The aortic valve is trileaflet. The leaflets are not thickened. The leaflets are not calcified. The leaflets exhibit normal mobility. There is no evidence of regurgitation. There is no evidence of stenosis.  Mitral Valve The mitral valve has normal structure and function. There is trace regurgitation. There is no evidence of  stenosis.  Tricuspid Valve Tricuspid valve structure is normal. There is mild regurgitation. There is no evidence of stenosis.  Pulmonic Valve Pulmonic valve structure is normal. There is trace regurgitation. There is no evidence of stenosis.  Ascending Aorta The aortic root is mildly dilated (4.0 cm). The ascending aorta is normal in size.  IVC/SVC The inferior vena cava is normal in size.  Pericardium There is no pericardial effusion. The pericardium is normal in appearance.  Pulmonary Artery The pulmonary artery systolic pressure is normal.  Pulmonary Veins The pulmonary veins have normal venous flow. Flow pattern is normal.    Left Ventricle Measurements    Function/Volumes  A4C EF   58 %      Left ventricular stroke volume (2D)   82 mL      Dimensions  LVIDd   5.5 cm      LVIDS   3.9 cm      IVSd   1 cm      LVPWd   1 cm      FS   29 %      Diastolic Filling  MV E' Tissue Velocity Septal   9 cm/s      E wave deceleration time   264 ms      MV Peak E Jeffrey   95 cm/s      MV Peak A Jeffrey   0.99 m/s           Right Ventricle Measurements    Dimensions  RVID d   4.5 cm         Doppler  RVOT PMAX   1 mmHg          Left Atrium Measurements    Dimensions  LA size   4.1 cm      VINNIE A4C   19.3 cm2           Right Atrium Measurements    Dimensions  RAA A4C   19.5 cm2           Aortic Valve Measurements    Stenosis  AV peak gradient   4 mmHg           Mitral Valve Measurements    Stenosis  MV stenosis pressure 1/2 time   0 ms           Tricuspid Valve Measurements    RVSP Parameters  Triscuspid Valve Regurgitation Peak Gradient   21 mmHg           Pulmonic Valve Measurements    Stenosis  PV peak gradient antegrade   2 mmHg      RVOT peak jeffrey   0.56 m/s           Aorta Measurements    Aortic Dimensions  Ao root   4 cm      Asc Ao   3.4 cm           Pulmonary Artery Measurements    Stenosis  RVOT peak jeffrey   0.56 m/s           Exam Details    Performed Procedure Technologist Supporting Staff Performing Physician  Echo  complete AJ [facility]          Appointment Date/Status Modality Department   2/9/2022     Completed AN ECHO PORT 1 AN CAR NON INV        Begin Exam End Exam  End Exam Questionnaires  2/9/2022  2:25 PM 2/9/2022  3:05 PM  PATIENT EDUCATION         .       Plan Factors-Exercise tolerance (METS): <4 METS.    Chart reviewed.   Existing labs reviewed.      Patient not instructed to abstain from smoking on day of procedure. Patient did not smoke on day of surgery.            Induction- intravenous.    Postoperative Plan-         Informed Consent- Anesthetic plan and risks discussed with patient.  I personally reviewed this patient with the CRNA. Discussed and agreed on the Anesthesia Plan with the CRNA..

## 2024-12-26 NOTE — H&P
History and Physical - [facility]  Jose Stewart 59 y.o. male MRN: [id number]      HPI: Jose Stewart is a 59 y.o. year-old male who presents for variceal surveillance.    REVIEW OF SYSTEMS: Per the HPI, and otherwise unremarkable.    PAST MEDICAL/SURGICAL HISTORY:  Past Medical History:   Diagnosis Date    Ambulatory dysfunction 02/08/2022    Anxiety     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    Cellulitis of left lower extremity 02/08/2022    Cirrhosis (HCC)     Gallbladder mass 05/01/2023    GERD (gastroesophageal reflux disease)     Hypertension     Hypoxia 02/11/2022    Mental developmental delay     mumps as a child, developed rheumatic fever    MRSA bacteremia 02/09/2022    Seizures (HCC)     Varices, esophageal (HCC)         Past Surgical History:   Procedure Laterality Date    COLONOSCOPY      [place] LAPAROSCOPY SURG CHOLECYSTECTOMY N/A 8/25/2023    Procedure: CHOLECYSTECTOMY OPEN;  Surgeon: Ran Fletcher DO;  [facility];  Service: General    SPLENECTOMY      UPPER GASTROINTESTINAL ENDOSCOPY         FAMILY/SOCIAL HISTORY:  Family History   Problem Relation Age of Onset    Depression Mother     Heart disease Father        Social History     Tobacco Use    Smoking status: Never     Passive exposure: Current    Smokeless tobacco: Never   Vaping Use    Vaping status: Never Used   Substance Use Topics    Alcohol use: Never    Drug use: Never       Meds/Allergies       Current Outpatient Medications:     acetaminophen (TYLENOL) 325 mg tablet    cholecalciferol 1,000 units tablet    folic acid (FOLVITE) 1 mg tablet    furosemide (LASIX) 20 mg tablet    iron polysaccharides (FERREX) 150 mg capsule    levETIRAcetam (KEPPRA) 500 mg tablet    nadolol (CORGARD) 40 mg tablet    OXcarbazepine (TRILEPTAL) 600 mg tablet    pantoprazole (PROTONIX) 40 mg tablet    triamcinolone (KENALOG) 0.1 % ointment    sucralfate (CARAFATE) 1 g/10 mL suspension    Current  "Facility-Administered Medications:     cyanocobalamin injection 1,000 mcg, 1,000 mcg, Intramuscular, Q30 Days    lactated ringers infusion, 125 mL/hr, Intravenous, Continuous    No Known Allergies    Objective     /66   Pulse 62   Temp (!) 97.3 °F (36.3 °C) (Temporal)   Resp 18   Ht 5' 9\" (1.753 m)   Wt 83.5 kg (184 lb)   SpO2 97%   BMI 27.17 kg/m²   PHYSICAL EXAM    GEN: NAD  CARDIO: RRR  PULM: CTA bilaterally  ABD: soft, non-tender, non-distended  EXT: no lower extremity edema  NEURO: AAOx3    ASSESSMENT/PLAN:  59 y.o. year old male here for EGD. he is stable and optimized for his procedure. Informed consent was obtained for the procedure.  Risks of infection, perforation and hemorrhage were discussed. The patient was agreeable to proceed with the procedure.         "

## 2024-12-26 NOTE — ANESTHESIA POSTPROCEDURE EVALUATION
Post-Op Assessment Note    CV Status:  Stable    Pain management: adequate       Mental Status:  Awake and sleepy   Hydration Status:  Euvolemic   PONV Controlled:  Controlled   Airway Patency:  Patent     Post Op Vitals Reviewed: Yes    No anethesia notable event occurred.    Staff: CRNA           Last Filed PACU Vitals:  Vitals Value Taken Time   Temp 97.1    Pulse 84    /66    Resp 15    SpO2 98        Modified Juan:  Activity: 2 (12/26/2024  8:22 AM)  Respiration: 2 (12/26/2024  8:22 AM)  Circulation: 2 (12/26/2024  8:22 AM)  Consciousness: 2 (12/26/2024  8:22 AM)  Oxygen Saturation: 2 (12/26/2024  8:22 AM)  Modified Juan Score: 10 (12/26/2024  8:22 AM)

## 2025-01-15 DIAGNOSIS — I87.2 VENOUS STASIS DERMATITIS OF BOTH LOWER EXTREMITIES: ICD-10-CM

## 2025-01-15 RX ORDER — TRIAMCINOLONE ACETONIDE 1 MG/G
OINTMENT TOPICAL
Qty: 454 G | Refills: 0 | Status: SHIPPED | OUTPATIENT
Start: 2025-01-15

## 2025-01-15 NOTE — TELEPHONE ENCOUNTER
Reason for call:   [x] Refill   [] Prior Auth  [] Other:     Office:   [x] PCP/Provider -  Ordering Department:  [facility] [place]  Authorized By: Bennie Martin DO   [] Specialty/Provider -     Medication: triamcinolone (KENALOG) 0.1 % ointment     Dose/Frequency: Apply sparingly to affected areas of legs 2 times a day     Quantity: 454    Pharmacy: [facility]/pharmacy #[id number] - [place], [place] - [address] [place] [address] [phone]    Does the patient have enough for 3 days?   [x] Yes   [] No - Send as HP to POD

## 2025-01-21 ENCOUNTER — CLINICAL SUPPORT (OUTPATIENT)
Dept: INTERNAL MEDICINE CLINIC | Facility: CLINIC | Age: 60
End: 2025-01-21
Payer: MEDICARE

## 2025-01-21 DIAGNOSIS — E53.9 DEFICIENCY OF VITAMIN B: Primary | ICD-10-CM

## 2025-01-21 PROCEDURE — 96372 THER/PROPH/DIAG INJ SC/IM: CPT

## 2025-01-21 RX ADMIN — CYANOCOBALAMIN 1000 MCG: 1000 INJECTION, SOLUTION INTRAMUSCULAR; SUBCUTANEOUS at 10:25

## 2025-01-29 ENCOUNTER — OFFICE VISIT (OUTPATIENT)
Dept: PODIATRY | Facility: CLINIC | Age: 60
End: 2025-01-29
Payer: MEDICARE

## 2025-01-29 VITALS — HEIGHT: 69 IN | BODY MASS INDEX: 27.25 KG/M2 | HEART RATE: 68 BPM | OXYGEN SATURATION: 96 % | WEIGHT: 184 LBS

## 2025-01-29 DIAGNOSIS — B35.1 ONYCHOMYCOSIS: ICD-10-CM

## 2025-01-29 DIAGNOSIS — I73.9 PVD (PERIPHERAL VASCULAR DISEASE) (HCC): ICD-10-CM

## 2025-01-29 DIAGNOSIS — L60.2 THICKENED NAILS: Primary | ICD-10-CM

## 2025-01-29 PROCEDURE — RECHECK: Performed by: PODIATRIST

## 2025-01-29 PROCEDURE — 11721 DEBRIDE NAIL 6 OR MORE: CPT | Performed by: PODIATRIST

## 2025-01-29 NOTE — PROGRESS NOTES
Assessment/Plan:     The patient's clinical examination today is significant for thickened and dystrophic pedal nail plates with discoloration, subungual debris, brittleness consistent with onychomycosis x10.  The interdigital spaces are clear and without maceration.  There is +1 pitting edema of the bilateral extremities with superficial varicosities noted consistent with venous insufficiency to both lower limbs.  Skin is thin with decreased turgor.  There is absence of hair growth to the bilateral extremities.  There are no signs of infection noted to either lower extremity.  Pedal pulses are nonpalpable likely secondary to his peripheral edema.  There are no signs of any acute ischemia to either lower extremity.     The patient's mycotic pedal nail plates were sharply debrided with a sterile nail clipper x 10 without complication.  The nails of the  reduce the thickness and correcting as a rotary bur without incident.  There are no other acute pedal issues noted today.    Recommend follow-up in 6 months, or as needed.     There are no diagnoses linked to this encounter.      Subjective:     Patient ID: Jose Stewart is a 59 y.o. male.    The patient presents today for follow-up of painful elongated pedal nail plates.  The patient notes difficulty trim the nails himself second to the thickness and girth.  Notes no other acute pedal issues today.  His brother is present with him in the exam room.  The patient does have a history of peripheral vascular disease/venous insufficiency.      PAST MEDICAL HISTORY:  Past Medical History:   Diagnosis Date    Ambulatory dysfunction 02/08/2022    Anxiety     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    Cellulitis of left lower extremity 02/08/2022    Cirrhosis (HCC)     Gallbladder mass 05/01/2023    GERD (gastroesophageal reflux disease)     Hypertension     Hypoxia 02/11/2022    Mental developmental delay     mumps as a child, developed rheumatic fever     MRSA bacteremia 02/09/2022    Seizures (HCC)     Varices, esophageal (HCC)        PAST SURGICAL HISTORY:  Past Surgical History:   Procedure Laterality Date    COLONOSCOPY      OR LAPAROSCOPY SURG CHOLECYSTECTOMY N/A 8/25/2023    Procedure: CHOLECYSTECTOMY OPEN;  Surgeon: Ran Fletcher DO;  Location: [facility];  Service: General    SPLENECTOMY      UPPER GASTROINTESTINAL ENDOSCOPY          ALLERGIES:  Patient has no known allergies.    MEDICATIONS:  Current Outpatient Medications   Medication Sig Dispense Refill    acetaminophen (TYLENOL) 325 mg tablet Take 2 tablets (650 mg total) by mouth every 6 (six) hours as needed for mild pain  0    cholecalciferol 1,000 units tablet Take 1 tablet (1,000 Units total) by mouth daily 90 tablet 3    folic acid (FOLVITE) 1 mg tablet TAKE 1 TABLET (1 MG TOTAL) BY MOUTH IN THE MORNING 90 tablet 1    furosemide (LASIX) 20 mg tablet TAKE 1 TABLET (20 MG TOTAL) BY MOUTH IN THE MORNING AND IN THE EVENING 180 tablet 1    iron polysaccharides (FERREX) 150 mg capsule TAKE 1 CAPSULE (150 MG TOTAL) BY MOUTH IN THE MORNING 90 capsule 1    levETIRAcetam (KEPPRA) 500 mg tablet Take 1 tablet (500 mg total) by mouth every 12 (twelve) hours 180 tablet 3    nadolol (CORGARD) 40 mg tablet TAKE 1 AND 1/2 TABLETS DAILY BY MOUTH 135 tablet 1    OXcarbazepine (TRILEPTAL) 600 mg tablet Take 1 tablet (600 mg total) by mouth every 12 (twelve) hours 180 tablet 3    pantoprazole (PROTONIX) 40 mg tablet TAKE 1 TABLET BY MOUTH IN THE MORNING AND IN THE EVENING BEFORE MEALS 180 tablet 1    triamcinolone (KENALOG) 0.1 % ointment Apply sparingly to affected areas of legs 2 times a day 454 g 0    sucralfate (CARAFATE) 1 g/10 mL suspension Take 10 mL (1 g total) by mouth 4 (four) times a day as needed (pain) for up to 7 days 280 mL 0     Current Facility-Administered Medications   Medication Dose Route Frequency Provider Last Rate Last Admin    cyanocobalamin injection 1,000 mcg  1,000 mcg  Intramuscular Q30 Days    1,000 mcg at 01/21/25 1025       SOCIAL HISTORY:  Social History     Socioeconomic History    Marital status: Single     Spouse name: None    Number of children: None    Years of education: None    Highest education level: None   Occupational History    None   Tobacco Use    Smoking status: Never     Passive exposure: Current    Smokeless tobacco: Never   Vaping Use    Vaping status: Never Used   Substance and Sexual Activity    Alcohol use: Never    Drug use: Never    Sexual activity: Not Currently   Other Topics Concern    None   Social History Narrative    None     Social Drivers of Health     Financial Resource Strain: Low Risk  (3/4/2024)    Overall Financial Resource Strain (CARDIA)     Difficulty of Paying Living Expenses: Not hard at all   Food Insecurity: No Food Insecurity (8/27/2023)    Hunger Vital Sign     Worried About Running Out of Food in the Last Year: Never true     Ran Out of Food in the Last Year: Never true   Transportation Needs: No Transportation Needs (3/4/2024)    PRAPARE - Transportation     Lack of Transportation (Medical): No     Lack of Transportation (Non-Medical): No   Physical Activity: Not on file   Stress: No Stress Concern Present (12/17/2024)    [employer] - Occupational Stress Questionnaire     Feeling of Stress : Not at all   Social Connections: Not on file   Intimate Partner Violence: Not on file   Housing Stability: Low Risk  (8/27/2023)    Housing Stability Vital Sign     Unable to Pay for Housing in the Last Year: No     Number of Times Moved in the Last Year: 1     Homeless in the Last Year: No        Review of Systems   Constitutional: Negative.    HENT: Negative.     Eyes: Negative.    Respiratory: Negative.     Cardiovascular: Negative.    Endocrine: Negative.    Musculoskeletal: Negative.    Neurological: Negative.    Hematological: Negative.    Psychiatric/Behavioral: Negative.           Objective:     Physical  Exam  Vitals reviewed.   Constitutional:       Appearance: Normal appearance.   HENT:      Head: Normocephalic and atraumatic.      Nose: Nose normal.   Eyes:      Conjunctiva/sclera: Conjunctivae normal.      Pupils: Pupils are equal, round, and reactive to light.   Pulmonary:      Effort: Pulmonary effort is normal.   Skin:     General: Skin is warm.      Capillary Refill: Capillary refill takes less than 2 seconds.   Neurological:      General: No focal deficit present.      Mental Status: He is alert and oriented to person, place, and time.   Psychiatric:         Mood and Affect: Mood normal.         Behavior: Behavior normal.         Thought Content: Thought content normal.

## 2025-02-03 ENCOUNTER — TELEPHONE (OUTPATIENT)
Dept: INTERNAL MEDICINE CLINIC | Facility: CLINIC | Age: 60
End: 2025-02-03

## 2025-02-03 DIAGNOSIS — I85.11 SECONDARY ESOPHAGEAL VARICES WITH BLEEDING (HCC): ICD-10-CM

## 2025-02-03 RX ORDER — SUCRALFATE 1 G/1
1 TABLET ORAL 4 TIMES DAILY
Qty: 360 TABLET | Refills: 1 | Status: SHIPPED | OUTPATIENT
Start: 2025-02-03 | End: 2025-02-14 | Stop reason: HOSPADM

## 2025-02-03 NOTE — TELEPHONE ENCOUNTER
Patient called the RX Refill Line. Message is being forwarded to the office.     Patient is requesting a refill for the sucralfate 1 gm tablet, 1 qid , 360 ( Patient only takes the liquid when he has an EGD)    Has enough medication for the week     To PATSY Hernandez     Please contact patient at [phone]

## 2025-02-07 ENCOUNTER — TELEPHONE (OUTPATIENT)
Age: 60
End: 2025-02-07

## 2025-02-14 ENCOUNTER — TELEPHONE (OUTPATIENT)
Age: 60
End: 2025-02-14

## 2025-02-14 ENCOUNTER — ANESTHESIA EVENT (OUTPATIENT)
Dept: GASTROENTEROLOGY | Facility: HOSPITAL | Age: 60
End: 2025-02-14
Payer: MEDICARE

## 2025-02-14 ENCOUNTER — HOSPITAL ENCOUNTER (OUTPATIENT)
Dept: GASTROENTEROLOGY | Facility: HOSPITAL | Age: 60
Setting detail: OUTPATIENT SURGERY
Discharge: HOME | End: 2025-02-14
Attending: STUDENT IN AN ORGANIZED HEALTH CARE EDUCATION/TRAINING PROGRAM
Payer: MEDICARE

## 2025-02-14 ENCOUNTER — ANESTHESIA (OUTPATIENT)
Dept: GASTROENTEROLOGY | Facility: HOSPITAL | Age: 60
End: 2025-02-14
Payer: MEDICARE

## 2025-02-14 VITALS
DIASTOLIC BLOOD PRESSURE: 64 MMHG | HEART RATE: 63 BPM | SYSTOLIC BLOOD PRESSURE: 114 MMHG | HEIGHT: 70 IN | WEIGHT: 186 LBS | BODY MASS INDEX: 26.63 KG/M2 | RESPIRATION RATE: 18 BRPM | OXYGEN SATURATION: 96 % | TEMPERATURE: 96.7 F

## 2025-02-14 DIAGNOSIS — I85.11 SECONDARY ESOPHAGEAL VARICES WITH BLEEDING (HCC): ICD-10-CM

## 2025-02-14 PROCEDURE — 43235 EGD DIAGNOSTIC BRUSH WASH: CPT | Performed by: STUDENT IN AN ORGANIZED HEALTH CARE EDUCATION/TRAINING PROGRAM

## 2025-02-14 RX ORDER — PHENYLEPHRINE HCL IN 0.9% NACL 1 MG/10 ML
SYRINGE (ML) INTRAVENOUS AS NEEDED
Status: DISCONTINUED | OUTPATIENT
Start: 2025-02-14 | End: 2025-02-14

## 2025-02-14 RX ORDER — LIDOCAINE HYDROCHLORIDE 10 MG/ML
INJECTION, SOLUTION EPIDURAL; INFILTRATION; INTRACAUDAL; PERINEURAL AS NEEDED
Status: DISCONTINUED | OUTPATIENT
Start: 2025-02-14 | End: 2025-02-14

## 2025-02-14 RX ORDER — SODIUM CHLORIDE, SODIUM LACTATE, POTASSIUM CHLORIDE, CALCIUM CHLORIDE 600; 310; 30; 20 MG/100ML; MG/100ML; MG/100ML; MG/100ML
INJECTION, SOLUTION INTRAVENOUS CONTINUOUS PRN
Status: DISCONTINUED | OUTPATIENT
Start: 2025-02-14 | End: 2025-02-14

## 2025-02-14 RX ORDER — SODIUM CHLORIDE, SODIUM LACTATE, POTASSIUM CHLORIDE, CALCIUM CHLORIDE 600; 310; 30; 20 MG/100ML; MG/100ML; MG/100ML; MG/100ML
125 INJECTION, SOLUTION INTRAVENOUS CONTINUOUS
Status: DISCONTINUED | OUTPATIENT
Start: 2025-02-14 | End: 2025-02-18 | Stop reason: HOSPADM

## 2025-02-14 RX ORDER — PROPOFOL 10 MG/ML
INJECTION, EMULSION INTRAVENOUS AS NEEDED
Status: DISCONTINUED | OUTPATIENT
Start: 2025-02-14 | End: 2025-02-14

## 2025-02-14 RX ADMIN — PROPOFOL 50 MG: 10 INJECTION, EMULSION INTRAVENOUS at 11:02

## 2025-02-14 RX ADMIN — LIDOCAINE HYDROCHLORIDE 50 MG: 10 INJECTION, SOLUTION EPIDURAL; INFILTRATION; INTRACAUDAL at 11:02

## 2025-02-14 RX ADMIN — PROPOFOL 50 MG: 10 INJECTION, EMULSION INTRAVENOUS at 11:05

## 2025-02-14 RX ADMIN — SODIUM CHLORIDE, SODIUM LACTATE, POTASSIUM CHLORIDE, AND CALCIUM CHLORIDE: .6; .31; .03; .02 INJECTION, SOLUTION INTRAVENOUS at 10:56

## 2025-02-14 RX ADMIN — Medication 100 MCG: at 11:01

## 2025-02-14 RX ADMIN — PROPOFOL 50 MG: 10 INJECTION, EMULSION INTRAVENOUS at 11:03

## 2025-02-14 NOTE — ANESTHESIA PREPROCEDURE EVALUATION
Procedure:  EGD    Relevant Problems   CARDIO   (+) Angiodysplasia   (+) Esophageal varices (HCC)   (+) PVD (peripheral vascular disease) (HCC)   (+) Primary hypertension   (+) Venous stasis dermatitis of both lower extremities      GI/HEPATIC   (+) Gastroesophageal reflux disease without esophagitis   (+) Other cirrhosis of liver (HCC)      HEMATOLOGY   (+) Anemia   (+) Iron deficiency anemia due to chronic blood loss      NEURO/PSYCH   (+) Legally blind in right eye, as defined in [place]   (+) Nonintractable epilepsy without status epilepticus (HCC)      Per family pt experiences a cognitive decline after every procedure. Anesthesia records reviewed. Pt only received propofol, no significant changes in hemodynamics intra-procedure. No changes to anti-epileptic meds, labs reviewed. No seizures in last 5 years, no strokes, no episodes of major bleeding.    Case discussed with Dr. Plata, pt and family. Would like to proceed with EGD and possible banding to prevent further bleeding.     Physical Exam    Airway    Mallampati score: II  TM Distance: >3 FB  Neck ROM: full     Dental        Cardiovascular      Pulmonary      Other Findings        Anesthesia Plan  ASA Score- 3     Anesthesia Type- IV sedation with anesthesia with ASA Monitors.         Additional Monitors:     Airway Plan:     Comment: Pt requesting to have brother sign consent for him as he is unable to sign.       Plan Factors-    Chart reviewed.    Patient summary reviewed.                  Induction- intravenous.    Postoperative Plan-     Perioperative Resuscitation Plan - Level 1 - Full Code.       Informed Consent- Anesthetic plan and risks discussed with patient.  I personally reviewed this patient with the CRNA. Discussed and agreed on the Anesthesia Plan with the CRNA..      NPO Status:  Vitals Value Taken Time   Date of last liquid 02/14/25 02/14/25 1009   Time of last liquid 0830 02/14/25 1009   Date of last solid 02/13/25 02/14/25 1009   Time of  last solid 9796 02/14/25 2382

## 2025-02-14 NOTE — ANESTHESIA POSTPROCEDURE EVALUATION
Post-Op Assessment Note    CV Status:  Stable  Pain Score: 0    Pain management: adequate       Mental Status:  Arousable and sleepy   Hydration Status:  Euvolemic and stable   PONV Controlled:  Controlled   Airway Patency:  Patent and adequate     Post Op Vitals Reviewed: Yes    No anethesia notable event occurred.    Staff: CRNA       Last Filed PACU Vitals:  Vitals Value Taken Time   Temp 96.7 °F (35.9 °C) 02/14/25 1109   Pulse 63 02/14/25 1109   /65 02/14/25 1109   Resp 18 02/14/25 1109   SpO2 95 % 02/14/25 1109       Modified Juan:     Vitals Value Taken Time   Activity 0 02/14/25 1109   Respiration 2 02/14/25 1109   Circulation 2 02/14/25 1109   Consciousness 0 02/14/25 1109   Oxygen Saturation 1 02/14/25 1109     Modified Juan Score: 5

## 2025-02-14 NOTE — TELEPHONE ENCOUNTER
Pt is post EGD today. His sister in law, Vidya, reports he is typically prescribed sucralfate tablets following his EGD's. She is aware sucralfate liquid has been discontinued. She is inquiring if the tablets will be ordered or if pt is to remain off this medication. Informed Vidya I would forward her question to  for clarification.

## 2025-02-14 NOTE — H&P
History and Physical - [facility]  Jose Stewart 59 y.o. male MRN: [id number]      HPI: Jose Stewart is a 59 y.o. year-old male who presents for variceal surveillance.    REVIEW OF SYSTEMS: Per the HPI, and otherwise unremarkable.    PAST MEDICAL/SURGICAL HISTORY:  Past Medical History:   Diagnosis Date    Ambulatory dysfunction 02/08/2022    Anxiety     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    Cellulitis of left lower extremity 02/08/2022    Cirrhosis (HCC)     Gallbladder mass 05/01/2023    GERD (gastroesophageal reflux disease)     Hypertension     Hypoxia 02/11/2022    Mental developmental delay     mumps as a child, developed rheumatic fever    MRSA bacteremia 02/09/2022    Seizures (HCC)     Varices, esophageal (HCC)         Past Surgical History:   Procedure Laterality Date    COLONOSCOPY      [place] LAPAROSCOPY SURG CHOLECYSTECTOMY N/A 8/25/2023    Procedure: CHOLECYSTECTOMY OPEN;  Surgeon: Ran Fletcher DO;  Location: [facility];  Service: General    SPLENECTOMY      UPPER GASTROINTESTINAL ENDOSCOPY         FAMILY/SOCIAL HISTORY:  Family History   Problem Relation Age of Onset    Depression Mother     Heart disease Father        Social History     Tobacco Use    Smoking status: Never     Passive exposure: Current    Smokeless tobacco: Never   Vaping Use    Vaping status: Never Used   Substance Use Topics    Alcohol use: Never    Drug use: Never       Meds/Allergies       Current Outpatient Medications:     acetaminophen (TYLENOL) 325 mg tablet    cholecalciferol 1,000 units tablet    folic acid (FOLVITE) 1 mg tablet    furosemide (LASIX) 20 mg tablet    iron polysaccharides (FERREX) 150 mg capsule    levETIRAcetam (KEPPRA) 500 mg tablet    nadolol (CORGARD) 40 mg tablet    OXcarbazepine (TRILEPTAL) 600 mg tablet    pantoprazole (PROTONIX) 40 mg tablet    sucralfate (CARAFATE) 1 g tablet    sucralfate (CARAFATE) 1 g/10 mL suspension    triamcinolone (KENALOG)  "0.1 % ointment    Current Facility-Administered Medications:     cyanocobalamin injection 1,000 mcg, 1,000 mcg, Intramuscular, Q30 Days, 1,000 mcg at 01/21/25 1025    lactated ringers infusion, 125 mL/hr, Intravenous, Continuous    No Known Allergies    Objective     Ht 5' 10\" (1.778 m)   Wt 84.4 kg (186 lb)   BMI 26.69 kg/m²   PHYSICAL EXAM    GEN: NAD  CARDIO: RRR  PULM: CTA bilaterally  ABD: soft, non-tender, non-distended  EXT: no lower extremity edema  NEURO: AAOx3    ASSESSMENT/PLAN:  59 y.o. year old male here for EGD. he is stable and optimized for his procedure. Informed consent was obtained for the procedure.  Risks of infection, perforation and hemorrhage were discussed. The patient was agreeable to proceed with the procedure.         "

## 2025-02-17 NOTE — TELEPHONE ENCOUNTER
Pt's Sister-in - law, Jeanne stephens (on comm form).  Reviewed provider recommendations and she verbalizes understanding.

## 2025-02-18 ENCOUNTER — CLINICAL SUPPORT (OUTPATIENT)
Dept: INTERNAL MEDICINE CLINIC | Facility: CLINIC | Age: 60
End: 2025-02-18
Payer: MEDICARE

## 2025-02-18 DIAGNOSIS — E53.8 B12 DEFICIENCY: Primary | ICD-10-CM

## 2025-02-18 PROCEDURE — 96372 THER/PROPH/DIAG INJ SC/IM: CPT | Performed by: INTERNAL MEDICINE

## 2025-02-18 RX ADMIN — CYANOCOBALAMIN 1000 MCG: 1000 INJECTION, SOLUTION INTRAMUSCULAR; SUBCUTANEOUS at 10:36

## 2025-02-19 ENCOUNTER — OFFICE VISIT (OUTPATIENT)
Dept: INTERNAL MEDICINE CLINIC | Facility: CLINIC | Age: 60
End: 2025-02-19
Payer: MEDICARE

## 2025-02-19 VITALS
TEMPERATURE: 98.3 F | HEART RATE: 70 BPM | OXYGEN SATURATION: 96 % | RESPIRATION RATE: 16 BRPM | BODY MASS INDEX: 26.48 KG/M2 | DIASTOLIC BLOOD PRESSURE: 70 MMHG | WEIGHT: 185 LBS | HEIGHT: 70 IN | SYSTOLIC BLOOD PRESSURE: 110 MMHG

## 2025-02-19 DIAGNOSIS — H61.23 HEARING LOSS OF BOTH EARS DUE TO CERUMEN IMPACTION: Primary | ICD-10-CM

## 2025-02-19 PROCEDURE — 69210 REMOVE IMPACTED EAR WAX UNI: CPT

## 2025-02-19 NOTE — PROGRESS NOTES
Ear cerumen removal    Date/Time: 2/19/2025 10:00 AM    Performed by: Nicolette Dudley MD  Authorized by: Faisal Shell MD  Universal Protocol:  Procedure performed by: (Attending present, Dr. Shell)    Patient location:  Clinic  Procedure details:     Location:  Both ears    Procedure type: irrigation with instrumentation      Instrumentation: curette      Approach:  External    Visualization (free text):  Both external auditory canals were visualized with otoscope prior to the start of the procedure.  No foreign body noted, intact tympanic membranes    Equipment used:  Ear irrigation solution and curette  Post-procedure details:     Complication:  None    Hearing quality:  Diminished    Patient tolerance of procedure:  Tolerated well, no immediate complications

## 2025-03-06 DIAGNOSIS — I10 PRIMARY HYPERTENSION: ICD-10-CM

## 2025-03-07 RX ORDER — FUROSEMIDE 20 MG/1
TABLET ORAL
Qty: 180 TABLET | Refills: 1 | Status: SHIPPED | OUTPATIENT
Start: 2025-03-07

## 2025-03-22 DIAGNOSIS — I85.00 ESOPHAGEAL VARICES WITHOUT BLEEDING, UNSPECIFIED ESOPHAGEAL VARICES TYPE (HCC): ICD-10-CM

## 2025-03-24 RX ORDER — NADOLOL 40 MG/1
60 TABLET ORAL DAILY
Qty: 135 TABLET | Refills: 1 | Status: SHIPPED | OUTPATIENT
Start: 2025-03-24

## 2025-03-25 ENCOUNTER — OFFICE VISIT (OUTPATIENT)
Dept: INTERNAL MEDICINE CLINIC | Facility: CLINIC | Age: 60
End: 2025-03-25
Payer: MEDICARE

## 2025-03-25 VITALS
DIASTOLIC BLOOD PRESSURE: 60 MMHG | SYSTOLIC BLOOD PRESSURE: 104 MMHG | OXYGEN SATURATION: 97 % | HEIGHT: 66 IN | RESPIRATION RATE: 20 BRPM | BODY MASS INDEX: 29.67 KG/M2 | HEART RATE: 57 BPM | WEIGHT: 184.6 LBS | TEMPERATURE: 97 F

## 2025-03-25 DIAGNOSIS — Z00.00 MEDICARE ANNUAL WELLNESS VISIT, SUBSEQUENT: Primary | ICD-10-CM

## 2025-03-25 DIAGNOSIS — Z12.5 ENCOUNTER FOR SCREENING FOR MALIGNANT NEOPLASM OF PROSTATE: ICD-10-CM

## 2025-03-25 PROCEDURE — 99212 OFFICE O/P EST SF 10 MIN: CPT

## 2025-03-25 PROCEDURE — G0439 PPPS, SUBSEQ VISIT: HCPCS

## 2025-03-25 PROCEDURE — G2211 COMPLEX E/M VISIT ADD ON: HCPCS

## 2025-03-25 PROCEDURE — 96372 THER/PROPH/DIAG INJ SC/IM: CPT

## 2025-03-25 RX ADMIN — CYANOCOBALAMIN 1000 MCG: 1000 INJECTION, SOLUTION INTRAMUSCULAR; SUBCUTANEOUS at 11:25

## 2025-03-25 NOTE — PATIENT INSTRUCTIONS
Medicare Preventive Visit Patient Instructions  Thank you for completing your Welcome to Medicare Visit or Medicare Annual Wellness Visit today. Your next wellness visit will be due in one year (3/26/2026).  The screening/preventive services that you may require over the next 5-10 years are detailed below. Some tests may not apply to you based off risk factors and/or age. Screening tests ordered at today's visit but not completed yet may show as past due. Also, please note that scanned in results may not display below.  Preventive Screenings:  Service Recommendations Previous Testing/Comments   Colorectal Cancer Screening  Colonoscopy    Fecal Occult Blood Test (FOBT)/Fecal Immunochemical Test (FIT)  Fecal DNA/Cologuard Test  Flexible Sigmoidoscopy Age: 45-75 years old   Colonoscopy: every 10 years (May be performed more frequently if at higher risk)  OR  FOBT/FIT: every 1 year  OR  Cologuard: every 3 years  OR  Sigmoidoscopy: every 5 years  Screening may be recommended earlier than age 45 if at higher risk for colorectal cancer. Also, an individualized decision between you and your healthcare provider will decide whether screening between the ages of 76-85 would be appropriate. Colonoscopy: 04/14/2023  FOBT/FIT: Not on file  Cologuard: Not on file  Sigmoidoscopy: Not on file    Screening Current     Prostate Cancer Screening Individualized decision between patient and health care provider in men between ages of 55-69   Medicare will cover every 12 months beginning on the day after your 50th birthday PSA: No results in last 5 years           Hepatitis C Screening Once for adults born between 1945 and 1965  More frequently in patients at high risk for Hepatitis C Hep C Antibody: Not on file    Screening Current   Diabetes Screening 1-2 times per year if you're at risk for diabetes or have pre-diabetes Fasting glucose: 91 mg/dL (10/29/2024)  A1C: 5.4 % (8/31/2022)  Screening Current   Cholesterol Screening Once every  5 years if you don't have a lipid disorder. May order more often based on risk factors. Lipid panel: 09/30/2024  Screening Current      Other Preventive Screenings Covered by Medicare:  Abdominal Aortic Aneurysm (AAA) Screening: covered once if your at risk. You're considered to be at risk if you have a family history of AAA or a male between the age of 65-75 who smoking at least 100 cigarettes in your lifetime.  Lung Cancer Screening: covers low dose CT scan once per year if you meet all of the following conditions: (1) Age 55-77; (2) No signs or symptoms of lung cancer; (3) Current smoker or have quit smoking within the last 15 years; (4) You have a tobacco smoking history of at least 20 pack years (packs per day x number of years you smoked); (5) You get a written order from a healthcare provider.  Glaucoma Screening: covered annually if you're considered high risk: (1) You have diabetes OR (2) Family history of glaucoma OR (3)  aged 50 and older OR (4)  American aged 65 and older  Osteoporosis Screening: covered every 2 years if you meet one of the following conditions: (1) Have a vertebral abnormality; (2) On glucocorticoid therapy for more than 3 months; (3) Have primary hyperparathyroidism; (4) On osteoporosis medications and need to assess response to drug therapy.  HIV Screening: covered annually if you're between the age of 15-65. Also covered annually if you are younger than 15 and older than 65 with risk factors for HIV infection. For pregnant patients, it is covered up to 3 times per pregnancy.    Immunizations:  Immunization Recommendations   Influenza Vaccine Annual influenza vaccination during flu season is recommended for all persons aged >= 6 months who do not have contraindications   Pneumococcal Vaccine   * Pneumococcal conjugate vaccine = PCV13 (Prevnar 13), PCV15 (Vaxneuvance), PCV20 (Prevnar 20)  * Pneumococcal polysaccharide vaccine = PPSV23 (Pneumovax) Adults 19-63 yo  with certain risk factors or if 65+ yo  If never received any pneumonia vaccine: recommend Prevnar 20 (PCV20)  Give PCV20 if previously received 1 dose of PCV13 or PPSV23   Hepatitis B Vaccine 3 dose series if at intermediate or high risk (ex: diabetes, end stage renal disease, liver disease)   Respiratory syncytial virus (RSV) Vaccine - COVERED BY MEDICARE PART D  * RSVPreF3 (Arexvy) CDC recommends that adults 60 years of age and older may receive a single dose of RSV vaccine using shared clinical decision-making (SCDM)   Tetanus (Td) Vaccine - COST NOT COVERED BY MEDICARE PART B Following completion of primary series, a booster dose should be given every 10 years to maintain immunity against tetanus. Td may also be given as tetanus wound prophylaxis.   Tdap Vaccine - COST NOT COVERED BY MEDICARE PART B Recommended at least once for all adults. For pregnant patients, recommended with each pregnancy.   Shingles Vaccine (Shingrix) - COST NOT COVERED BY MEDICARE PART B  2 shot series recommended in those 19 years and older who have or will have weakened immune systems or those 50 years and older     Health Maintenance Due:      Topic Date Due   • HIV Screening  Never done   • Colorectal Cancer Screening  04/11/2033   • Hepatitis C Screening  Completed     Immunizations Due:      Topic Date Due   • HIB Vaccine (1 of 1 - Risk 1-dose series) Never done   • Meningococcal ACWY Vaccine (1 - Risk 2-dose series) Never done   • Influenza Vaccine (1) 09/01/2024   • COVID-19 Vaccine (5 - 2024-25 season) 09/01/2024     Advance Directives   What are advance directives?  Advance directives are legal documents that state your wishes and plans for medical care. These plans are made ahead of time in case you lose your ability to make decisions for yourself. Advance directives can apply to any medical decision, such as the treatments you want, and if you want to donate organs.   What are the types of advance directives?  There are many  types of advance directives, and each state has rules about how to use them. You may choose a combination of any of the following:  Living will:  This is a written record of the treatment you want. You can also choose which treatments you do not want, which to limit, and which to stop at a certain time. This includes surgery, medicine, IV fluid, and tube feedings.   Durable power of  for healthcare (DPAHC):  This is a written record that states who you want to make healthcare choices for you when you are unable to make them for yourself. This person, called a proxy, is usually a family member or a friend. You may choose more than 1 proxy.  Do not resuscitate (DNR) order:  A DNR order is used in case your heart stops beating or you stop breathing. It is a request not to have certain forms of treatment, such as CPR. A DNR order may be included in other types of advance directives.  Medical directive:  This covers the care that you want if you are in a coma, near death, or unable to make decisions for yourself. You can list the treatments you want for each condition. Treatment may include pain medicine, surgery, blood transfusions, dialysis, IV or tube feedings, and a ventilator (breathing machine).  Values history:  This document has questions about your views, beliefs, and how you feel and think about life. This information can help others choose the care that you would choose.  Why are advance directives important?  An advance directive helps you control your care. Although spoken wishes may be used, it is better to have your wishes written down. Spoken wishes can be misunderstood, or not followed. Treatments may be given even if you do not want them. An advance directive may make it easier for your family to make difficult choices about your care.   Weight Management   Why it is important to manage your weight:  Being overweight increases your risk of health conditions such as heart disease, high blood  pressure, type 2 diabetes, and certain types of cancer. It can also increase your risk for osteoarthritis, sleep apnea, and other respiratory problems. Aim for a slow, steady weight loss. Even a small amount of weight loss can lower your risk of health problems.  How to lose weight safely:  A safe and healthy way to lose weight is to eat fewer calories and get regular exercise. You can lose up about 1 pound a week by decreasing the number of calories you eat by 500 calories each day.   Healthy meal plan for weight management:  A healthy meal plan includes a variety of foods, contains fewer calories, and helps you stay healthy. A healthy meal plan includes the following:  Eat whole-grain foods more often.  A healthy meal plan should contain fiber. Fiber is the part of grains, fruits, and vegetables that is not broken down by your body. Whole-grain foods are healthy and provide extra fiber in your diet. Some examples of whole-grain foods are whole-wheat breads and pastas, oatmeal, brown rice, and bulgur.  Eat a variety of vegetables every day.  Include dark, leafy greens such as spinach, kale, dipesh greens, and mustard greens. Eat yellow and orange vegetables such as carrots, sweet potatoes, and winter squash.   Eat a variety of fruits every day.  Choose fresh or canned fruit (canned in its own juice or light syrup) instead of juice. Fruit juice has very little or no fiber.  Eat low-fat dairy foods.  Drink fat-free (skim) milk or 1% milk. Eat fat-free yogurt and low-fat cottage cheese. Try low-fat cheeses such as mozzarella and other reduced-fat cheeses.  Choose meat and other protein foods that are low in fat.  Choose beans or other legumes such as split peas or lentils. Choose fish, skinless poultry (chicken or turkey), or lean cuts of red meat (beef or pork). Before you cook meat or poultry, cut off any visible fat.   Use less fat and oil.  Try baking foods instead of frying them. Add less fat, such as margarine,  sour cream, regular salad dressing and mayonnaise to foods. Eat fewer high-fat foods. Some examples of high-fat foods include french fries, doughnuts, ice cream, and cakes.  Eat fewer sweets.  Limit foods and drinks that are high in sugar. This includes candy, cookies, regular soda, and sweetened drinks.  Exercise:  Exercise at least 30 minutes per day on most days of the week. Some examples of exercise include walking, biking, dancing, and swimming. You can also fit in more physical activity by taking the stairs instead of the elevator or parking farther away from stores. Ask your healthcare provider about the best exercise plan for you.      © Copyright [employer] 2018 Information is for End User's use only and may not be sold, redistributed or otherwise used for commercial purposes. All illustrations and images included in CareNotes® are the copyrighted property of [employer] Inc[employer] or [employer]

## 2025-03-25 NOTE — PROGRESS NOTES
Name: Jose Stewart      : 1965      MRN: [id number]  Encounter Provider: Nicolette Dudley MD  Encounter Date: 3/25/2025   Encounter department: [facility] INTERNAL MEDICINE [facility]  Medicare annual wellness visit, subsequent  Last colonoscopy was - repeat due in 10 years  Denies any hx of smoking- does not need lung cancer screening  Patient will complete blood work this weekend  Will check PSA  Orders:    PSA, Total Screen; Future    HIV 1/2 AG/AB w Reflex [facility] for 2 yr old and above; Future    Encounter for screening for malignant neoplasm of prostate    Orders:    PSA, Total Screen; Future       Preventive health issues were discussed with patient, and age appropriate screening tests were ordered as noted in patient's After Visit Summary. Personalized health advice and appropriate referrals for health education or preventive services given if needed, as noted in patient's After Visit Summary.    History of Present Illness     HPI   Patient presents for a medicare annual wellness visit. No complaints offered  Patient Care Team:  Daphne Guzman MD as PCP - General (Internal Medicine)  Ben Dotson MD (Surgical Oncology)    Review of Systems   Constitutional:  Negative for chills and fever.   HENT:  Negative for ear pain and sore throat.    Eyes:  Negative for pain and visual disturbance.   Respiratory:  Negative for cough and shortness of breath.    Cardiovascular:  Negative for chest pain and palpitations.   Gastrointestinal:  Negative for abdominal pain and vomiting.   Genitourinary:  Negative for dysuria and hematuria.   Musculoskeletal:  Negative for arthralgias and back pain.   Skin:  Negative for color change and rash.   Neurological:  Negative for seizures and syncope.   All other systems reviewed and are negative.    Medical History Reviewed by provider this encounter:       Annual Wellness Visit Questionnaire       Health Risk Assessment:   Patient  rates overall health as very good. Patient feels that their physical health rating is slightly better. Patient is satisfied with their life. Eyesight was rated as same. Hearing was rated as same. Patient feels that their emotional and mental health rating is same. Patients states they are never, rarely angry. Patient states they are never, rarely unusually tired/fatigued. Pain experienced in the last 7 days has been none. Patient states that he has experienced no weight loss or gain in last 6 months.     Depression Screening:   PHQ-2 Score: 0      Fall Risk Screening:   In the past year, patient has experienced: no history of falling in past year      Home Safety:  Patient does not have trouble with stairs inside or outside of their home. Patient has working smoke alarms and has working carbon monoxide detector. Home safety hazards include: none.     Nutrition:   Current diet is Regular and Frequent junk food.     Medications:   Patient is not currently taking any over-the-counter supplements. Patient is not able to manage medications. Medications managed by sister in law who he lives with brother and sister in law    Activities of Daily Living (ADLs)/Instrumental Activities of Daily Living (IADLs):   Walk and transfer into and out of bed and chair?: Yes  Dress and groom yourself?: Yes    Bathe or shower yourself?: Yes    Feed yourself? Yes  Do your laundry/housekeeping?: No  Manage your money, pay your bills and track your expenses?: No  Make your own meals?: No    Do your own shopping?: No    Advance Care Planning:   Living will: No    Durable POA for healthcare: Yes      PREVENTIVE SCREENINGS      Cardiovascular Screening:    General: Screening Current      Diabetes Screening:     General: Screening Current      Colorectal Cancer Screening:     General: Screening Current      Prostate Cancer Screening:    General: Risks and Benefits Discussed    Due for: PSA      Abdominal Aortic Aneurysm (AAA) Screening:         "General: Screening Not Indicated      Lung Cancer Screening:     General: Screening Not Indicated      Hepatitis C Screening:    General: Screening Current    Screening, Brief Intervention, and Referral to Treatment (SBIRT)     Screening      AUDIT-C Screenin) How often did you have a drink containing alcohol in the past year? never  2) How many drinks did you have on a typical day when you were drinking in the past year? 0  3) How often did you have 6 or more drinks on one occasion in the past year? never    AUDIT-C Score: 0  Interpretation: Score 0-3 (male): Negative screen for alcohol misuse    Single Item Drug Screening:  How often have you used an illegal drug (including marijuana) or a prescription medication for non-medical reasons in the past year? never    Single Item Drug Screen Score: 0  Interpretation: Negative screen for possible drug use disorder    Social Drivers of Health     Financial Resource Strain: Low Risk  (3/4/2024)    Overall Financial Resource Strain (CARDIA)     Difficulty of Paying Living Expenses: Not hard at all   Food Insecurity: No Food Insecurity (2023)    Hunger Vital Sign     Worried About Running Out of Food in the Last Year: Never true     Ran Out of Food in the Last Year: Never true   Transportation Needs: No Transportation Needs (3/25/2025)    PRAPARE - Transportation     Lack of Transportation (Medical): No     Lack of Transportation (Non-Medical): No   Housing Stability: Unknown (3/25/2025)    Housing Stability Vital Sign     Unable to Pay for Housing in the Last Year: No     Homeless in the Last Year: No   Utilities: Not At Risk (3/25/2025)    [facility] Utilities     Threatened with loss of utilities: No     No results found.    Objective   /60 (BP Location: Right arm, Patient Position: Sitting, Cuff Size: Standard)   Pulse 57   Temp (!) 97 °F (36.1 °C) (Tympanic)   Resp 20   Ht 5' 6\" (1.676 m)   Wt 83.7 kg (184 lb 9.6 oz)   SpO2 97%   BMI 29.80 kg/m² "     Physical Exam  Vitals and nursing note reviewed.   Constitutional:       General: He is not in acute distress.     Appearance: He is well-developed.   HENT:      Head: Normocephalic and atraumatic.   Eyes:      Conjunctiva/sclera: Conjunctivae normal.   Cardiovascular:      Rate and Rhythm: Normal rate and regular rhythm.      Heart sounds: No murmur heard.  Pulmonary:      Effort: Pulmonary effort is normal. No respiratory distress.      Breath sounds: Normal breath sounds.   Abdominal:      Palpations: Abdomen is soft.      Tenderness: There is no abdominal tenderness.   Musculoskeletal:         General: No swelling.      Cervical back: Neck supple.   Skin:     General: Skin is warm and dry.      Capillary Refill: Capillary refill takes less than 2 seconds.   Neurological:      Mental Status: He is alert.   Psychiatric:         Mood and Affect: Mood normal.

## 2025-03-26 ENCOUNTER — TELEPHONE (OUTPATIENT)
Age: 60
End: 2025-03-26

## 2025-03-26 NOTE — TELEPHONE ENCOUNTER
Pt's sister in law called. Pt was seen recently in the office and there was discussion about the covid vaccine. KATHARINE states Pt will not be getting the vaccine as he has a history of possible reaction to the same in the past, approximately 4 years ago. KATHARINE wanted to update PCP.

## 2025-04-17 DIAGNOSIS — D50.0 IRON DEFICIENCY ANEMIA DUE TO CHRONIC BLOOD LOSS: ICD-10-CM

## 2025-04-18 RX ORDER — IRON POLYSACCHARIDE COMPLEX 150 MG
150 CAPSULE ORAL DAILY
Qty: 90 CAPSULE | Refills: 1 | Status: SHIPPED | OUTPATIENT
Start: 2025-04-18

## 2025-04-22 ENCOUNTER — APPOINTMENT (OUTPATIENT)
Dept: LAB | Facility: AMBULARY SURGERY CENTER | Age: 60
End: 2025-04-22
Payer: MEDICARE

## 2025-04-22 DIAGNOSIS — Z12.5 ENCOUNTER FOR SCREENING FOR MALIGNANT NEOPLASM OF PROSTATE: ICD-10-CM

## 2025-04-22 DIAGNOSIS — Z87.898 HISTORY OF PREDIABETES: ICD-10-CM

## 2025-04-22 DIAGNOSIS — D50.0 IRON DEFICIENCY ANEMIA DUE TO CHRONIC BLOOD LOSS: ICD-10-CM

## 2025-04-22 DIAGNOSIS — Z00.00 MEDICARE ANNUAL WELLNESS VISIT, SUBSEQUENT: ICD-10-CM

## 2025-04-22 DIAGNOSIS — K74.69 OTHER CIRRHOSIS OF LIVER (HCC): ICD-10-CM

## 2025-04-22 DIAGNOSIS — E53.8 B12 DEFICIENCY: ICD-10-CM

## 2025-04-22 DIAGNOSIS — I10 PRIMARY HYPERTENSION: ICD-10-CM

## 2025-04-22 DIAGNOSIS — G40.909 NONINTRACTABLE EPILEPSY WITHOUT STATUS EPILEPTICUS, UNSPECIFIED EPILEPSY TYPE (HCC): ICD-10-CM

## 2025-04-22 LAB
BASOPHILS # BLD AUTO: 0.04 THOUSANDS/ÂΜL (ref 0–0.1)
BASOPHILS NFR BLD AUTO: 1 % (ref 0–1)
CHOLEST SERPL-MCNC: 182 MG/DL (ref ?–200)
EOSINOPHIL # BLD AUTO: 0.53 THOUSAND/ÂΜL (ref 0–0.61)
EOSINOPHIL NFR BLD AUTO: 14 % (ref 0–6)
ERYTHROCYTE [DISTWIDTH] IN BLOOD BY AUTOMATED COUNT: 13.4 % (ref 11.6–15.1)
FERRITIN SERPL-MCNC: 22 NG/ML (ref 30–336)
HCT VFR BLD AUTO: 38 % (ref 36.5–49.3)
HDLC SERPL-MCNC: 41 MG/DL
HGB BLD-MCNC: 12.4 G/DL (ref 12–17)
HIV 1+2 AB+HIV1 P24 AG SERPL QL IA: NORMAL
IMM GRANULOCYTES # BLD AUTO: 0 THOUSAND/UL (ref 0–0.2)
IMM GRANULOCYTES NFR BLD AUTO: 0 % (ref 0–2)
IRON SATN MFR SERPL: 22 % (ref 15–50)
IRON SERPL-MCNC: 70 UG/DL (ref 50–212)
LDLC SERPL CALC-MCNC: 127 MG/DL (ref 0–100)
LYMPHOCYTES # BLD AUTO: 1.27 THOUSANDS/ÂΜL (ref 0.6–4.47)
LYMPHOCYTES NFR BLD AUTO: 33 % (ref 14–44)
MCH RBC QN AUTO: 30.4 PG (ref 26.8–34.3)
MCHC RBC AUTO-ENTMCNC: 32.6 G/DL (ref 31.4–37.4)
MCV RBC AUTO: 93 FL (ref 82–98)
MONOCYTES # BLD AUTO: 0.32 THOUSAND/ÂΜL (ref 0.17–1.22)
MONOCYTES NFR BLD AUTO: 8 % (ref 4–12)
NEUTROPHILS # BLD AUTO: 1.67 THOUSANDS/ÂΜL (ref 1.85–7.62)
NEUTS SEG NFR BLD AUTO: 44 % (ref 43–75)
NRBC BLD AUTO-RTO: 0 /100 WBCS
PLATELET # BLD AUTO: 225 THOUSANDS/UL (ref 149–390)
PMV BLD AUTO: 9 FL (ref 8.9–12.7)
PSA SERPL-MCNC: 0.06 NG/ML (ref 0–4)
RBC # BLD AUTO: 4.08 MILLION/UL (ref 3.88–5.62)
TIBC SERPL-MCNC: 315 UG/DL (ref 250–450)
TRANSFERRIN SERPL-MCNC: 225 MG/DL (ref 203–362)
TRIGL SERPL-MCNC: 70 MG/DL (ref ?–150)
UIBC SERPL-MCNC: 245 UG/DL (ref 155–355)
VIT B12 SERPL-MCNC: 775 PG/ML (ref 180–914)
WBC # BLD AUTO: 3.83 THOUSAND/UL (ref 4.31–10.16)

## 2025-04-22 PROCEDURE — 83540 ASSAY OF IRON: CPT

## 2025-04-22 PROCEDURE — G0103 PSA SCREENING: HCPCS

## 2025-04-22 PROCEDURE — 82728 ASSAY OF FERRITIN: CPT

## 2025-04-22 PROCEDURE — 87389 HIV-1 AG W/HIV-1&-2 AB AG IA: CPT

## 2025-04-22 PROCEDURE — 82607 VITAMIN B-12: CPT

## 2025-04-22 PROCEDURE — 83550 IRON BINDING TEST: CPT

## 2025-04-22 PROCEDURE — 80061 LIPID PANEL: CPT

## 2025-04-22 PROCEDURE — 85025 COMPLETE CBC W/AUTO DIFF WBC: CPT

## 2025-04-22 RX ORDER — LEVETIRACETAM 500 MG/1
500 TABLET ORAL EVERY 12 HOURS
Qty: 180 TABLET | Refills: 0 | Status: SHIPPED | OUTPATIENT
Start: 2025-04-22

## 2025-04-22 NOTE — TELEPHONE ENCOUNTER
Reason for call:   [x] Refill   [] Prior Auth  [] Other:     Office:   [] PCP/Provider -   [x] Specialty/Provider - [facility] [place]     Medication:     levETIRAcetam (KEPPRA) 500 mg Take 1 tablet (500 mg total) by mouth every 12 (twelve) hours          Pharmacy: [facility]/pharmacy #[id number] - [place], [place]     Local Pharmacy   Does the patient have enough for 3 days?   [] Yes   [x] No - Send as HP to POD    Mail Away Pharmacy   Does the patient have enough for 10 days?   [] Yes   [] No - Send as HP to POD

## 2025-04-22 NOTE — TELEPHONE ENCOUNTER
I have called and spoke with patient's sister in law.   Patient scheduled with Ms. Krishnan on 05/12/25 at [place].

## 2025-04-29 ENCOUNTER — OFFICE VISIT (OUTPATIENT)
Age: 60
End: 2025-04-29
Payer: MEDICARE

## 2025-04-29 VITALS
BODY MASS INDEX: 28.16 KG/M2 | HEIGHT: 68 IN | SYSTOLIC BLOOD PRESSURE: 102 MMHG | HEART RATE: 64 BPM | DIASTOLIC BLOOD PRESSURE: 64 MMHG | WEIGHT: 185.8 LBS

## 2025-04-29 DIAGNOSIS — K74.60 CIRRHOSIS OF LIVER WITHOUT ASCITES, UNSPECIFIED HEPATIC CIRRHOSIS TYPE (HCC): Primary | ICD-10-CM

## 2025-04-29 DIAGNOSIS — I85.00 ESOPHAGEAL VARICES WITHOUT BLEEDING, UNSPECIFIED ESOPHAGEAL VARICES TYPE (HCC): ICD-10-CM

## 2025-04-29 PROCEDURE — G2211 COMPLEX E/M VISIT ADD ON: HCPCS | Performed by: STUDENT IN AN ORGANIZED HEALTH CARE EDUCATION/TRAINING PROGRAM

## 2025-04-29 PROCEDURE — 99214 OFFICE O/P EST MOD 30 MIN: CPT | Performed by: STUDENT IN AN ORGANIZED HEALTH CARE EDUCATION/TRAINING PROGRAM

## 2025-04-29 RX ORDER — SODIUM CHLORIDE, SODIUM LACTATE, POTASSIUM CHLORIDE, CALCIUM CHLORIDE 600; 310; 30; 20 MG/100ML; MG/100ML; MG/100ML; MG/100ML
125 INJECTION, SOLUTION INTRAVENOUS CONTINUOUS
OUTPATIENT
Start: 2025-04-29

## 2025-04-29 NOTE — PROGRESS NOTES
Name: Jose Stewart      : 1965      MRN: [id number]  Encounter Provider: Mimi Plata MD  Encounter Date: 2025   Encounter department: [facility] [address]  :  Assessment & Plan  Cirrhosis of liver without ascites, unspecified hepatic cirrhosis type (HCC)  59 y.o. male with history of peripheral vascular disease, hypertension, GERD and cryptogenic cirrhosis decompensated by previous variceal bleeding status post esophageal variceal band ligation (2022) who presents for hepatology follow up. Overall, doing well, but continues to require EGD for banding and has been having worsening mental status following anesthesia. Plan for neurology visit next month.     - Etiology: Cryptogenic, previous serologies unremarkable, no EtOH use  - Esophageal variceal screening: Most recent EGD 2025 showing multiple small varices, moderate portal hypertensive gastropathy with plan for repeat in 3 to 6 months for serial banding, needs neurologic evaluation prior to next EGD given concerns for worsening cognitive impairment after anesthesia.  Continue nadolol 60 mg daily with goal heart rate 55-60.  2 bands placed on .  Will schedule for repeat EGD 6 months from prior for banding for secondary ppx  - Ascites: Currently on Lasix 20 mg p.o. daily.  Recommend 2g Na diet (no added salt to food and no pre-packaged foods).  - Portosystemic encephalopathy: None present at this time. No indication for lactulose or rifaximin. No focal neurologic deficits and no previous episodes of HE. Will hold off on lactulose at this time, but can consider starting if neuro assessment unremarkable or neurologic deficits appear.   - HCC Screening: Repeat MRI/MRCP and AFP now and in q6 months.  MR RI/MRCP  with LR 2 cirrhosis associated nodule without definitive suspicious liver lesion, AFP 1.98 on 10/29  - HRS: Currently, no evidence of HRS. Avoid NSAIDs, ARBs, ACE-Is  - Nutrition: avoid  malnutrition and sarcopenia, try and walk every day, drink nutritional supplement such as Glucerna (at least 1 per day, and ideally before bedtime)  - Yearly flu shot, pneumococcal vaccine   - Avoid raw fish, fish tanks, shellfish  - Immunizations for hepatitis A and B, no immunity as of 2024. Needs these vaccinations  - Avoid alcohol, tobacco use, NSAIDs  - Okay to take up to 2 g of Tylenol  - Screening colonoscopy 4/14/2023 normal with recall 10 years    MELD 3.0: 8 at 10/29/2024  9:47 AM  MELD-Na: 8 at 10/29/2024  9:47 AM  Calculated from:  Serum Creatinine: 0.66 mg/dL (Using min of 1 mg/dL) at 10/29/2024  9:47 AM  Serum Sodium: 142 mmol/L (Using max of 137 mmol/L) at 10/29/2024  9:47 AM  Total Bilirubin: 0.44 mg/dL (Using min of 1 mg/dL) at 10/29/2024  9:47 AM  Serum Albumin: 3.3 g/dL at 10/29/2024  9:47 AM  INR(ratio): 1.17 at 10/29/2024  9:47 AM  Age at listing (hypothetical): 59 years  Sex: Male at 10/29/2024  9:47 AM      Orders:    Comprehensive metabolic panel; Standing    CBC and differential; Standing    Protime-INR; Standing    AFP tumor marker; Future    MRI abdomen w wo contrast and mrcp; Future    EGD Banding; Future    Esophageal varices without bleeding, unspecified esophageal varices type (HCC)    Orders:    Comprehensive metabolic panel; Standing    CBC and differential; Standing    Protime-INR; Standing    AFP tumor marker; Future    MRI abdomen w wo contrast and mrcp; Future    EGD Banding; Future        History of Present Illness   Jose Stewart is a 59 y.o. male with history of peripheral vascular disease, hypertension, GERD and cryptogenic cirrhosis decompensated by previous variceal bleeding status post esophageal variceal band ligation (February 2022) who presents for hepatology follow up.  Serologic workup unremarkable in the past without excessive alcohol consumption or significant metabolic risk factors.  Previous serologies over the past few years unremarkable.  Immunoglobulins  not checked. Brother at the visit with him today. Notes he has been doing well. Tripped over sandals and fell a few weeks ago, but no other falls. Watches movies and rides bike. No alcohol or tobacco. No NSAIDs or Tylenol. Father with colon cancer in late 70s. Denies nausea, vomiting, abd pain, diarrhea, constipation, melena, hematochezia. Pt is alert and oriented, but sometimes delayed responses. Lives with brother and his wife. Going to [facility] soon.       History obtained from: patient and brother   Review of Systems A complete review of systems is negative other than that noted above in the HPI.    Medical History Reviewed by provider this encounter:     .  Current Outpatient Medications   Medication Sig Dispense Refill    acetaminophen (TYLENOL) 325 mg tablet Take 2 tablets (650 mg total) by mouth every 6 (six) hours as needed for mild pain  0    cholecalciferol 1,000 units tablet Take 1 tablet (1,000 Units total) by mouth daily 90 tablet 3    folic acid (FOLVITE) 1 mg tablet TAKE 1 TABLET (1 MG TOTAL) BY MOUTH IN THE MORNING 90 tablet 1    furosemide (LASIX) 20 mg tablet TAKE 1 TABLET (20 MG TOTAL) BY MOUTH IN THE MORNING AND IN THE EVENING 180 tablet 1    iron polysaccharides (FERREX) 150 mg capsule TAKE 1 CAPSULE (150 MG TOTAL) BY MOUTH IN THE MORNING 90 capsule 1    levETIRAcetam (KEPPRA) 500 mg tablet Take 1 tablet (500 mg total) by mouth every 12 (twelve) hours 180 tablet 0    nadolol (CORGARD) 40 mg tablet TAKE 1 & 1/2 TABLETS BY MOUTH DAILY 135 tablet 1    OXcarbazepine (TRILEPTAL) 600 mg tablet Take 1 tablet (600 mg total) by mouth every 12 (twelve) hours 180 tablet 3    pantoprazole (PROTONIX) 40 mg tablet TAKE 1 TABLET BY MOUTH IN THE MORNING AND IN THE EVENING BEFORE MEALS 180 tablet 1    triamcinolone (KENALOG) 0.1 % ointment Apply sparingly to affected areas of legs 2 times a day 454 g 0     Current Facility-Administered Medications   Medication Dose Route Frequency Provider Last Rate Last Admin     cyanocobalamin injection 1,000 mcg  1,000 mcg Intramuscular Q30 Days    1,000 mcg at 03/25/25 1125     Objective   There were no vitals taken for this visit.    Physical Exam       Lab Results: I personally reviewed relevant lab results. none    Radiology Results Review : No pertinent imaging studies reviewed.  Results for orders placed during the hospital encounter of 02/14/25    EGD    Narrative  Table formatting from the original result was not included.  [facility]  [address]  [place] [address]  [phone]      DATE OF SERVICE:  2/14/25    PHYSICIAN(S):  Attending:  Mimi Plata MD    Fellow:  No Staff Documented      INDICATION:  Secondary esophageal varices with bleeding (HCC)    POST-OP DIAGNOSIS:  See the impression below.    PREPROCEDURE:  Informed consent was obtained for the procedure, including sedation.  Risks of perforation, hemorrhage, adverse drug reaction and aspiration were discussed. The patient was placed in the left lateral decubitus position.    Patient was explained about the risks and benefits of the procedure. Risks including but not limited to bleeding, infection, and perforation were explained in detail. Also explained about less than 100% sensitivity with the exam and other alternatives.    PROCEDURE: EGD    DETAILS OF PROCEDURE:  Patient was taken to the procedure room where a time out was performed to confirm correct patient and correct procedure. The patient underwent monitored anesthesia care, which was administered by an anesthesia professional. The patient's blood pressure, heart rate, level of consciousness, respirations, oxygen, ECG and ETCO2 were monitored throughout the procedure. The scope was introduced through the mouth and advanced to the second part of the duodenum. Retroflexion was performed in the fundus. The patient experienced no blood loss. The procedure was not difficult. The patient tolerated the procedure well. There  were no apparent adverse events.    ANESTHESIA INFORMATION:  ASA: III  Anesthesia Type: IV Sedation with Anesthesia    MEDICATIONS:  No administrations occurring from 1042 to 1106 on 02/14/25      FINDINGS:  Z-line 42 cm from the incisors  Multiple small varices in the esophagus; no bleeding was observed. Post-banding scars seen  Moderate portal hypertensive gastropathy. PHG polyps seen in the antrum. No gastric varices seen.  The duodenum appeared normal.      SPECIMENS:  * No specimens in log *        Impression  Multiple small varices in the esophagus  Moderate portal hypertensive gastropathy  The duodenum appeared normal.      RECOMMENDATION:  Follow up with me in clinic  Repeat EGD in 3-6 months for serial banding  Recommend neurologic evaluation prior to next EGD given concerns for worsening cognitive impairment after anesthesia  Continue nadolol for secondary prophylaxis  Resume prior diet and discharge home              Mimi Plata MD      Administrative Statements   I have spent a total time of 32 minutes in caring for this patient on the day of the visit/encounter including Diagnostic results, Prognosis, Risks and benefits of tx options, and Instructions for management.

## 2025-04-29 NOTE — ASSESSMENT & PLAN NOTE
Orders:    Comprehensive metabolic panel; Standing    CBC and differential; Standing    Protime-INR; Standing    AFP tumor marker; Future    MRI abdomen w wo contrast and mrcp; Future    EGD Banding; Future

## 2025-04-30 ENCOUNTER — OFFICE VISIT (OUTPATIENT)
Dept: INTERNAL MEDICINE CLINIC | Facility: CLINIC | Age: 60
End: 2025-04-30
Payer: MEDICARE

## 2025-04-30 ENCOUNTER — RESULTS FOLLOW-UP (OUTPATIENT)
Dept: GASTROENTEROLOGY | Facility: CLINIC | Age: 60
End: 2025-04-30

## 2025-04-30 ENCOUNTER — APPOINTMENT (OUTPATIENT)
Dept: LAB | Facility: AMBULARY SURGERY CENTER | Age: 60
End: 2025-04-30
Payer: MEDICARE

## 2025-04-30 VITALS
SYSTOLIC BLOOD PRESSURE: 96 MMHG | BODY MASS INDEX: 28.04 KG/M2 | WEIGHT: 185 LBS | HEIGHT: 68 IN | DIASTOLIC BLOOD PRESSURE: 58 MMHG | RESPIRATION RATE: 16 BRPM | TEMPERATURE: 97.8 F | HEART RATE: 67 BPM | OXYGEN SATURATION: 97 %

## 2025-04-30 DIAGNOSIS — K74.60 CIRRHOSIS OF LIVER WITHOUT ASCITES, UNSPECIFIED HEPATIC CIRRHOSIS TYPE (HCC): ICD-10-CM

## 2025-04-30 DIAGNOSIS — G40.909 NONINTRACTABLE EPILEPSY WITHOUT STATUS EPILEPTICUS, UNSPECIFIED EPILEPSY TYPE (HCC): ICD-10-CM

## 2025-04-30 DIAGNOSIS — K74.69 OTHER CIRRHOSIS OF LIVER (HCC): Primary | ICD-10-CM

## 2025-04-30 DIAGNOSIS — K21.9 GASTROESOPHAGEAL REFLUX DISEASE WITHOUT ESOPHAGITIS: ICD-10-CM

## 2025-04-30 DIAGNOSIS — D50.0 IRON DEFICIENCY ANEMIA DUE TO CHRONIC BLOOD LOSS: ICD-10-CM

## 2025-04-30 DIAGNOSIS — I10 PRIMARY HYPERTENSION: ICD-10-CM

## 2025-04-30 DIAGNOSIS — Z91.81 HISTORY OF FALLING: ICD-10-CM

## 2025-04-30 DIAGNOSIS — E53.8 B12 DEFICIENCY: ICD-10-CM

## 2025-04-30 DIAGNOSIS — I85.00 ESOPHAGEAL VARICES WITHOUT BLEEDING, UNSPECIFIED ESOPHAGEAL VARICES TYPE (HCC): ICD-10-CM

## 2025-04-30 LAB — AFP-TM SERPL-MCNC: 1.83 NG/ML (ref 0–9)

## 2025-04-30 PROCEDURE — 82105 ALPHA-FETOPROTEIN SERUM: CPT

## 2025-04-30 PROCEDURE — 96372 THER/PROPH/DIAG INJ SC/IM: CPT

## 2025-04-30 PROCEDURE — G2211 COMPLEX E/M VISIT ADD ON: HCPCS

## 2025-04-30 PROCEDURE — 99213 OFFICE O/P EST LOW 20 MIN: CPT

## 2025-04-30 PROCEDURE — 36415 COLL VENOUS BLD VENIPUNCTURE: CPT

## 2025-04-30 RX ADMIN — CYANOCOBALAMIN 1000 MCG: 1000 INJECTION, SOLUTION INTRAMUSCULAR; SUBCUTANEOUS at 10:10

## 2025-04-30 NOTE — PROGRESS NOTES
INTERNAL MEDICINE FOLLOW-UP OFFICE VISIT  [facility] INTERNAL [facility]IC[facility] [place]    NAME: Jose Stewart  AGE: 59 y.o. SEX: male  : 1965     DATE: 2025     Assessment and Plan:     1. Other cirrhosis of liver (HCC) (Primary)  Follows with GI, last seen    EGD to eval varices due in 6 months  Continue nadolol daily and continue Lasix 20 mg daily    MRI abdomen ordered to eval biliary dilatation seen on MRI     2. B12 deficiency  B12 injection today  Check B12 level     3. Iron deficiency anemia due to chronic blood loss  Continue iron tablets  Ferritin 25 increased to 22    4. Primary hypertension  BP in office 96/56, repeat /52      5. Gastroesophageal reflux disease without esophagitis    6. Epilepsy  Reports no seizures for several years  Continue Keppra 500 mg BID and Oxcarbazepine 600 mg BID   Following up with neurology on 25    7. Falls  Patient has had three mechanical falls in the past few months  Discussed the importance of using his cane, he often walks without one      Return in about 6 months (around 10/30/2025) for Next scheduled follow up.     Chief Complaint:     Chief Complaint   Patient presents with    Follow-up     One month f/u, B-12        History of Present Illness:     Ms. Garcia is a 59 year old male with a past medical history of Cirrhosis, esophageal varices, HTN, epilepsy who presents for a follow up visit. His brother is with him. He reports that he tripped last week Friday at a baseball game, states that he was not paying close attention to where he was walking and tripped on a step. He reports that he is doing well overall. Brother reports that he has been more forgetful for which they have an upcoming appointment with neurology.     The following portions of the patient's history were reviewed and updated as appropriate: allergies, current medications, past family history, past medical history, past social history,  "past surgical history and problem list.     Review of Systems:     Review of Systems   Constitutional:  Negative for chills and fever.   HENT:  Negative for ear pain and sore throat.    Eyes:  Negative for pain and visual disturbance.   Respiratory:  Negative for cough and shortness of breath.    Cardiovascular:  Negative for chest pain and palpitations.   Gastrointestinal:  Negative for abdominal pain and vomiting.   Genitourinary:  Negative for dysuria and hematuria.   Musculoskeletal:  Negative for arthralgias and back pain.   Skin:  Negative for color change and rash.   Neurological:  Negative for seizures and syncope.   All other systems reviewed and are negative.       Problem List:     Patient Active Problem List   Diagnosis    Nonintractable epilepsy without status epilepticus (HCC)    Primary hypertension    Gastroesophageal reflux disease without esophagitis    Venous stasis dermatitis of both lower extremities    Anemia    Status post splenectomy    Esophageal varices (HCC)    Legally blind in right eye, as defined in [place]    Bilateral hearing loss    Angiodysplasia    History of TB (tuberculosis)    History of prediabetes    Other cirrhosis of liver (HCC)    Hypomagnesemia    Status post cholecystectomy    Platelets decreased (HCC)    Non-pressure chronic ulcer of left calf, limited to breakdown of skin (HCC)    PVD (peripheral vascular disease) (HCC)    Need for hepatitis A and B vaccination    Iron deficiency anemia due to chronic blood loss    B12 deficiency    History of falling        Objective:     BP 96/58 (BP Location: Left arm, Patient Position: Sitting, Cuff Size: Adult)   Pulse 67   Temp 97.8 °F (36.6 °C) (Tympanic)   Resp 16   Ht 5' 8\" (1.727 m)   Wt 83.9 kg (185 lb)   SpO2 97%   BMI 28.13 kg/m²     Physical Exam  Vitals and nursing note reviewed.   Constitutional:       General: He is not in acute distress.     Appearance: He is well-developed.   HENT:      Head: Normocephalic and " atraumatic.   Eyes:      Conjunctiva/sclera: Conjunctivae normal.   Cardiovascular:      Rate and Rhythm: Normal rate and regular rhythm.      Heart sounds: No murmur heard.  Pulmonary:      Effort: Pulmonary effort is normal. No respiratory distress.      Breath sounds: Normal breath sounds.   Abdominal:      Palpations: Abdomen is soft.      Tenderness: There is no abdominal tenderness.   Musculoskeletal:         General: No swelling.      Cervical back: Neck supple.   Skin:     General: Skin is warm and dry.      Capillary Refill: Capillary refill takes less than 2 seconds.   Neurological:      Mental Status: He is alert.   Psychiatric:         Mood and Affect: Mood normal.         Pertinent Laboratory/Diagnostic Studies:    Laboratory Results: I have personally reviewed the pertinent laboratory results/reports     CBC:   Results from Last 12 Months   Lab Units 04/22/25  0917   WBC Thousand/uL 3.83*   RBC Million/uL 4.08   HEMOGLOBIN g/dL 12.4   HEMATOCRIT % 38.0   MCV fL 93   MCH pg 30.4   MCHC g/dL 32.6   RDW % 13.4   MPV fL 9.0   PLATELETS Thousands/uL 225   NRBC AUTO /100 WBCs 0   SEGS PCT % 44   LYMPHO PCT % 33   MONO PCT % 8   EOS PCT % 14*   BASOS PCT % 1   TOTAL NEUT ABS Thousands/µL 1.67*   LYMPHS ABS Thousands/µL 1.27   MONOS ABS Thousand/µL 0.32   EOS ABS Thousand/µL 0.53     Chemistry Profile:   Results from Last 12 Months   Lab Units 04/22/25  0917 10/29/24  0947   POTASSIUM mmol/L 4.3 3.7   CHLORIDE mmol/L 101 102   CO2 mmol/L 30 33*   BUN mg/dL 7 7   CREATININE mg/dL 0.72 0.66   GLUCOSE FASTING mg/dL 90 91   CALCIUM mg/dL 9.1 8.8   CORRECTED CALCIUM mg/dL  --  9.4   AST U/L 25 18   ALT U/L 12 11   ALK PHOS U/L 86 106*   EGFR ml/min/1.73sq m 100 104     Iron Studies:   Results from Last 12 Months   Lab Units 04/22/25  0917   IRON ug/dL 70   TIBC ug/dL 315   FERRITIN ng/mL 22*       Radiology/Other Diagnostic Testing Results:     Nicolette Dudley MD  [facility] INTERNAL MEDICINE [place]

## 2025-05-02 DIAGNOSIS — D50.0 IRON DEFICIENCY ANEMIA DUE TO CHRONIC BLOOD LOSS: ICD-10-CM

## 2025-05-02 RX ORDER — FOLIC ACID 1 MG/1
TABLET ORAL
Qty: 90 TABLET | Refills: 1 | Status: SHIPPED | OUTPATIENT
Start: 2025-05-02

## 2025-05-12 ENCOUNTER — OFFICE VISIT (OUTPATIENT)
Dept: NEUROLOGY | Facility: CLINIC | Age: 60
End: 2025-05-12
Payer: MEDICARE

## 2025-05-12 VITALS
OXYGEN SATURATION: 98 % | SYSTOLIC BLOOD PRESSURE: 120 MMHG | BODY MASS INDEX: 27.49 KG/M2 | DIASTOLIC BLOOD PRESSURE: 70 MMHG | HEIGHT: 70 IN | HEART RATE: 68 BPM | WEIGHT: 192 LBS

## 2025-05-12 DIAGNOSIS — G40.909 NONINTRACTABLE EPILEPSY WITHOUT STATUS EPILEPTICUS, UNSPECIFIED EPILEPSY TYPE (HCC): ICD-10-CM

## 2025-05-12 PROCEDURE — 99213 OFFICE O/P EST LOW 20 MIN: CPT | Performed by: NURSE PRACTITIONER

## 2025-05-12 RX ORDER — LEVETIRACETAM 500 MG/1
500 TABLET ORAL EVERY 12 HOURS
Qty: 180 TABLET | Refills: 3 | Status: SHIPPED | OUTPATIENT
Start: 2025-05-12

## 2025-05-12 RX ORDER — OXCARBAZEPINE 600 MG/1
600 TABLET, FILM COATED ORAL EVERY 12 HOURS
Qty: 180 TABLET | Refills: 3 | Status: SHIPPED | OUTPATIENT
Start: 2025-05-12

## 2025-05-12 NOTE — PATIENT INSTRUCTIONS
Continue levetiracetam and oxcarbazepine at current doses    Notify office if patient experiences any seizures.

## 2025-05-12 NOTE — ASSESSMENT & PLAN NOTE
59 y.o. male, with intellectual disability and epilepsy who is presenting to [facility] for follow-up of his seizures.     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  He is not experiencing any side effects from his antiepileptic medications and will continue those at the current doses.  Recent oxcarbazepine level and CMP unremarkable.    Reason for Continued Antiepileptic Medications: High risk for further seizures due to abnormal EEG.    Plan: Continue levetiracetam 500 mg twice daily and oxcarbazepine 600 mg twice daily            Call office if you experience any further seizures            Follow-up:  1 year  Orders:    levETIRAcetam (KEPPRA) 500 mg tablet; Take 1 tablet (500 mg total) by mouth every 12 (twelve) hours    OXcarbazepine (TRILEPTAL) 600 mg tablet; Take 1 tablet (600 mg total) by mouth every 12 (twelve) hours

## 2025-05-12 NOTE — PROGRESS NOTES
Name: Jose Stewart      : 1965      MRN: [id number]  Encounter Provider: SIDDHARTHA Crowell  Encounter Date: 2025   Encounter department: [facility] [place]  :  Assessment & Plan  Nonintractable epilepsy without status epilepticus, unspecified epilepsy type (HCC)  59 y.o. male, with intellectual disability and epilepsy who is presenting to [facility] for follow-up of his seizures.     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  He is not experiencing any side effects from his antiepileptic medications and will continue those at the current doses.  Recent oxcarbazepine level and CMP unremarkable.    Reason for Continued Antiepileptic Medications: High risk for further seizures due to abnormal EEG.    Plan: Continue levetiracetam 500 mg twice daily and oxcarbazepine 600 mg twice daily            Call office if you experience any further seizures            Follow-up:  1 year  Orders:    levETIRAcetam (KEPPRA) 500 mg tablet; Take 1 tablet (500 mg total) by mouth every 12 (twelve) hours    OXcarbazepine (TRILEPTAL) 600 mg tablet; Take 1 tablet (600 mg total) by mouth every 12 (twelve) hours          History of Present Illness   Jose Stewart is a 59 y.o. male, with intellectual disability and epilepsy who is presenting to [facility] for follow-up of his seizures. He is accompanied by his brother.     For review: The patient developed seizures at age 4 after what sounds like a febrile illness. Thereafter, he would have seizures every 6 months to one year. Anxiety and stress are triggers. Per his brother, he would lay flat for 2-5 minutes with eyes closed and rolling back while having intermittent tremulousness. There was no prodrome or warning. The post-ictal state can last up to a day. There were also some possible nocturnal seizures because he would urinate in  the bed which he doesn't typically do. This most recent seizure was around 2017.       Interval History:   Last office visit 4/11/24 with SIDDHARTHA Benson- no seizures, AEDs unchanged.   Seizures since last office visit: none  Hospitalizations: none    Patient and his brother will be going to [facility] on June 16th for 3 weeks.    He also recently fell face first when he tripped.  He did sustain a black eye but no evidence of broken facial bones.      Current Antiepileptic Medications:  1. levetiracetam 500 mg bid  2. oxcarbazepine 600 mg bid  Medication side effects: none  Medication adherence: good   Other medications as per Epic      Event/Seizure Semiology:  1.  lay flat for 2-5 minutes with eyes closed and rolling back while having intermittent tremulousness. There was no prodrome or warning. The post-ictal state can last up to a day.   2. There were also some possible nocturnal seizures because he would urinate in the bed which he doesn't typically do.     Special Features  Age/Date of seizure onset: 5 yo  Status epilepticus: no  Self Injury Seizures: no  Precipitating Factors: stress  Longest seizure free interval: 6-7 years (current)     Epilepsy Risk Factors:  Intellectual disability  Possible febrile illness as a child (?encephalitis)    Prior AEDs:  none       Prior Evaluation:  Routine EEG 10/5/2022: Occasional right anterior temporal sharp wave discharges followed by right temporal delta activities suggests an underlying area of neuronal dysfunction that is a potential source of seizures.      - CTH 2/8/2022: No structural abnormalities seen    Labs: 4./22/25- oxcarbazepine 10, CMP WNL, sodium 137  CBC- WBC 3.83, ANC 1.67       Social History:  Driving: No  Lives with: his brother   Occupation: on permanent disability      I reviewed Allergies, Medical History, Surgical History,  Family History and problem list as documented in Epic/Care Everywhere.       Objective   /70 (BP Location:  "Left arm, Patient Position: Sitting, Cuff Size: Adult)   Pulse 68   Ht 5' 10\" (1.778 m)   Wt 87.1 kg (192 lb)   SpO2 98%   BMI 27.55 kg/m²      General Exam  In general, patient is well appearing and in no distress.    Neurologic Exam  Mental Status: Alert and oriented x 3  Language: normal fluency and comprehension  Cranial Nerves: Abnormal shaped pupil right eye with blindness right eye, EOMI, no nystagmus, Face symmetric, Tongue/palate midline, No dysarthria   Motor: No pronator drift. Normal bulk and tone. Strength 5/5 throughout  Coordination: Finger to nose intact  Gait: normal casual gait, drags feet at times      Administrative Statements     Voice recognition software was used in the generation of this note. There may be unintentional errors including grammatical errors, spelling errors, or pronoun errors.   "

## 2025-05-15 DIAGNOSIS — I87.2 VENOUS STASIS DERMATITIS OF BOTH LOWER EXTREMITIES: ICD-10-CM

## 2025-05-16 RX ORDER — TRIAMCINOLONE ACETONIDE 1 MG/G
OINTMENT TOPICAL
Qty: 454 G | Refills: 0 | Status: SHIPPED | OUTPATIENT
Start: 2025-05-16

## 2025-05-29 ENCOUNTER — HOSPITAL ENCOUNTER (OUTPATIENT)
Dept: MRI IMAGING | Facility: HOSPITAL | Age: 60
Discharge: HOME | End: 2025-05-29
Attending: STUDENT IN AN ORGANIZED HEALTH CARE EDUCATION/TRAINING PROGRAM
Payer: MEDICARE

## 2025-05-29 DIAGNOSIS — K74.60 CIRRHOSIS OF LIVER WITHOUT ASCITES, UNSPECIFIED HEPATIC CIRRHOSIS TYPE (HCC): ICD-10-CM

## 2025-05-29 DIAGNOSIS — I85.00 ESOPHAGEAL VARICES WITHOUT BLEEDING, UNSPECIFIED ESOPHAGEAL VARICES TYPE (HCC): ICD-10-CM

## 2025-05-29 PROCEDURE — 74183 MRI ABD W/O CNTR FLWD CNTR: CPT

## 2025-05-29 PROCEDURE — A9585 GADOBUTROL INJECTION: HCPCS | Performed by: INTERNAL MEDICINE

## 2025-05-29 RX ORDER — GADOBUTROL 604.72 MG/ML
9 INJECTION INTRAVENOUS
Status: COMPLETED | OUTPATIENT
Start: 2025-05-29 | End: 2025-05-29

## 2025-05-29 RX ADMIN — GADOBUTROL 9 ML: 604.72 INJECTION INTRAVENOUS at 15:45

## 2025-06-06 DIAGNOSIS — K21.9 GASTROESOPHAGEAL REFLUX DISEASE WITHOUT ESOPHAGITIS: ICD-10-CM

## 2025-06-06 DIAGNOSIS — I87.2 VENOUS STASIS DERMATITIS OF BOTH LOWER EXTREMITIES: ICD-10-CM

## 2025-06-06 DIAGNOSIS — I10 PRIMARY HYPERTENSION: ICD-10-CM

## 2025-06-07 RX ORDER — FUROSEMIDE 20 MG/1
TABLET ORAL
Qty: 180 TABLET | Refills: 1 | Status: SHIPPED | OUTPATIENT
Start: 2025-06-07

## 2025-06-07 RX ORDER — PANTOPRAZOLE SODIUM 40 MG/1
TABLET, DELAYED RELEASE ORAL
Qty: 180 TABLET | Refills: 1 | Status: SHIPPED | OUTPATIENT
Start: 2025-06-07

## 2025-06-07 RX ORDER — TRIAMCINOLONE ACETONIDE 1 MG/G
OINTMENT TOPICAL
Qty: 454 G | Refills: 0 | Status: SHIPPED | OUTPATIENT
Start: 2025-06-07

## 2025-07-17 ENCOUNTER — APPOINTMENT (EMERGENCY)
Dept: CT IMAGING | Facility: HOSPITAL | Age: 60
DRG: 086 | End: 2025-07-17
Payer: MEDICARE

## 2025-07-17 ENCOUNTER — APPOINTMENT (OUTPATIENT)
Dept: LAB | Facility: AMBULARY SURGERY CENTER | Age: 60
DRG: 086 | End: 2025-07-17
Payer: MEDICARE

## 2025-07-17 ENCOUNTER — HOSPITAL ENCOUNTER (INPATIENT)
Facility: HOSPITAL | Age: 60
LOS: 1 days | Discharge: HOME | DRG: 086 | End: 2025-07-18
Admitting: STUDENT IN AN ORGANIZED HEALTH CARE EDUCATION/TRAINING PROGRAM
Payer: MEDICARE

## 2025-07-17 ENCOUNTER — APPOINTMENT (INPATIENT)
Dept: CT IMAGING | Facility: HOSPITAL | Age: 60
DRG: 086 | End: 2025-07-17
Payer: MEDICARE

## 2025-07-17 DIAGNOSIS — I61.9 ICH (INTRACEREBRAL HEMORRHAGE) (HCC): Primary | ICD-10-CM

## 2025-07-17 DIAGNOSIS — I85.00 ESOPHAGEAL VARICES WITHOUT BLEEDING, UNSPECIFIED ESOPHAGEAL VARICES TYPE (HCC): ICD-10-CM

## 2025-07-17 DIAGNOSIS — K74.60 CIRRHOSIS OF LIVER WITHOUT ASCITES, UNSPECIFIED HEPATIC CIRRHOSIS TYPE (HCC): ICD-10-CM

## 2025-07-17 DIAGNOSIS — G93.89 PNEUMOCEPHALUS: ICD-10-CM

## 2025-07-17 DIAGNOSIS — T07.XXXA MULTIPLE ABRASIONS: ICD-10-CM

## 2025-07-17 DIAGNOSIS — G89.11 ACUTE PAIN DUE TO TRAUMA: ICD-10-CM

## 2025-07-17 DIAGNOSIS — E53.8 B12 DEFICIENCY: ICD-10-CM
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
## 2025-07-17 DIAGNOSIS — I60.9 SUBARACHNOID HEMORRHAGE (HCC): ICD-10-CM

## 2025-07-17 DIAGNOSIS — S02.401A MAXILLARY SINUS FRACTURE (HCC): ICD-10-CM
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
## 2025-07-17 PROBLEM — S02.19XA CLOSED FRACTURE OF FRONTAL SINUS (HCC): Status: ACTIVE | Noted: 2025-07-17

## 2025-07-17 PROBLEM — I62.9 INTRACRANIAL HEMORRHAGE (HCC): Status: ACTIVE | Noted: 2025-07-17

## 2025-07-17 LAB
ALBUMIN SERPL BCG-MCNC: 3.6 G/DL (ref 3.5–5)
ALBUMIN SERPL BCG-MCNC: 3.6 G/DL (ref 3.5–5)
ALP SERPL-CCNC: 68 U/L (ref 34–104)
ALP SERPL-CCNC: 80 U/L (ref 34–104)
ALT SERPL W P-5'-P-CCNC: 10 U/L (ref 7–52)
ALT SERPL W P-5'-P-CCNC: 10 U/L (ref 7–52)
ANION GAP SERPL CALCULATED.3IONS-SCNC: 4 MMOL/L (ref 4–13)
ANION GAP SERPL CALCULATED.3IONS-SCNC: 7 MMOL/L (ref 4–13)
APTT PPP: 25 SECONDS (ref 23–34)
AST SERPL W P-5'-P-CCNC: 18 U/L (ref 13–39)
AST SERPL W P-5'-P-CCNC: 19 U/L (ref 13–39)
BASOPHILS # BLD AUTO: 0.03 THOUSANDS/ÂΜL (ref 0–0.1)
BASOPHILS # BLD AUTO: 0.04 THOUSANDS/ÂΜL (ref 0–0.1)
BASOPHILS NFR BLD AUTO: 1 % (ref 0–1)
BASOPHILS NFR BLD AUTO: 1 % (ref 0–1)
BILIRUB SERPL-MCNC: 0.42 MG/DL (ref 0.2–1)
BILIRUB SERPL-MCNC: 0.48 MG/DL (ref 0.2–1)
BUN SERPL-MCNC: 9 MG/DL (ref 5–25)
BUN SERPL-MCNC: 9 MG/DL (ref 5–25)
CALCIUM SERPL-MCNC: 8.7 MG/DL (ref 8.4–10.2)
CALCIUM SERPL-MCNC: 8.9 MG/DL (ref 8.4–10.2)
CFFMA (FUNCTIONAL FIBRINOGEN MAX AMPLITUDE): 21 MM (ref 15–32)
CHLORIDE SERPL-SCNC: 103 MMOL/L (ref 96–108)
CHLORIDE SERPL-SCNC: 105 MMOL/L (ref 96–108)
CKLY30: 2.2 % (ref 0–2.6)
CKR(REACTION TIME): 3.2 MIN (ref 4.6–9.1)
CO2 SERPL-SCNC: 31 MMOL/L (ref 21–32)
CO2 SERPL-SCNC: 31 MMOL/L (ref 21–32)
CREAT SERPL-MCNC: 0.63 MG/DL (ref 0.6–1.3)
CREAT SERPL-MCNC: 0.68 MG/DL (ref 0.6–1.3)
CRTMA(RAPIDTEG MAX AMPLITUDE): 57.2 MM (ref 52–70)
EOSINOPHIL # BLD AUTO: 0.66 THOUSAND/ÂΜL (ref 0–0.61)
EOSINOPHIL # BLD AUTO: 0.72 THOUSAND/ÂΜL (ref 0–0.61)
EOSINOPHIL NFR BLD AUTO: 16 % (ref 0–6)
EOSINOPHIL NFR BLD AUTO: 17 % (ref 0–6)
ERYTHROCYTE [DISTWIDTH] IN BLOOD BY AUTOMATED COUNT: 13.6 % (ref 11.6–15.1)
ERYTHROCYTE [DISTWIDTH] IN BLOOD BY AUTOMATED COUNT: 13.7 % (ref 11.6–15.1)
ETHANOL SERPL-MCNC: <10 MG/DL
GFR SERPL CREATININE-BSD FRML MDRD: 103 ML/MIN/1.73SQ M
GFR SERPL CREATININE-BSD FRML MDRD: 106 ML/MIN/1.73SQ M
GLUCOSE P FAST SERPL-MCNC: 91 MG/DL (ref 65–99)
GLUCOSE SERPL-MCNC: 94 MG/DL (ref 65–140)
GLUCOSE SERPL-MCNC: 95 MG/DL (ref 65–140)
HCT VFR BLD AUTO: 35.8 % (ref 36.5–49.3)
HCT VFR BLD AUTO: 38 % (ref 36.5–49.3)
HGB BLD-MCNC: 11.9 G/DL (ref 12–17)
HGB BLD-MCNC: 12 G/DL (ref 12–17)
IMM GRANULOCYTES # BLD AUTO: 0 THOUSAND/UL (ref 0–0.2)
IMM GRANULOCYTES # BLD AUTO: 0.01 THOUSAND/UL (ref 0–0.2)
IMM GRANULOCYTES NFR BLD AUTO: 0 % (ref 0–2)
IMM GRANULOCYTES NFR BLD AUTO: 0 % (ref 0–2)
INR PPP: 1.13 (ref 0.85–1.19)
INR PPP: 1.13 (ref 0.85–1.19)
LYMPHOCYTES # BLD AUTO: 1.17 THOUSANDS/ÂΜL (ref 0.6–4.47)
LYMPHOCYTES # BLD AUTO: 1.31 THOUSANDS/ÂΜL (ref 0.6–4.47)
LYMPHOCYTES NFR BLD AUTO: 27 % (ref 14–44)
LYMPHOCYTES NFR BLD AUTO: 31 % (ref 14–44)
MCH RBC QN AUTO: 29.3 PG (ref 26.8–34.3)
MCH RBC QN AUTO: 30.7 PG (ref 26.8–34.3)
MCHC RBC AUTO-ENTMCNC: 31.3 G/DL (ref 31.4–37.4)
MCHC RBC AUTO-ENTMCNC: 33.5 G/DL (ref 31.4–37.4)
MCV RBC AUTO: 92 FL (ref 82–98)
MCV RBC AUTO: 94 FL (ref 82–98)
MONOCYTES # BLD AUTO: 0.33 THOUSAND/ÂΜL (ref 0.17–1.22)
MONOCYTES # BLD AUTO: 0.37 THOUSAND/ÂΜL (ref 0.17–1.22)
MONOCYTES NFR BLD AUTO: 8 % (ref 4–12)
MONOCYTES NFR BLD AUTO: 9 % (ref 4–12)
NEUTROPHILS # BLD AUTO: 1.83 THOUSANDS/ÂΜL (ref 1.85–7.62)
NEUTROPHILS # BLD AUTO: 1.97 THOUSANDS/ÂΜL (ref 1.85–7.62)
NEUTS SEG NFR BLD AUTO: 44 % (ref 43–75)
NEUTS SEG NFR BLD AUTO: 46 % (ref 43–75)
NRBC BLD AUTO-RTO: 0 /100 WBCS
NRBC BLD AUTO-RTO: 0 /100 WBCS
PLATELET # BLD AUTO: 188 THOUSANDS/UL (ref 149–390)
PLATELET # BLD AUTO: 189 THOUSANDS/UL (ref 149–390)
PMV BLD AUTO: 9.8 FL (ref 8.9–12.7)
PMV BLD AUTO: 9.9 FL (ref 8.9–12.7)
POTASSIUM SERPL-SCNC: 3.3 MMOL/L (ref 3.5–5.3)
POTASSIUM SERPL-SCNC: 4.2 MMOL/L (ref 3.5–5.3)
PROT SERPL-MCNC: 7.3 G/DL (ref 6.4–8.4)
PROT SERPL-MCNC: 7.4 G/DL (ref 6.4–8.4)
PROTHROMBIN TIME: 14.8 SECONDS (ref 12.3–15)
PROTHROMBIN TIME: 15.2 SECONDS (ref 12.3–15)
RBC # BLD AUTO: 3.91 MILLION/UL (ref 3.88–5.62)
RBC # BLD AUTO: 4.06 MILLION/UL (ref 3.88–5.62)
SODIUM SERPL-SCNC: 140 MMOL/L (ref 135–147)
SODIUM SERPL-SCNC: 141 MMOL/L (ref 135–147)
WBC # BLD AUTO: 4.17 THOUSAND/UL (ref 4.31–10.16)
WBC # BLD AUTO: 4.27 THOUSAND/UL (ref 4.31–10.16)

## 2025-07-17 PROCEDURE — 85576 BLOOD PLATELET AGGREGATION: CPT | Performed by: STUDENT IN AN ORGANIZED HEALTH CARE EDUCATION/TRAINING PROGRAM

## 2025-07-17 PROCEDURE — 83520 IMMUNOASSAY QUANT NOS NONAB: CPT | Performed by: NURSE PRACTITIONER

## 2025-07-17 PROCEDURE — 85347 COAGULATION TIME ACTIVATED: CPT | Performed by: STUDENT IN AN ORGANIZED HEALTH CARE EDUCATION/TRAINING PROGRAM

## 2025-07-17 PROCEDURE — 85025 COMPLETE CBC W/AUTO DIFF WBC: CPT

## 2025-07-17 PROCEDURE — 80053 COMPREHEN METABOLIC PANEL: CPT

## 2025-07-17 PROCEDURE — 82077 ASSAY SPEC XCP UR&BREATH IA: CPT | Performed by: EMERGENCY MEDICINE

## 2025-07-17 PROCEDURE — 90715 TDAP VACCINE 7 YRS/> IM: CPT

## 2025-07-17 PROCEDURE — 85610 PROTHROMBIN TIME: CPT

## 2025-07-17 PROCEDURE — 36415 COLL VENOUS BLD VENIPUNCTURE: CPT

## 2025-07-17 PROCEDURE — 93005 ELECTROCARDIOGRAM TRACING: CPT

## 2025-07-17 PROCEDURE — 74177 CT ABD & PELVIS W/CONTRAST: CPT

## 2025-07-17 PROCEDURE — 71260 CT THORAX DX C+: CPT

## 2025-07-17 PROCEDURE — 70450 CT HEAD/BRAIN W/O DYE: CPT

## 2025-07-17 PROCEDURE — 85397 CLOTTING FUNCT ACTIVITY: CPT | Performed by: STUDENT IN AN ORGANIZED HEALTH CARE EDUCATION/TRAINING PROGRAM

## 2025-07-17 PROCEDURE — 82948 REAGENT STRIP/BLOOD GLUCOSE: CPT

## 2025-07-17 PROCEDURE — 85730 THROMBOPLASTIN TIME PARTIAL: CPT

## 2025-07-17 PROCEDURE — 90471 IMMUNIZATION ADMIN: CPT

## 2025-07-17 PROCEDURE — 80183 DRUG SCRN QUANT OXCARBAZEPIN: CPT | Performed by: NURSE PRACTITIONER

## 2025-07-17 PROCEDURE — 99285 EMERGENCY DEPT VISIT HI MDM: CPT

## 2025-07-17 PROCEDURE — 99223 1ST HOSP IP/OBS HIGH 75: CPT | Performed by: SURGERY

## 2025-07-17 PROCEDURE — 99284 EMERGENCY DEPT VISIT MOD MDM: CPT

## 2025-07-17 PROCEDURE — 72125 CT NECK SPINE W/O DYE: CPT

## 2025-07-17 PROCEDURE — 99291 CRITICAL CARE FIRST HOUR: CPT | Performed by: STUDENT IN AN ORGANIZED HEALTH CARE EDUCATION/TRAINING PROGRAM

## 2025-07-17 PROCEDURE — 85384 FIBRINOGEN ACTIVITY: CPT | Performed by: STUDENT IN AN ORGANIZED HEALTH CARE EDUCATION/TRAINING PROGRAM

## 2025-07-17 RX ORDER — LEVETIRACETAM 500 MG/1
500 TABLET ORAL EVERY 12 HOURS SCHEDULED
Status: DISCONTINUED | OUTPATIENT
Start: 2025-07-17 | End: 2025-07-18 | Stop reason: HOSPADM

## 2025-07-17 RX ORDER — POTASSIUM CHLORIDE 1500 MG/1
40 TABLET, EXTENDED RELEASE ORAL ONCE
Status: COMPLETED | OUTPATIENT
Start: 2025-07-17 | End: 2025-07-17

## 2025-07-17 RX ORDER — OXYCODONE HYDROCHLORIDE 5 MG/1
5 TABLET ORAL EVERY 6 HOURS PRN
Refills: 0 | Status: DISCONTINUED | OUTPATIENT
Start: 2025-07-17 | End: 2025-07-18 | Stop reason: HOSPADM

## 2025-07-17 RX ORDER — LEVETIRACETAM 500 MG/5ML
500 INJECTION, SOLUTION, CONCENTRATE INTRAVENOUS EVERY 12 HOURS SCHEDULED
Status: DISCONTINUED | OUTPATIENT
Start: 2025-07-17 | End: 2025-07-17

## 2025-07-17 RX ORDER — GINSENG 100 MG
1 CAPSULE ORAL 2 TIMES DAILY
Status: DISCONTINUED | OUTPATIENT
Start: 2025-07-17 | End: 2025-07-18 | Stop reason: HOSPADM

## 2025-07-17 RX ORDER — OXCARBAZEPINE 150 MG/1
600 TABLET, FILM COATED ORAL EVERY 12 HOURS SCHEDULED
Status: DISCONTINUED | OUTPATIENT
Start: 2025-07-17 | End: 2025-07-18 | Stop reason: HOSPADM

## 2025-07-17 RX ORDER — CYANOCOBALAMIN 1000 UG/ML
1000 INJECTION, SOLUTION INTRAMUSCULAR; SUBCUTANEOUS
Status: DISCONTINUED | OUTPATIENT
Start: 2025-07-17 | End: 2025-07-18 | Stop reason: HOSPADM

## 2025-07-17 RX ORDER — ACETAMINOPHEN 325 MG/1
325 TABLET ORAL EVERY 6 HOURS PRN
Status: DISCONTINUED | OUTPATIENT
Start: 2025-07-17 | End: 2025-07-18 | Stop reason: HOSPADM

## 2025-07-17 RX ORDER — CHLORHEXIDINE GLUCONATE ORAL RINSE 1.2 MG/ML
15 SOLUTION DENTAL EVERY 12 HOURS SCHEDULED
Status: DISCONTINUED | OUTPATIENT
Start: 2025-07-17 | End: 2025-07-18 | Stop reason: HOSPADM

## 2025-07-17 RX ORDER — GINSENG 100 MG
1 CAPSULE ORAL ONCE
Status: COMPLETED | OUTPATIENT
Start: 2025-07-17 | End: 2025-07-17

## 2025-07-17 RX ADMIN — AMOXICILLIN AND CLAVULANATE POTASSIUM 1 TABLET: 875; 125 TABLET, FILM COATED ORAL at 20:51

## 2025-07-17 RX ADMIN — IOHEXOL 100 ML: 350 INJECTION, SOLUTION INTRAVENOUS at 16:41

## 2025-07-17 RX ADMIN — BACITRACIN 1 LARGE APPLICATION: 500 OINTMENT TOPICAL at 13:57

## 2025-07-17 RX ADMIN — LEVETIRACETAM 500 MG: 500 TABLET, FILM COATED ORAL at 20:51

## 2025-07-17 RX ADMIN — POTASSIUM CHLORIDE 40 MEQ: 1500 TABLET, EXTENDED RELEASE ORAL at 17:19

## 2025-07-17 RX ADMIN — BACITRACIN 1 SMALL APPLICATION: 500 OINTMENT TOPICAL at 22:10

## 2025-07-17 RX ADMIN — OXCARBAZEPINE 600 MG: 150 TABLET, FILM COATED ORAL at 20:51

## 2025-07-17 RX ADMIN — TETANUS TOXOID, REDUCED DIPHTHERIA TOXOID AND ACELLULAR PERTUSSIS VACCINE, ADSORBED 0.5 ML: 5; 2.5; 8; 8; 2.5 SUSPENSION INTRAMUSCULAR at 13:57

## 2025-07-17 RX ADMIN — CHLORHEXIDINE GLUCONATE 15 ML: 1.2 SOLUTION ORAL at 20:51
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
## 2025-07-18 ENCOUNTER — APPOINTMENT (INPATIENT)
Dept: CT IMAGING | Facility: HOSPITAL | Age: 60
DRG: 086 | End: 2025-07-18
Payer: MEDICARE

## 2025-07-18 ENCOUNTER — TELEPHONE (OUTPATIENT)
Dept: NEUROSURGERY | Facility: CLINIC | Age: 60
End: 2025-07-18

## 2025-07-18 VITALS
RESPIRATION RATE: 16 BRPM | WEIGHT: 186.51 LBS | DIASTOLIC BLOOD PRESSURE: 70 MMHG | OXYGEN SATURATION: 98 % | HEIGHT: 69 IN | TEMPERATURE: 98.6 F | HEART RATE: 56 BPM | SYSTOLIC BLOOD PRESSURE: 113 MMHG | BODY MASS INDEX: 27.62 KG/M2

## 2025-07-18 LAB
ANION GAP SERPL CALCULATED.3IONS-SCNC: 3 MMOL/L (ref 4–13)
ATRIAL RATE: 74 BPM
BASOPHILS # BLD AUTO: 0.01 THOUSANDS/ÂΜL (ref 0–0.1)
BASOPHILS NFR BLD AUTO: 0 % (ref 0–1)
BUN SERPL-MCNC: 9 MG/DL (ref 5–25)
CALCIUM SERPL-MCNC: 8.5 MG/DL (ref 8.4–10.2)
CHLORIDE SERPL-SCNC: 107 MMOL/L (ref 96–108)
CO2 SERPL-SCNC: 30 MMOL/L (ref 21–32)
CREAT SERPL-MCNC: 0.59 MG/DL (ref 0.6–1.3)
EOSINOPHIL # BLD AUTO: 0.42 THOUSAND/ÂΜL (ref 0–0.61)
EOSINOPHIL NFR BLD AUTO: 9 % (ref 0–6)
ERYTHROCYTE [DISTWIDTH] IN BLOOD BY AUTOMATED COUNT: 13.6 % (ref 11.6–15.1)
GFR SERPL CREATININE-BSD FRML MDRD: 109 ML/MIN/1.73SQ M
GLUCOSE SERPL-MCNC: 93 MG/DL (ref 65–140)
HCT VFR BLD AUTO: 36.5 % (ref 36.5–49.3)
HGB BLD-MCNC: 11.7 G/DL (ref 12–17)
IMM GRANULOCYTES # BLD AUTO: 0.01 THOUSAND/UL (ref 0–0.2)
IMM GRANULOCYTES NFR BLD AUTO: 0 % (ref 0–2)
INR PPP: 1.17 (ref 0.85–1.19)
LEVETIRACETAM SERPL-MCNC: 9.8 UG/ML (ref 12–46)
LYMPHOCYTES # BLD AUTO: 1.01 THOUSANDS/ÂΜL (ref 0.6–4.47)
LYMPHOCYTES NFR BLD AUTO: 22 % (ref 14–44)
MAGNESIUM SERPL-MCNC: 1.9 MG/DL (ref 1.9–2.7)
MCH RBC QN AUTO: 29.5 PG (ref 26.8–34.3)
MCHC RBC AUTO-ENTMCNC: 32.1 G/DL (ref 31.4–37.4)
MCV RBC AUTO: 92 FL (ref 82–98)
MONOCYTES # BLD AUTO: 0.48 THOUSAND/ÂΜL (ref 0.17–1.22)
MONOCYTES NFR BLD AUTO: 11 % (ref 4–12)
NEUTROPHILS # BLD AUTO: 2.58 THOUSANDS/ÂΜL (ref 1.85–7.62)
NEUTS SEG NFR BLD AUTO: 58 % (ref 43–75)
NRBC BLD AUTO-RTO: 0 /100 WBCS
P AXIS: 71 DEGREES
PLATELET # BLD AUTO: 176 THOUSANDS/UL (ref 149–390)
PMV BLD AUTO: 9.3 FL (ref 8.9–12.7)
POTASSIUM SERPL-SCNC: 4 MMOL/L (ref 3.5–5.3)
PR INTERVAL: 242 MS
PROTHROMBIN TIME: 15.7 SECONDS (ref 12.3–15)
QRS AXIS: 33 DEGREES
QRSD INTERVAL: 104 MS
QT INTERVAL: 432 MS
QTC INTERVAL: 479 MS
RBC # BLD AUTO: 3.97 MILLION/UL (ref 3.88–5.62)
SODIUM SERPL-SCNC: 140 MMOL/L (ref 135–147)
T WAVE AXIS: 54 DEGREES
VENTRICULAR RATE: 74 BPM
WBC # BLD AUTO: 4.51 THOUSAND/UL (ref 4.31–10.16)

## 2025-07-18 PROCEDURE — 97163 PT EVAL HIGH COMPLEX 45 MIN: CPT

## 2025-07-18 PROCEDURE — 97167 OT EVAL HIGH COMPLEX 60 MIN: CPT

## 2025-07-18 PROCEDURE — 85610 PROTHROMBIN TIME: CPT | Performed by: NURSE PRACTITIONER

## 2025-07-18 PROCEDURE — 80048 BASIC METABOLIC PNL TOTAL CA: CPT | Performed by: NURSE PRACTITIONER

## 2025-07-18 PROCEDURE — NC001 PR NO CHARGE: Performed by: NURSE PRACTITIONER

## 2025-07-18 PROCEDURE — 93010 ELECTROCARDIOGRAM REPORT: CPT | Performed by: INTERNAL MEDICINE

## 2025-07-18 PROCEDURE — 99223 1ST HOSP IP/OBS HIGH 75: CPT | Performed by: PHYSICIAN ASSISTANT

## 2025-07-18 PROCEDURE — 83735 ASSAY OF MAGNESIUM: CPT

## 2025-07-18 PROCEDURE — 70450 CT HEAD/BRAIN W/O DYE: CPT

## 2025-07-18 PROCEDURE — 85025 COMPLETE CBC W/AUTO DIFF WBC: CPT | Performed by: NURSE PRACTITIONER

## 2025-07-18 PROCEDURE — 99238 HOSP IP/OBS DSCHRG MGMT 30/<: CPT

## 2025-07-18 RX ORDER — ENOXAPARIN SODIUM 100 MG/ML
30 INJECTION SUBCUTANEOUS EVERY 12 HOURS SCHEDULED
Status: DISCONTINUED | OUTPATIENT
Start: 2025-07-18 | End: 2025-07-18 | Stop reason: HOSPADM

## 2025-07-18 RX ORDER — MAGNESIUM SULFATE HEPTAHYDRATE 40 MG/ML
2 INJECTION, SOLUTION INTRAVENOUS ONCE
Status: COMPLETED | OUTPATIENT
Start: 2025-07-18 | End: 2025-07-18

## 2025-07-18 RX ADMIN — OXCARBAZEPINE 600 MG: 150 TABLET, FILM COATED ORAL at 11:12

## 2025-07-18 RX ADMIN — ACETAMINOPHEN 325 MG: 325 TABLET ORAL at 02:16

## 2025-07-18 RX ADMIN — LEVETIRACETAM 500 MG: 500 TABLET, FILM COATED ORAL at 09:45

## 2025-07-18 RX ADMIN — AMOXICILLIN AND CLAVULANATE POTASSIUM 1 TABLET: 875; 125 TABLET, FILM COATED ORAL at 09:48

## 2025-07-18 RX ADMIN — Medication 2.5 MG: at 04:45

## 2025-07-18 RX ADMIN — Medication 1000 UNITS: at 09:45

## 2025-07-18 RX ADMIN — CHLORHEXIDINE GLUCONATE 15 ML: 1.2 SOLUTION ORAL at 09:45

## 2025-07-18 RX ADMIN — BACITRACIN 1 SMALL APPLICATION: 500 OINTMENT TOPICAL at 09:45

## 2025-07-18 RX ADMIN — MAGNESIUM SULFATE HEPTAHYDRATE 2 G: 40 INJECTION, SOLUTION INTRAVENOUS at 09:45
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
## 2025-07-18 NOTE — PLAN OF CARE
Problem: SAFETY ADULT  Goal: Patient will remain free of falls  Description: INTERVENTIONS:  - Educate patient/family on patient safety including physical limitations  - Instruct patient to call for assistance with activity   - Consider consulting OT/PT to assist with strengthening/mobility based on AM PAC & JH-HLM score  - Consult OT/PT to assist with strengthening/mobility   - Keep Call bell within reach  - Keep bed low and locked with side rails adjusted as appropriate  - Keep care items and personal belongings within reach  - Initiate and maintain comfort rounds  - Make Fall Risk Sign visible to staff  - Apply yellow socks and bracelet for high fall risk patients  - Consider moving patient to room near nurses station  Outcome: Progressing  Goal: Maintain or return to baseline ADL function  Description: INTERVENTIONS:  -  Assess patient's ability to carry out ADLs; assess patient's baseline for ADL function and identify physical deficits which impact ability to perform ADLs (bathing, care of mouth/teeth, toileting, grooming, dressing, etc.)  - Assess/evaluate cause of self-care deficits   - Assess range of motion  - Assess patient's mobility; develop plan if impaired  - Assess patient's need for assistive devices and provide as appropriate  - Encourage maximum independence but intervene and supervise when necessary  - Involve family in performance of ADLs  - Assess for home care needs following discharge   - Consider OT consult to assist with ADL evaluation and planning for discharge  - Provide patient education as appropriate  - Monitor functional capacity and physical performance, use of AM PAC & JH-HLM   - Monitor gait, balance and fatigue with ambulation    Outcome: Progressing  Goal: Maintains/Returns to pre admission functional level  Description: INTERVENTIONS:  - Perform AM-PAC 6 Click Basic Mobility/ Daily Activity assessment daily.  - Set and communicate daily mobility goal to care team and  patient/family/caregiver.   - Collaborate with rehabilitation services on mobility goals if consulted  - Out of bed for toileting  - Record patient progress and toleration of activity level   Outcome: Progressing     Problem: DISCHARGE PLANNING  Goal: Discharge to home or other facility with appropriate resources  Description: INTERVENTIONS:  - Identify barriers to discharge w/patient and caregiver  - Arrange for needed discharge resources and transportation as appropriate  - Identify discharge learning needs (meds, wound care, etc.)  - Arrange for interpretive services to assist at discharge as needed  - Refer to Case Management Department for coordinating discharge planning if the patient needs post-hospital services based on physician/advanced practitioner order or complex needs related to functional status, cognitive ability, or social support system  Outcome: Progressing     Problem: Knowledge Deficit  Goal: Patient/family/caregiver demonstrates understanding of disease process, treatment plan, medications, and discharge instructions  Description: Complete learning assessment and assess knowledge base.  Interventions:  - Provide teaching at level of understanding  - Provide teaching via preferred learning methods  Outcome: Progressing
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
## 2025-07-18 NOTE — ASSESSMENT & PLAN NOTE
PTA: furosemide  Currently normotensive    Plan:  If BP remains stable, resume nadolol tomorrow  Holding furosemide for now  Monitor I/Os  Daily weights
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
## 2025-07-18 NOTE — ASSESSMENT & PLAN NOTE
Last EGD 2/2025  Has had serial EGD with EVBL in the past  No current evidence of bleed  PTA: nadolol    Plan:  If BP remains stable, resume nadolol tomorrow
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
## 2025-07-18 NOTE — TELEPHONE ENCOUNTER
INPATIENT: 7/17/25 -     2 WK HFU - [facility]  8/7/25 / 7:00 / LOCO    IMAGING: CT HEAD - LAST 7/18/25    PER  LETY Fofana; Kathy Newman  Please arrange 2-week follow-up with [facility] with repeat CT head.  Thank you.
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
## 2025-07-18 NOTE — ASSESSMENT & PLAN NOTE
Last EGD 2/2025  Has had serial EGD with [facility] in the past  No current evidence of bleed  PTA: nadolol    Plan:  Continue nadolol

## 2025-07-18 NOTE — NURSING NOTE
[facility] Nicolette Mueller going over d/c instructions with pt and family members. IV removed. Pt and family verbalized understanding of d/c teaching, all questions answered. Pt leaving by ambulating independtly escorted by family.
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
## 2025-07-18 NOTE — DISCHARGE INSTR - AVS FIRST PAGE
Neurosurgery discharge instructions following traumatic head bleed:     Do not take any blood thinning medications (ie. No Advil. No motrin. No ibuprofen. No Aleve. No Aspirin. No fishoil. No heparin. No antiplatelet / no anticoagulation medication).  Refrain from activity that increases chance of trauma to head or falls. Recommend you take fall precaution.  No strenuous activity or sports.  Return to hospital Emergency Room if you experience worsening / new headache, nausea/vomiting, speech/vision change, seizure, confusion / mental status change, weakness, or other neurological changes.      Follow-up as scheduled with a repeat CT head without contrast to be completed 2-3 days prior to visit.  Prescription has been entered electronically.  Please call [phone] to schedule.
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
## 2025-07-18 NOTE — INCIDENTAL FINDINGS
The following findings require follow up:  Radiographic finding   Finding: Incidental scattered less than 6 mm lung nodules.   Follow up required: non-contrast CT chest   Follow up should be done within 6 month(s)    Please notify the following clinician to assist with the follow up:   Dr. Daphne Guzman MD    Incidental finding results were discussed with the Patient's POA by Nicolette Mueller PA-C on 07/18/25.   They expressed understanding and all questions answered.

## 2025-07-21 ENCOUNTER — TELEPHONE (OUTPATIENT)
Age: 60
End: 2025-07-21

## 2025-07-21 NOTE — TELEPHONE ENCOUNTER
Jeanne, the patient's in-law, called stating they received a call from Physical Therapy regarding the patient's referral. They need more specifics about the target/goal of the referral. Please call pt back. The patient's initial evaluation is scheduled for 07/25 at Physical Therapy, [facility].

## 2025-07-21 NOTE — UTILIZATION REVIEW
NOTIFICATION OF ADMISSION DISCHARGE   This is a Notification of Discharge from [facility]. Please be advised that this patient has been discharge from our facility. Below you will find the admission and discharge date and time including the patient’s disposition.   UTILIZATION REVIEW CONTACT:  Utilization Review Assistants  Network Utilization Review Department  Phone: [phone] x carefully listen to the prompts. All voicemails are confidential.  Email: [email or web address]     ADMISSION INFORMATION  PRESENTATION DATE: 7/17/2025  1:31 PM  OBERVATION ADMISSION DATE: N/A  INPATIENT ADMISSION DATE: 7/17/25  3:58 PM   DISCHARGE DATE: 7/18/2025  3:13 PM   DISPOSITION:Home/Self Care    Network Utilization Review Department  ATTENTION: Please call with any questions or concerns to [phone] and carefully listen to the prompts so that you are directed to the right person. All voicemails are confidential.   For Discharge needs, contact Care Management DC Support Team at [phone] opt. 2  Send all requests for admission clinical reviews, approved or denied determinations and any other requests to dedicated fax number below belonging to the campus where the patient is receiving treatment. List of dedicated fax numbers for the Facilities:  FACILITY NAME UR FAX NUMBER   ADMISSION DENIALS (Administrative/Medical Necessity) [phone]   DISCHARGE SUPPORT TEAM ([facility]) [phone]   PARENT CHILD HEALTH (Maternity/NICU/Pediatrics) [phone]   [facility] [phone]   [facility] [phone]   [facility] [phone]   [facility] [phone]   [facility] [phone]   [facility] [phone]   [facility] [phone]   [facility] [phone]   [facility]  [facility] [phone]   [facility] [phone]   [facility] [phone]   [facility] [phone]

## 2025-07-22 ENCOUNTER — TRANSITIONAL CARE MANAGEMENT (OUTPATIENT)
Dept: INTERNAL MEDICINE CLINIC | Facility: CLINIC | Age: 60
End: 2025-07-22

## 2025-07-24 LAB — OXCARBAZEPINE SERPL-MCNC: 24 UG/ML (ref 10–35)

## 2025-07-25 ENCOUNTER — EVALUATION (OUTPATIENT)
Dept: PHYSICAL THERAPY | Facility: CLINIC | Age: 60
End: 2025-07-25
Payer: MEDICARE
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
## 2025-07-25 DIAGNOSIS — I62.9 INTRACRANIAL HEMORRHAGE (HCC): Primary | ICD-10-CM

## 2025-07-25 PROCEDURE — 97112 NEUROMUSCULAR REEDUCATION: CPT

## 2025-07-25 PROCEDURE — 97162 PT EVAL MOD COMPLEX 30 MIN: CPT
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
## 2025-07-29 ENCOUNTER — OFFICE VISIT (OUTPATIENT)
Dept: PHYSICAL THERAPY | Facility: CLINIC | Age: 60
End: 2025-07-29
Payer: MEDICARE

## 2025-07-29 DIAGNOSIS — I62.9 INTRACRANIAL HEMORRHAGE (HCC): Primary | ICD-10-CM

## 2025-07-29 PROCEDURE — 97112 NEUROMUSCULAR REEDUCATION: CPT

## 2025-07-29 PROCEDURE — 97110 THERAPEUTIC EXERCISES: CPT

## 2025-07-31 ENCOUNTER — OFFICE VISIT (OUTPATIENT)
Dept: PHYSICAL THERAPY | Facility: CLINIC | Age: 60
End: 2025-07-31
Payer: MEDICARE

## 2025-07-31 ENCOUNTER — OFFICE VISIT (OUTPATIENT)
Dept: INTERNAL MEDICINE CLINIC | Facility: CLINIC | Age: 60
End: 2025-07-31
Payer: MEDICARE

## 2025-07-31 VITALS
WEIGHT: 182.8 LBS | TEMPERATURE: 98.3 F | BODY MASS INDEX: 26.17 KG/M2 | DIASTOLIC BLOOD PRESSURE: 54 MMHG | HEIGHT: 70 IN | HEART RATE: 70 BPM | SYSTOLIC BLOOD PRESSURE: 86 MMHG | OXYGEN SATURATION: 96 %

## 2025-07-31 DIAGNOSIS — M25.612 IMPAIRED RANGE OF MOTION OF BOTH SHOULDERS: ICD-10-CM

## 2025-07-31 DIAGNOSIS — I10 PRIMARY HYPERTENSION: ICD-10-CM

## 2025-07-31 DIAGNOSIS — E53.8 B12 DEFICIENCY: ICD-10-CM

## 2025-07-31 DIAGNOSIS — I62.9 INTRACRANIAL HEMORRHAGE (HCC): Primary | ICD-10-CM

## 2025-07-31 DIAGNOSIS — R91.1 LUNG NODULE SEEN ON IMAGING STUDY: Primary | ICD-10-CM

## 2025-07-31 DIAGNOSIS — D69.6 PLATELETS DECREASED (HCC): ICD-10-CM

## 2025-07-31 DIAGNOSIS — T07.XXXA MULTIPLE ABRASIONS: ICD-10-CM

## 2025-07-31 DIAGNOSIS — I62.9 INTRACRANIAL HEMORRHAGE (HCC): ICD-10-CM

## 2025-07-31 DIAGNOSIS — D50.0 IRON DEFICIENCY ANEMIA DUE TO CHRONIC BLOOD LOSS: ICD-10-CM

## 2025-07-31 DIAGNOSIS — M25.611 IMPAIRED RANGE OF MOTION OF BOTH SHOULDERS: ICD-10-CM

## 2025-07-31 PROCEDURE — 97110 THERAPEUTIC EXERCISES: CPT

## 2025-07-31 PROCEDURE — 97112 NEUROMUSCULAR REEDUCATION: CPT

## 2025-07-31 PROCEDURE — 99214 OFFICE O/P EST MOD 30 MIN: CPT

## 2025-07-31 PROCEDURE — G2211 COMPLEX E/M VISIT ADD ON: HCPCS

## 2025-08-04 ENCOUNTER — HOSPITAL ENCOUNTER (OUTPATIENT)
Dept: CT IMAGING | Facility: HOSPITAL | Age: 60
Discharge: HOME | End: 2025-08-04
Attending: PHYSICIAN ASSISTANT
Payer: MEDICARE

## 2025-08-04 DIAGNOSIS — I61.9 ICH (INTRACEREBRAL HEMORRHAGE) (HCC): ICD-10-CM

## 2025-08-04 DIAGNOSIS — I60.9 SUBARACHNOID HEMORRHAGE (HCC): ICD-10-CM

## 2025-08-04 PROCEDURE — 70450 CT HEAD/BRAIN W/O DYE: CPT

## 2025-08-05 ENCOUNTER — OFFICE VISIT (OUTPATIENT)
Dept: PHYSICAL THERAPY | Facility: CLINIC | Age: 60
End: 2025-08-05
Payer: MEDICARE

## 2025-08-05 DIAGNOSIS — I62.9 INTRACRANIAL HEMORRHAGE (HCC): Primary | ICD-10-CM

## 2025-08-05 PROCEDURE — 97110 THERAPEUTIC EXERCISES: CPT

## 2025-08-05 PROCEDURE — 97112 NEUROMUSCULAR REEDUCATION: CPT

## 2025-08-07 ENCOUNTER — TELEPHONE (OUTPATIENT)
Age: 60
End: 2025-08-07

## 2025-08-07 ENCOUNTER — OFFICE VISIT (OUTPATIENT)
Dept: NEUROSURGERY | Facility: CLINIC | Age: 60
End: 2025-08-07
Payer: MEDICARE

## 2025-08-07 ENCOUNTER — OFFICE VISIT (OUTPATIENT)
Dept: PHYSICAL THERAPY | Facility: CLINIC | Age: 60
End: 2025-08-07
Payer: MEDICARE

## 2025-08-07 VITALS
DIASTOLIC BLOOD PRESSURE: 64 MMHG | BODY MASS INDEX: 26.05 KG/M2 | HEART RATE: 72 BPM | OXYGEN SATURATION: 96 % | WEIGHT: 182 LBS | TEMPERATURE: 98.6 F | SYSTOLIC BLOOD PRESSURE: 104 MMHG | HEIGHT: 70 IN

## 2025-08-07 DIAGNOSIS — S06.5XAA SDH (SUBDURAL HEMATOMA) (HCC): Primary | ICD-10-CM

## 2025-08-07 DIAGNOSIS — I62.9 INTRACRANIAL HEMORRHAGE (HCC): Primary | ICD-10-CM

## 2025-08-07 PROCEDURE — 97110 THERAPEUTIC EXERCISES: CPT

## 2025-08-07 PROCEDURE — 99213 OFFICE O/P EST LOW 20 MIN: CPT | Performed by: PHYSICIAN ASSISTANT

## 2025-08-07 PROCEDURE — 97112 NEUROMUSCULAR REEDUCATION: CPT

## 2025-08-12 ENCOUNTER — APPOINTMENT (OUTPATIENT)
Dept: LAB | Facility: AMBULARY SURGERY CENTER | Age: 60
End: 2025-08-12
Payer: MEDICARE

## 2025-08-12 ENCOUNTER — OFFICE VISIT (OUTPATIENT)
Dept: PHYSICAL THERAPY | Facility: CLINIC | Age: 60
End: 2025-08-12
Payer: MEDICARE

## 2025-08-14 ENCOUNTER — OFFICE VISIT (OUTPATIENT)
Dept: PHYSICAL THERAPY | Facility: CLINIC | Age: 60
End: 2025-08-14
Payer: MEDICARE

## 2025-08-14 ENCOUNTER — TELEPHONE (OUTPATIENT)
Age: 60
End: 2025-08-14

## 2025-08-19 ENCOUNTER — EVALUATION (OUTPATIENT)
Dept: PHYSICAL THERAPY | Facility: CLINIC | Age: 60
End: 2025-08-19
Payer: MEDICARE

## 2025-08-19 DIAGNOSIS — I62.9 INTRACRANIAL HEMORRHAGE (HCC): Primary | ICD-10-CM

## 2025-08-19 PROCEDURE — 97112 NEUROMUSCULAR REEDUCATION: CPT

## 2025-08-21 ENCOUNTER — OFFICE VISIT (OUTPATIENT)
Dept: PHYSICAL THERAPY | Facility: CLINIC | Age: 60
End: 2025-08-21
Payer: MEDICARE

## 2025-08-21 DIAGNOSIS — I62.9 INTRACRANIAL HEMORRHAGE (HCC): Primary | ICD-10-CM

## 2025-08-21 PROCEDURE — 97110 THERAPEUTIC EXERCISES: CPT

## 2025-08-21 PROCEDURE — 97112 NEUROMUSCULAR REEDUCATION: CPT

## (undated) DEVICE — HARMONIC 1100 SHEARS, 36CM SHAFT LENGTH: Brand: HARMONIC

## (undated) DEVICE — TROCAR: Brand: KII FIOS FIRST ENTRY

## (undated) DEVICE — SEALANT PATCH 2 X 4 IN FIBRIN TOPICAL EVARREST

## (undated) DEVICE — SUT MONOCRYL 4-0 PS-2 27 IN Y426H

## (undated) DEVICE — PACK PBDS LAP CHOLE RF

## (undated) DEVICE — ABDOMINAL PAD: Brand: DERMACEA

## (undated) DEVICE — GLOVE SRG BIOGEL ORTHOPEDIC 8

## (undated) DEVICE — NEEDLE 22 G X 1 1/2 SAFETY

## (undated) DEVICE — SUT VICRYL 0 UR-6 27 IN J603H

## (undated) DEVICE — DECANTER: Brand: UNBRANDED

## (undated) DEVICE — INTENDED FOR TISSUE SEPARATION, AND OTHER PROCEDURES THAT REQUIRE A SHARP SURGICAL BLADE TO PUNCTURE OR CUT.: Brand: BARD-PARKER SAFETY BLADES SIZE 11, STERILE

## (undated) DEVICE — LIGAMAX 5 MM ENDOSCOPIC MULTIPLE CLIP APPLIER: Brand: LIGAMAX

## (undated) DEVICE — ADHESIVE SKIN HIGH VISCOSITY EXOFIN 1ML

## (undated) DEVICE — TROCAR: Brand: KII® SLEEVE

## (undated) DEVICE — TOWEL SURG XR DETECT GREEN STRL RFD